# Patient Record
Sex: MALE | Race: WHITE | NOT HISPANIC OR LATINO | Employment: OTHER | URBAN - METROPOLITAN AREA
[De-identification: names, ages, dates, MRNs, and addresses within clinical notes are randomized per-mention and may not be internally consistent; named-entity substitution may affect disease eponyms.]

---

## 2023-01-01 ENCOUNTER — RADIATION ONCOLOGY WEEKLY ASSESSMENT (OUTPATIENT)
Dept: RADIATION ONCOLOGY | Facility: HOSPITAL | Age: 68
End: 2023-01-01
Payer: MEDICARE

## 2023-01-01 ENCOUNTER — APPOINTMENT (OUTPATIENT)
Dept: GENERAL RADIOLOGY | Facility: HOSPITAL | Age: 68
End: 2023-01-01
Payer: MEDICARE

## 2023-01-01 ENCOUNTER — HOSPITAL ENCOUNTER (OUTPATIENT)
Dept: RADIATION ONCOLOGY | Facility: HOSPITAL | Age: 68
Setting detail: RADIATION/ONCOLOGY SERIES
End: 2023-01-01
Payer: MEDICARE

## 2023-01-01 ENCOUNTER — TELEPHONE (OUTPATIENT)
Dept: RADIATION ONCOLOGY | Facility: HOSPITAL | Age: 68
End: 2023-01-01
Payer: MEDICARE

## 2023-01-01 ENCOUNTER — APPOINTMENT (OUTPATIENT)
Dept: RADIATION ONCOLOGY | Facility: HOSPITAL | Age: 68
End: 2023-01-01
Payer: MEDICARE

## 2023-01-01 ENCOUNTER — HOSPITAL ENCOUNTER (OUTPATIENT)
Facility: HOSPITAL | Age: 68
Setting detail: OBSERVATION
End: 2023-08-02
Attending: STUDENT IN AN ORGANIZED HEALTH CARE EDUCATION/TRAINING PROGRAM | Admitting: STUDENT IN AN ORGANIZED HEALTH CARE EDUCATION/TRAINING PROGRAM
Payer: MEDICARE

## 2023-01-01 ENCOUNTER — APPOINTMENT (OUTPATIENT)
Dept: CT IMAGING | Facility: HOSPITAL | Age: 68
End: 2023-01-01
Payer: MEDICARE

## 2023-01-01 VITALS
RESPIRATION RATE: 15 BRPM | HEIGHT: 70 IN | WEIGHT: 156.6 LBS | DIASTOLIC BLOOD PRESSURE: 63 MMHG | BODY MASS INDEX: 22.42 KG/M2 | OXYGEN SATURATION: 92 % | SYSTOLIC BLOOD PRESSURE: 101 MMHG | HEART RATE: 72 BPM | TEMPERATURE: 98.3 F

## 2023-01-01 VITALS
OXYGEN SATURATION: 97 % | HEART RATE: 112 BPM | SYSTOLIC BLOOD PRESSURE: 121 MMHG | RESPIRATION RATE: 24 BRPM | WEIGHT: 150 LBS | HEIGHT: 67 IN | DIASTOLIC BLOOD PRESSURE: 71 MMHG | BODY MASS INDEX: 23.54 KG/M2 | TEMPERATURE: 99.1 F

## 2023-01-01 VITALS
OXYGEN SATURATION: 95 % | SYSTOLIC BLOOD PRESSURE: 107 MMHG | RESPIRATION RATE: 24 BRPM | DIASTOLIC BLOOD PRESSURE: 69 MMHG | HEART RATE: 80 BPM

## 2023-01-01 DIAGNOSIS — C79.51 METASTASIS TO BONE: Primary | ICD-10-CM

## 2023-01-01 DIAGNOSIS — J96.91 RESPIRATORY FAILURE WITH HYPOXIA AND HYPERCAPNIA, UNSPECIFIED CHRONICITY: ICD-10-CM

## 2023-01-01 DIAGNOSIS — J96.92 RESPIRATORY FAILURE WITH HYPOXIA AND HYPERCAPNIA, UNSPECIFIED CHRONICITY: ICD-10-CM

## 2023-01-01 DIAGNOSIS — Z85.118 HISTORY OF LUNG CANCER: ICD-10-CM

## 2023-01-01 DIAGNOSIS — R41.82 ALTERED MENTAL STATUS, UNSPECIFIED ALTERED MENTAL STATUS TYPE: Primary | ICD-10-CM

## 2023-01-01 LAB
ALBUMIN SERPL-MCNC: 1.9 G/DL (ref 3.5–5.2)
ALBUMIN/GLOB SERPL: 0.5 G/DL
ALP SERPL-CCNC: 141 U/L (ref 39–117)
ALT SERPL W P-5'-P-CCNC: 48 U/L (ref 1–41)
ANION GAP SERPL CALCULATED.3IONS-SCNC: 6 MMOL/L (ref 5–15)
APTT PPP: 25.1 SECONDS (ref 61–76.5)
ARTERIAL PATENCY WRIST A: POSITIVE
AST SERPL-CCNC: 34 U/L (ref 1–40)
ATMOSPHERIC PRESS: ABNORMAL MM[HG]
BACTERIA UR QL AUTO: ABNORMAL /HPF
BASE EXCESS BLDA CALC-SCNC: 19.2 MMOL/L (ref 0–3)
BASOPHILS # BLD AUTO: 0 10*3/MM3 (ref 0–0.2)
BASOPHILS NFR BLD AUTO: 0 % (ref 0–1.5)
BDY SITE: ABNORMAL
BILIRUB SERPL-MCNC: <0.2 MG/DL (ref 0–1.2)
BILIRUB UR QL STRIP: NEGATIVE
BUN SERPL-MCNC: 23 MG/DL (ref 8–23)
BUN/CREAT SERPL: 76.7 (ref 7–25)
CALCIUM SPEC-SCNC: 7.7 MG/DL (ref 8.6–10.5)
CHLORIDE SERPL-SCNC: 101 MMOL/L (ref 98–107)
CLARITY UR: CLEAR
CO2 BLDA-SCNC: 49.6 MMOL/L (ref 22–29)
CO2 SERPL-SCNC: 35 MMOL/L (ref 22–29)
COLOR UR: YELLOW
CREAT SERPL-MCNC: 0.3 MG/DL (ref 0.76–1.27)
DEPRECATED RDW RBC AUTO: 64.8 FL (ref 37–54)
EGFRCR SERPLBLD CKD-EPI 2021: 130.4 ML/MIN/1.73
EOSINOPHIL # BLD AUTO: 0 10*3/MM3 (ref 0–0.4)
EOSINOPHIL NFR BLD AUTO: 0 % (ref 0.3–6.2)
ERYTHROCYTE [DISTWIDTH] IN BLOOD BY AUTOMATED COUNT: 20.6 % (ref 12.3–15.4)
GLOBULIN UR ELPH-MCNC: 3.9 GM/DL
GLUCOSE BLDC GLUCOMTR-MCNC: 173 MG/DL (ref 70–105)
GLUCOSE BLDC GLUCOMTR-MCNC: 183 MG/DL (ref 70–105)
GLUCOSE SERPL-MCNC: 141 MG/DL (ref 65–99)
GLUCOSE UR STRIP-MCNC: ABNORMAL MG/DL
HCO3 BLDA-SCNC: 47.1 MMOL/L (ref 21–28)
HCT VFR BLD AUTO: 26.2 % (ref 37.5–51)
HEMODILUTION: NO
HGB BLD-MCNC: 7.9 G/DL (ref 13–17.7)
HGB UR QL STRIP.AUTO: NEGATIVE
HYALINE CASTS UR QL AUTO: ABNORMAL /LPF
INHALED O2 CONCENTRATION: 33 %
INR PPP: 1.13 (ref 0.93–1.1)
KETONES UR QL STRIP: NEGATIVE
LEUKOCYTE ESTERASE UR QL STRIP.AUTO: ABNORMAL
LYMPHOCYTES # BLD AUTO: 0.6 10*3/MM3 (ref 0.7–3.1)
LYMPHOCYTES NFR BLD AUTO: 5.1 % (ref 19.6–45.3)
MCH RBC QN AUTO: 25.7 PG (ref 26.6–33)
MCHC RBC AUTO-ENTMCNC: 30.2 G/DL (ref 31.5–35.7)
MCV RBC AUTO: 85.2 FL (ref 79–97)
MODALITY: ABNORMAL
MONOCYTES # BLD AUTO: 0.5 10*3/MM3 (ref 0.1–0.9)
MONOCYTES NFR BLD AUTO: 4 % (ref 5–12)
MUCOUS THREADS URNS QL MICRO: ABNORMAL /HPF
NEUTROPHILS NFR BLD AUTO: 11.4 10*3/MM3 (ref 1.7–7)
NEUTROPHILS NFR BLD AUTO: 90.9 % (ref 42.7–76)
NITRITE UR QL STRIP: NEGATIVE
NRBC BLD AUTO-RTO: 0.3 /100 WBC (ref 0–0.2)
PCO2 BLDA: 79.1 MM HG (ref 35–48)
PH BLDA: 7.38 PH UNITS (ref 7.35–7.45)
PH UR STRIP.AUTO: 5.5 [PH] (ref 5–8)
PLATELET # BLD AUTO: 310 10*3/MM3 (ref 140–450)
PMV BLD AUTO: 7.8 FL (ref 6–12)
PO2 BLDA: 75 MM HG (ref 83–108)
POTASSIUM SERPL-SCNC: 3.8 MMOL/L (ref 3.5–5.2)
PROT SERPL-MCNC: 5.8 G/DL (ref 6–8.5)
PROT UR QL STRIP: ABNORMAL
PROTHROMBIN TIME: 12 SECONDS (ref 9.6–11.7)
RAD ONC ARIA COURSE ID: NORMAL
RAD ONC ARIA COURSE INTENT: NORMAL
RAD ONC ARIA COURSE LAST TREATMENT DATE: NORMAL
RAD ONC ARIA COURSE START DATE: NORMAL
RAD ONC ARIA COURSE TREATMENT ELAPSED DAYS: 19
RAD ONC ARIA FIRST TREATMENT DATE: NORMAL
RAD ONC ARIA PLAN FRACTIONS TREATED TO DATE: 8
RAD ONC ARIA PLAN FRACTIONS TREATED TO DATE: 8
RAD ONC ARIA PLAN ID: NORMAL
RAD ONC ARIA PLAN ID: NORMAL
RAD ONC ARIA PLAN NAME: NORMAL
RAD ONC ARIA PLAN NAME: NORMAL
RAD ONC ARIA PLAN PRESCRIBED DOSE PER FRACTION: 1.5 GY
RAD ONC ARIA PLAN PRESCRIBED DOSE PER FRACTION: 1.5 GY
RAD ONC ARIA PLAN PRIMARY REFERENCE POINT: NORMAL
RAD ONC ARIA PLAN PRIMARY REFERENCE POINT: NORMAL
RAD ONC ARIA PLAN TOTAL FRACTIONS PRESCRIBED: 10
RAD ONC ARIA PLAN TOTAL FRACTIONS PRESCRIBED: 10
RAD ONC ARIA PLAN TOTAL PRESCRIBED DOSE: 1500 CGY
RAD ONC ARIA PLAN TOTAL PRESCRIBED DOSE: 1500 CGY
RAD ONC ARIA REFERENCE POINT DOSAGE GIVEN TO DATE: 24 GY
RAD ONC ARIA REFERENCE POINT ID: NORMAL
RAD ONC ARIA REFERENCE POINT SESSION DOSAGE GIVEN: 3 GY
RBC # BLD AUTO: 3.07 10*6/MM3 (ref 4.14–5.8)
RBC # UR STRIP: ABNORMAL /HPF
REF LAB TEST METHOD: ABNORMAL
SAO2 % BLDCOA: 93.5 % (ref 94–98)
SODIUM SERPL-SCNC: 142 MMOL/L (ref 136–145)
SP GR UR STRIP: 1.02 (ref 1–1.03)
SQUAMOUS #/AREA URNS HPF: ABNORMAL /HPF
TROPONIN T SERPL HS-MCNC: 31 NG/L
UROBILINOGEN UR QL STRIP: ABNORMAL
WBC # UR STRIP: ABNORMAL /HPF
WBC NRBC COR # BLD: 12.5 10*3/MM3 (ref 3.4–10.8)

## 2023-01-01 PROCEDURE — 77290 THER RAD SIMULAJ FIELD CPLX: CPT | Performed by: RADIOLOGY

## 2023-01-01 PROCEDURE — 77280 THER RAD SIMULAJ FIELD SMPL: CPT | Performed by: STUDENT IN AN ORGANIZED HEALTH CARE EDUCATION/TRAINING PROGRAM

## 2023-01-01 PROCEDURE — 25010000002 MORPHINE PER 10 MG: Performed by: STUDENT IN AN ORGANIZED HEALTH CARE EDUCATION/TRAINING PROGRAM

## 2023-01-01 PROCEDURE — 77412 RADIATION TX DELIVERY LVL 3: CPT | Performed by: RADIOLOGY

## 2023-01-01 PROCEDURE — G0378 HOSPITAL OBSERVATION PER HR: HCPCS

## 2023-01-01 PROCEDURE — 82948 REAGENT STRIP/BLOOD GLUCOSE: CPT

## 2023-01-01 PROCEDURE — 77295 3-D RADIOTHERAPY PLAN: CPT | Performed by: RADIOLOGY

## 2023-01-01 PROCEDURE — 94799 UNLISTED PULMONARY SVC/PX: CPT

## 2023-01-01 PROCEDURE — 77412 RADIATION TX DELIVERY LVL 3: CPT | Performed by: STUDENT IN AN ORGANIZED HEALTH CARE EDUCATION/TRAINING PROGRAM

## 2023-01-01 PROCEDURE — 77427 RADIATION TX MANAGEMENT X5: CPT | Performed by: RADIOLOGY

## 2023-01-01 PROCEDURE — 96376 TX/PRO/DX INJ SAME DRUG ADON: CPT

## 2023-01-01 PROCEDURE — 25010000002 CEFTRIAXONE PER 250 MG: Performed by: NURSE PRACTITIONER

## 2023-01-01 PROCEDURE — 84484 ASSAY OF TROPONIN QUANT: CPT | Performed by: NURSE PRACTITIONER

## 2023-01-01 PROCEDURE — 96365 THER/PROPH/DIAG IV INF INIT: CPT

## 2023-01-01 PROCEDURE — 25010000002 NALOXONE HCL 2 MG/2ML SOLUTION PREFILLED SYRINGE: Performed by: NURSE PRACTITIONER

## 2023-01-01 PROCEDURE — 85025 COMPLETE CBC W/AUTO DIFF WBC: CPT | Performed by: NURSE PRACTITIONER

## 2023-01-01 PROCEDURE — 25010000002 LORAZEPAM PER 2 MG: Performed by: STUDENT IN AN ORGANIZED HEALTH CARE EDUCATION/TRAINING PROGRAM

## 2023-01-01 PROCEDURE — 94660 CPAP INITIATION&MGMT: CPT

## 2023-01-01 PROCEDURE — 77334 RADIATION TREATMENT AID(S): CPT | Performed by: RADIOLOGY

## 2023-01-01 PROCEDURE — 81001 URINALYSIS AUTO W/SCOPE: CPT | Performed by: NURSE PRACTITIONER

## 2023-01-01 PROCEDURE — 77300 RADIATION THERAPY DOSE PLAN: CPT | Performed by: RADIOLOGY

## 2023-01-01 PROCEDURE — 85730 THROMBOPLASTIN TIME PARTIAL: CPT | Performed by: NURSE PRACTITIONER

## 2023-01-01 PROCEDURE — 36415 COLL VENOUS BLD VENIPUNCTURE: CPT

## 2023-01-01 PROCEDURE — 96375 TX/PRO/DX INJ NEW DRUG ADDON: CPT

## 2023-01-01 PROCEDURE — 85610 PROTHROMBIN TIME: CPT | Performed by: NURSE PRACTITIONER

## 2023-01-01 PROCEDURE — 71045 X-RAY EXAM CHEST 1 VIEW: CPT

## 2023-01-01 PROCEDURE — 80053 COMPREHEN METABOLIC PANEL: CPT | Performed by: NURSE PRACTITIONER

## 2023-01-01 PROCEDURE — 82803 BLOOD GASES ANY COMBINATION: CPT

## 2023-01-01 PROCEDURE — 36600 WITHDRAWAL OF ARTERIAL BLOOD: CPT

## 2023-01-01 PROCEDURE — 99284 EMERGENCY DEPT VISIT MOD MDM: CPT

## 2023-01-01 PROCEDURE — 70450 CT HEAD/BRAIN W/O DYE: CPT

## 2023-01-01 PROCEDURE — 87040 BLOOD CULTURE FOR BACTERIA: CPT | Performed by: NURSE PRACTITIONER

## 2023-01-01 RX ORDER — MIDODRINE HYDROCHLORIDE 10 MG/1
10 TABLET ORAL
COMMUNITY

## 2023-01-01 RX ORDER — LORAZEPAM 2 MG/ML
0.5 CONCENTRATE ORAL
Status: DISCONTINUED | OUTPATIENT
Start: 2023-01-01 | End: 2023-01-01 | Stop reason: HOSPADM

## 2023-01-01 RX ORDER — ACETAMINOPHEN 160 MG/5ML
650 SOLUTION ORAL EVERY 4 HOURS PRN
Status: DISCONTINUED | OUTPATIENT
Start: 2023-01-01 | End: 2023-01-01 | Stop reason: HOSPADM

## 2023-01-01 RX ORDER — SCOLOPAMINE TRANSDERMAL SYSTEM 1 MG/1
1 PATCH, EXTENDED RELEASE TRANSDERMAL
Status: DISCONTINUED | OUTPATIENT
Start: 2023-01-01 | End: 2023-01-01 | Stop reason: HOSPADM

## 2023-01-01 RX ORDER — LORAZEPAM 2 MG/ML
2 INJECTION INTRAMUSCULAR
Status: DISCONTINUED | OUTPATIENT
Start: 2023-01-01 | End: 2023-01-01 | Stop reason: HOSPADM

## 2023-01-01 RX ORDER — SODIUM CHLORIDE 9 MG/ML
40 INJECTION, SOLUTION INTRAVENOUS AS NEEDED
Status: DISCONTINUED | OUTPATIENT
Start: 2023-01-01 | End: 2023-01-01 | Stop reason: HOSPADM

## 2023-01-01 RX ORDER — LORAZEPAM 2 MG/ML
1 CONCENTRATE ORAL
Status: DISCONTINUED | OUTPATIENT
Start: 2023-01-01 | End: 2023-01-01 | Stop reason: HOSPADM

## 2023-01-01 RX ORDER — LORAZEPAM 0.5 MG/1
0.5 TABLET ORAL EVERY 8 HOURS PRN
COMMUNITY

## 2023-01-01 RX ORDER — HALOPERIDOL 1 MG/1
1 TABLET ORAL EVERY 4 HOURS PRN
Status: DISCONTINUED | OUTPATIENT
Start: 2023-01-01 | End: 2023-01-01 | Stop reason: HOSPADM

## 2023-01-01 RX ORDER — LORAZEPAM 2 MG/ML
1 INJECTION INTRAMUSCULAR
Status: DISCONTINUED | OUTPATIENT
Start: 2023-01-01 | End: 2023-01-01 | Stop reason: HOSPADM

## 2023-01-01 RX ORDER — GLYCOPYRROLATE 0.2 MG/ML
0.4 INJECTION INTRAMUSCULAR; INTRAVENOUS
Status: DISCONTINUED | OUTPATIENT
Start: 2023-01-01 | End: 2023-01-01 | Stop reason: HOSPADM

## 2023-01-01 RX ORDER — LORAZEPAM 2 MG/ML
0.5 INJECTION INTRAMUSCULAR
Status: DISCONTINUED | OUTPATIENT
Start: 2023-01-01 | End: 2023-01-01 | Stop reason: HOSPADM

## 2023-01-01 RX ORDER — CARBOXYMETHYLCELLULOSE SODIUM 10 MG/ML
1 GEL OPHTHALMIC
Status: DISCONTINUED | OUTPATIENT
Start: 2023-01-01 | End: 2023-01-01 | Stop reason: HOSPADM

## 2023-01-01 RX ORDER — DIPHENOXYLATE HYDROCHLORIDE AND ATROPINE SULFATE 2.5; .025 MG/1; MG/1
1 TABLET ORAL
Status: DISCONTINUED | OUTPATIENT
Start: 2023-01-01 | End: 2023-01-01 | Stop reason: HOSPADM

## 2023-01-01 RX ORDER — NICOTINE POLACRILEX 4 MG
LOZENGE BUCCAL
COMMUNITY

## 2023-01-01 RX ORDER — GLYCOPYRROLATE 0.2 MG/ML
0.2 INJECTION INTRAMUSCULAR; INTRAVENOUS
Status: DISCONTINUED | OUTPATIENT
Start: 2023-01-01 | End: 2023-01-01 | Stop reason: HOSPADM

## 2023-01-01 RX ORDER — NALOXONE HYDROCHLORIDE 1 MG/ML
2 INJECTION INTRAMUSCULAR; INTRAVENOUS; SUBCUTANEOUS ONCE
Status: COMPLETED | OUTPATIENT
Start: 2023-01-01 | End: 2023-01-01

## 2023-01-01 RX ORDER — SODIUM CHLORIDE 0.9 % (FLUSH) 0.9 %
10 SYRINGE (ML) INJECTION EVERY 12 HOURS SCHEDULED
Status: DISCONTINUED | OUTPATIENT
Start: 2023-01-01 | End: 2023-01-01 | Stop reason: HOSPADM

## 2023-01-01 RX ORDER — LORAZEPAM 1 MG/1
1 TABLET ORAL
Status: DISCONTINUED | OUTPATIENT
Start: 2023-01-01 | End: 2023-01-01 | Stop reason: HOSPADM

## 2023-01-01 RX ORDER — ACETAMINOPHEN 650 MG/1
650 SUPPOSITORY RECTAL EVERY 4 HOURS PRN
Status: DISCONTINUED | OUTPATIENT
Start: 2023-01-01 | End: 2023-01-01 | Stop reason: HOSPADM

## 2023-01-01 RX ORDER — SODIUM CHLORIDE 0.9 % (FLUSH) 0.9 %
10 SYRINGE (ML) INJECTION AS NEEDED
Status: DISCONTINUED | OUTPATIENT
Start: 2023-01-01 | End: 2023-01-01

## 2023-01-01 RX ORDER — LORAZEPAM 2 MG/ML
2 CONCENTRATE ORAL
Status: DISCONTINUED | OUTPATIENT
Start: 2023-01-01 | End: 2023-01-01 | Stop reason: HOSPADM

## 2023-01-01 RX ORDER — MORPHINE SULFATE 20 MG/ML
20 SOLUTION ORAL
Status: DISCONTINUED | OUTPATIENT
Start: 2023-01-01 | End: 2023-01-01 | Stop reason: HOSPADM

## 2023-01-01 RX ORDER — SODIUM CHLORIDE 0.9 % (FLUSH) 0.9 %
10 SYRINGE (ML) INJECTION AS NEEDED
Status: DISCONTINUED | OUTPATIENT
Start: 2023-01-01 | End: 2023-01-01 | Stop reason: HOSPADM

## 2023-01-01 RX ORDER — IPRATROPIUM BROMIDE AND ALBUTEROL SULFATE 2.5; .5 MG/3ML; MG/3ML
3 SOLUTION RESPIRATORY (INHALATION) 3 TIMES DAILY
COMMUNITY

## 2023-01-01 RX ORDER — HALOPERIDOL 5 MG/ML
1 INJECTION INTRAMUSCULAR EVERY 4 HOURS PRN
Status: DISCONTINUED | OUTPATIENT
Start: 2023-01-01 | End: 2023-01-01 | Stop reason: HOSPADM

## 2023-01-01 RX ORDER — ACETAMINOPHEN 325 MG/1
650 TABLET ORAL EVERY 4 HOURS PRN
Status: DISCONTINUED | OUTPATIENT
Start: 2023-01-01 | End: 2023-01-01 | Stop reason: HOSPADM

## 2023-01-01 RX ORDER — LORAZEPAM 0.5 MG/1
0.5 TABLET ORAL
Status: DISCONTINUED | OUTPATIENT
Start: 2023-01-01 | End: 2023-01-01 | Stop reason: HOSPADM

## 2023-01-01 RX ORDER — LORAZEPAM 1 MG/1
2 TABLET ORAL
Status: DISCONTINUED | OUTPATIENT
Start: 2023-01-01 | End: 2023-01-01 | Stop reason: HOSPADM

## 2023-01-01 RX ORDER — METOPROLOL TARTRATE 75 MG/1
1 TABLET, FILM COATED ORAL 2 TIMES DAILY
COMMUNITY

## 2023-01-01 RX ADMIN — LORAZEPAM 2 MG: 2 INJECTION INTRAMUSCULAR; INTRAVENOUS at 00:03

## 2023-01-01 RX ADMIN — Medication 10 ML: at 02:51

## 2023-01-01 RX ADMIN — MORPHINE SULFATE 6 MG: 4 INJECTION, SOLUTION INTRAMUSCULAR; INTRAVENOUS at 00:02

## 2023-01-01 RX ADMIN — LORAZEPAM 2 MG: 2 INJECTION INTRAMUSCULAR; INTRAVENOUS at 02:51

## 2023-01-01 RX ADMIN — CEFTRIAXONE 1000 MG: 1 INJECTION, POWDER, FOR SOLUTION INTRAMUSCULAR; INTRAVENOUS at 17:40

## 2023-01-01 RX ADMIN — LORAZEPAM 2 MG: 2 INJECTION INTRAMUSCULAR; INTRAVENOUS at 01:18

## 2023-01-01 RX ADMIN — MORPHINE SULFATE 6 MG: 4 INJECTION, SOLUTION INTRAMUSCULAR; INTRAVENOUS at 02:51

## 2023-01-01 RX ADMIN — NALOXONE HYDROCHLORIDE 2 MG: 1 INJECTION PARENTERAL at 14:38

## 2023-01-01 RX ADMIN — LORAZEPAM 0.5 MG: 2 INJECTION INTRAMUSCULAR; INTRAVENOUS at 19:23

## 2023-01-01 RX ADMIN — MORPHINE SULFATE 6 MG: 4 INJECTION, SOLUTION INTRAMUSCULAR; INTRAVENOUS at 19:13

## 2023-01-01 RX ADMIN — MORPHINE SULFATE 6 MG: 4 INJECTION, SOLUTION INTRAMUSCULAR; INTRAVENOUS at 01:18

## 2023-01-01 RX ADMIN — Medication 10 ML: at 01:18

## 2023-01-01 RX ADMIN — Medication 10 ML: at 00:03

## 2023-01-01 RX ADMIN — MORPHINE SULFATE 6 MG: 4 INJECTION, SOLUTION INTRAMUSCULAR; INTRAVENOUS at 21:29

## 2023-04-30 ENCOUNTER — APPOINTMENT (OUTPATIENT)
Dept: GENERAL RADIOLOGY | Facility: HOSPITAL | Age: 68
End: 2023-04-30
Payer: MEDICARE

## 2023-04-30 ENCOUNTER — HOSPITAL ENCOUNTER (EMERGENCY)
Facility: HOSPITAL | Age: 68
Discharge: HOME | End: 2023-04-30
Attending: EMERGENCY MEDICINE | Admitting: EMERGENCY MEDICINE
Payer: MEDICARE

## 2023-04-30 VITALS
OXYGEN SATURATION: 95 % | HEART RATE: 96 BPM | RESPIRATION RATE: 18 BRPM | HEIGHT: 70 IN | BODY MASS INDEX: 23.48 KG/M2 | DIASTOLIC BLOOD PRESSURE: 86 MMHG | WEIGHT: 164.02 LBS | TEMPERATURE: 98.6 F | SYSTOLIC BLOOD PRESSURE: 141 MMHG

## 2023-04-30 DIAGNOSIS — S76.011A HIP STRAIN, RIGHT, INITIAL ENCOUNTER: Primary | ICD-10-CM

## 2023-04-30 DIAGNOSIS — S70.01XA CONTUSION OF RIGHT HIP, INITIAL ENCOUNTER: ICD-10-CM

## 2023-04-30 PROCEDURE — 99283 EMERGENCY DEPT VISIT LOW MDM: CPT

## 2023-04-30 PROCEDURE — 72110 X-RAY EXAM L-2 SPINE 4/>VWS: CPT

## 2023-04-30 PROCEDURE — 73502 X-RAY EXAM HIP UNI 2-3 VIEWS: CPT

## 2023-04-30 RX ORDER — LIDOCAINE 50 MG/G
1 PATCH TOPICAL EVERY 24 HOURS
Qty: 30 EACH | Refills: 0 | Status: SHIPPED | OUTPATIENT
Start: 2023-04-30

## 2023-04-30 RX ORDER — NAPROXEN 500 MG/1
500 TABLET ORAL 2 TIMES DAILY PRN
Qty: 30 TABLET | Refills: 0 | Status: SHIPPED | OUTPATIENT
Start: 2023-04-30

## 2023-04-30 RX ORDER — METHOCARBAMOL 750 MG/1
750 TABLET, FILM COATED ORAL ONCE
Status: COMPLETED | OUTPATIENT
Start: 2023-04-30 | End: 2023-04-30

## 2023-04-30 RX ORDER — LIDOCAINE 50 MG/G
1 PATCH TOPICAL
Status: DISCONTINUED | OUTPATIENT
Start: 2023-04-30 | End: 2023-04-30 | Stop reason: HOSPADM

## 2023-04-30 RX ORDER — HYDROCODONE BITARTRATE AND ACETAMINOPHEN 5; 325 MG/1; MG/1
1 TABLET ORAL ONCE
Status: COMPLETED | OUTPATIENT
Start: 2023-04-30 | End: 2023-04-30

## 2023-04-30 RX ADMIN — METHOCARBAMOL 750 MG: 750 TABLET ORAL at 11:19

## 2023-04-30 RX ADMIN — LIDOCAINE 1 PATCH: 50 PATCH TOPICAL at 11:19

## 2023-04-30 RX ADMIN — HYDROCODONE BITARTRATE AND ACETAMINOPHEN 1 TABLET: 5; 325 TABLET ORAL at 11:19

## 2023-04-30 NOTE — DISCHARGE INSTRUCTIONS
Please use lidocaine patch for local relief, if these are too expensive please use over-the-counter formulations instead.  Please alternate between Tylenol and naproxen for pain control, please follow-up with orthopedic surgery,  above.

## 2023-04-30 NOTE — Clinical Note
[facility] EMERGENCY DEPARTMENT  [address] [place] [address]  Phone: [phone]    Isra Davenport was seen and treated in our emergency department on 4/30/2023.  He may return to work on 05/01/2023.         Thank you for choosing [facility].    Timi You PA

## 2023-04-30 NOTE — ED PROVIDER NOTES
Subjective   History of Present Illness     Patient is a 67-year-old male comes in complaining of fall and apparent right hip injury 1 week ago.  Patient states he fell about 4 feet onto his right hip after tripping over a screw that was sticking up out of the floor.  Patient states he hit his head at that time as well 1 week ago.  Patient denied any loss of consciousness, vomiting or syncopal episode prior.  Patient states pain is about a 6 out of 10 and nothing seems to make it better or worse.  Patient denies any saddle anesthesia, numbness or tingling bilateral lower extremities, urinary or bowel dysfunction.    Review of Systems   Constitutional: Negative for chills, fatigue and fever.   HENT: Negative for congestion, sore throat, tinnitus and trouble swallowing.    Eyes: Negative for photophobia, discharge and visual disturbance.   Respiratory: Negative for cough and shortness of breath.    Cardiovascular: Negative for chest pain and leg swelling.   Gastrointestinal: Negative for abdominal pain, diarrhea, nausea and vomiting.   Genitourinary: Negative for dysuria, flank pain, frequency and urgency.   Musculoskeletal: Negative for arthralgias and myalgias.        Right hip pain   Skin: Negative for rash.   Neurological: Negative for dizziness, syncope, light-headedness and headaches.   Psychiatric/Behavioral: Negative for confusion.       History reviewed. No pertinent past medical history.    Allergies   Allergen Reactions   • Ibuprofen Itching   • Penicillins Hives       History reviewed. No pertinent surgical history.    History reviewed. No pertinent family history.    Social History     Socioeconomic History   • Marital status:            Objective   Physical Exam  Vitals and nursing note reviewed.   Constitutional:       General: He is not in acute distress.     Appearance: He is well-developed. He is not diaphoretic.   HENT:      Head: Normocephalic and atraumatic.      Right Ear: External ear  "normal.      Left Ear: External ear normal.      Nose: Nose normal.      Mouth/Throat:      Mouth: Mucous membranes are moist.   Eyes:      Extraocular Movements: Extraocular movements intact.      Conjunctiva/sclera: Conjunctivae normal.      Pupils: Pupils are equal, round, and reactive to light.   Cardiovascular:      Rate and Rhythm: Normal rate and regular rhythm.      Pulses: Normal pulses.      Heart sounds: Normal heart sounds.      Comments: S1, S2 audible.  Pulmonary:      Effort: Pulmonary effort is normal. No respiratory distress.      Breath sounds: Normal breath sounds. No wheezing, rhonchi or rales.      Comments: On room air.  Abdominal:      General: Bowel sounds are normal. There is no distension.      Palpations: Abdomen is soft.      Tenderness: There is no abdominal tenderness. There is no guarding or rebound.   Musculoskeletal:         General: Tenderness present. No swelling, deformity or signs of injury.      Cervical back: Normal range of motion.      Right lower leg: No edema.      Left lower leg: No edema.      Comments: Patient has mild tenderness over right hip.  Patient has decreased range of motion of right hip secondary to pain.   Skin:     General: Skin is warm.      Capillary Refill: Capillary refill takes less than 2 seconds.      Findings: No erythema or rash.   Neurological:      Mental Status: He is alert and oriented to person, place, and time.      Cranial Nerves: No cranial nerve deficit.   Psychiatric:         Mood and Affect: Mood normal.         Behavior: Behavior normal.         Procedures           ED Course      /86   Pulse 96   Temp 98.6 °F (37 °C)   Resp 18   Ht 177.8 cm (70\")   Wt 74.4 kg (164 lb 0.4 oz)   SpO2 95%   BMI 23.53 kg/m²   Labs Reviewed - No data to display  XR Spine Lumbar Complete 4+VW    Result Date: 4/30/2023  Impression: 1. Negative for acute fracture. 2. Chronic compression fracture at L1. 3. Status post thoracolumbar fixation with " "visualized hardware unchanged. Electronically Signed: Edenilson Harvey  4/30/2023 11:36 AM EDT  Workstation ID: [id number]    XR Hip With or Without Pelvis 2 - 3 View Right    Result Date: 4/30/2023  Impression: Negative for fracture. Electronically Signed: Edenilson Harvey  4/30/2023 11:33 AM EDT  Workstation ID: [id number]                                         MDM       Differential diagnosis: right hip fracture, low back strain, not meant to be an all-inclusive list    Chart review: Upon chart review, shows patient was seen by Dr. Velasquez for follow-up of abdominal pain and possible urology consult.    EKG: Not indicated     Imaging: X-ray right hip independently interpreted by myself shows no obvious findings of fracture or dislocation.  Official radiology read shows negative for fracture.  X-ray lumbar spine is negative for fracture.  Chronic compression fracture at L1.  Status post thoracolumbar fixation with visualized hardware unchanged.    Labs: Lab work not indicated    Vitals:  /86   Pulse 96   Temp 98.6 °F (37 °C)   Resp 18   Ht 177.8 cm (70\")   Wt 74.4 kg (164 lb 0.4 oz)   SpO2 95%   BMI 23.53 kg/m²     Medications given:    Medications   methocarbamol (ROBAXIN) tablet 750 mg (750 mg Oral Given 4/30/23 1119)   HYDROcodone-acetaminophen (NORCO) 5-325 MG per tablet 1 tablet (1 tablet Oral Given 4/30/23 1119)       Procedures:  Not indicated  MDM: Patient is a 67-year-old male comes in complaining of apparent fall and residual right hip pain that occurred about a week ago.  Patient appears in no acute distress on initial and reevaluation.  X-ray right hip independently interpreted by myself shows no obvious findings of fracture or dislocation.  Official radiology read shows negative for fracture.  X-ray lumbar spine is negative for fracture.  Chronic compression fracture at L1.  Status post thoracolumbar fixation with visualized hardware unchanged.  Patient was given Robaxin and lidocaine patch as well as " Norco here in the ER.  Patient has reported ibuprofen allergy however patient reports that he has taken Aleve in the past without issue.  Patient already has a crutch at bedside and was offered a walker however patient states he already has this at home.  Patient able to ambulate with a crutch on discharge and instructed to follow-up with orthopedic surgery if symptoms persist and voiced understanding. See full discharge instructions for further details.  Results and plan discussed with patient and is agreeable with plan.    Final diagnoses:   Hip strain, right, initial encounter   Contusion of right hip, initial encounter       ED Disposition  ED Disposition     ED Disposition   Discharge    Condition   Stable    Comment   --             [facility] EMERGENCY DEPARTMENT  [address] [facility] [address] [phone]  Go in 1 day  As needed, If symptoms worsen    PATIENT CONNECTION - [facility] [id number] [phone]  Call in 1 week  As needed    Phillip Garcia MD  [address] [facility] [place] [address] [phone]    Schedule an appointment as soon as possible for a visit in 1 week  As needed, If symptoms worsen         Medication List      New Prescriptions    lidocaine 5 %  Commonly known as: LIDODERM  Place 1 patch on the skin as directed by provider Daily. Remove & Discard patch within 12 hours or as directed by MD     naproxen 500 MG EC tablet  Commonly known as: EC NAPROSYN  Take 1 tablet by mouth 2 (Two) Times a Day As Needed for Mild Pain.           Where to Get Your Medications      These medications were sent to [id number] - [place], [place] - [address] [facility] AT [facility] [address] - [phone] [facility] [phone]   [address] [place] [address]    Phone: [phone]   · lidocaine 5 %  · naproxen 500 MG EC tablet          Timi You PA  04/30/23 7812

## 2023-06-16 ENCOUNTER — ANESTHESIA EVENT (OUTPATIENT)
Dept: PERIOP | Facility: HOSPITAL | Age: 68
End: 2023-06-16
Payer: MEDICARE

## 2023-06-16 PROBLEM — S72.001A CLOSED FRACTURE OF RIGHT HIP, INITIAL ENCOUNTER: Status: ACTIVE | Noted: 2023-01-01

## 2023-06-16 PROBLEM — S72.001A CLOSED FRACTURE OF RIGHT HIP: Status: ACTIVE | Noted: 2023-01-01

## 2023-06-16 NOTE — ANESTHESIA PREPROCEDURE EVALUATION
Anesthesia Evaluation     Patient summary reviewed and Nursing notes reviewed   no history of anesthetic complications:  NPO Solid Status: > 8 hours  NPO Liquid Status: > 2 hours           Airway   Dental      Pulmonary    (+) pneumonia , a smoker Current,   Cardiovascular     ECG reviewed    (+) dysrhythmias Tachycardia, PAC,       Neuro/Psych  GI/Hepatic/Renal/Endo      Musculoskeletal     Abdominal    Substance History      OB/GYN          Other   arthritis, blood dyscrasia anemia,     ROS/Med Hx Other: Additional History:  Hip fx, low albumin    ?neoplasm    PSH:  BACK SURGERY LEG SURGERY                 Anesthesia Plan    ASA 3     general with block and ERAS Protocol   total IV anesthesia  (Patient identified; pre-operative vital signs, all relevant labs/studies, complete medical/surgical/anesthetic history, full medication list, full allergy list, and NPO status obtained/reviewed; physical assessment performed; anesthetic options, side effects, potential complications, risks, and benefits discussed; questions answered; written anesthesia consent obtained; patient cleared for procedure; anesthesia machine and equipment checked and functioning)  intravenous induction     Anesthetic plan, risks, benefits, and alternatives have been provided, discussed and informed consent has been obtained with: patient.    Plan discussed with CRNA and CAA.        CODE STATUS:    Level Of Support Discussed With: Patient  Code Status (Patient has no pulse and is not breathing): CPR (Attempt to Resuscitate)  Medical Interventions (Patient has pulse or is breathing): Full Support

## 2023-06-17 ENCOUNTER — ANESTHESIA (OUTPATIENT)
Dept: PERIOP | Facility: HOSPITAL | Age: 68
End: 2023-06-17
Payer: MEDICARE

## 2023-06-17 PROCEDURE — 25010000002 MIDAZOLAM PER 1 MG: Performed by: ANESTHESIOLOGY

## 2023-06-17 PROCEDURE — 25010000002 CEFAZOLIN PER 500 MG: Performed by: ORTHOPAEDIC SURGERY

## 2023-06-17 PROCEDURE — 25010000002 ONDANSETRON PER 1 MG: Performed by: ANESTHESIOLOGIST ASSISTANT

## 2023-06-17 PROCEDURE — 25010000002 ROPIVACAINE PER 1 MG: Performed by: ANESTHESIOLOGY

## 2023-06-17 PROCEDURE — 25010000002 FENTANYL CITRATE (PF) 100 MCG/2ML SOLUTION: Performed by: ANESTHESIOLOGIST ASSISTANT

## 2023-06-17 PROCEDURE — 25010000002 PROPOFOL 1000 MG/100ML EMULSION: Performed by: ANESTHESIOLOGIST ASSISTANT

## 2023-06-17 PROCEDURE — 25010000002 DEXAMETHASONE PER 1 MG: Performed by: ANESTHESIOLOGY

## 2023-06-17 RX ORDER — ROCURONIUM BROMIDE 10 MG/ML
INJECTION, SOLUTION INTRAVENOUS AS NEEDED
Status: DISCONTINUED | OUTPATIENT
Start: 2023-06-17 | End: 2023-06-17 | Stop reason: SURG

## 2023-06-17 RX ORDER — ACETAMINOPHEN 500 MG
TABLET ORAL AS NEEDED
Status: DISCONTINUED | OUTPATIENT
Start: 2023-06-17 | End: 2023-06-17 | Stop reason: SURG

## 2023-06-17 RX ORDER — MIDAZOLAM HYDROCHLORIDE 1 MG/ML
INJECTION INTRAMUSCULAR; INTRAVENOUS AS NEEDED
Status: DISCONTINUED | OUTPATIENT
Start: 2023-06-17 | End: 2023-06-17 | Stop reason: SURG

## 2023-06-17 RX ORDER — ROPIVACAINE HYDROCHLORIDE 5 MG/ML
INJECTION, SOLUTION EPIDURAL; INFILTRATION; PERINEURAL
Status: COMPLETED | OUTPATIENT
Start: 2023-06-17 | End: 2023-06-17

## 2023-06-17 RX ORDER — ONDANSETRON 2 MG/ML
INJECTION INTRAMUSCULAR; INTRAVENOUS AS NEEDED
Status: DISCONTINUED | OUTPATIENT
Start: 2023-06-17 | End: 2023-06-17 | Stop reason: SURG

## 2023-06-17 RX ORDER — EPHEDRINE SULFATE 5 MG/ML
INJECTION INTRAVENOUS AS NEEDED
Status: DISCONTINUED | OUTPATIENT
Start: 2023-06-17 | End: 2023-06-17 | Stop reason: SURG

## 2023-06-17 RX ORDER — LIDOCAINE HYDROCHLORIDE 10 MG/ML
INJECTION, SOLUTION EPIDURAL; INFILTRATION; INTRACAUDAL; PERINEURAL AS NEEDED
Status: DISCONTINUED | OUTPATIENT
Start: 2023-06-17 | End: 2023-06-17 | Stop reason: SURG

## 2023-06-17 RX ORDER — PHENYLEPHRINE HCL IN 0.9% NACL 1 MG/10 ML
SYRINGE (ML) INTRAVENOUS AS NEEDED
Status: DISCONTINUED | OUTPATIENT
Start: 2023-06-17 | End: 2023-06-17 | Stop reason: SURG

## 2023-06-17 RX ORDER — OXYCODONE HCL 10 MG/1
TABLET, FILM COATED, EXTENDED RELEASE ORAL AS NEEDED
Status: DISCONTINUED | OUTPATIENT
Start: 2023-06-17 | End: 2023-06-17 | Stop reason: SURG

## 2023-06-17 RX ORDER — GLYCOPYRROLATE 0.2 MG/ML
INJECTION INTRAMUSCULAR; INTRAVENOUS AS NEEDED
Status: DISCONTINUED | OUTPATIENT
Start: 2023-06-17 | End: 2023-06-17 | Stop reason: SURG

## 2023-06-17 RX ORDER — TRANEXAMIC ACID 10 MG/ML
INJECTION, SOLUTION INTRAVENOUS AS NEEDED
Status: DISCONTINUED | OUTPATIENT
Start: 2023-06-17 | End: 2023-06-17 | Stop reason: SURG

## 2023-06-17 RX ORDER — DEXAMETHASONE SODIUM PHOSPHATE 4 MG/ML
INJECTION, SOLUTION INTRA-ARTICULAR; INTRALESIONAL; INTRAMUSCULAR; INTRAVENOUS; SOFT TISSUE
Status: COMPLETED | OUTPATIENT
Start: 2023-06-17 | End: 2023-06-17

## 2023-06-17 RX ORDER — FENTANYL CITRATE 50 UG/ML
INJECTION, SOLUTION INTRAMUSCULAR; INTRAVENOUS AS NEEDED
Status: DISCONTINUED | OUTPATIENT
Start: 2023-06-17 | End: 2023-06-17 | Stop reason: SURG

## 2023-06-17 RX ORDER — NEOSTIGMINE METHYLSULFATE 5 MG/5 ML
SYRINGE (ML) INTRAVENOUS AS NEEDED
Status: DISCONTINUED | OUTPATIENT
Start: 2023-06-17 | End: 2023-06-17 | Stop reason: SURG

## 2023-06-17 RX ORDER — PROPOFOL 10 MG/ML
INJECTION, EMULSION INTRAVENOUS AS NEEDED
Status: DISCONTINUED | OUTPATIENT
Start: 2023-06-17 | End: 2023-06-17 | Stop reason: SURG

## 2023-06-17 RX ADMIN — Medication 150 MCG: at 09:25

## 2023-06-17 RX ADMIN — SODIUM CHLORIDE, SODIUM LACTATE, POTASSIUM CHLORIDE, AND CALCIUM CHLORIDE: .6; .31; .03; .02 INJECTION, SOLUTION INTRAVENOUS at 08:56

## 2023-06-17 RX ADMIN — DEXAMETHASONE SODIUM PHOSPHATE 4 MG: 4 INJECTION, SOLUTION INTRAMUSCULAR; INTRAVENOUS at 08:42

## 2023-06-17 RX ADMIN — SODIUM CHLORIDE, SODIUM LACTATE, POTASSIUM CHLORIDE, AND CALCIUM CHLORIDE: .6; .31; .03; .02 INJECTION, SOLUTION INTRAVENOUS at 08:42

## 2023-06-17 RX ADMIN — PROPOFOL INJECTABLE EMULSION 100 MG: 10 INJECTION, EMULSION INTRAVENOUS at 09:01

## 2023-06-17 RX ADMIN — TRANEXAMIC ACID 1000 MG: 10 INJECTION, SOLUTION INTRAVENOUS at 09:06

## 2023-06-17 RX ADMIN — Medication 3 MG: at 09:32

## 2023-06-17 RX ADMIN — FENTANYL CITRATE 50 MCG: 50 INJECTION, SOLUTION INTRAMUSCULAR; INTRAVENOUS at 09:01

## 2023-06-17 RX ADMIN — ONDANSETRON 4 MG: 2 INJECTION INTRAMUSCULAR; INTRAVENOUS at 09:29

## 2023-06-17 RX ADMIN — EPHEDRINE SULFATE 10 MG: 5 INJECTION INTRAVENOUS at 09:13

## 2023-06-17 RX ADMIN — ACETAMINOPHEN 1000 MG: 500 TABLET, FILM COATED ORAL at 08:36

## 2023-06-17 RX ADMIN — ROPIVACAINE HYDROCHLORIDE 30 ML: 5 INJECTION EPIDURAL; INFILTRATION; PERINEURAL at 08:42

## 2023-06-17 RX ADMIN — Medication 2 MG: at 09:37

## 2023-06-17 RX ADMIN — GLYCOPYRROLATE 0.4 MG: 0.2 INJECTION INTRAMUSCULAR; INTRAVENOUS at 09:37

## 2023-06-17 RX ADMIN — ROCURONIUM BROMIDE 30 MG: 10 INJECTION, SOLUTION INTRAVENOUS at 09:01

## 2023-06-17 RX ADMIN — PROPOFOL INJECTABLE EMULSION 150 MCG/KG/MIN: 10 INJECTION, EMULSION INTRAVENOUS at 09:06

## 2023-06-17 RX ADMIN — GLYCOPYRROLATE 0.4 MG: 0.2 INJECTION INTRAMUSCULAR; INTRAVENOUS at 09:32

## 2023-06-17 RX ADMIN — LIDOCAINE HYDROCHLORIDE 20 MG: 10 INJECTION, SOLUTION EPIDURAL; INFILTRATION; INTRACAUDAL; PERINEURAL at 09:01

## 2023-06-17 RX ADMIN — OXYCODONE HYDROCHLORIDE 10 MG: 10 TABLET, FILM COATED, EXTENDED RELEASE ORAL at 08:36

## 2023-06-17 RX ADMIN — MIDAZOLAM 2 MG: 1 INJECTION INTRAMUSCULAR; INTRAVENOUS at 08:38

## 2023-06-17 RX ADMIN — CEFAZOLIN 2 G: 2 INJECTION, POWDER, FOR SOLUTION INTRAMUSCULAR; INTRAVENOUS at 08:56

## 2023-06-17 NOTE — ANESTHESIA PROCEDURE NOTES
Peripheral IV    Start time: 6/17/2023 9:20 AM  End time: 6/17/2023 9:20 AM  Line placed for Fluids/Medication Admin and Sedation.  Performed By   CRNA/CAA: Li Vasquez CAA  Preanesthetic Checklist  Completed: patient identified, IV checked, site marked, risks and benefits discussed, surgical consent, monitors and equipment checked, pre-op evaluation and timeout performed  Peripheral IV Prep   Patient position: supine   Prep: ChloraPrep  Patient monitoring: heart rate, cardiac monitor and continuous pulse ox  Peripheral IV Procedure   Laterality:left  Location:  Hand  Catheter size: 20 G          Post Assessment   Dressing Type: tape and transparent.    IV Dressing/Site: clean, dry and intact  Additional Notes  Existing IV occluded by positioning.  Unable to adjust to flow adequately, so placed new one

## 2023-06-17 NOTE — ANESTHESIA PROCEDURE NOTES
Peripheral Block    Pre-sedation assessment completed: 6/17/2023 8:30 AM    Patient reassessed immediately prior to procedure    Patient location during procedure: pre-op  Reason for block: procedure for pain, at surgeon's request, post-op pain management and secondary anesthetic  Performed by  Anesthesiologist: Tutu Rodriguez MD  Preanesthetic Checklist  Completed: patient identified, IV checked, site marked, risks and benefits discussed, surgical consent, monitors and equipment checked, pre-op evaluation and timeout performed  Prep:  Pt Position: supine  Sterile barriers:cap, gloves, mask and washed/disinfected hands  Prep: ChloraPrep  Patient monitoring: blood pressure monitoring, continuous pulse oximetry and EKG  Procedure    Sedation: yes  Performed under: local infiltration  Guidance:ultrasound guided and landmark technique    ULTRASOUND INTERPRETATION.  Using ultrasound guidance a 20 G gauge needle was placed in close proximity to the femoral nerve, at which point, under ultrasound guidance anesthetic was injected in the area of the nerve and spread of the anesthesia was seen on ultrasound in close proximity thereto.  There were no abnormalities seen on ultrasound; a digital image was taken; and the patient tolerated the procedure with no complications. Images:still images obtained, printed/placed on chart    Laterality:right  Block Type:fascia iliaca compartment  Injection Technique:single-shot  Needle Type:echogenic  Needle Gauge:20 G  Resistance on Injection: less than 15 psi    Medications Used: dexamethasone (DECADRON) injection - Injection   4 mg - 6/17/2023 8:42:00 AM  ropivacaine (NAROPIN) 0.5 % injection - Injection   30 mL - 6/17/2023 8:42:00 AM      Medications  Preservative Free Saline:30ml    Post Assessment  Injection Assessment: negative aspiration for heme, no paresthesia on injection and incremental injection  Patient Tolerance:comfortable throughout  block  Complications:no  Additional Notes  Pre-procedure:  Peripheral nerve block performed preoperatively prior to the start of anesthesia time at the request of the patient and the surgeon for the management of postoperative acute surgical pain as well as for a secondary intraoperative anesthetic (general anesthesia is the primary intraoperative anesthetic); patient identified; pre-procedure vital signs, all relevant labs/studies, complete medical/surgical/anesthetic history, full medication list, full allergy list, and NPO status obtained/reviewed; physical assessment performed; anesthetic options, side effects, potential complications, risks, and benefits discussed; questions answered; patient wishes to proceed with the procedure; written anesthesia procedure consent obtained; patient cleared for procedure; time out performed; IV access in situ    Procedure:  ASA monitor placed; supplemental oxygen provided; patient positioned; hand hygiene performed; sterile technique maintained throughout the procedure; sterile prep applied; insertion site determined by anatomical landmarks, palpation, and ultrasound imaging; live ultrasound guidance throughout the procedure; target nerves/landmarks identified on live ultrasound; skin and subcutaneous tissues numbed by injection of 1% lidocaine; 4 inch 20G StimupWhisher Ultra 360 Insulated Echogenic Needle used; realtime needle advancement and placement in the target anatomic plane witnessed on live ultrasound; negative aspiration prior to injection; correct needle placement confirmed on live ultrasound by local anesthetic spread in the correct anatomic plane; local anesthetic mixture injected with negative aspiration prior to each injection and after each 1-5 mL injected; needle withdrawn; dressing applied; ultrasound image printed and placed in the patient's permanent medical record    Post-procedure:  Peripheral nerve block placed successfully; good block; no apparent  complications; minimal estimated blood loss; vital signs stable throughout; see nurse's notes for vitals; transported to the OR, general anesthesia induced, and surgery started

## 2023-06-17 NOTE — ANESTHESIA PROCEDURE NOTES
Airway  Urgency: elective    Date/Time: 6/17/2023 9:04 AM  End Time:6/17/2023 9:04 AM  Airway not difficult    General Information and Staff    Patient location during procedure: OR  Anesthesiologist: Tutu Rodriguez MD  CRNA/CAA: Li Vasquez CAA    Indications and Patient Condition  Indications for airway management: airway protection    Preoxygenated: yes  Mask difficulty assessment: 2 - vent by mask + OA or adjuvant +/- NMBA    Final Airway Details  Final airway type: endotracheal airway      Successful airway: ETT  Cuffed: yes   Successful intubation technique: video laryngoscopy  Facilitating devices/methods: intubating stylet  Endotracheal tube insertion site: oral  Blade: [place]  Blade size: 4  ETT size (mm): 7.5  Cormack-Lehane Classification: grade I - full view of glottis  Placement verified by: chest auscultation, capnometry and palpation of cuff   Measured from: lips  ETT/EBT  to lips (cm): 23  Number of attempts at approach: 1  Assessment: lips, teeth, and gum same as pre-op and atraumatic intubation

## 2023-06-17 NOTE — ANESTHESIA POSTPROCEDURE EVALUATION
Patient: Isra Davenport    Procedure Summary     Date: 06/17/23 Room / Location: [facility] OR 08 / [facility] MAIN OR    Anesthesia Start: 0854 Anesthesia Stop: 0946    Procedure: HIP INTERTROCHANTERIC NAILING (Right: Thigh) Diagnosis:       Closed fracture of right hip, initial encounter      (Closed fracture of right hip, initial encounter [S72.001A])    Surgeons: Levi Blackmon II, MD Provider: Tutu Rodriguez MD    Anesthesia Type: general with block, ERAS Protocol ASA Status: 3          Anesthesia Type: general with block, ERAS Protocol    Vitals  Vitals Value Taken Time   /60 06/17/23 1100   Temp 97.8 °F (36.6 °C) 06/17/23 1100   Pulse 97 06/17/23 1101   Resp 22 06/17/23 1100   SpO2 92 % 06/17/23 1101   Vitals shown include unvalidated device data.        Post Anesthesia Care and Evaluation    Patient location during evaluation: PACU  Patient participation: complete - patient participated  Level of consciousness: awake  Pain scale: See nurse's notes for pain score.  Pain management: adequate    Airway patency: patent  Anesthetic complications: No anesthetic complications  PONV Status: none  Cardiovascular status: acceptable  Respiratory status: acceptable and spontaneous ventilation  Hydration status: acceptable    Comments: Patient seen and examined postoperatively; vital signs stable; SpO2 greater than or equal to 90%; cardiopulmonary status stable; nausea/vomiting adequately controlled; pain adequately controlled; no apparent anesthesia complications; patient discharged from anesthesia care when discharge criteria were met

## 2023-06-18 PROBLEM — R91.8 MASS OF LEFT LUNG: Status: ACTIVE | Noted: 2023-01-01

## 2023-06-19 ENCOUNTER — ANESTHESIA EVENT (OUTPATIENT)
Dept: GASTROENTEROLOGY | Facility: HOSPITAL | Age: 68
DRG: 477 | End: 2023-06-19
Payer: MEDICARE

## 2023-06-19 ENCOUNTER — ANESTHESIA (OUTPATIENT)
Dept: GASTROENTEROLOGY | Facility: HOSPITAL | Age: 68
DRG: 477 | End: 2023-06-19
Payer: MEDICARE

## 2023-06-19 PROCEDURE — 25010000002 PROPOFOL 500 MG/50ML EMULSION: Performed by: NURSE ANESTHETIST, CERTIFIED REGISTERED

## 2023-06-19 PROCEDURE — 25010000002 PROPOFOL 10 MG/ML EMULSION: Performed by: NURSE ANESTHETIST, CERTIFIED REGISTERED

## 2023-06-19 RX ORDER — SODIUM CHLORIDE 9 MG/ML
INJECTION, SOLUTION INTRAVENOUS CONTINUOUS PRN
Status: DISCONTINUED | OUTPATIENT
Start: 2023-06-19 | End: 2023-06-19 | Stop reason: SURG

## 2023-06-19 RX ORDER — PROPOFOL 10 MG/ML
INJECTION, EMULSION INTRAVENOUS AS NEEDED
Status: DISCONTINUED | OUTPATIENT
Start: 2023-06-19 | End: 2023-06-19 | Stop reason: SURG

## 2023-06-19 RX ORDER — LIDOCAINE HYDROCHLORIDE 20 MG/ML
INJECTION, SOLUTION INFILTRATION; PERINEURAL AS NEEDED
Status: DISCONTINUED | OUTPATIENT
Start: 2023-06-19 | End: 2023-06-19 | Stop reason: SURG

## 2023-06-19 RX ADMIN — LIDOCAINE HYDROCHLORIDE 60 MG: 20 INJECTION, SOLUTION INFILTRATION; PERINEURAL at 08:32

## 2023-06-19 RX ADMIN — PROPOFOL 125 MCG/KG/MIN: 10 INJECTION, EMULSION INTRAVENOUS at 08:32

## 2023-06-19 RX ADMIN — SODIUM CHLORIDE: 0.9 INJECTION, SOLUTION INTRAVENOUS at 08:28

## 2023-06-19 RX ADMIN — PROPOFOL 80 MG: 10 INJECTION, EMULSION INTRAVENOUS at 08:32

## 2023-06-19 NOTE — ANESTHESIA PREPROCEDURE EVALUATION
Anesthesia Evaluation     Patient summary reviewed and Nursing notes reviewed   no history of anesthetic complications:  NPO Solid Status: > 8 hours  NPO Liquid Status: > 8 hours           Airway   Dental      Pulmonary    (+) pneumonia , a smoker Current, COPD,     ROS comment: Lung mass   Cardiovascular     ECG reviewed    (+) dysrhythmias Tachycardia, PAC,   (-) angina      Neuro/Psych  (-) CVA  GI/Hepatic/Renal/Endo - negative ROS     Musculoskeletal     Abdominal    Substance History   (+) alcohol use,   (-) drug use     OB/GYN          Other   arthritis, blood dyscrasia anemia,     ROS/Med Hx Other: Additional History:  Hip fx, low albumin    ?neoplasm    PSH:  BACK SURGERY LEG SURGERY                     Anesthesia Plan    ASA 3     general   total IV anesthesia  (Patient identified; pre-operative vital signs, all relevant labs/studies, complete medical/surgical/anesthetic history, full medication list, full allergy list, and NPO status obtained/reviewed; physical assessment performed; anesthetic options, side effects, potential complications, risks, and benefits discussed; questions answered; written anesthesia consent obtained; patient cleared for procedure; anesthesia machine and equipment checked and functioning)  intravenous induction     Anesthetic plan, risks, benefits, and alternatives have been provided, discussed and informed consent has been obtained with: patient.    Plan discussed with CRNA and CAA.        CODE STATUS:

## 2023-06-19 NOTE — ANESTHESIA POSTPROCEDURE EVALUATION
Patient: Isra Davenport    Procedure Summary     Date: 06/19/23 Room / Location: [facility] ENDOSCOPY 2 / [facility] ENDOSCOPY    Anesthesia Start: 0828 Anesthesia Stop: 0906    Procedure: BRONCHOSCOPY WITH BIOPSY X1 AREA, FINE NEEDLE ASPIRATION, BRUSHING, AND BRONCHIAL WASHING (Bronchus) Diagnosis:       Mass of left lung      (Mass of left lung [R91.8])    Surgeons: Reggie Abarca MD Provider: Colette Rossi MD    Anesthesia Type: general ASA Status: 3          Anesthesia Type: general    Vitals  Vitals Value Taken Time   /75 06/19/23 1100   Temp     Pulse 91 06/19/23 1100   Resp 25 06/19/23 1015   SpO2 93 % 06/19/23 1100   Vitals shown include unvalidated device data.        Post Anesthesia Care and Evaluation    Patient location during evaluation: PACU  Patient participation: complete - patient participated  Level of consciousness: awake and alert  Pain management: satisfactory to patient    Airway patency: patent  Anesthetic complications: No anesthetic complications  PONV Status: none  Cardiovascular status: acceptable  Respiratory status: acceptable  Hydration status: acceptable

## 2023-06-28 PROBLEM — C34.92 PRIMARY MALIGNANT NEOPLASM OF LEFT LUNG METASTATIC TO OTHER SITE: Status: ACTIVE | Noted: 2023-01-01

## 2023-06-29 ENCOUNTER — HOME HEALTH ADMISSION (OUTPATIENT)
Dept: HOME HEALTH SERVICES | Facility: HOME HEALTHCARE | Age: 68
End: 2023-06-29
Payer: COMMERCIAL

## 2023-06-29 PROBLEM — C34.90 LUNG CANCER METASTATIC TO BRAIN: Status: ACTIVE | Noted: 2023-06-29

## 2023-06-29 PROBLEM — C79.31 LUNG CANCER METASTATIC TO BRAIN: Status: ACTIVE | Noted: 2023-01-01

## 2023-07-06 PROBLEM — M48.061 SPINAL STENOSIS OF LUMBAR REGION: Status: ACTIVE | Noted: 2023-01-01

## 2023-07-06 PROBLEM — G89.29 CHRONIC BACK PAIN: Status: ACTIVE | Noted: 2023-07-06

## 2023-07-06 PROBLEM — K43.9 HERNIA OF ANTERIOR ABDOMINAL WALL: Status: ACTIVE | Noted: 2020-09-22

## 2023-07-06 PROBLEM — K63.5 POLYP OF COLON: Status: ACTIVE | Noted: 2023-01-01

## 2023-07-06 PROBLEM — N41.9 PROSTATITIS: Status: ACTIVE | Noted: 2023-01-01

## 2023-07-06 PROBLEM — M79.2 NEURALGIA: Status: ACTIVE | Noted: 2023-01-01

## 2023-07-06 PROBLEM — M54.9 CHRONIC BACK PAIN: Status: ACTIVE | Noted: 2023-07-06

## 2023-07-06 PROBLEM — N40.0 BENIGN PROSTATIC HYPERPLASIA WITHOUT URINARY OBSTRUCTION: Status: ACTIVE | Noted: 2023-01-01

## 2023-07-06 PROBLEM — M19.90 OSTEOARTHRITIS: Status: ACTIVE | Noted: 2023-01-01

## 2023-07-06 PROBLEM — G62.9 NEUROPATHY: Status: ACTIVE | Noted: 2023-07-06

## 2023-07-15 ENCOUNTER — APPOINTMENT (OUTPATIENT)
Dept: GENERAL RADIOLOGY | Facility: HOSPITAL | Age: 68
DRG: 308 | End: 2023-07-15
Payer: MEDICARE

## 2023-07-15 ENCOUNTER — HOSPITAL ENCOUNTER (INPATIENT)
Facility: HOSPITAL | Age: 68
LOS: 10 days | Discharge: NURSING, REHAB OR LONG-TERM CARE | DRG: 308 | End: 2023-07-27
Attending: EMERGENCY MEDICINE | Admitting: INTERNAL MEDICINE
Payer: MEDICARE

## 2023-07-15 DIAGNOSIS — M79.604 PAIN IN BOTH LOWER EXTREMITIES: ICD-10-CM

## 2023-07-15 DIAGNOSIS — M79.605 PAIN IN BOTH LOWER EXTREMITIES: ICD-10-CM

## 2023-07-15 DIAGNOSIS — R60.0 PEDAL EDEMA: ICD-10-CM

## 2023-07-15 DIAGNOSIS — I48.91 ATRIAL FIBRILLATION WITH RAPID VENTRICULAR RESPONSE: ICD-10-CM

## 2023-07-15 DIAGNOSIS — C34.92 PRIMARY MALIGNANT NEOPLASM OF LEFT LUNG METASTATIC TO OTHER SITE: Primary | ICD-10-CM

## 2023-07-15 PROBLEM — C79.51 METASTASIS TO BONE: Status: ACTIVE | Noted: 2023-01-01

## 2023-07-15 LAB
ALBUMIN SERPL-MCNC: 2.6 G/DL (ref 3.5–5.2)
ALBUMIN/GLOB SERPL: 0.6 G/DL
ALP SERPL-CCNC: 178 U/L (ref 39–117)
ALT SERPL W P-5'-P-CCNC: 28 U/L (ref 1–41)
ANION GAP SERPL CALCULATED.3IONS-SCNC: 6 MMOL/L (ref 5–15)
ANION GAP SERPL CALCULATED.3IONS-SCNC: 9 MMOL/L (ref 5–15)
AST SERPL-CCNC: 11 U/L (ref 1–40)
BASOPHILS # BLD AUTO: 0 10*3/MM3 (ref 0–0.2)
BASOPHILS # BLD AUTO: 0 10*3/MM3 (ref 0–0.2)
BASOPHILS NFR BLD AUTO: 0.1 % (ref 0–1.5)
BASOPHILS NFR BLD AUTO: 0.3 % (ref 0–1.5)
BILIRUB SERPL-MCNC: 0.2 MG/DL (ref 0–1.2)
BUN SERPL-MCNC: 21 MG/DL (ref 8–23)
BUN SERPL-MCNC: 23 MG/DL (ref 8–23)
BUN/CREAT SERPL: 39 (ref 7–25)
BUN/CREAT SERPL: 56.8 (ref 7–25)
CALCIUM SPEC-SCNC: 10 MG/DL (ref 8.6–10.5)
CALCIUM SPEC-SCNC: 9.2 MG/DL (ref 8.6–10.5)
CHLORIDE SERPL-SCNC: 95 MMOL/L (ref 98–107)
CHLORIDE SERPL-SCNC: 96 MMOL/L (ref 98–107)
CO2 SERPL-SCNC: 36 MMOL/L (ref 22–29)
CO2 SERPL-SCNC: 38 MMOL/L (ref 22–29)
CREAT SERPL-MCNC: 0.37 MG/DL (ref 0.76–1.27)
CREAT SERPL-MCNC: 0.59 MG/DL (ref 0.76–1.27)
DEPRECATED RDW RBC AUTO: 61.7 FL (ref 37–54)
DEPRECATED RDW RBC AUTO: 64.3 FL (ref 37–54)
EGFRCR SERPLBLD CKD-EPI 2021: 106.3 ML/MIN/1.73
EGFRCR SERPLBLD CKD-EPI 2021: 122.4 ML/MIN/1.73
EOSINOPHIL # BLD AUTO: 0 10*3/MM3 (ref 0–0.4)
EOSINOPHIL # BLD AUTO: 0 10*3/MM3 (ref 0–0.4)
EOSINOPHIL NFR BLD AUTO: 0 % (ref 0.3–6.2)
EOSINOPHIL NFR BLD AUTO: 0 % (ref 0.3–6.2)
ERYTHROCYTE [DISTWIDTH] IN BLOOD BY AUTOMATED COUNT: 20.6 % (ref 12.3–15.4)
ERYTHROCYTE [DISTWIDTH] IN BLOOD BY AUTOMATED COUNT: 20.7 % (ref 12.3–15.4)
GEN 5 2HR TROPONIN T REFLEX: 30 NG/L
GLOBULIN UR ELPH-MCNC: 4.2 GM/DL
GLUCOSE BLDC GLUCOMTR-MCNC: 391 MG/DL (ref 70–105)
GLUCOSE BLDC GLUCOMTR-MCNC: 421 MG/DL (ref 70–105)
GLUCOSE BLDC GLUCOMTR-MCNC: 448 MG/DL (ref 70–105)
GLUCOSE SERPL-MCNC: 355 MG/DL (ref 65–99)
GLUCOSE SERPL-MCNC: 450 MG/DL (ref 65–99)
HBA1C MFR BLD: 7.3 % (ref 4.8–5.6)
HCT VFR BLD AUTO: 31.8 % (ref 37.5–51)
HCT VFR BLD AUTO: 32.5 % (ref 37.5–51)
HGB BLD-MCNC: 10 G/DL (ref 13–17.7)
HGB BLD-MCNC: 9.9 G/DL (ref 13–17.7)
LYMPHOCYTES # BLD AUTO: 0.6 10*3/MM3 (ref 0.7–3.1)
LYMPHOCYTES # BLD AUTO: 0.6 10*3/MM3 (ref 0.7–3.1)
LYMPHOCYTES NFR BLD AUTO: 4.9 % (ref 19.6–45.3)
LYMPHOCYTES NFR BLD AUTO: 4.9 % (ref 19.6–45.3)
MAGNESIUM SERPL-MCNC: 1.7 MG/DL (ref 1.6–2.4)
MCH RBC QN AUTO: 25.7 PG (ref 26.6–33)
MCH RBC QN AUTO: 26.7 PG (ref 26.6–33)
MCHC RBC AUTO-ENTMCNC: 30.5 G/DL (ref 31.5–35.7)
MCHC RBC AUTO-ENTMCNC: 31.5 G/DL (ref 31.5–35.7)
MCV RBC AUTO: 84.5 FL (ref 79–97)
MCV RBC AUTO: 84.8 FL (ref 79–97)
MONOCYTES # BLD AUTO: 0.5 10*3/MM3 (ref 0.1–0.9)
MONOCYTES # BLD AUTO: 0.6 10*3/MM3 (ref 0.1–0.9)
MONOCYTES NFR BLD AUTO: 4.4 % (ref 5–12)
MONOCYTES NFR BLD AUTO: 5.5 % (ref 5–12)
NEUTROPHILS NFR BLD AUTO: 10.2 10*3/MM3 (ref 1.7–7)
NEUTROPHILS NFR BLD AUTO: 10.3 10*3/MM3 (ref 1.7–7)
NEUTROPHILS NFR BLD AUTO: 89.3 % (ref 42.7–76)
NEUTROPHILS NFR BLD AUTO: 90.6 % (ref 42.7–76)
NRBC BLD AUTO-RTO: 0.1 /100 WBC (ref 0–0.2)
NRBC BLD AUTO-RTO: 0.4 /100 WBC (ref 0–0.2)
NT-PROBNP SERPL-MCNC: 1137 PG/ML (ref 0–900)
PLATELET # BLD AUTO: 404 10*3/MM3 (ref 140–450)
PLATELET # BLD AUTO: 415 10*3/MM3 (ref 140–450)
PMV BLD AUTO: 9.1 FL (ref 6–12)
PMV BLD AUTO: 9.6 FL (ref 6–12)
POTASSIUM SERPL-SCNC: 4.6 MMOL/L (ref 3.5–5.2)
POTASSIUM SERPL-SCNC: 5.4 MMOL/L (ref 3.5–5.2)
PROCALCITONIN SERPL-MCNC: 0.22 NG/ML (ref 0–0.25)
PROT SERPL-MCNC: 6.8 G/DL (ref 6–8.5)
RBC # BLD AUTO: 3.75 10*6/MM3 (ref 4.14–5.8)
RBC # BLD AUTO: 3.84 10*6/MM3 (ref 4.14–5.8)
SODIUM SERPL-SCNC: 138 MMOL/L (ref 136–145)
SODIUM SERPL-SCNC: 142 MMOL/L (ref 136–145)
TROPONIN T DELTA: 4 NG/L
TROPONIN T SERPL HS-MCNC: 26 NG/L
WBC NRBC COR # BLD: 11.4 10*3/MM3 (ref 3.4–10.8)
WBC NRBC COR # BLD: 11.4 10*3/MM3 (ref 3.4–10.8)

## 2023-07-15 PROCEDURE — 80053 COMPREHEN METABOLIC PANEL: CPT | Performed by: EMERGENCY MEDICINE

## 2023-07-15 PROCEDURE — 63710000001 INSULIN GLARGINE PER 5 UNITS: Performed by: NURSE PRACTITIONER

## 2023-07-15 PROCEDURE — 84484 ASSAY OF TROPONIN QUANT: CPT | Performed by: EMERGENCY MEDICINE

## 2023-07-15 PROCEDURE — 71045 X-RAY EXAM CHEST 1 VIEW: CPT

## 2023-07-15 PROCEDURE — 25010000002 ONDANSETRON PER 1 MG: Performed by: EMERGENCY MEDICINE

## 2023-07-15 PROCEDURE — 82948 REAGENT STRIP/BLOOD GLUCOSE: CPT

## 2023-07-15 PROCEDURE — 25010000002 HYDROMORPHONE 1 MG/ML SOLUTION: Performed by: EMERGENCY MEDICINE

## 2023-07-15 PROCEDURE — 99285 EMERGENCY DEPT VISIT HI MDM: CPT

## 2023-07-15 PROCEDURE — 73630 X-RAY EXAM OF FOOT: CPT

## 2023-07-15 PROCEDURE — 83036 HEMOGLOBIN GLYCOSYLATED A1C: CPT | Performed by: EMERGENCY MEDICINE

## 2023-07-15 PROCEDURE — 63710000001 INSULIN LISPRO (HUMAN) PER 5 UNITS: Performed by: NURSE PRACTITIONER

## 2023-07-15 PROCEDURE — 77263 THER RADIOLOGY TX PLNG CPLX: CPT | Performed by: RADIOLOGY

## 2023-07-15 PROCEDURE — G0378 HOSPITAL OBSERVATION PER HR: HCPCS

## 2023-07-15 PROCEDURE — 73590 X-RAY EXAM OF LOWER LEG: CPT

## 2023-07-15 PROCEDURE — 63710000001 INSULIN LISPRO (HUMAN) PER 5 UNITS: Performed by: EMERGENCY MEDICINE

## 2023-07-15 PROCEDURE — 84466 ASSAY OF TRANSFERRIN: CPT | Performed by: NURSE PRACTITIONER

## 2023-07-15 PROCEDURE — 36415 COLL VENOUS BLD VENIPUNCTURE: CPT | Performed by: EMERGENCY MEDICINE

## 2023-07-15 PROCEDURE — 63710000001 DEXAMETHASONE PER 0.25 MG: Performed by: NURSE PRACTITIONER

## 2023-07-15 PROCEDURE — 25010000002 FUROSEMIDE PER 20 MG: Performed by: EMERGENCY MEDICINE

## 2023-07-15 PROCEDURE — 25010000002 AZITHROMYCIN PER 500 MG: Performed by: NURSE PRACTITIONER

## 2023-07-15 PROCEDURE — 85025 COMPLETE CBC W/AUTO DIFF WBC: CPT | Performed by: EMERGENCY MEDICINE

## 2023-07-15 PROCEDURE — 93005 ELECTROCARDIOGRAM TRACING: CPT | Performed by: EMERGENCY MEDICINE

## 2023-07-15 PROCEDURE — 83540 ASSAY OF IRON: CPT | Performed by: NURSE PRACTITIONER

## 2023-07-15 PROCEDURE — 83735 ASSAY OF MAGNESIUM: CPT | Performed by: EMERGENCY MEDICINE

## 2023-07-15 PROCEDURE — 84145 PROCALCITONIN (PCT): CPT | Performed by: NURSE PRACTITIONER

## 2023-07-15 PROCEDURE — 83880 ASSAY OF NATRIURETIC PEPTIDE: CPT | Performed by: NURSE PRACTITIONER

## 2023-07-15 PROCEDURE — 85025 COMPLETE CBC W/AUTO DIFF WBC: CPT | Performed by: NURSE PRACTITIONER

## 2023-07-15 RX ORDER — HYDROCODONE BITARTRATE AND ACETAMINOPHEN 5; 325 MG/1; MG/1
1 TABLET ORAL EVERY 6 HOURS PRN
Status: DISCONTINUED | OUTPATIENT
Start: 2023-07-15 | End: 2023-07-18

## 2023-07-15 RX ORDER — DEXTROSE MONOHYDRATE 25 G/50ML
10-50 INJECTION, SOLUTION INTRAVENOUS
Status: DISCONTINUED | OUTPATIENT
Start: 2023-07-15 | End: 2023-07-19

## 2023-07-15 RX ORDER — POLYETHYLENE GLYCOL 3350 17 G/17G
17 POWDER, FOR SOLUTION ORAL DAILY PRN
Status: DISCONTINUED | OUTPATIENT
Start: 2023-07-15 | End: 2023-07-27 | Stop reason: HOSPADM

## 2023-07-15 RX ORDER — FERROUS SULFATE TAB EC 324 MG (65 MG FE EQUIVALENT) 324 (65 FE) MG
324 TABLET DELAYED RESPONSE ORAL
Status: DISCONTINUED | OUTPATIENT
Start: 2023-07-16 | End: 2023-07-18

## 2023-07-15 RX ORDER — ASPIRIN 81 MG/1
81 TABLET, CHEWABLE ORAL DAILY
Status: DISCONTINUED | OUTPATIENT
Start: 2023-07-16 | End: 2023-07-27 | Stop reason: HOSPADM

## 2023-07-15 RX ORDER — IPRATROPIUM BROMIDE AND ALBUTEROL SULFATE 2.5; .5 MG/3ML; MG/3ML
3 SOLUTION RESPIRATORY (INHALATION) EVERY 4 HOURS PRN
Status: DISCONTINUED | OUTPATIENT
Start: 2023-07-15 | End: 2023-07-27 | Stop reason: HOSPADM

## 2023-07-15 RX ORDER — INSULIN LISPRO 100 [IU]/ML
1-200 INJECTION, SOLUTION INTRAVENOUS; SUBCUTANEOUS
Status: DISCONTINUED | OUTPATIENT
Start: 2023-07-15 | End: 2023-07-19

## 2023-07-15 RX ORDER — DEXAMETHASONE 4 MG/1
4 TABLET ORAL EVERY 8 HOURS SCHEDULED
Status: DISCONTINUED | OUTPATIENT
Start: 2023-07-15 | End: 2023-07-16

## 2023-07-15 RX ORDER — METOPROLOL TARTRATE 5 MG/5ML
5 INJECTION INTRAVENOUS ONCE
Status: COMPLETED | OUTPATIENT
Start: 2023-07-15 | End: 2023-07-15

## 2023-07-15 RX ORDER — DILTIAZEM HCL/D5W 125 MG/125
5-15 PLASTIC BAG, INJECTION (ML) INTRAVENOUS CONTINUOUS
Status: DISCONTINUED | OUTPATIENT
Start: 2023-07-15 | End: 2023-07-16

## 2023-07-15 RX ORDER — FUROSEMIDE 10 MG/ML
20 INJECTION INTRAMUSCULAR; INTRAVENOUS DAILY
Status: DISCONTINUED | OUTPATIENT
Start: 2023-07-16 | End: 2023-07-27 | Stop reason: HOSPADM

## 2023-07-15 RX ORDER — SODIUM CHLORIDE 0.9 % (FLUSH) 0.9 %
10 SYRINGE (ML) INJECTION AS NEEDED
Status: DISCONTINUED | OUTPATIENT
Start: 2023-07-15 | End: 2023-07-27 | Stop reason: HOSPADM

## 2023-07-15 RX ORDER — AMOXICILLIN 250 MG
2 CAPSULE ORAL 2 TIMES DAILY
Status: DISCONTINUED | OUTPATIENT
Start: 2023-07-15 | End: 2023-07-27 | Stop reason: HOSPADM

## 2023-07-15 RX ORDER — INSULIN LISPRO 100 [IU]/ML
1-200 INJECTION, SOLUTION INTRAVENOUS; SUBCUTANEOUS AS NEEDED
Status: DISCONTINUED | OUTPATIENT
Start: 2023-07-15 | End: 2023-07-19

## 2023-07-15 RX ORDER — ONDANSETRON 2 MG/ML
4 INJECTION INTRAMUSCULAR; INTRAVENOUS ONCE
Status: COMPLETED | OUTPATIENT
Start: 2023-07-15 | End: 2023-07-15

## 2023-07-15 RX ORDER — ONDANSETRON 4 MG/1
4 TABLET, FILM COATED ORAL EVERY 6 HOURS PRN
Status: DISCONTINUED | OUTPATIENT
Start: 2023-07-15 | End: 2023-07-27 | Stop reason: HOSPADM

## 2023-07-15 RX ORDER — BISACODYL 10 MG
10 SUPPOSITORY, RECTAL RECTAL DAILY PRN
Status: DISCONTINUED | OUTPATIENT
Start: 2023-07-15 | End: 2023-07-27 | Stop reason: HOSPADM

## 2023-07-15 RX ORDER — NICOTINE POLACRILEX 4 MG
15 LOZENGE BUCCAL
Status: DISCONTINUED | OUTPATIENT
Start: 2023-07-15 | End: 2023-07-19

## 2023-07-15 RX ORDER — SODIUM CHLORIDE 0.9 % (FLUSH) 0.9 %
10 SYRINGE (ML) INJECTION EVERY 12 HOURS SCHEDULED
Status: DISCONTINUED | OUTPATIENT
Start: 2023-07-15 | End: 2023-07-27 | Stop reason: HOSPADM

## 2023-07-15 RX ORDER — FAMOTIDINE 20 MG/1
20 TABLET, FILM COATED ORAL
Status: DISCONTINUED | OUTPATIENT
Start: 2023-07-16 | End: 2023-07-18

## 2023-07-15 RX ORDER — FUROSEMIDE 10 MG/ML
40 INJECTION INTRAMUSCULAR; INTRAVENOUS ONCE
Status: COMPLETED | OUTPATIENT
Start: 2023-07-15 | End: 2023-07-15

## 2023-07-15 RX ORDER — IBUPROFEN 600 MG/1
1 TABLET ORAL
Status: DISCONTINUED | OUTPATIENT
Start: 2023-07-15 | End: 2023-07-19

## 2023-07-15 RX ORDER — BISACODYL 5 MG/1
5 TABLET, DELAYED RELEASE ORAL DAILY PRN
Status: DISCONTINUED | OUTPATIENT
Start: 2023-07-15 | End: 2023-07-27 | Stop reason: HOSPADM

## 2023-07-15 RX ORDER — SODIUM CHLORIDE 9 MG/ML
100 INJECTION, SOLUTION INTRAVENOUS CONTINUOUS
Status: DISCONTINUED | OUTPATIENT
Start: 2023-07-16 | End: 2023-07-16

## 2023-07-15 RX ORDER — INSULIN LISPRO 100 [IU]/ML
4 INJECTION, SOLUTION INTRAVENOUS; SUBCUTANEOUS ONCE
Status: COMPLETED | OUTPATIENT
Start: 2023-07-15 | End: 2023-07-15

## 2023-07-15 RX ORDER — GABAPENTIN 100 MG/1
200 CAPSULE ORAL NIGHTLY
Status: DISCONTINUED | OUTPATIENT
Start: 2023-07-15 | End: 2023-07-22

## 2023-07-15 RX ORDER — SODIUM CHLORIDE 9 MG/ML
40 INJECTION, SOLUTION INTRAVENOUS AS NEEDED
Status: DISCONTINUED | OUTPATIENT
Start: 2023-07-15 | End: 2023-07-27 | Stop reason: HOSPADM

## 2023-07-15 RX ADMIN — Medication 5 MG/HR: at 20:02

## 2023-07-15 RX ADMIN — FUROSEMIDE 40 MG: 10 INJECTION, SOLUTION INTRAMUSCULAR; INTRAVENOUS at 21:35

## 2023-07-15 RX ADMIN — METOPROLOL TARTRATE 5 MG: 1 INJECTION, SOLUTION INTRAVENOUS at 20:39

## 2023-07-15 RX ADMIN — DEXAMETHASONE 4 MG: 4 TABLET ORAL at 21:36

## 2023-07-15 RX ADMIN — Medication 10 ML: at 21:37

## 2023-07-15 RX ADMIN — INSULIN LISPRO 4 UNITS: 100 INJECTION, SOLUTION INTRAVENOUS; SUBCUTANEOUS at 20:20

## 2023-07-15 RX ADMIN — HYDROMORPHONE HYDROCHLORIDE 0.5 MG: 1 INJECTION, SOLUTION INTRAMUSCULAR; INTRAVENOUS; SUBCUTANEOUS at 17:18

## 2023-07-15 RX ADMIN — ONDANSETRON 4 MG: 2 INJECTION INTRAMUSCULAR; INTRAVENOUS at 17:15

## 2023-07-15 RX ADMIN — GABAPENTIN 200 MG: 100 CAPSULE ORAL at 21:36

## 2023-07-15 RX ADMIN — INSULIN LISPRO 7 UNITS: 100 INJECTION, SOLUTION INTRAVENOUS; SUBCUTANEOUS at 22:44

## 2023-07-15 RX ADMIN — AZITHROMYCIN MONOHYDRATE 500 MG: 500 INJECTION, POWDER, LYOPHILIZED, FOR SOLUTION INTRAVENOUS at 21:36

## 2023-07-15 RX ADMIN — SENNOSIDES AND DOCUSATE SODIUM 2 TABLET: 8.6; 5 TABLET ORAL at 21:36

## 2023-07-15 RX ADMIN — INSULIN GLARGINE 17 UNITS: 100 INJECTION, SOLUTION SUBCUTANEOUS at 22:43

## 2023-07-15 NOTE — ED PROVIDER NOTES
Subjective   History of Present Illness  68-year-old male presents with pain and swelling to his legs.  He has history of metastatic cancer.  He states he had pain medication prescribed yesterday but the pharmacy is closed for the weekend and he has not been able to get it.  He does not complain of chest pain or shortness of breath.  He has recently been started on chemotherapy and radiation.  He states he has had 3 courses of chemotherapy.  He does not report any fever.  Review of Systems    Past Medical History:   Diagnosis Date    Arthritis     Benign prostatic hyperplasia without urinary obstruction 07/06/2023    Chronic back pain 07/06/2023    Closed fracture of right hip 06/16/2023    Added automatically from request for surgery [id number]    Hernia of anterior abdominal wall 09/22/2020    Lung cancer     squamous cell carcimoma,left    Lung cancer metastatic to brain 06/29/2023    Mass of left lung 06/16/2023    Added automatically from request for surgery [id number]    Neuralgia 07/06/2023    Neuropathy 07/06/2023    Osteoarthritis 07/06/2023    Polyp of colon 07/06/2023    Primary malignant neoplasm of left lung metastatic to other site 06/16/2023    Added automatically from request for surgery [id number]    Prostatitis 07/06/2023    Spinal stenosis of lumbar region 07/06/2023       Allergies   Allergen Reactions    Ibuprofen Itching    Penicillins Hives       Past Surgical History:   Procedure Laterality Date    BACK SURGERY  2006    BRONCHOSCOPY N/A 6/19/2023    Procedure: BRONCHOSCOPY WITH BIOPSY X1 AREA, FINE NEEDLE ASPIRATION, BRUSHING, AND BRONCHIAL WASHING;  Surgeon: Reggie Abarca MD;  Location: [facility] ENDOSCOPY;  Service: Pulmonary;  Laterality: N/A;  POST: MUCOUS PLUGS  AND LUNG MASS    HIP INTERTROCHANTERIC NAILING Right 6/17/2023    Procedure: HIP INTERTROCHANTERIC NAILING;  Surgeon: Levi Blackmon II, MD;  Location: [facility] MAIN OR;  Service: Orthopedics;  Laterality: Right;    LEG SURGERY  Bilateral 08/25/1985    PORTACATH PLACEMENT Right 6/29/2023    Procedure: INSERTION OF PORTACATH;  Surgeon: Jese Goss MD;  Location: [facility] MAIN OR;  Service: General;  Laterality: Right;       No family history on file.    Social History     Socioeconomic History    Marital status:    Tobacco Use    Smoking status: Every Day     Packs/day: 1.00     Years: 15.00     Pack years: 15.00     Types: Cigarettes    Smokeless tobacco: Never   Vaping Use    Vaping Use: Never used   Substance and Sexual Activity    Alcohol use: Yes     Alcohol/week: 2.0 standard drinks     Types: 2 Cans of beer per week    Drug use: Never    Sexual activity: Defer     Prior to Admission medications    Medication Sig Start Date End Date Taking? Authorizing Provider   acetaminophen (TYLENOL) 325 MG tablet Take 2 tablets by mouth Every 6 (Six) Hours As Needed for Mild Pain. Indications: Pain    Provider, MD Hang   albuterol sulfate  (90 Base) MCG/ACT inhaler Inhale 2 puffs Every 4 (Four) Hours As Needed for Wheezing. 6/26/23   Soham Harding MD   aspirin 81 MG EC tablet Take 1 tablet by mouth Daily. 6/30/23   Soham Harding MD   ferrous sulfate 324 (65 Fe) MG tablet delayed-release EC tablet Take 1 tablet by mouth Daily With Breakfast. 6/27/23   Soham Harding MD   metoprolol tartrate (LOPRESSOR) 25 MG tablet Take 1 tablet by mouth Every 8 (Eight) Hours. 6/26/23   Soham Harding MD           Objective   Physical Exam  68-year-old male awake alert.  Chronically ill in appearance.  Pupils equal round to light.  Neck supple chest clear cardiovascular rhythm abdomen soft nontender examination extremities he has bilateral pedal edema.  There is some erythema to both feet some warmth.  He complains of pain palpation of both feet.     Procedures           ED Course      Results for orders placed or performed during the hospital encounter of 07/15/23   Comprehensive Metabolic Panel    Specimen:  Blood   Result Value Ref Range    Glucose 355 (H) 65 - 99 mg/dL    BUN 21 8 - 23 mg/dL    Creatinine 0.37 (L) 0.76 - 1.27 mg/dL    Sodium 138 136 - 145 mmol/L    Potassium 4.6 3.5 - 5.2 mmol/L    Chloride 96 (L) 98 - 107 mmol/L    CO2 36.0 (H) 22.0 - 29.0 mmol/L    Calcium 10.0 8.6 - 10.5 mg/dL    Total Protein 6.8 6.0 - 8.5 g/dL    Albumin 2.6 (L) 3.5 - 5.2 g/dL    ALT (SGPT) 28 1 - 41 U/L    AST (SGOT) 11 1 - 40 U/L    Alkaline Phosphatase 178 (H) 39 - 117 U/L    Total Bilirubin 0.2 0.0 - 1.2 mg/dL    Globulin 4.2 gm/dL    A/G Ratio 0.6 g/dL    BUN/Creatinine Ratio 56.8 (H) 7.0 - 25.0    Anion Gap 6.0 5.0 - 15.0 mmol/L    eGFR 122.4 >60.0 mL/min/1.73   CBC Auto Differential    Specimen: Blood   Result Value Ref Range    WBC 11.40 (H) 3.40 - 10.80 10*3/mm3    RBC 3.75 (L) 4.14 - 5.80 10*6/mm3    Hemoglobin 10.0 (L) 13.0 - 17.7 g/dL    Hematocrit 31.8 (L) 37.5 - 51.0 %    MCV 84.8 79.0 - 97.0 fL    MCH 26.7 26.6 - 33.0 pg    MCHC 31.5 31.5 - 35.7 g/dL    RDW 20.7 (H) 12.3 - 15.4 %    RDW-SD 61.7 (H) 37.0 - 54.0 fl    MPV 9.1 6.0 - 12.0 fL    Platelets 404 140 - 450 10*3/mm3    Neutrophil % 89.3 (H) 42.7 - 76.0 %    Lymphocyte % 4.9 (L) 19.6 - 45.3 %    Monocyte % 5.5 5.0 - 12.0 %    Eosinophil % 0.0 (L) 0.3 - 6.2 %    Basophil % 0.3 0.0 - 1.5 %    Neutrophils, Absolute 10.20 (H) 1.70 - 7.00 10*3/mm3    Lymphocytes, Absolute 0.60 (L) 0.70 - 3.10 10*3/mm3    Monocytes, Absolute 0.60 0.10 - 0.90 10*3/mm3    Eosinophils, Absolute 0.00 0.00 - 0.40 10*3/mm3    Basophils, Absolute 0.00 0.00 - 0.20 10*3/mm3    nRBC 0.1 0.0 - 0.2 /100 WBC   POC Glucose Once    Specimen: Blood   Result Value Ref Range    Glucose 391 (H) 70 - 105 mg/dL   ECG 12 Lead Rhythm Change   Result Value Ref Range    QT Interval 233 ms     XR Chest 1 View    Result Date: 7/15/2023  Impression: 1. Worsening left basilar airspace disease compatible with pneumonia. 2. Chronic elevation of the left hemidiaphragm. Electronically Signed: Jeet Panda   "7/15/2023 6:09 PM EDT  Workstation ID: [id number]    XR Foot 3+ View Bilateral    Result Date: 7/15/2023  Impression: 1. No acute bony abnormality of the feet. Electronically Signed: Jeet Panda  7/15/2023 6:34 PM EDT  Workstation ID: [id number]    XR Tibia Fibula 2 View Bilateral    Result Date: 7/15/2023  Impression: 1. No acute bony abnormality of the left tibia or fibula. 2. Old healed fractures of the right tibia and fibula. No acute bony abnormality. Electronically Signed: Jeet Panda  7/15/2023 6:31 PM EDT  Workstation ID: [id number]   Medications   sodium chloride 0.9 % flush 10 mL (has no administration in time range)   dilTIAZem (CARDIZEM) 125 mg in 125 mL D5W infusion (10 mg/hr Intravenous Rate/Dose Change 7/15/23 2035)   HYDROmorphone (DILAUDID) injection 0.5 mg (has no administration in time range)   metoprolol tartrate (LOPRESSOR) injection 5 mg (has no administration in time range)   furosemide (LASIX) injection 40 mg (has no administration in time range)   ondansetron (ZOFRAN) injection 4 mg (4 mg Intravenous Given 7/15/23 1715)   HYDROmorphone (DILAUDID) injection 0.5 mg (0.5 mg Intravenous Given 7/15/23 1718)   dilTIAZem (CARDIZEM) bolus from bag 1 mg/mL 15 mg (15 mg Intravenous Bolus from Bag 7/15/23 2003)   insulin lispro (HUMALOG/ADMELOG) injection 4 Units (4 Units Subcutaneous Given 7/15/23 2020)     /65   Pulse (!) 149   Temp 98.2 °F (36.8 °C)   Resp 19   Ht 177.8 cm (70\")   Wt 65.4 kg (144 lb 3.2 oz)   SpO2 95%   BMI 20.69 kg/m²                                          Medical Decision Making  Problems Addressed:  Atrial fibrillation with rapid ventricular response: complicated acute illness or injury  Pain in both lower extremities: complicated acute illness or injury  Pedal edema: complicated acute illness or injury  Primary malignant neoplasm of left lung metastatic to other site: complicated acute illness or injury    Amount and/or Complexity of Data Reviewed  Labs: " ordered.  Radiology: ordered.  ECG/medicine tests: ordered.    Risk  Prescription drug management.  Decision regarding hospitalization.    Chart review: Patient had vascular ultrasound yesterday which was negative for DVT he had hip intertrochanteric nailing for pathologic fracture on the 17th of last month.  Comorbidity: As per past history   Differential: Pathologic fracture, DVT ruled out by negative ultrasound yesterday, cellulitis felt to be unlikely since he has pain in both feet.  My EKG interpretation: EKG not initially obtained as he was in sinus rhythm on the monitor with occasional ectopic beat however he was noted to develop what appeared to be atrial fibrillation an EKG was obtained   My EKG report atrial fibrillation with rapid ventricular response rate of 151.  lab: Glucose 355 with BUN 21 creatinine 0.37 CO2 36 albumin 2.6 alkaline phosphatase 178  My Radiology review and interpretation: X-rays of tib-fib showed no evidence of metastatic disease.  He has old left healed tib-fib fracture.  X-rays of both feet negative for fracture or metastasis.  Chest x-ray reveals worsening left basilar airspace disease with chronic elevation of left hemidiaphragm.  Discussion/treatment: Patient had IV placed.  He was given Dilaudid and Zofran.  He reported marked improvement in his pain with treatment.  Patient was noted going to atrial fibrillation.  He was started on Cardizem given bolus and placed on drip.  Regarding his chest x-ray he reports he has had no fever new shortness of breath or cough.  I suspect this is likely related to his neoplasm as this has similar appearance to his CT scan from the 17th of last month.  We will hold off on antibiotics for now. patient did not have adequate rate control with Cardizem and was given additional Lopressor.  He was given Lasix for his edema.  We will also check magnesium level.  Additional history he actually has not started radiation yet just chemotherapy patient  critical care time of 30 minutes independent of procedures.  Patient was discussed with the nurse practitioner for the hospitalist.  Will be admitted to their service for continued care patient had improvement in his heart rate with treatment.  Patient was evaluated using appropriate PPE      Final diagnoses:   Primary malignant neoplasm of left lung metastatic to other site   Pedal edema   Pain in both lower extremities   Atrial fibrillation with rapid ventricular response       ED Disposition  ED Disposition       ED Disposition   Decision to Admit    Condition   --    Comment   Level of Care: Telemetry [5]   Admitting Physician: ETHAN TIDWELL [[id number]]   Bed Request Comments: pcu                 No follow-up provider specified.       Medication List      No changes were made to your prescriptions during this visit.            Salvatore Dougherty MD  07/15/23 2027       Salvatore Dougherty MD  07/15/23 2028       Salvatore Dougherty MD  07/15/23 2109

## 2023-07-16 LAB
GLUCOSE BLDC GLUCOMTR-MCNC: 150 MG/DL (ref 70–105)
GLUCOSE BLDC GLUCOMTR-MCNC: 184 MG/DL (ref 70–105)
GLUCOSE BLDC GLUCOMTR-MCNC: 309 MG/DL (ref 70–105)
GLUCOSE BLDC GLUCOMTR-MCNC: 349 MG/DL (ref 70–105)
GLUCOSE BLDC GLUCOMTR-MCNC: 365 MG/DL (ref 70–105)
GLUCOSE BLDC GLUCOMTR-MCNC: 390 MG/DL (ref 70–105)
GLUCOSE BLDC GLUCOMTR-MCNC: 495 MG/DL (ref 70–105)
IRON 24H UR-MRATE: 22 MCG/DL (ref 59–158)
IRON SATN MFR SERPL: 10 % (ref 20–50)
TIBC SERPL-MCNC: 224 MCG/DL (ref 298–536)
TRANSFERRIN SERPL-MCNC: 150 MG/DL (ref 200–360)

## 2023-07-16 PROCEDURE — 82948 REAGENT STRIP/BLOOD GLUCOSE: CPT

## 2023-07-16 PROCEDURE — 63710000001 INSULIN GLARGINE PER 5 UNITS: Performed by: NURSE PRACTITIONER

## 2023-07-16 PROCEDURE — 63710000001 DEXAMETHASONE PER 0.25 MG: Performed by: NURSE PRACTITIONER

## 2023-07-16 PROCEDURE — 94640 AIRWAY INHALATION TREATMENT: CPT

## 2023-07-16 PROCEDURE — 99222 1ST HOSP IP/OBS MODERATE 55: CPT | Performed by: INTERNAL MEDICINE

## 2023-07-16 PROCEDURE — 93010 ELECTROCARDIOGRAM REPORT: CPT | Performed by: INTERNAL MEDICINE

## 2023-07-16 PROCEDURE — 99214 OFFICE O/P EST MOD 30 MIN: CPT | Performed by: INTERNAL MEDICINE

## 2023-07-16 PROCEDURE — 63710000001 INSULIN REGULAR HUMAN PER 5 UNITS: Performed by: NURSE PRACTITIONER

## 2023-07-16 PROCEDURE — G0378 HOSPITAL OBSERVATION PER HR: HCPCS

## 2023-07-16 PROCEDURE — 94799 UNLISTED PULMONARY SVC/PX: CPT

## 2023-07-16 PROCEDURE — 25010000002 FUROSEMIDE PER 20 MG: Performed by: NURSE PRACTITIONER

## 2023-07-16 PROCEDURE — 25010000002 AZITHROMYCIN PER 500 MG: Performed by: NURSE PRACTITIONER

## 2023-07-16 PROCEDURE — 63710000001 INSULIN LISPRO (HUMAN) PER 5 UNITS: Performed by: NURSE PRACTITIONER

## 2023-07-16 PROCEDURE — 93005 ELECTROCARDIOGRAM TRACING: CPT

## 2023-07-16 RX ORDER — GUAIFENESIN 600 MG/1
600 TABLET, EXTENDED RELEASE ORAL EVERY 12 HOURS SCHEDULED
Status: DISCONTINUED | OUTPATIENT
Start: 2023-07-16 | End: 2023-07-27 | Stop reason: HOSPADM

## 2023-07-16 RX ORDER — HYDROCODONE BITARTRATE AND ACETAMINOPHEN 5; 325 MG/1; MG/1
1 TABLET ORAL EVERY 4 HOURS PRN
Status: ON HOLD | COMMUNITY
End: 2023-07-21 | Stop reason: SDUPTHER

## 2023-07-16 RX ORDER — ERGOCALCIFEROL 1.25 MG/1
50000 CAPSULE ORAL WEEKLY
COMMUNITY

## 2023-07-16 RX ORDER — FUROSEMIDE 20 MG/1
20 TABLET ORAL DAILY
COMMUNITY

## 2023-07-16 RX ORDER — DEXAMETHASONE 4 MG/1
4 TABLET ORAL
COMMUNITY

## 2023-07-16 RX ORDER — SODIUM CHLORIDE 9 MG/ML
100 INJECTION, SOLUTION INTRAVENOUS ONCE
Status: COMPLETED | OUTPATIENT
Start: 2023-07-16 | End: 2023-07-16

## 2023-07-16 RX ORDER — DILTIAZEM HYDROCHLORIDE 120 MG/1
120 CAPSULE, COATED, EXTENDED RELEASE ORAL
Status: DISCONTINUED | OUTPATIENT
Start: 2023-07-16 | End: 2023-07-18

## 2023-07-16 RX ORDER — DEXAMETHASONE 4 MG/1
4 TABLET ORAL
Status: DISCONTINUED | OUTPATIENT
Start: 2023-07-17 | End: 2023-07-17

## 2023-07-16 RX ORDER — BUDESONIDE 0.5 MG/2ML
0.5 INHALANT ORAL
Status: DISCONTINUED | OUTPATIENT
Start: 2023-07-16 | End: 2023-07-22

## 2023-07-16 RX ORDER — ONDANSETRON 4 MG/1
4 TABLET, FILM COATED ORAL EVERY 6 HOURS PRN
COMMUNITY

## 2023-07-16 RX ORDER — NICOTINE 21 MG/24HR
1 PATCH, TRANSDERMAL 24 HOURS TRANSDERMAL EVERY 24 HOURS
COMMUNITY

## 2023-07-16 RX ORDER — OMEPRAZOLE 20 MG/1
20 CAPSULE, DELAYED RELEASE ORAL DAILY
COMMUNITY
End: 2023-07-27 | Stop reason: HOSPADM

## 2023-07-16 RX ADMIN — SODIUM CHLORIDE 100 ML/HR: 9 INJECTION, SOLUTION INTRAVENOUS at 00:04

## 2023-07-16 RX ADMIN — METOPROLOL TARTRATE 25 MG: 25 TABLET, FILM COATED ORAL at 08:09

## 2023-07-16 RX ADMIN — GUAIFENESIN 600 MG: 600 TABLET, EXTENDED RELEASE ORAL at 21:26

## 2023-07-16 RX ADMIN — Medication 10 ML: at 20:12

## 2023-07-16 RX ADMIN — Medication 10 ML: at 08:09

## 2023-07-16 RX ADMIN — FERROUS SULFATE TAB EC 324 MG (65 MG FE EQUIVALENT) 324 MG: 324 (65 FE) TABLET DELAYED RESPONSE at 08:09

## 2023-07-16 RX ADMIN — INSULIN LISPRO 5 UNITS: 100 INJECTION, SOLUTION INTRAVENOUS; SUBCUTANEOUS at 20:12

## 2023-07-16 RX ADMIN — DEXAMETHASONE 4 MG: 4 TABLET ORAL at 06:06

## 2023-07-16 RX ADMIN — FUROSEMIDE 20 MG: 10 INJECTION, SOLUTION INTRAMUSCULAR; INTRAVENOUS at 08:09

## 2023-07-16 RX ADMIN — ASPIRIN 81 MG CHEWABLE TABLET 81 MG: 81 TABLET CHEWABLE at 08:09

## 2023-07-16 RX ADMIN — INSULIN GLARGINE 17 UNITS: 100 INJECTION, SOLUTION SUBCUTANEOUS at 20:12

## 2023-07-16 RX ADMIN — INSULIN HUMAN 8 UNITS: 100 INJECTION, SOLUTION PARENTERAL at 00:04

## 2023-07-16 RX ADMIN — FAMOTIDINE 20 MG: 20 TABLET, FILM COATED ORAL at 08:09

## 2023-07-16 RX ADMIN — BUDESONIDE 0.5 MG: 0.5 INHALANT RESPIRATORY (INHALATION) at 20:01

## 2023-07-16 RX ADMIN — INSULIN LISPRO 11 UNITS: 100 INJECTION, SOLUTION INTRAVENOUS; SUBCUTANEOUS at 12:29

## 2023-07-16 RX ADMIN — HYDROCODONE BITARTRATE AND ACETAMINOPHEN 1 TABLET: 5; 325 TABLET ORAL at 03:46

## 2023-07-16 RX ADMIN — FAMOTIDINE 20 MG: 20 TABLET, FILM COATED ORAL at 16:39

## 2023-07-16 RX ADMIN — METOPROLOL TARTRATE 25 MG: 25 TABLET, FILM COATED ORAL at 00:04

## 2023-07-16 RX ADMIN — SENNOSIDES AND DOCUSATE SODIUM 2 TABLET: 8.6; 5 TABLET ORAL at 21:29

## 2023-07-16 RX ADMIN — INSULIN HUMAN 2 UNITS: 100 INJECTION, SOLUTION PARENTERAL at 06:06

## 2023-07-16 RX ADMIN — GUAIFENESIN 600 MG: 600 TABLET, EXTENDED RELEASE ORAL at 14:13

## 2023-07-16 RX ADMIN — SENNOSIDES AND DOCUSATE SODIUM 2 TABLET: 8.6; 5 TABLET ORAL at 08:09

## 2023-07-16 RX ADMIN — AZITHROMYCIN MONOHYDRATE 500 MG: 500 INJECTION, POWDER, LYOPHILIZED, FOR SOLUTION INTRAVENOUS at 21:26

## 2023-07-16 RX ADMIN — INSULIN LISPRO 14 UNITS: 100 INJECTION, SOLUTION INTRAVENOUS; SUBCUTANEOUS at 17:37

## 2023-07-16 RX ADMIN — GABAPENTIN 200 MG: 100 CAPSULE ORAL at 20:12

## 2023-07-16 RX ADMIN — SODIUM CHLORIDE 100 ML/HR: 9 INJECTION, SOLUTION INTRAVENOUS at 06:06

## 2023-07-16 RX ADMIN — METOPROLOL TARTRATE 25 MG: 25 TABLET, FILM COATED ORAL at 16:39

## 2023-07-16 RX ADMIN — DILTIAZEM HYDROCHLORIDE 120 MG: 120 CAPSULE, EXTENDED RELEASE ORAL at 14:13

## 2023-07-16 RX ADMIN — HYDROCODONE BITARTRATE AND ACETAMINOPHEN 1 TABLET: 5; 325 TABLET ORAL at 16:39

## 2023-07-16 NOTE — ED NOTES
Nursing report ED to floor  Isra Davenport  68 y.o.  male    HPI:   Chief Complaint   Patient presents with    Pain       Admitting doctor:   Jese SMITH MD    Admitting diagnosis:   The primary encounter diagnosis was Primary malignant neoplasm of left lung metastatic to other site. Diagnoses of Pedal edema, Pain in both lower extremities, and Atrial fibrillation with rapid ventricular response were also pertinent to this visit.    Code status:   Current Code Status       Date Active Code Status Order ID Comments User Context       Prior            Allergies:   Ibuprofen and Penicillins    Isolation:  No active isolations     Fall Risk:  Fall Risk Assessment was completed, and patient is at high risk for falls.   Predictive Model Details         20 (Low) Factor Value    Calculated 7/15/2023 18:07 Age 68    Risk of Fall Model Musculoskeletal Assessment WDL     Active Peripheral IV Present     Imaging order in this encounter Present     Respiratory Rate 19     Skin Assessment WDL     Financial Class Other     Magnesium not on file     Albumin 2.6 g/dL     Creatinine 0.37 mg/dL     Drug Use No     Diastolic BP 69     Tobacco Use Current     [place] Scale not on file     Chloride 96 mmol/L     Number of Distinct Medication Classes administered 2     Peripheral Vascular Assessment WDL     Clinically Relevant Sex Not Female     Gastrointestinal Assessment WDL     Number of administrations of Analgesic Narcotics 1     Cardiac Assessment WDL     ALT 28 U/L     Potassium 4.6 mmol/L     Days after Admission 0.05     Calcium 10 mg/dL     Total Bilirubin 0.2 mg/dL         Weight:       07/15/23  1706   Weight: 65.4 kg (144 lb 3.2 oz)       Intake and Output  No intake or output data in the 24 hours ending 07/15/23 2041    Diet:        Most recent vitals:   Vitals:    07/15/23 2028 07/15/23 2035 07/15/23 2035 07/15/23 2039   BP: 126/78  113/65    Pulse: (!) 149 (!) 149  (!) 143   Resp:       Temp:       SpO2: 95%       Weight:       Height:           Active LDAs/IV Access:   Lines, Drains & Airways       Active LDAs       Name Placement date Placement time Site Days    Peripheral IV 07/15/23 1711 Anterior;Distal;Left;Upper Antecubital 07/15/23  1711  Antecubital  less than 1    Peripheral IV 07/15/23 2033 Anterior;Right Forearm 07/15/23  2033  Forearm  less than 1    Single Lumen Implantable Port 06/29/23 Right Internal jugular 06/29/23  0745  Internal jugular  16                    Skin Condition:   Skin Assessments (last day)       None             Labs (abnormal labs have a star):   Labs Reviewed   COMPREHENSIVE METABOLIC PANEL - Abnormal; Notable for the following components:       Result Value    Glucose 355 (*)     Creatinine 0.37 (*)     Chloride 96 (*)     CO2 36.0 (*)     Albumin 2.6 (*)     Alkaline Phosphatase 178 (*)     BUN/Creatinine Ratio 56.8 (*)     All other components within normal limits    Narrative:     GFR Normal >60  Chronic Kidney Disease <60  Kidney Failure <15     CBC WITH AUTO DIFFERENTIAL - Abnormal; Notable for the following components:    WBC 11.40 (*)     RBC 3.75 (*)     Hemoglobin 10.0 (*)     Hematocrit 31.8 (*)     RDW 20.7 (*)     RDW-SD 61.7 (*)     Neutrophil % 89.3 (*)     Lymphocyte % 4.9 (*)     Eosinophil % 0.0 (*)     Neutrophils, Absolute 10.20 (*)     Lymphocytes, Absolute 0.60 (*)     All other components within normal limits   POCT GLUCOSE FINGERSTICK - Abnormal; Notable for the following components:    Glucose 391 (*)     All other components within normal limits   TROPONIN   MAGNESIUM   CBC AND DIFFERENTIAL    Narrative:     The following orders were created for panel order CBC & Differential.  Procedure                               Abnormality         Status                     ---------                               -----------         ------                     CBC Auto Differential[[id number]]        Abnormal            Final result                 Please view results for  these tests on the individual orders.       LOC: Person, Place, Time, and Situation    Telemetry:  Telemetry    Cardiac Monitoring Ordered: yes    EKG:   ECG 12 Lead Rhythm Change   Preliminary Result   HEART RATE= 151  bpm   RR Interval= 398  ms   SC Interval=   ms   P Horizontal Axis=   deg   P Front Axis=   deg   QRSD Interval= 75  ms   QT Interval= 233  ms   QRS Axis= 71  deg   T Wave Axis= 238  deg   - ABNORMAL ECG -   Atrial fibrillation   Consider anteroseptal infarct   Repolarization abnormality, prob rate related   Electronically Signed By:    Date and Time of Study: 2023-07-15 19:55:15          Medications Given in the ED:   Medications   sodium chloride 0.9 % flush 10 mL (has no administration in time range)   dilTIAZem (CARDIZEM) 125 mg in 125 mL D5W infusion (10 mg/hr Intravenous Rate/Dose Change 7/15/23 2035)   HYDROmorphone (DILAUDID) injection 0.5 mg (has no administration in time range)   furosemide (LASIX) injection 40 mg (has no administration in time range)   ondansetron (ZOFRAN) injection 4 mg (4 mg Intravenous Given 7/15/23 1715)   HYDROmorphone (DILAUDID) injection 0.5 mg (0.5 mg Intravenous Given 7/15/23 1718)   dilTIAZem (CARDIZEM) bolus from bag 1 mg/mL 15 mg (15 mg Intravenous Bolus from Bag 7/15/23 2003)   insulin lispro (HUMALOG/ADMELOG) injection 4 Units (4 Units Subcutaneous Given 7/15/23 2020)   metoprolol tartrate (LOPRESSOR) injection 5 mg (5 mg Intravenous Given 7/15/23 2039)       Imaging results:  XR Chest 1 View    Result Date: 7/15/2023  Impression: 1. Worsening left basilar airspace disease compatible with pneumonia. 2. Chronic elevation of the left hemidiaphragm. Electronically Signed: Jeet Panda  7/15/2023 6:09 PM EDT  Workstation ID: [id number]    XR Foot 3+ View Bilateral    Result Date: 7/15/2023  Impression: 1. No acute bony abnormality of the feet. Electronically Signed: Jeet Panda  7/15/2023 6:34 PM EDT  Workstation ID: [id number]    XR Tibia Fibula 2 View  Bilateral    Result Date: 7/15/2023  Impression: 1. No acute bony abnormality of the left tibia or fibula. 2. Old healed fractures of the right tibia and fibula. No acute bony abnormality. Electronically Signed: Jeet Panda  7/15/2023 6:31 PM EDT  Workstation ID: [id number]     Social issues:   Social History     Socioeconomic History    Marital status:    Tobacco Use    Smoking status: Every Day     Packs/day: 1.00     Years: 15.00     Pack years: 15.00     Types: Cigarettes    Smokeless tobacco: Never   Vaping Use    Vaping Use: Never used   Substance and Sexual Activity    Alcohol use: Yes     Alcohol/week: 2.0 standard drinks     Types: 2 Cans of beer per week    Drug use: Never    Sexual activity: Defer       NIH Stroke Scale:  Interval: (not recorded)  1a. Level of Consciousness: (not recorded)  1b. LOC Questions: (not recorded)  1c. LOC Commands: (not recorded)  2. Best Gaze: (not recorded)  3. Visual: (not recorded)  4. Facial Palsy: (not recorded)  5a. Motor Arm, Left: (not recorded)  5b. Motor Arm, Right: (not recorded)  6a. Motor Leg, Left: (not recorded)  6b. Motor Leg, Right: (not recorded)  7. Limb Ataxia: (not recorded)  8. Sensory: (not recorded)  9. Best Language: (not recorded)  10. Dysarthria: (not recorded)  11. Extinction and Inattention (formerly Neglect): (not recorded)    Total (NIH Stroke Scale): (not recorded)     Additional notable assessment information:       Nursing report ED to floor:        Geraldo Trujillo RN   07/15/23 20:41 EDT

## 2023-07-16 NOTE — CONSULTS
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  Endocrine consultation is requested for further evaluation management.  Patient tells me that he is not aware that he has diabetes.  He is not taking any medications for diabetes.  He apparently has been on dexamethasone with his chemotherapy for lung cancer and have most likely contributed to this hyperglycemia.  He is eating fair at this time.    Past Medical History:   Diagnosis Date    Arthritis     Benign prostatic hyperplasia without urinary obstruction 07/06/2023    Chronic back pain 07/06/2023    Closed fracture of right hip 06/16/2023    Added automatically from request for surgery [id number]    Hernia of anterior abdominal wall 09/22/2020    Lung cancer     squamous cell carcimoma,left    Lung cancer metastatic to brain 06/29/2023    Mass of left lung 06/16/2023    Added automatically from request for surgery [id number]    Neuralgia 07/06/2023    Neuropathy 07/06/2023    Osteoarthritis 07/06/2023    Polyp of colon 07/06/2023    Primary malignant neoplasm of left lung metastatic to other site 06/16/2023    Added automatically from request for surgery [id number]    Prostatitis 07/06/2023    Spinal stenosis of lumbar region 07/06/2023       Social History     Socioeconomic History    Marital status:    Tobacco Use    Smoking status: Every Day     Packs/day: 1.00     Years: 15.00     Pack years: 15.00     Types: Cigarettes     Passive exposure: Current    Smokeless tobacco: Never   Vaping Use     Vaping Use: Never used   Substance and Sexual Activity    Alcohol use: Yes     Alcohol/week: 2.0 standard drinks     Types: 2 Cans of beer per week    Drug use: Never    Sexual activity: Defer       History reviewed. No pertinent family history.    Allergies   Allergen Reactions    Ibuprofen Itching    Penicillins Hives       ROS:   Constitutional: Admit fatigue, tiredness.    Eyes:  Denies change in visual acuity   HENT:  Denies nasal congestion or sore throat   Respiratory: Denies cough, shortness of breath.   Cardiovascular:  Denies chest pain, edema   GI:  Denies abdominal pain, nausea, vomiting.   :  Denies polyuria and polydipsia  Musculoskeletal:  Denies back pain or joint pain   Integument:  Denies dry skin, rash   Neurologic:  Denies headache, focal weakness or sensory changes   Endocrine:  Denies polyuria or polydipsia   Psychiatric:  Denies depression or anxiety      Vitals:    07/16/23 1439   BP: 129/72   Pulse: 94   Resp: 16   Temp: 97.4 °F (36.3 °C)   SpO2: 93%      Body mass index is 20.4 kg/m².     Physical Exam:  GEN: NAD, conversant  EYES: EOMI, PERRL, no conjunctival erythema  NECK: no thyromegaly, full ROM   CV: RRR, no murmurs/rubs/gallops, no peripheral edema  LUNG: CTAB, no wheezes/rales/rhonchi  SKIN: no rashes, no acanthosis  MSK: no deformities, full ROM of all extremities  NEURO: no tremors, DTR normal  PSYCH: AOX3, appropriate mood, affect normal      Results Review:     I reviewed the patient's new clinical results.    Lab Results   Component Value Date    GLUCOSE 450 (C) 07/15/2023    BUN 23 07/15/2023    CREATININE 0.59 (L) 07/15/2023    BCR 39.0 (H) 07/15/2023    K 5.4 (H) 07/15/2023    CO2 38.0 (H) 07/15/2023    CALCIUM 9.2 07/15/2023    PROTENTOTREF 7.1 06/17/2023    ALBUMIN 2.6 (L) 07/15/2023    LABIL2 0.5 (L) 06/17/2023    AST 11 07/15/2023    ALT 28 07/15/2023       Lab Results   Component Value Date    HGBA1C 7.30 (H) 07/15/2023     Lab Results   Component Value Date     CREATININE 0.59 (L) 07/15/2023     Results from last 7 days   Lab Units 07/16/23  1109 07/16/23  0709 07/16/23  0605 07/16/23  0136 07/15/23  2345 07/15/23  2316   GLUCOSE mg/dL 390* 184* 150* 309* 421* 448*       Medication Review: Reviewed.       Current Facility-Administered Medications:     aspirin chewable tablet 81 mg, 81 mg, Oral, Daily, Yi Amin APRN, 81 mg at 07/16/23 0809    azithromycin (ZITHROMAX) 500 mg in sodium chloride 0.9 % 250 mL IVPB-VTB, 500 mg, Intravenous, Q24H, Yi Amin APRN, 500 mg at 07/15/23 2136    sennosides-docusate (PERICOLACE) 8.6-50 MG per tablet 2 tablet, 2 tablet, Oral, BID, 2 tablet at 07/16/23 0809 **AND** polyethylene glycol (MIRALAX) packet 17 g, 17 g, Oral, Daily PRN **AND** bisacodyl (DULCOLAX) EC tablet 5 mg, 5 mg, Oral, Daily PRN **AND** bisacodyl (DULCOLAX) suppository 10 mg, 10 mg, Rectal, Daily PRN, Yi Amin APRN    budesonide (PULMICORT) nebulizer solution 0.5 mg, 0.5 mg, Nebulization, BID - RT, Raoul Castillo MD    [START ON 7/17/2023] dexamethasone (DECADRON) tablet 4 mg, 4 mg, Oral, Daily With Breakfast, Srini Kitchen APRN    dextrose (D50W) (25 g/50 mL) IV injection 10-50 mL, 10-50 mL, Intravenous, Q15 Min PRN, Yi Amin APRN    dextrose (GLUTOSE) oral gel 15 g, 15 g, Oral, Q15 Min PRN, Yi Amin APRN    dilTIAZem CD (CARDIZEM CD) 24 hr capsule 120 mg, 120 mg, Oral, Q24H, Raoul Castillo MD, 120 mg at 07/16/23 1413    famotidine (PEPCID) tablet 20 mg, 20 mg, Oral, BID AC, AminJames mirzaessa, APRN, 20 mg at 07/16/23 0809    ferrous sulfate EC tablet 324 mg, 324 mg, Oral, Daily With Breakfast, Amin, Yi, APRN, 324 mg at 07/16/23 0809    furosemide (LASIX) injection 20 mg, 20 mg, Intravenous, Daily, Delroy Amina, APRN, 20 mg at 07/16/23 0809    gabapentin (NEURONTIN) capsule 200 mg, 200 mg, Oral, Nightly, Amin, Iy, APRN, 200 mg at 07/15/23 2136    Glucagon (GLUCAGEN) injection 1 mg, 1 mg,  Intramuscular, Q15 Min PRN, AminDelroya, APRN    guaiFENesin (MUCINEX) 12 hr tablet 600 mg, 600 mg, Oral, Q12H, Raoul Castillo MD, 600 mg at 07/16/23 1413    HYDROcodone-acetaminophen (NORCO) 5-325 MG per tablet 1 tablet, 1 tablet, Oral, Q6H PRN, Amin, Yi, APRN, 1 tablet at 07/16/23 0346    HYDROmorphone (DILAUDID) injection 0.5 mg, 0.5 mg, Intravenous, Q4H PRN, Amin, Yi, APRN    insulin glargine (LANTUS, SEMGLEE) injection 1-200 Units, 1-200 Units, Subcutaneous, Nightly - Glucommander, Amin, Yi, APRN, 17 Units at 07/15/23 2243    insulin lispro (HUMALOG/ADMELOG) injection 1-200 Units, 1-200 Units, Subcutaneous, 4x Daily With Meals & Nightly, Eloisa, Yi, APRN, 11 Units at 07/16/23 1229    insulin lispro (HUMALOG/ADMELOG) injection 1-200 Units, 1-200 Units, Subcutaneous, PRN, Amin, Yi, APRN    ipratropium-albuterol (DUO-NEB) nebulizer solution 3 mL, 3 mL, Nebulization, Q4H PRN, Amin, Yi, APRN    metoprolol tartrate (LOPRESSOR) tablet 25 mg, 25 mg, Oral, Q8H, Amin, Yi, APRN, 25 mg at 07/16/23 0809    ondansetron (ZOFRAN) tablet 4 mg, 4 mg, Oral, Q6H PRN, Amin, Yi, APRN    [COMPLETED] Insert Peripheral IV, , , Once **AND** sodium chloride 0.9 % flush 10 mL, 10 mL, Intravenous, PRN, Salvatore Dougherty MD    sodium chloride 0.9 % flush 10 mL, 10 mL, Intravenous, Q12H, Amin, Yi, APRN, 10 mL at 07/16/23 0809    sodium chloride 0.9 % flush 10 mL, 10 mL, Intravenous, PRN, Amin, Yi, APRN    sodium chloride 0.9 % infusion 40 mL, 40 mL, Intravenous, PRN, Eloisa, Yi, APRN          Assessment and plan:  Diabetes mellitus type 2 with hyperglycemia: Uncontrolled, he is currently on subcu Glucomander with Lantus and Humalog which I will recommend to continue.  I will add regular insulin for dexamethasone.  Follow blood sugars and make adjustments as needed.    Metastatic lung cancer: Followed by oncology    A-fib with RVR/lower extremity  pain and edema/anemia: Followed by primary team.    Thank you very much for the consultation.      Mireya Abarca MD FACE.

## 2023-07-16 NOTE — PROGRESS NOTES
[facility] Medicine Services Daily Progress Note    Patient Name: Isra Davenport  : 1955  MRN: [id number]  Primary Care Physician:  Mayco Carreon MD  Date of admission: 7/15/2023      Subjective      Chief Complaint: Pain     History of Present Illness: Isra Davenport is a 68 y.o. 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  He is status post intramedullary nailing of the hip on 2023 and a Vefrrj-f-Ghnp placed on 2023 he is currently undergoing chemotherapy and plan for palliative radiation who presented to [facility] on 7/15/2023 complaining of pain and lower extremity edema     Review of records show patient had presented to chemotherapy with complaint of lower extremity edema and what he describes as neuropathic pain.  He had ultrasound that was negative for DVT and was prescribed Lasix.  He was also prescribed Norco 5/325 for pain.  Patient reports when he went to  prescriptions from the pharmacy the pharmacy was closed on Monday and he was unable to fill prescription.  He presents to the ED today with uncontrolled pain.  It was noted from previous admission he was discharged on low pressure for diagnosis of sinus tachycardia     ED work-up included chest x-ray that showed a right internal jugular Port-A-Cath in place.  Heart size stable.  Airspace disease in the left lung compatible with pneumonia.  Chronic elevation of the left hemidiaphragm.  No pleural effusion.  Right lung was clear.  Bilateral foot x-ray was negative for fracture.  There is healed fracture of the right tibia and fibula.  Troponin 26, potassium 4.6, sodium 138, glucose 355, creatinine 0.3, BUN is 21.  Albumin 2.6.  White count 11.4, hemoglobin 10.  On admission heart rate 84 but while he was being treated in the ED heart rate jumped into the  150s and was found to be in acute A-fib with RVR.  O2 sats dropped to 88% was placed on 2 L nasal cannula.  He was given a Cardizem bolus and Lopressor bolus and resumed on a Cardizem drip.  Patient denies any chest pain or feeling of chest palpitations.  Denies any fever chills or increased shortness of breath.  Reports positive for productive cough.  Positive for swelling of bilateral lower extremities below the calf and extreme tenderness to touch bilateral feet.      7/16/23 patient seen and examined in bed no acute distress, vital signs stable, K-5.4, hr 81, dyspnea on high flow oxygen.  We will start Cardizem p.o.       ROS Review of Systems   Constitutional: Positive for malaise/fatigue.   Cardiovascular:  Positive for leg swelling.   Respiratory:  Positive for cough and sputum production.    Skin: Negative.    Musculoskeletal:  Positive for back pain.   Gastrointestinal: Negative.    Genitourinary: Negative.    Neurological:  Positive for paresthesias.   Psychiatric/B      Objective      Vitals:   Temp:  [98.2 °F (36.8 °C)-98.7 °F (37.1 °C)] 98.7 °F (37.1 °C)  Heart Rate:  [0-156] 81  Resp:  [19-21] 20  BP: (101-129)/(54-82) 120/70  Flow (L/min):  [2-22] 22    Physical Exam Constitutional:       Appearance: He is ill-appearing.   Eyes:      Pupils: Pupils are equal, round, and reactive to light.   Cardiovascular:      Rate and Rhythm: Tachycardia present. Rhythm irregular.   Pulmonary:      Breath sounds: Rhonchi present.   Abdominal:      General: Abdomen is flat.      Palpations: Abdomen is soft.   Musculoskeletal:      Right lower leg: Edema present.      Left lower leg: Edema present.   Skin:     General: Skin is warm and dry.   Neurological:      Mental Status: He is alert and oriented to person, place, and time.   Psychiatric:         Mood and Affect: Mood normal.           Result Review    Result Review:  I have personally reviewed the results from the time of this admission to 7/16/2023 08:56 EDT and  agree with these findings:  []  Laboratory  []  Microbiology  []  Radiology  []  EKG/Telemetry   []  Cardiology/Vascular   []  Pathology  []  Old records  []  Other:  Most notable findings include:     Wounds (last 24 hours)       LDA Wound       Row Name 07/16/23 0713 07/16/23 0711          Wound 06/17/23 0915 Right anterior hip Incision    Wound - Properties Group Placement Date: 06/17/23  -HB Placement Time: 0915  -HB Side: Right  -HB Orientation: anterior  -HB Location: hip  -HB Primary Wound Type: Incision  -HB    Dressing Appearance -- dry  -MF     Closure -- Approximated;Staples  -MF     Periwound -- intact;dry  -MF     Periwound Temperature -- warm  -MF     Periwound Skin Turgor -- soft  -MF     Drainage Amount -- none  -MF     Care, Wound -- cleansed with;sterile normal saline  -MF     Dressing Care -- open to air  -MF     Retired Wound - Properties Group Placement Date: 06/17/23  -HB Placement Time: 0915  -HB Side: Right  -HB Orientation: anterior  -HB Location: hip  -HB Primary Wound Type: Incision  -HB    Retired Wound - Properties Group Date first assessed: 06/17/23  -HB Time first assessed: 0915  -HB Side: Right  -HB Location: hip  -HB Primary Wound Type: Incision  -HB       Wound 07/15/23 2100 Bilateral medial gluteal Pressure Injury    Wound - Properties Group Placement Date: 07/15/23  -MF Placement Time: 2100  -MF Present on Hospital Admission: Y  -MF Side: Bilateral  -MF Orientation: medial  -MF Location: gluteal  -MF Primary Wound Type: Pressure inj  -MF    Pressure Injury Stage 2  -MF --     Dressing Appearance open to air  -MF --     Closure Open to air  -MF --     Periwound Temperature warm  -MF --     Periwound Skin Turgor soft  -MF --     Drainage Amount none  -MF --     Care, Wound cleansed with;soap and water  -MF --     Dressing Care open to air  -MF --     Periwound Care dry periwound area maintained  -MF --     Retired Wound - Properties Group Placement Date: 07/15/23  -MF Placement  Time: 2100  -MF Present on Hospital Admission: Y  -MF Side: Bilateral  -MF Orientation: medial  -MF Location: gluteal  -MF Primary Wound Type: Pressure inj  -MF    Retired Wound - Properties Group Date first assessed: 07/15/23  - Time first assessed: 2100  -MF Present on Hospital Admission: Y  -MF Side: Bilateral  -MF Location: gluteal  -MF Primary Wound Type: Pressure inj  -MF              User Key  (r) = Recorded By, (t) = Taken By, (c) = Cosigned By      Initials Name Provider Type     Cleo Britton, RN Registered Nurse     Danyell Torres RN Registered Nurse                    CBC:      Lab 07/15/23  2133 07/15/23  1712   WBC 11.40* 11.40*   HEMOGLOBIN 9.9* 10.0*   HEMATOCRIT 32.5* 31.8*   PLATELETS 415 404   NEUTROS ABS 10.30* 10.20*   LYMPHS ABS 0.60* 0.60*   MONOS ABS 0.50 0.60   EOS ABS 0.00 0.00   MCV 84.5 84.8     CMP:        Lab 07/15/23  2133 07/15/23  1712   SODIUM 142 138   POTASSIUM 5.4* 4.6   CHLORIDE 95* 96*   CO2 38.0* 36.0*   ANION GAP 9.0 6.0   BUN 23 21   CREATININE 0.59* 0.37*   EGFR 106.3 122.4   GLUCOSE 450* 355*   CALCIUM 9.2 10.0   MAGNESIUM 1.7  --    TOTAL PROTEIN  --  6.8   ALBUMIN  --  2.6*   GLOBULIN  --  4.2   ALT (SGPT)  --  28   AST (SGOT)  --  11   BILIRUBIN  --  0.2   ALK PHOS  --  178*     No results found for: ACANTHNAEG, AFBCX, BPERTUSSISCX, BLOODCX  No results found for: BCIDPCR, CXREFLEX, CSFCX, CULTURETIS  No results found for: CULTURES, HSVCX, URCX  No results found for: EYECULTURE, GCCX, HSVCULTURE, LABHSV  No results found for: LEGIONELLA, MRSACX, MUMPSCX, MYCOPLASCX  No results found for: NOCARDIACX, STOOLCX  No results found for: THROATCX, UNSTIMCULT, URINECX, CULTURE, VZVCULTUR  No results found for: VIRALCULTU, WOUNDCX      Assessment & Plan      Brief Patient Summary:  Isra Davenport is a 68 y.o. male who presented to chemotherapy with complaint of lower extremity edema and what he describes as neuropathic pain.  He had ultrasound that was negative for  DVT and was prescribed Lasix.  He was also prescribed Norco 5/325 for pain.  Patient reports when he went to  prescriptions from the pharmacy, the pharmacy was closed on Monday and he was unable to fill prescription.  He presents to the ED today with uncontrolled pain.  It was noted from previous admission he was discharged on low pressure for diagnosis of sinus tachycardia         aspirin, 81 mg, Oral, Daily  azithromycin, 500 mg, Intravenous, Q24H  dexamethasone, 4 mg, Oral, Q8H  famotidine, 20 mg, Oral, BID AC  ferrous sulfate, 324 mg, Oral, Daily With Breakfast  furosemide, 20 mg, Intravenous, Daily  gabapentin, 200 mg, Oral, Nightly  insulin glargine, 1-200 Units, Subcutaneous, Nightly - Glucommander  insulin lispro, 1-200 Units, Subcutaneous, 4x Daily With Meals & Nightly  insulin regular, 2 Units, Subcutaneous, Q8H  metoprolol tartrate, 25 mg, Oral, Q8H  senna-docusate sodium, 2 tablet, Oral, BID  sodium chloride, 10 mL, Intravenous, Q12H       dilTIAZem, 5-15 mg/hr, Last Rate: Stopped (07/16/23 0052)  Pharmacy Consult - Steroid Insulin Protocol,          Active Hospital Problems:  Active Hospital Problems    Diagnosis     **Atrial fibrillation with RVR        New onset A-fib with RVR  -Cardizem drip>to po  - Started on Cardiezem drip in ED and given lopressor  - consulted cardiology  - Had considered pharmacologic AC but with known brain mets concerned with risk for bleed so will consult oncology for recommendation on AC  - continue VTE with SCD until seen by cardiology and oncology     Pain and Edema BLE  - appears Neuropathic  - pt also with known bone cancer and arthritis   - consider neuropathy 2/2 chemotherapy  - will continue Lasix for edema  -Was ruled out for DVT   - will add gabapentin 200mg QHS for sign of neuropathic pain  - check BNP  - will resume pt Norco that was previously ordered and add prn Dilaudid   -echo 6/22/23: Left ventricular systolic function is normal. Left ventricular  ejection fraction appears to be 61 - 65%.   Left ventricular diastolic function is consistent with (grade I) impaired relaxation.     Hyperglycemia- likely steroid induced   -A1C 7.3  - add glucomander  - add steroid protocol      Hypoxia- consider 2/2 known lung cancer   - cxr with pneumonia and elevated WBC and known tobacco use  - will cover possible COPD vs pneumonia vs carcinoma with empiric Azithromycin  - wean O2 as tolerated   - duo nebs prn     Anemia  - continue ferrous sulfate  - hgb stable     Stage IV squamous cell lung cancer with mets to brain and bone  - currently treated with chemotherapy   - continue decadron as previously ordered by oncology  - continue nexium   - consult oncology for continued management    DVT prophylaxis:  Mechanical DVT prophylaxis orders are present.    CODE STATUS:    Medical Intervention Limits: NO intubation (DNI)  Code Status (Patient has no pulse and is not breathing): No CPR (Do Not Attempt to Resuscitate)  Medical Interventions (Patient has pulse or is breathing): Limited Support      Disposition:  I expect patient to be discharged NH.    I have utilized all available, immediate resources to obtain, update, or review the patient's current medications including all prescriptions, over-the-counter products, herbals, cannabis/cannabidiol products, and vitamin.mineral/dietary (nutritional) supplements.      Medical Intervention Limits: NO intubation (DNI)  Code Status (Patient has no pulse and is not breathing): No CPR (Do Not Attempt to Resuscitate)  Medical Interventions (Patient has pulse or is breathing): Limited Support    Next of kin of Power of :       Admission Status:  I believe this patient meets ADMIT status.              This patient has been examined wearing appropriate Personal Protective Equipment and discussed with  RN . 07/16/23      Electronically signed by Raoul Castillo MD, 07/16/23, 08:56 EDT.  [facility] Hospitalist Team

## 2023-07-16 NOTE — CONSULTS
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 male admitted to [facility] through the ED on 7/15/2023 with complaints of uncontrolled pain and BLE edema.  In the ED he developed tachycardia in the 150s and was found to be in A-fib with RVR.  He was started on supplemental oxygen for O2 sat drop to 88%.  He received Cardizem bolus followed by drip and Lopressor bolus.  In the ED, CBC revealed WBC 11.4, hemoglobin 10.0, MCV 84.8, and platelets 404,000.  Creatinine and LFTs were not elevated.  CXR revealed left basilar pneumonia.  He reported left lower extremity pain with x-ray of the left tip/fib and left foot negative for acute findings.  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  He is post radiation therapy to the brain and currently receiving radiation therapy to the right hip and femur.  He was last seen by our service as an outpatient on 2023 where he was prescribed Lasix and hydrocodone/APAP 5/325 mg as needed for his BLE edema and pain.  BLE venous Doppler performed as an outpatient on 2023 was negative for DVT/SVT and showed bilateral fluid retention.  At time of consultation 2023, hemoglobin had dropped to 9.9.    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  Our service had initially evaluated him during 2023 - 2023 hospitalization during which lung cancer diagnosis was made and initial work-up completed.  He had received radiation therapy to the brain and is currently receiving  radiation to the hip/femur.  He was also diagnosed with iron deficiency anemia during the hospital stay where he was treated with IV iron and discharged on oral ferrous sulfate with GERD prophylaxis.  Complete anemia work-up with the exception of bone marrow was performed during the hospital stay with findings negative for additional etiologies.  Incidentally his copper level was elevated without multivitamin supplementation.  CBC on 7/14/2023 revealed WBC 13, hemoglobin 10.76, and platelets 329,000.  He was continued on daily oral iron.  Molecular testing on his tumor pathology was pending with plans to start chemotherapy after radiation was completed.  He was also to start Xgeva for bone metastases support and was prescribed calcium with vitamin D supplementation.    Past Medical History, Surgical History, Social History, Family History: No change from recent outpatient chart  Allergies: Penicillin causes hives and ibuprofen causes pruritus.    PCP: Mayco Carreon MD    History:  Past Medical History:   Diagnosis Date    Arthritis     Benign prostatic hyperplasia without urinary obstruction 07/06/2023    Chronic back pain 07/06/2023    Closed fracture of right hip 06/16/2023    Added automatically from request for surgery [id number]    Hernia of anterior abdominal wall 09/22/2020    Lung cancer     squamous cell carcimoma,left    Lung cancer metastatic to brain 06/29/2023    Mass of left lung 06/16/2023    Added automatically from request for surgery [id number]    Neuralgia 07/06/2023    Neuropathy 07/06/2023    Osteoarthritis 07/06/2023    Polyp of colon 07/06/2023    Primary malignant neoplasm of left lung metastatic to other site 06/16/2023    Added automatically from request for surgery [id number]    Prostatitis 07/06/2023    Spinal stenosis of lumbar region 07/06/2023   ,   Past Surgical History:   Procedure Laterality Date    BACK SURGERY  2006    BRONCHOSCOPY N/A 6/19/2023    Procedure: BRONCHOSCOPY WITH  BIOPSY X1 AREA, FINE NEEDLE ASPIRATION, BRUSHING, AND BRONCHIAL WASHING;  Surgeon: Reggie Abarca MD;  Location: [facility] ENDOSCOPY;  Service: Pulmonary;  Laterality: N/A;  POST: MUCOUS PLUGS  AND LUNG MASS    HIP INTERTROCHANTERIC NAILING Right 6/17/2023    Procedure: HIP INTERTROCHANTERIC NAILING;  Surgeon: Levi Blackmon II, MD;  Location: [facility] MAIN OR;  Service: Orthopedics;  Laterality: Right;    LEG SURGERY Bilateral 08/25/1985    PORTACATH PLACEMENT Right 6/29/2023    Procedure: INSERTION OF PORTACATH;  Surgeon: Jese Goss MD;  Location: [facility] MAIN OR;  Service: General;  Laterality: Right;   , History reviewed. No pertinent family history.,   Social History     Tobacco Use    Smoking status: Every Day     Packs/day: 1.00     Years: 15.00     Pack years: 15.00     Types: Cigarettes     Passive exposure: Current    Smokeless tobacco: Never   Vaping Use    Vaping Use: Never used   Substance Use Topics    Alcohol use: Yes     Alcohol/week: 2.0 standard drinks     Types: 2 Cans of beer per week    Drug use: Never   ,   Medications Prior to Admission   Medication Sig Dispense Refill Last Dose    acetaminophen (TYLENOL) 325 MG tablet Take 2 tablets by mouth Every 6 (Six) Hours As Needed for Mild Pain. Indications: Pain       albuterol sulfate  (90 Base) MCG/ACT inhaler Inhale 2 puffs Every 4 (Four) Hours As Needed for Wheezing. 6.7 g 5     aspirin 81 MG EC tablet Take 1 tablet by mouth Daily. 30 tablet 2     denosumab (XGEVA) 120 MG/1.7ML solution injection Inject 1.7 mL under the skin into the appropriate area as directed See Admin Instructions. Every 4 weeks       dexamethasone (DECADRON) 4 MG tablet Take 1 tablet by mouth Daily With Breakfast.       ferrous sulfate 324 (65 Fe) MG tablet delayed-release EC tablet Take 1 tablet by mouth Daily With Breakfast. 30 tablet 2     furosemide (LASIX) 20 MG tablet Take 1 tablet by mouth Daily.       HYDROcodone-acetaminophen (NORCO) 5-325  MG per tablet Take 1 tablet by mouth Every 4 (Four) Hours As Needed for Moderate Pain.       metoprolol tartrate (LOPRESSOR) 25 MG tablet Take 1 tablet by mouth Every 8 (Eight) Hours. 90 tablet 1     nicotine (NICODERM CQ) 21 MG/24HR patch Place 1 patch on the skin as directed by provider Daily.       omeprazole (priLOSEC) 20 MG capsule Take 1 capsule by mouth Daily.       ondansetron (ZOFRAN) 4 MG tablet Take 1 tablet by mouth Every 6 (Six) Hours As Needed for Nausea or Vomiting.       vitamin D (ERGOCALCIFEROL) 1.25 MG (38513 UT) capsule capsule Take 1 capsule by mouth 1 (One) Time Per Week.       vitamin D3 125 MCG (5000 UT) capsule capsule Take 1 capsule by mouth Daily.      , Scheduled Meds:  aspirin, 81 mg, Oral, Daily  azithromycin, 500 mg, Intravenous, Q24H  dexamethasone, 4 mg, Oral, Q8H  famotidine, 20 mg, Oral, BID AC  ferrous sulfate, 324 mg, Oral, Daily With Breakfast  furosemide, 20 mg, Intravenous, Daily  gabapentin, 200 mg, Oral, Nightly  insulin glargine, 1-200 Units, Subcutaneous, Nightly - Glucommander  insulin lispro, 1-200 Units, Subcutaneous, 4x Daily With Meals & Nightly  insulin regular, 2 Units, Subcutaneous, Q8H  metoprolol tartrate, 25 mg, Oral, Q8H  senna-docusate sodium, 2 tablet, Oral, BID  sodium chloride, 10 mL, Intravenous, Q12H    , Continuous Infusions:  dilTIAZem, 5-15 mg/hr, Last Rate: Stopped (07/16/23 0052)  Pharmacy Consult - Steroid Insulin Protocol,     , PRN Meds:    senna-docusate sodium **AND** polyethylene glycol **AND** bisacodyl **AND** bisacodyl    dextrose    dextrose    glucagon (human recombinant)    HYDROcodone-acetaminophen    HYDROmorphone    insulin lispro    ipratropium-albuterol    ondansetron    Pharmacy Consult - Steroid Insulin Protocol    [COMPLETED] Insert Peripheral IV **AND** sodium chloride    sodium chloride    sodium chloride   Allergies:  Ibuprofen and Penicillins    ROS:  Review of Systems   Constitutional:  Negative for activity change, chills,  "fatigue, fever and unexpected weight change.   HENT:  Negative for congestion, dental problem, hearing loss, mouth sores, nosebleeds, sore throat and trouble swallowing.    Eyes:  Negative for photophobia and visual disturbance.   Respiratory:  Positive for shortness of breath. Negative for cough and chest tightness.    Cardiovascular:  Positive for leg swelling. Negative for chest pain and palpitations.   Gastrointestinal:  Negative for abdominal distention, abdominal pain, blood in stool, constipation, diarrhea, nausea and vomiting.   Endocrine: Negative for cold intolerance and heat intolerance.   Genitourinary:  Negative for decreased urine volume, difficulty urinating, dysuria, frequency, hematuria and urgency.   Musculoskeletal:  Negative for arthralgias and gait problem.   Skin:  Positive for wound (BLE's from skin creatinine secondary to edema). Negative for rash.   Neurological:  Negative for dizziness, tremors, weakness, light-headedness, numbness and headaches.   Hematological:  Negative for adenopathy. Does not bruise/bleed easily.   Psychiatric/Behavioral:  Negative for confusion and hallucinations. The patient is not nervous/anxious.    All other systems reviewed and are negative.     Objective     Vital Signs:   /70 (BP Location: Right arm, Patient Position: Lying)   Pulse 81   Temp 98.7 °F (37.1 °C) (Oral)   Resp 20   Ht 177.8 cm (70\")   Wt 64.5 kg (142 lb 3.2 oz)   SpO2 93%   BMI 20.40 kg/m²     Physical Exam:  Physical Exam  Vitals and nursing note reviewed.   Constitutional:       General: He is not in acute distress.     Appearance: Normal appearance. He is well-developed. He is not diaphoretic.   HENT:      Head: Normocephalic and atraumatic.      Nose: Nose normal.      Comments: O2 per NC     Mouth/Throat:      Mouth: Mucous membranes are moist.      Pharynx: Oropharynx is clear. No oropharyngeal exudate or posterior oropharyngeal erythema.      Comments: Edentulous  Eyes:      " General: No scleral icterus.     Extraocular Movements: Extraocular movements intact.      Conjunctiva/sclera: Conjunctivae normal.      Pupils: Pupils are equal, round, and reactive to light.   Cardiovascular:      Rate and Rhythm: Normal rate and regular rhythm.      Heart sounds: Normal heart sounds. No murmur heard.     Comments: Cardiac monitor leads.  Right chest wall Sjzoqj-e-Zyhu is not accessed.  Pulmonary:      Effort: Pulmonary effort is normal. No respiratory distress.      Breath sounds: No stridor. No wheezing or rales.      Comments: Decreased breath sounds bilaterally  Abdominal:      General: Bowel sounds are normal. There is no distension.      Palpations: Abdomen is soft. There is no mass.      Tenderness: There is no abdominal tenderness. There is no guarding.   Genitourinary:     Comments: Deferred   Musculoskeletal:         General: Swelling (BLE 1+ edema) present. No tenderness or deformity. Normal range of motion.      Cervical back: Normal range of motion and neck supple.      Right lower leg: No edema.      Left lower leg: No edema.      Comments: RUE IV and left hand O2 monitor.   Lymphadenopathy:      Cervical: No cervical adenopathy.      Upper Body:      Right upper body: No supraclavicular adenopathy.      Left upper body: No supraclavicular adenopathy.   Skin:     General: Skin is warm and dry.      Coloration: Skin is not pale.      Findings: Erythema (BLE with dry/hyperemic skin) present. No bruising or rash.      Comments: Left chest wall tattoos.   Neurological:      General: No focal deficit present.      Mental Status: He is alert and oriented to person, place, and time.      Coordination: Coordination normal.   Psychiatric:         Mood and Affect: Mood normal.         Behavior: Behavior normal.         Thought Content: Thought content normal.        Results Review:  Lab Results (last 48 hours)       Procedure Component Value Units Date/Time    POC Glucose Once [[id number]]   (Abnormal) Collected: 07/16/23 0709    Specimen: Blood Updated: 07/16/23 0711     Glucose 184 mg/dL      Comment: Serial Number: [id number]:  [id number]       POC Glucose Once [[id number]]  (Abnormal) Collected: 07/16/23 0605    Specimen: Blood Updated: 07/16/23 0607     Glucose 150 mg/dL      Comment: Serial Number: [id number]:  [id number]       POC Glucose Once [[id number]]  (Abnormal) Collected: 07/16/23 0136    Specimen: Blood Updated: 07/16/23 0137     Glucose 309 mg/dL      Comment: Serial Number: [id number]:  [id number]       POC Glucose Once [[id number]]  (Abnormal) Collected: 07/15/23 2345    Specimen: Blood Updated: 07/15/23 2348     Glucose 421 mg/dL      Comment: Serial Number: [id number]:  [id number]       POC Glucose Once [[id number]]  (Abnormal) Collected: 07/15/23 2316    Specimen: Blood Updated: 07/15/23 2317     Glucose 448 mg/dL      Comment: Serial Number: [id number]:  [id number]       Magnesium [[id number]]  (Normal) Collected: 07/15/23 2133    Specimen: Blood Updated: 07/15/23 2239     Magnesium 1.7 mg/dL     Basic Metabolic Panel [[id number]]  (Abnormal) Collected: 07/15/23 2133    Specimen: Blood Updated: 07/15/23 2239     Glucose 450 mg/dL      BUN 23 mg/dL      Creatinine 0.59 mg/dL      Sodium 142 mmol/L      Potassium 5.4 mmol/L      Chloride 95 mmol/L      CO2 38.0 mmol/L      Calcium 9.2 mg/dL      BUN/Creatinine Ratio 39.0     Anion Gap 9.0 mmol/L      eGFR 106.3 mL/min/1.73     Narrative:      GFR Normal >60  Chronic Kidney Disease <60  Kidney Failure <15      High Sensitivity Troponin T 2Hr [[id number]]  (Abnormal) Collected: 07/15/23 2133    Specimen: Blood Updated: 07/15/23 2239     HS Troponin T 30 ng/L      Troponin T Delta 4 ng/L     Narrative:      High Sensitive Troponin T Reference Range:  <10.0 ng/L- Negative Female for AMI  <15.0 ng/L- Negative Male for AMI  >=10 - Abnormal Female indicating possible myocardial injury.  >=15 - Abnormal Male indicating  "possible myocardial injury.   Clinicians would have to utilize clinical acumen, EKG, Troponin, and serial changes to determine if it is an Acute Myocardial Infarction or myocardial injury due to an underlying chronic condition.         Procalcitonin [[id number]]  (Normal) Collected: 07/15/23 2133    Specimen: Blood Updated: 07/15/23 2229     Procalcitonin 0.22 ng/mL     Narrative:      As a Marker for Sepsis (Non-Neonates):    1. <0.5 ng/mL represents a low risk of severe sepsis and/or septic shock.  2. >2 ng/mL represents a high risk of severe sepsis and/or septic shock.    As a Marker for Lower Respiratory Tract Infections that require antibiotic therapy:    PCT on Admission    Antibiotic Therapy       6-12 Hrs later    >0.5                Strongly Recommended  >0.25 - <0.5        Recommended   0.1 - 0.25          Discouraged              Remeasure/reassess PCT  <0.1                Strongly Discouraged     Remeasure/reassess PCT    As 28 day mortality risk marker: \"Change in Procalcitonin Result\" (>80% or <=80%) if Day 0 (or Day 1) and Day 4 values are available. Refer to http://www.[employer]-pct-calculator.com    Change in PCT <=80%  A decrease of PCT levels below or equal to 80% defines a positive change in PCT test result representing a higher risk for 28-day all-cause mortality of patients diagnosed with severe sepsis for septic shock.    Change in PCT >80%  A decrease of PCT levels of more than 80% defines a negative change in PCT result representing a lower risk for 28-day all-cause mortality of patients diagnosed with severe sepsis or septic shock.       BNP [[id number]]  (Abnormal) Collected: 07/15/23 2133    Specimen: Blood Updated: 07/15/23 2223     proBNP 1,137.0 pg/mL     Narrative:      Among patients with dyspnea, NT-proBNP is highly sensitive for the detection of acute congestive heart failure. In addition NT-proBNP of <300 pg/ml effectively rules out acute congestive heart failure with 99% negative " predictive value.    Results may be falsely decreased if patient taking Biotin.      CBC Auto Differential [[id number]]  (Abnormal) Collected: 07/15/23 2133    Specimen: Blood Updated: 07/15/23 2220     WBC 11.40 10*3/mm3      RBC 3.84 10*6/mm3      Hemoglobin 9.9 g/dL      Hematocrit 32.5 %      MCV 84.5 fL      MCH 25.7 pg      MCHC 30.5 g/dL      RDW 20.6 %      RDW-SD 64.3 fl      MPV 9.6 fL      Platelets 415 10*3/mm3      Neutrophil % 90.6 %      Lymphocyte % 4.9 %      Monocyte % 4.4 %      Eosinophil % 0.0 %      Basophil % 0.1 %      Neutrophils, Absolute 10.30 10*3/mm3      Lymphocytes, Absolute 0.60 10*3/mm3      Monocytes, Absolute 0.50 10*3/mm3      Eosinophils, Absolute 0.00 10*3/mm3      Basophils, Absolute 0.00 10*3/mm3      nRBC 0.4 /100 WBC     Hemoglobin A1c [[id number]]  (Abnormal) Collected: 07/15/23 2133    Specimen: Blood Updated: 07/15/23 2215     Hemoglobin A1C 7.30 %     High Sensitivity Troponin T [[id number]]  (Abnormal) Collected: 07/15/23 1712    Specimen: Blood Updated: 07/15/23 2052     HS Troponin T 26 ng/L     Narrative:      High Sensitive Troponin T Reference Range:  <10.0 ng/L- Negative Female for AMI  <15.0 ng/L- Negative Male for AMI  >=10 - Abnormal Female indicating possible myocardial injury.  >=15 - Abnormal Male indicating possible myocardial injury.   Clinicians would have to utilize clinical acumen, EKG, Troponin, and serial changes to determine if it is an Acute Myocardial Infarction or myocardial injury due to an underlying chronic condition.         POC Glucose Once [[id number]]  (Abnormal) Collected: 07/15/23 2020    Specimen: Blood Updated: 07/15/23 2021     Glucose 391 mg/dL      Comment: Serial Number: [id number]:  [id number]       CBC & Differential [[id number]]  (Abnormal) Collected: 07/15/23 1712    Specimen: Blood Updated: 07/15/23 1754    Narrative:      The following orders were created for panel order CBC & Differential.  Procedure                                Abnormality         Status                     ---------                               -----------         ------                     CBC Auto Differential[[id number]]        Abnormal            Final result                 Please view results for these tests on the individual orders.    CBC Auto Differential [[id number]]  (Abnormal) Collected: 07/15/23 1712    Specimen: Blood Updated: 07/15/23 1754     WBC 11.40 10*3/mm3      RBC 3.75 10*6/mm3      Hemoglobin 10.0 g/dL      Hematocrit 31.8 %      MCV 84.8 fL      MCH 26.7 pg      MCHC 31.5 g/dL      RDW 20.7 %      RDW-SD 61.7 fl      MPV 9.1 fL      Platelets 404 10*3/mm3      Neutrophil % 89.3 %      Lymphocyte % 4.9 %      Monocyte % 5.5 %      Eosinophil % 0.0 %      Basophil % 0.3 %      Neutrophils, Absolute 10.20 10*3/mm3      Lymphocytes, Absolute 0.60 10*3/mm3      Monocytes, Absolute 0.60 10*3/mm3      Eosinophils, Absolute 0.00 10*3/mm3      Basophils, Absolute 0.00 10*3/mm3      nRBC 0.1 /100 WBC     Comprehensive Metabolic Panel [[id number]]  (Abnormal) Collected: 07/15/23 1712    Specimen: Blood Updated: 07/15/23 1741     Glucose 355 mg/dL      BUN 21 mg/dL      Creatinine 0.37 mg/dL      Sodium 138 mmol/L      Potassium 4.6 mmol/L      Chloride 96 mmol/L      CO2 36.0 mmol/L      Calcium 10.0 mg/dL      Total Protein 6.8 g/dL      Albumin 2.6 g/dL      ALT (SGPT) 28 U/L      AST (SGOT) 11 U/L      Alkaline Phosphatase 178 U/L      Total Bilirubin 0.2 mg/dL      Globulin 4.2 gm/dL      A/G Ratio 0.6 g/dL      BUN/Creatinine Ratio 56.8     Anion Gap 6.0 mmol/L      eGFR 122.4 mL/min/1.73     Narrative:      GFR Normal >60  Chronic Kidney Disease <60  Kidney Failure <15               Pending Results: iron sat    Imaging Reviewed:   XR Chest 1 View    Result Date: 7/15/2023  Impression: 1. Worsening left basilar airspace disease compatible with pneumonia. 2. Chronic elevation of the left hemidiaphragm. Electronically Signed: Jeet Panda  7/15/2023  6:09 PM EDT  Workstation ID: [id number]    NM PET/CT Skull Base to Mid Thigh    Result Date: 7/10/2023  1.There is a large mass in the left upper lobe extending to the hilum consistent with patient's known malignancy. There is metastatic activity to left hilar lymph node, bilateral adrenal glands and right greater trochanter. Intracranial metastatic disease has also been seen on brain MRI exam. Electronically Signed: Maddie Leungroberth  7/10/2023 6:13 PM EDT  Workstation ID: [id number]    XR Foot 3+ View Bilateral    Result Date: 7/15/2023  Impression: 1. No acute bony abnormality of the feet. Electronically Signed: Jeet Panda  7/15/2023 6:34 PM EDT  Workstation ID: [id number]    XR Tibia Fibula 2 View Bilateral    Result Date: 7/15/2023  Impression: 1. No acute bony abnormality of the left tibia or fibula. 2. Old healed fractures of the right tibia and fibula. No acute bony abnormality. Electronically Signed: Jeet Panda  7/15/2023 6:31 PM EDT  Workstation ID: [id number]     I have reviewed the patient's labs, imaging, reports, and other clinician documentation.         Assessment & Plan       ASSESSMENT  Stage IV squamous cell carcinoma left upper lobe lung-status post radiation to the brain and currently on radiation to the right hip/femur.  Molecular testing on tumor pathology remains pending with plan for outpatient palliative chemotherapy once radiation therapy is completed.  Plan to start outpatient Xgeva for bone metastases support.  Will resume radiation to the left hip.  DANN/elevated copper level-he has not been on multivitamin containing copper.  Received IV iron last admit.  On daily oral iron.  Hemoglobin dropping somewhat post admission.  We will check iron sat and treat with IV iron if indicated.  He had reported bright red blood per rectum as outpatient with 2 colonoscopies in the past 6 years that were unremarkable per his report.  Left lung pneumonia-likely postobstructive.  On antibiotics and supportive care  per primary team.  BLE edema and pain-recent outpatient BLE Doppler was negative for DVT/SVT.  On Lasix.  Gabapentin and Dilaudid for pain control added by primary team.  A-fib with RVR-new onset noted in ED.  Cardiology consulted per primary team.  Currently on Cardizem drip.  Chronic nausea-on Pepcid twice daily and as needed Zofran.  Smoking-cessation counseling continued.  He had been started on nicotine patch as outpatient.    PLAN  Check iron saturation.  Continue ferrous sulfate 325 mg by mouth daily.  Consider IV iron if iron sat is low.  Radiation oncology consultation to resume radiation therapy when able  Await molecular testing results with plan for chemotherapy post completion of radiation.  Start Xgeva and daily Ca+VitD for bone metastases support as outpatient.  Reduce dexamethasone dose back to current outpatient dose of 4 mg by mouth daily.  Continue ASA 81 mg by mouth daily.  Would not recommend full dose anticoagulation for A-fib secondary to brain mets and known rectal bleeding.  Continue as needed hydrocodone, as needed Dilaudid, and scheduled gabapentin for pain control.  Continue Pepcid p.o. twice daily and as needed Zofran.  Follow CBC.    Note prepared by DILIP Sebastian.  Patient seen and examined by Mina Dupree MD.  Electronically signed by NOREEN Vanegas, 07/16/23, 9:56 AM EDT.       I have personally performed a face-to-face diagnostic evaluation on this patient. I have performed a complete history and physical examination, reviewed laboratory studies, and radiographic examinations.  I have completed the majority and substantive portion of the medical decision making.  I have formulated the assessment on this patient and the plan of action as noted above. I have discussed the case with Srini Kitchen NP, have edited/reviewed the note, and agree with the care plan.  The patient claims to have come to the hospital with lower extremity swelling and shortness of air.  On  examination, he has decreased breath sounds and 2+ lower extremity edema.  WBC 11.4, hemoglobin 9.9, platelet count 415,000 and blood sugar is very high.  He has been started on Decadron 4 mg 3 times a day.  We will decrease the dose to 4 mg once a day which she was on as outpatient.  We will have radiation oncology resume radiation unable.  Leg swelling and A-fib to be addressed by cardiology.  He will continue on treatment for pneumonia and oral iron replacement.  Plan is to start systemic therapy post completion of radiation.      I discussed the patient's findings and my recommendations with patient.    Thank you for this consult.  We will be happy to follow along in the care of this patient.     Part of this note may be an electronic transcription/translation of spoken language to printed text using the Dragon Dictation System.    Electronically signed by Mina Dupree MD, 07/16/23, 10:08 AM EDT.

## 2023-07-16 NOTE — CONSULTS
Referring Provider: Dr. Castillo  Reason for Consultation: New onset A-fib    Patient Care Team:  Mayco Carreon MD as PCP - General (Internal Medicine)  Mina Dupree MD as Consulting Physician (Hematology and Oncology)    Chief complaint pain and lower extremity swelling      History of present illness:  Isra Davenport is a 68 y.o. male who presented to the ED with pain and lower extremity swelling on 7/15/2023.  States he has had progressively worsening lower extremity swelling for the last few days so came in for further evaluation.  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    Cardiology consulted for new onset A-fib. 12-lead EKG from yesterday shows A-fib RVR, patient started on IV Cardizem and given IV Lopressor.  Patient is in sinus rhythm at time of evaluation.  Patient recently underwent cardiac work-up during recent admission on 6/16/2023 in which his Myoview study showed no evidence of recurrent ischemia and was consistent with a low risk study and echocardiogram showed preserved LVEF of 56 to 60% with grade 1 diastolic dysfunction.     Review of Systems   Constitutional: Positive for malaise/fatigue. Negative for chills and fever.   Cardiovascular:  Positive for dyspnea on exertion and leg swelling. Negative for chest pain, palpitations and syncope.   Respiratory:  Positive for shortness of breath.    Gastrointestinal:  Negative for abdominal pain, nausea and vomiting.   Neurological:  Positive for weakness. Negative for dizziness and light-headedness.     History  Past Medical History:   Diagnosis Date    Arthritis     Benign prostatic hyperplasia without urinary obstruction 07/06/2023    Chronic back pain 07/06/2023    Closed fracture of right hip 06/16/2023     Added automatically from request for surgery [id number]    Hernia of anterior abdominal wall 09/22/2020    Lung cancer     squamous cell carcimoma,left    Lung cancer metastatic to brain 06/29/2023    Mass of left lung 06/16/2023    Added automatically from request for surgery [id number]    Neuralgia 07/06/2023    Neuropathy 07/06/2023    Osteoarthritis 07/06/2023    Polyp of colon 07/06/2023    Primary malignant neoplasm of left lung metastatic to other site 06/16/2023    Added automatically from request for surgery [id number]    Prostatitis 07/06/2023    Spinal stenosis of lumbar region 07/06/2023       Past Surgical History:   Procedure Laterality Date    BACK SURGERY  2006    BRONCHOSCOPY N/A 6/19/2023    Procedure: BRONCHOSCOPY WITH BIOPSY X1 AREA, FINE NEEDLE ASPIRATION, BRUSHING, AND BRONCHIAL WASHING;  Surgeon: Reggie Abarca MD;  Location: [facility] ENDOSCOPY;  Service: Pulmonary;  Laterality: N/A;  POST: MUCOUS PLUGS  AND LUNG MASS    HIP INTERTROCHANTERIC NAILING Right 6/17/2023    Procedure: HIP INTERTROCHANTERIC NAILING;  Surgeon: Levi Blackmon II, MD;  Location: [facility] MAIN OR;  Service: Orthopedics;  Laterality: Right;    LEG SURGERY Bilateral 08/25/1985    PORTACATH PLACEMENT Right 6/29/2023    Procedure: INSERTION OF PORTACATH;  Surgeon: Jese Goss MD;  Location: [facility] MAIN OR;  Service: General;  Laterality: Right;       History reviewed. No pertinent family history.    Social History     Tobacco Use    Smoking status: Every Day     Packs/day: 1.00     Years: 15.00     Pack years: 15.00     Types: Cigarettes     Passive exposure: Current    Smokeless tobacco: Never   Vaping Use    Vaping Use: Never used   Substance Use Topics    Alcohol use: Yes     Alcohol/week: 2.0 standard drinks     Types: 2 Cans of beer per week    Drug use: Never        Medications Prior to Admission   Medication Sig Dispense Refill Last Dose    acetaminophen (TYLENOL) 325 MG tablet Take 2 tablets by mouth  Every 6 (Six) Hours As Needed for Mild Pain. Indications: Pain       albuterol sulfate  (90 Base) MCG/ACT inhaler Inhale 2 puffs Every 4 (Four) Hours As Needed for Wheezing. 6.7 g 5     aspirin 81 MG EC tablet Take 1 tablet by mouth Daily. 30 tablet 2     denosumab (XGEVA) 120 MG/1.7ML solution injection Inject 1.7 mL under the skin into the appropriate area as directed See Admin Instructions. Every 4 weeks       dexamethasone (DECADRON) 4 MG tablet Take 1 tablet by mouth Daily With Breakfast.       ferrous sulfate 324 (65 Fe) MG tablet delayed-release EC tablet Take 1 tablet by mouth Daily With Breakfast. 30 tablet 2     furosemide (LASIX) 20 MG tablet Take 1 tablet by mouth Daily.       HYDROcodone-acetaminophen (NORCO) 5-325 MG per tablet Take 1 tablet by mouth Every 4 (Four) Hours As Needed for Moderate Pain.       metoprolol tartrate (LOPRESSOR) 25 MG tablet Take 1 tablet by mouth Every 8 (Eight) Hours. 90 tablet 1     nicotine (NICODERM CQ) 21 MG/24HR patch Place 1 patch on the skin as directed by provider Daily.       omeprazole (priLOSEC) 20 MG capsule Take 1 capsule by mouth Daily.       ondansetron (ZOFRAN) 4 MG tablet Take 1 tablet by mouth Every 6 (Six) Hours As Needed for Nausea or Vomiting.       vitamin D (ERGOCALCIFEROL) 1.25 MG (43101 UT) capsule capsule Take 1 capsule by mouth 1 (One) Time Per Week.       vitamin D3 125 MCG (5000 UT) capsule capsule Take 1 capsule by mouth Daily.            Ibuprofen and Penicillins    Scheduled Meds:aspirin, 81 mg, Oral, Daily  azithromycin, 500 mg, Intravenous, Q24H  [START ON 7/17/2023] dexamethasone, 4 mg, Oral, Daily With Breakfast  famotidine, 20 mg, Oral, BID AC  ferrous sulfate, 324 mg, Oral, Daily With Breakfast  furosemide, 20 mg, Intravenous, Daily  gabapentin, 200 mg, Oral, Nightly  insulin glargine, 1-200 Units, Subcutaneous, Nightly - Glucommander  insulin lispro, 1-200 Units, Subcutaneous, 4x Daily With Meals & Nightly  metoprolol tartrate, 25  "mg, Oral, Q8H  senna-docusate sodium, 2 tablet, Oral, BID  sodium chloride, 10 mL, Intravenous, Q12H      Continuous Infusions:dilTIAZem, 5-15 mg/hr, Last Rate: Stopped (07/16/23 0052)      PRN Meds:.  senna-docusate sodium **AND** polyethylene glycol **AND** bisacodyl **AND** bisacodyl    dextrose    dextrose    glucagon (human recombinant)    HYDROcodone-acetaminophen    HYDROmorphone    insulin lispro    ipratropium-albuterol    ondansetron    [COMPLETED] Insert Peripheral IV **AND** sodium chloride    sodium chloride    sodium chloride        VITAL SIGNS  Vitals:    07/16/23 0000 07/16/23 0045 07/16/23 0500 07/16/23 0954   BP: 102/66 105/61 120/70 107/62   BP Location:  Right arm Right arm    Patient Position:  Lying Lying    Pulse: 109 81 81 89   Resp:  21 20 16   Temp:  98.5 °F (36.9 °C) 98.7 °F (37.1 °C) 97.5 °F (36.4 °C)   TempSrc:  Oral Oral Oral   SpO2: 94% 94% 93% 96%   Weight:   64.5 kg (142 lb 3.2 oz)    Height:           Flowsheet Rows      Flowsheet Row First Filed Value   Admission Height 177.8 cm (70\") Documented at 07/15/2023 1652   Admission Weight 72.6 kg (160 lb) Documented at 07/15/2023 1652             TELEMETRY: Sinus rhythm    Physical Exam:  Vitals reviewed.   Constitutional:       Appearance: Chronically ill-appearing.   Eyes:      Pupils: Pupils are equal, round, and reactive to light.   Pulmonary:      Effort: Pulmonary effort is normal.      Breath sounds: Rhonchi present.   Cardiovascular:      Normal rate. Regular rhythm.   Pulses:     Intact distal pulses.   Edema:     Peripheral edema present.  Abdominal:      General: Bowel sounds are normal.      Palpations: Abdomen is soft.   Musculoskeletal: Normal range of motion.      Cervical back: Normal range of motion and neck supple. Skin:     General: Skin is warm.   Neurological:      General: No focal deficit present.      Mental Status: Confused.          LAB RESULTS (LAST 7 DAYS)    CBC  Results from last 7 days   Lab Units " 07/15/23  2133 07/15/23  1712   WBC 10*3/mm3 11.40* 11.40*   RBC 10*6/mm3 3.84* 3.75*   HEMOGLOBIN g/dL 9.9* 10.0*   HEMATOCRIT % 32.5* 31.8*   MCV fL 84.5 84.8   PLATELETS 10*3/mm3 415 404       BMP  Results from last 7 days   Lab Units 07/15/23  2133 07/15/23  1712   SODIUM mmol/L 142 138   POTASSIUM mmol/L 5.4* 4.6   CHLORIDE mmol/L 95* 96*   CO2 mmol/L 38.0* 36.0*   BUN mg/dL 23 21   CREATININE mg/dL 0.59* 0.37*   GLUCOSE mg/dL 450* 355*   MAGNESIUM mg/dL 1.7  --        CMP   Results from last 7 days   Lab Units 07/15/23  2133 07/15/23  1712   SODIUM mmol/L 142 138   POTASSIUM mmol/L 5.4* 4.6   CHLORIDE mmol/L 95* 96*   CO2 mmol/L 38.0* 36.0*   BUN mg/dL 23 21   CREATININE mg/dL 0.59* 0.37*   GLUCOSE mg/dL 450* 355*   ALBUMIN g/dL  --  2.6*   BILIRUBIN mg/dL  --  0.2   ALK PHOS U/L  --  178*   AST (SGOT) U/L  --  11   ALT (SGPT) U/L  --  28         BNP 1137        TROPONIN  Results from last 7 days   Lab Units 07/15/23  2133   HSTROP T ng/L 30*       CoAg        Creatinine Clearance  Estimated Creatinine Clearance: 110.8 mL/min (A) (by C-G formula based on SCr of 0.59 mg/dL (L)).    ABG        Radiology  XR Chest 1 View    Result Date: 7/15/2023  Impression: 1. Worsening left basilar airspace disease compatible with pneumonia. 2. Chronic elevation of the left hemidiaphragm. Electronically Signed: Jeet Panda  7/15/2023 6:09 PM EDT  Workstation ID: [id number]    XR Foot 3+ View Bilateral    Result Date: 7/15/2023  Impression: 1. No acute bony abnormality of the feet. Electronically Signed: Jeet Panda  7/15/2023 6:34 PM EDT  Workstation ID: [id number]    XR Tibia Fibula 2 View Bilateral    Result Date: 7/15/2023  Impression: 1. No acute bony abnormality of the left tibia or fibula. 2. Old healed fractures of the right tibia and fibula. No acute bony abnormality. Electronically Signed: Jeet Panda  7/15/2023 6:31 PM EDT  Workstation ID: [id number]         EKG      I personally viewed and interpreted the patient's  EKG/Telemetry data:    ECHOCARDIOGRAM:    Results for orders placed during the hospital encounter of 06/16/23    Adult Transthoracic Echo Complete W/ Cont if Necessary Per Protocol    Interpretation Summary    Left ventricular systolic function is normal. Left ventricular ejection fraction appears to be 61 - 65%.    Left ventricular diastolic function is consistent with (grade I) impaired relaxation.    There is mild bileaflet mitral valve prolapse present.    Estimated right ventricular systolic pressure from tricuspid regurgitation is mildly elevated (35-45 mmHg). Calculated right ventricular systolic pressure from tricuspid regurgitation is 44 mmHg.      STRESS MYOVIEW:  Results for orders placed during the hospital encounter of 06/16/23    Stress Test With Myocardial Perfusion One Day    Interpretation Summary    Left ventricular ejection fraction is hyperdynamic (Calculated EF > 70%).    Myocardial perfusion imaging indicates a small-to-moderate-sized infarct located in the inferior wall with no significant ischemia noted.    Impressions are consistent with a low risk study.    Diaphragmatic attenuation artifact is present.    This is Cardiolite imaging stress test with fixed inferior defect.  MI versus attenuation artifact.  No ischemia noted.  Left ventricular size and function was normal.  Clinical correlation recommended.    Findings consistent with a normal ECG stress test.       CARDIAC CATHETERIZATION:  No results found for this or any previous visit.       OTHER:         Assessment & Plan       Atrial fibrillation with RVR    New onset atrial fibrillation  Patient was started on Cardizem drip in ED and given Lopressor  Patient is currently on sinus rhythm  Patient recently underwent cardiac work-up to include Myoview study which showed no significant ischemia and was consistent with a low risk study  Patient also had recent echocardiogram which showed preserved LVEF of 61 to 65% with grade 1 diastolic  dysfunction and mild bileaflet mitral valve prolapse noted  [id number]-SQLr score is 1  Will obtain Holter/MCOT monitor at discharge to evaluate further arrhythmia  Patient is currently on Lopressor for rate control    Pain and lower extremity edema  Pain appears neuropathic  Patient has known 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 Rojas    I discussed the patients findings and my recommendations with patient and nurse    Lorrie Montiel, APRN  07/16/23  10:34 EDT    Cardiology attending:  Seen and examined.  Chart and labs reviewed.  History and exam findings are verified with above changes noted.  Assessment and plan notated by APC after being formulated by attending consultant.  Note that greater than 50% of the time spent in care of the patient was provided by attending consultant.  Patient underwent recent ischemic work-up that was negative for ischemia on Myoview.  Echocardiogram demonstrated normal left ventricular systolic function.  Patient had paroxysmal atrial fibrillation and spontaneously converted back to sinus rhythm.  No further work-up recommended at the present time.  Would consider oral anticoagulation if no contraindications given this patient's coagulable state from malignancy and A-fib.    Physical Exam:      General: Alert, cooperative, no distress, appears stated age  Head:  Normocephalic, atraumatic, mucous membranes moist  Eyes:  Conjunctiva/corneas clear, EOM's intact     Neck:  Supple,  no bruit  Lungs:            Coarse and diminished bilaterally  Chest wall: No tenderness  Heart::  Regular rate and rhythm, S1  and S2 normal, no murmur, rub or gallop  Abdomen: Soft, non-tender, nondistended bowel sounds active  Extremities: No cyanosis, clubbing, or edema  Pulses:            2+ and symmetric all extremities  Skin:  No rashes or lesions  Neuro/psych: A&O x3. CN II through XII are grossly intact with appropriate affect

## 2023-07-16 NOTE — H&P
[facility]  History & Physical    Patient Name: Isra Davenport  : 1955  MRN: [id number]  Primary Care Physician:  Mayco Carreon MD  Date of admission: 7/15/2023  Date and Time of Service: 7/15/23 at 2015    Subjective      Chief Complaint: Pain    History of Present Illness: Isra Davenport is a 68 y.o. 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  He is status post intramedullary nailing of the hip on 2023 and a Javoop-i-Emvw placed on 2023 he is currently undergoing chemotherapy and plan for palliative radiation who presented to [facility] on 7/15/2023 complaining of pain and lower extremity edema    Review of records show patient had presented to chemotherapy with complaint of lower extremity edema and what he describes as neuropathic pain.  He had ultrasound that was negative for DVT and was prescribed Lasix.  He was also prescribed Norco 5/325 for pain.  Patient reports when he went to  prescriptions from the pharmacy the pharmacy was closed on Monday and he was unable to fill prescription.  He presents to the ED today with uncontrolled pain.  It was noted from previous admission he was discharged on low pressure for diagnosis of sinus tachycardia    ED work-up included chest x-ray that showed a right internal jugular Port-A-Cath in place.  Heart size stable.  Airspace disease in the left lung compatible with pneumonia.  Chronic elevation of the left hemidiaphragm.  No pleural effusion.  Right lung was clear.  Bilateral foot x-ray was negative for fracture.  There is healed fracture of the right tibia and fibula.  Troponin 26, potassium 4.6, sodium 138, glucose 355, creatinine 0.3, BUN is 21.  Albumin 2.6.  White count 11.4, hemoglobin 10.  On admission heart rate 84 but while he was being treated in the ED  heart rate jumped into the 150s and was found to be in acute A-fib with RVR.  O2 sats dropped to 88% was placed on 2 L nasal cannula.  He was given a Cardizem bolus and Lopressor bolus and resumed on a Cardizem drip.  Patient denies any chest pain or feeling of chest palpitations.  Denies any fever chills or increased shortness of breath.  Reports positive for productive cough.  Positive for swelling of bilateral lower extremities below the calf and extreme tenderness to touch bilateral feet.          Review of Systems   Constitutional: Positive for malaise/fatigue.   Cardiovascular:  Positive for leg swelling.   Respiratory:  Positive for cough and sputum production.    Skin: Negative.    Musculoskeletal:  Positive for back pain.   Gastrointestinal: Negative.    Genitourinary: Negative.    Neurological:  Positive for paresthesias.   Psychiatric/Behavioral: Negative.        Personal History     Past Medical History:   Diagnosis Date    Arthritis     Benign prostatic hyperplasia without urinary obstruction 07/06/2023    Chronic back pain 07/06/2023    Closed fracture of right hip 06/16/2023    Added automatically from request for surgery [id number]    Hernia of anterior abdominal wall 09/22/2020    Lung cancer     squamous cell carcimoma,left    Lung cancer metastatic to brain 06/29/2023    Mass of left lung 06/16/2023    Added automatically from request for surgery [id number]    Neuralgia 07/06/2023    Neuropathy 07/06/2023    Osteoarthritis 07/06/2023    Polyp of colon 07/06/2023    Primary malignant neoplasm of left lung metastatic to other site 06/16/2023    Added automatically from request for surgery [id number]    Prostatitis 07/06/2023    Spinal stenosis of lumbar region 07/06/2023       Past Surgical History:   Procedure Laterality Date    BACK SURGERY  2006    BRONCHOSCOPY N/A 6/19/2023    Procedure: BRONCHOSCOPY WITH BIOPSY X1 AREA, FINE NEEDLE ASPIRATION, BRUSHING, AND BRONCHIAL WASHING;  Surgeon: Reggie Abarca,  MD;  Location: [facility] ENDOSCOPY;  Service: Pulmonary;  Laterality: N/A;  POST: MUCOUS PLUGS  AND LUNG MASS    HIP INTERTROCHANTERIC NAILING Right 6/17/2023    Procedure: HIP INTERTROCHANTERIC NAILING;  Surgeon: Levi Blackmon II, MD;  Location: [facility] MAIN OR;  Service: Orthopedics;  Laterality: Right;    LEG SURGERY Bilateral 08/25/1985    PORTACATH PLACEMENT Right 6/29/2023    Procedure: INSERTION OF PORTACATH;  Surgeon: Jese Goss MD;  Location: [facility] MAIN OR;  Service: General;  Laterality: Right;       Family History: family history is not on file. Otherwise pertinent FHx was reviewed and not pertinent to current issue.    Social History:  reports that he has been smoking cigarettes. He has a 15.00 pack-year smoking history. He has never used smokeless tobacco. He reports current alcohol use of about 2.0 standard drinks per week. He reports that he does not use drugs.    Home Medications:  Prior to Admission Medications       Prescriptions Last Dose Informant Patient Reported? Taking?    acetaminophen (TYLENOL) 325 MG tablet   Yes No    Take 2 tablets by mouth Every 6 (Six) Hours As Needed for Mild Pain. Indications: Pain    albuterol sulfate  (90 Base) MCG/ACT inhaler   No No    Inhale 2 puffs Every 4 (Four) Hours As Needed for Wheezing.    aspirin 81 MG EC tablet   No No    Take 1 tablet by mouth Daily.    ferrous sulfate 324 (65 Fe) MG tablet delayed-release EC tablet   No No    Take 1 tablet by mouth Daily With Breakfast.    metoprolol tartrate (LOPRESSOR) 25 MG tablet   No No    Take 1 tablet by mouth Every 8 (Eight) Hours.              Allergies:  Allergies   Allergen Reactions    Ibuprofen Itching    Penicillins Hives       Objective      Vitals:   Temp:  [98.2 °F (36.8 °C)] 98.2 °F (36.8 °C)  Heart Rate:  [0-156] 108  Resp:  [19-20] 19  BP: (105-129)/(54-82) 114/74  Flow (L/min):  [2-6] 2    Physical Exam  Constitutional:       Appearance: He is ill-appearing.   Eyes:       Pupils: Pupils are equal, round, and reactive to light.   Cardiovascular:      Rate and Rhythm: Tachycardia present. Rhythm irregular.   Pulmonary:      Breath sounds: Rhonchi present.   Abdominal:      General: Abdomen is flat.      Palpations: Abdomen is soft.   Musculoskeletal:      Right lower leg: Edema present.      Left lower leg: Edema present.   Skin:     General: Skin is warm and dry.   Neurological:      Mental Status: He is alert and oriented to person, place, and time.   Psychiatric:         Mood and Affect: Mood normal.        Result Review    Result Review:  I have personally reviewed the results from the time of this admission to 7/15/2023 21:19 EDT and agree with these findings:  [x]  Laboratory  [x]  Microbiology  [x]  Radiology  []  EKG/Telemetry   []  Cardiology/Vascular   []  Pathology  [x]  Old records  []  Other:  Most notable findings include: see H&P         Assessment & Plan        Active Hospital Problems:  Active Hospital Problems    Diagnosis     **Atrial fibrillation with RVR      Plan:   New onset A-fib with RVR  - Started on Cardiezem drip in ED and given lopressor  - will consult cardiology  - Had considered pharmacologic AC but with known brain mets concerned with risk for bleed so will consult oncology for recommendation on AC  - continue VTE with SCD until seen by cardiology and oncology    Pain and Edema BLE  - appears Neuropathic  - pt also with known bone cancer and arthritis   - consider neuropathy 2/2 chemotherapy  - will continue Lasix for edema  -Was ruled out for DVT   - will add gabapentin 200mg QHS for sign of neuropathic pain  - check BNP  - will resume pt Norco that was previously ordered and add prn Dilaudid   -echo 6/22/23: Left ventricular systolic function is normal. Left ventricular ejection fraction appears to be 61 - 65%.   Left ventricular diastolic function is consistent with (grade I) impaired relaxation.    Hyperglycemia  - likely steroid induced   -A1C  7.3  - add glucomander  - add steroid protocol   -endocrinology consult    Hypoxia  - consider 2/2 known lung cancer   - cxr with pneumonia and elevated WBC and known tobacco use  - will cover possible COPD vs pneumonia vs carcinoma with empiric Azithromycin  - wean O2 as tolerated   - duo nebs prn    Anemia  - continue ferrous sulfate  - hgb stable    Stage IV squamous cell lung cancer with mets to brain and bone  - currently treated with chemotherapy   - continue decadron as previously ordered by oncology  - continue nexium   - consult oncology for continued management    DVT prophylaxis:  Mechanical DVT prophylaxis orders are present.    CODE STATUS:    Medical Intervention Limits: NO intubation (DNI)  Code Status (Patient has no pulse and is not breathing): No CPR (Do Not Attempt to Resuscitate)  Medical Interventions (Patient has pulse or is breathing): Limited Support    Admission Status:  I believe this patient meets observation status.    I discussed the patient's findings and my recommendations with patient.    This patient has been examined wearing appropriate Personal Protective Equipment a    Signature: Electronically signed by NOREEN Agosto, 07/15/23, 21:19 EDT.  [facility] Hospitalist Team

## 2023-07-16 NOTE — PLAN OF CARE
Goal Outcome Evaluation:             Pt is resting at this time, no complaints of pain, vss, plan of care continues at this time

## 2023-07-17 PROBLEM — I48.91 ATRIAL FIBRILLATION: Status: ACTIVE | Noted: 2023-01-01

## 2023-07-17 LAB
ANION GAP SERPL CALCULATED.3IONS-SCNC: 7 MMOL/L (ref 5–15)
BASOPHILS # BLD AUTO: 0 10*3/MM3 (ref 0–0.2)
BASOPHILS NFR BLD AUTO: 0.2 % (ref 0–1.5)
BUN SERPL-MCNC: 17 MG/DL (ref 8–23)
BUN/CREAT SERPL: 48.6 (ref 7–25)
CALCIUM SPEC-SCNC: 9.7 MG/DL (ref 8.6–10.5)
CHLORIDE SERPL-SCNC: 94 MMOL/L (ref 98–107)
CO2 SERPL-SCNC: 41 MMOL/L (ref 22–29)
CREAT SERPL-MCNC: 0.35 MG/DL (ref 0.76–1.27)
DEPRECATED RDW RBC AUTO: 62.1 FL (ref 37–54)
EGFRCR SERPLBLD CKD-EPI 2021: 124.5 ML/MIN/1.73
EOSINOPHIL # BLD AUTO: 0 10*3/MM3 (ref 0–0.4)
EOSINOPHIL NFR BLD AUTO: 0.1 % (ref 0.3–6.2)
ERYTHROCYTE [DISTWIDTH] IN BLOOD BY AUTOMATED COUNT: 19.7 % (ref 12.3–15.4)
GLUCOSE BLDC GLUCOMTR-MCNC: 194 MG/DL (ref 70–105)
GLUCOSE BLDC GLUCOMTR-MCNC: 198 MG/DL (ref 70–105)
GLUCOSE BLDC GLUCOMTR-MCNC: 234 MG/DL (ref 70–105)
GLUCOSE BLDC GLUCOMTR-MCNC: 60 MG/DL (ref 70–105)
GLUCOSE BLDC GLUCOMTR-MCNC: 97 MG/DL (ref 70–105)
GLUCOSE SERPL-MCNC: 91 MG/DL (ref 65–99)
HCT VFR BLD AUTO: 31.5 % (ref 37.5–51)
HGB BLD-MCNC: 9.5 G/DL (ref 13–17.7)
LYMPHOCYTES # BLD AUTO: 0.9 10*3/MM3 (ref 0.7–3.1)
LYMPHOCYTES NFR BLD AUTO: 7 % (ref 19.6–45.3)
MCH RBC QN AUTO: 25.7 PG (ref 26.6–33)
MCHC RBC AUTO-ENTMCNC: 30.1 G/DL (ref 31.5–35.7)
MCV RBC AUTO: 85.3 FL (ref 79–97)
MONOCYTES # BLD AUTO: 0.8 10*3/MM3 (ref 0.1–0.9)
MONOCYTES NFR BLD AUTO: 6.3 % (ref 5–12)
NEUTROPHILS NFR BLD AUTO: 10.7 10*3/MM3 (ref 1.7–7)
NEUTROPHILS NFR BLD AUTO: 86.4 % (ref 42.7–76)
NRBC BLD AUTO-RTO: 0.2 /100 WBC (ref 0–0.2)
PLATELET # BLD AUTO: 445 10*3/MM3 (ref 140–450)
PMV BLD AUTO: 9.2 FL (ref 6–12)
POTASSIUM SERPL-SCNC: 4.5 MMOL/L (ref 3.5–5.2)
QT INTERVAL: 233 MS
QT INTERVAL: 341 MS
RBC # BLD AUTO: 3.69 10*6/MM3 (ref 4.14–5.8)
SODIUM SERPL-SCNC: 142 MMOL/L (ref 136–145)
TSH SERPL DL<=0.05 MIU/L-ACNC: 0.14 UIU/ML (ref 0.27–4.2)
WBC NRBC COR # BLD: 12.3 10*3/MM3 (ref 3.4–10.8)

## 2023-07-17 PROCEDURE — 99232 SBSQ HOSP IP/OBS MODERATE 35: CPT | Performed by: INTERNAL MEDICINE

## 2023-07-17 PROCEDURE — 63710000001 INSULIN LISPRO (HUMAN) PER 5 UNITS: Performed by: NURSE PRACTITIONER

## 2023-07-17 PROCEDURE — 93010 ELECTROCARDIOGRAM REPORT: CPT | Performed by: INTERNAL MEDICINE

## 2023-07-17 PROCEDURE — 94799 UNLISTED PULMONARY SVC/PX: CPT

## 2023-07-17 PROCEDURE — 84443 ASSAY THYROID STIM HORMONE: CPT | Performed by: INTERNAL MEDICINE

## 2023-07-17 PROCEDURE — 25010000002 NA FERRIC GLUC CPLX PER 12.5 MG: Performed by: INTERNAL MEDICINE

## 2023-07-17 PROCEDURE — 25010000002 FUROSEMIDE PER 20 MG: Performed by: NURSE PRACTITIONER

## 2023-07-17 PROCEDURE — 63710000001 DEXAMETHASONE PER 0.25 MG: Performed by: NURSE PRACTITIONER

## 2023-07-17 PROCEDURE — 97166 OT EVAL MOD COMPLEX 45 MIN: CPT

## 2023-07-17 PROCEDURE — 85025 COMPLETE CBC W/AUTO DIFF WBC: CPT | Performed by: NURSE PRACTITIONER

## 2023-07-17 PROCEDURE — 97162 PT EVAL MOD COMPLEX 30 MIN: CPT

## 2023-07-17 PROCEDURE — 25010000002 AZITHROMYCIN PER 500 MG: Performed by: NURSE PRACTITIONER

## 2023-07-17 PROCEDURE — 85007 BL SMEAR W/DIFF WBC COUNT: CPT | Performed by: NURSE PRACTITIONER

## 2023-07-17 PROCEDURE — G0108 DIAB MANAGE TRN  PER INDIV: HCPCS

## 2023-07-17 PROCEDURE — 99231 SBSQ HOSP IP/OBS SF/LOW 25: CPT | Performed by: INTERNAL MEDICINE

## 2023-07-17 PROCEDURE — 93005 ELECTROCARDIOGRAM TRACING: CPT | Performed by: INTERNAL MEDICINE

## 2023-07-17 PROCEDURE — 63710000001 INSULIN REGULAR HUMAN PER 5 UNITS: Performed by: INTERNAL MEDICINE

## 2023-07-17 PROCEDURE — 80048 BASIC METABOLIC PNL TOTAL CA: CPT | Performed by: INTERNAL MEDICINE

## 2023-07-17 PROCEDURE — 63710000001 INSULIN GLARGINE PER 5 UNITS: Performed by: NURSE PRACTITIONER

## 2023-07-17 PROCEDURE — 63710000001 INSULIN LISPRO (HUMAN) PER 5 UNITS: Performed by: INTERNAL MEDICINE

## 2023-07-17 PROCEDURE — 82948 REAGENT STRIP/BLOOD GLUCOSE: CPT

## 2023-07-17 PROCEDURE — 63710000001 DEXAMETHASONE PER 0.25 MG: Performed by: INTERNAL MEDICINE

## 2023-07-17 RX ORDER — DEXAMETHASONE 4 MG/1
4 TABLET ORAL EVERY 12 HOURS SCHEDULED
Status: DISCONTINUED | OUTPATIENT
Start: 2023-07-17 | End: 2023-07-19

## 2023-07-17 RX ORDER — IPRATROPIUM BROMIDE AND ALBUTEROL SULFATE 2.5; .5 MG/3ML; MG/3ML
3 SOLUTION RESPIRATORY (INHALATION)
Status: DISCONTINUED | OUTPATIENT
Start: 2023-07-17 | End: 2023-07-23

## 2023-07-17 RX ORDER — DILTIAZEM HCL/D5W 125 MG/125
5-15 PLASTIC BAG, INJECTION (ML) INTRAVENOUS
Status: DISCONTINUED | OUTPATIENT
Start: 2023-07-17 | End: 2023-07-19

## 2023-07-17 RX ORDER — INSULIN LISPRO 100 [IU]/ML
6 INJECTION, SOLUTION INTRAVENOUS; SUBCUTANEOUS ONCE
Status: COMPLETED | OUTPATIENT
Start: 2023-07-17 | End: 2023-07-17

## 2023-07-17 RX ORDER — DEXAMETHASONE 4 MG/1
4 TABLET ORAL EVERY 12 HOURS SCHEDULED
Status: DISCONTINUED | OUTPATIENT
Start: 2023-07-17 | End: 2023-07-17

## 2023-07-17 RX ADMIN — METOPROLOL TARTRATE 25 MG: 25 TABLET, FILM COATED ORAL at 09:31

## 2023-07-17 RX ADMIN — POLYETHYLENE GLYCOL 3350 17 G: 17 POWDER, FOR SOLUTION ORAL at 22:13

## 2023-07-17 RX ADMIN — DEXAMETHASONE 4 MG: 4 TABLET ORAL at 09:18

## 2023-07-17 RX ADMIN — SODIUM CHLORIDE 250 MG: 9 INJECTION, SOLUTION INTRAVENOUS at 11:26

## 2023-07-17 RX ADMIN — INSULIN LISPRO 6 UNITS: 100 INJECTION, SOLUTION INTRAVENOUS; SUBCUTANEOUS at 14:53

## 2023-07-17 RX ADMIN — METOPROLOL TARTRATE 25 MG: 25 TABLET, FILM COATED ORAL at 20:59

## 2023-07-17 RX ADMIN — FERROUS SULFATE TAB EC 324 MG (65 MG FE EQUIVALENT) 324 MG: 324 (65 FE) TABLET DELAYED RESPONSE at 09:19

## 2023-07-17 RX ADMIN — INSULIN GLARGINE 12 UNITS: 100 INJECTION, SOLUTION SUBCUTANEOUS at 22:13

## 2023-07-17 RX ADMIN — DEXAMETHASONE 4 MG: 4 TABLET ORAL at 22:13

## 2023-07-17 RX ADMIN — INSULIN LISPRO 8 UNITS: 100 INJECTION, SOLUTION INTRAVENOUS; SUBCUTANEOUS at 18:13

## 2023-07-17 RX ADMIN — Medication 5 MG/HR: at 21:32

## 2023-07-17 RX ADMIN — GUAIFENESIN 600 MG: 600 TABLET, EXTENDED RELEASE ORAL at 22:13

## 2023-07-17 RX ADMIN — METOPROLOL TARTRATE 25 MG: 25 TABLET, FILM COATED ORAL at 00:31

## 2023-07-17 RX ADMIN — Medication 10 ML: at 09:20

## 2023-07-17 RX ADMIN — SENNOSIDES AND DOCUSATE SODIUM 2 TABLET: 8.6; 5 TABLET ORAL at 22:13

## 2023-07-17 RX ADMIN — BISACODYL 5 MG: 5 TABLET, COATED ORAL at 22:13

## 2023-07-17 RX ADMIN — GABAPENTIN 200 MG: 100 CAPSULE ORAL at 22:12

## 2023-07-17 RX ADMIN — FAMOTIDINE 20 MG: 20 TABLET, FILM COATED ORAL at 17:37

## 2023-07-17 RX ADMIN — Medication 10 ML: at 22:14

## 2023-07-17 RX ADMIN — IPRATROPIUM BROMIDE AND ALBUTEROL SULFATE 3 ML: .5; 3 SOLUTION RESPIRATORY (INHALATION) at 20:41

## 2023-07-17 RX ADMIN — HYDROCODONE BITARTRATE AND ACETAMINOPHEN 1 TABLET: 5; 325 TABLET ORAL at 20:59

## 2023-07-17 RX ADMIN — BUDESONIDE 0.5 MG: 0.5 INHALANT RESPIRATORY (INHALATION) at 20:47

## 2023-07-17 RX ADMIN — SENNOSIDES AND DOCUSATE SODIUM 2 TABLET: 8.6; 5 TABLET ORAL at 09:18

## 2023-07-17 RX ADMIN — INSULIN LISPRO 4 UNITS: 100 INJECTION, SOLUTION INTRAVENOUS; SUBCUTANEOUS at 22:14

## 2023-07-17 RX ADMIN — GUAIFENESIN 600 MG: 600 TABLET, EXTENDED RELEASE ORAL at 09:19

## 2023-07-17 RX ADMIN — FAMOTIDINE 20 MG: 20 TABLET, FILM COATED ORAL at 09:18

## 2023-07-17 RX ADMIN — ASPIRIN 81 MG CHEWABLE TABLET 81 MG: 81 TABLET CHEWABLE at 09:19

## 2023-07-17 RX ADMIN — INSULIN HUMAN 2 UNITS: 100 INJECTION, SOLUTION PARENTERAL at 22:14

## 2023-07-17 RX ADMIN — FUROSEMIDE 20 MG: 10 INJECTION, SOLUTION INTRAMUSCULAR; INTRAVENOUS at 09:19

## 2023-07-17 RX ADMIN — AZITHROMYCIN MONOHYDRATE 500 MG: 500 INJECTION, POWDER, LYOPHILIZED, FOR SOLUTION INTRAVENOUS at 22:14

## 2023-07-17 RX ADMIN — DILTIAZEM HYDROCHLORIDE 120 MG: 120 CAPSULE, EXTENDED RELEASE ORAL at 09:18

## 2023-07-17 NOTE — CASE MANAGEMENT/SOCIAL WORK
Discharge Planning Assessment  [facility]     Patient Name: Isra Davenport  MRN: [id number]  Today's Date: 7/17/2023    Admit Date: 7/15/2023    Plan: [facility] referral pending acceptance. Will require precert and PASRR to be completed. From home alone with no home O2.   Discharge Needs Assessment       Row Name 07/17/23 1346       Living Environment    People in Home alone    Current Living Arrangements home    Potentially Unsafe Housing Conditions none    Primary Care Provided by self    Provides Primary Care For no one    Family Caregiver if Needed sibling(s)    Quality of Family Relationships supportive    Able to Return to Prior Arrangements yes       Resource/Environmental Concerns    Resource/Environmental Concerns none    Transportation Concerns rides, unreliable from others       Transition Planning    Patient/Family Anticipates Transition to inpatient rehabilitation facility    Patient/Family Anticipated Services at Transition skilled nursing    Transportation Anticipated family or friend will provide;health plan transportation       Discharge Needs Assessment    Readmission Within the Last 30 Days other (see comments)  Currently in observation status and not considered readmission at this time    Equipment Currently Used at Home none    Concerns to be Addressed care coordination/care conferences;discharge planning    Anticipated Changes Related to Illness none    Equipment Needed After Discharge other (see comments)  Watch O2 needs    Provided Post Acute Provider List? N/A    Patient's Choice of Community Agency(s) 1. [facility]                   Discharge Plan       Row Name 07/17/23 1348       Plan    Plan [facility] referral pending acceptance. Will require precert and PASRR to be completed. From home alone with no home O2.    Patient/Family in Agreement with Plan yes    Plan Comments CM met with patient at bedside to discuss dc planning. Confirmed patient was current  with [facility]. CM added in basket and confirmed with liaison Negrita ONEIL Patient reported that he worked with therapy who recommending SNF. Patient reported that he would go to [place]. CM discussed that [place] now known [facility]. New referral added in basket and sent to liaporfirio Bashir to review. Updated  liaison. FIDEL Barriers: Diabetic educator consulted, palliative care team consulted, endocrinology following. PT notes pending at this time. Precert to be started once therapy notes available and once accepting facility is known. Patient reported that he does not drive and relies on his brother or sister-in-law for transportation.                  Continued Care and Services - Admitted Since 7/15/2023       Destination       Service Provider Request Status Selected Services Address Phone Fax Patient Preferred    [facility] Pending - Request Sent N/A [id number] [place] [address] [phone]               Home Medical Care Coordination complete.      Service Provider Request Status Selected Services Address Phone Fax Patient Preferred    [facility]  Selected Home Health Services [address] [place] [address] [phone]                    Demographic Summary       Row Name 07/17/23 1346       General Information    Admission Type observation    Required Notices Provided Observation Status Notice    Referral Source admission list    Reason for Consult discharge planning    Preferred Language English       Contact Information    Permission Granted to Share Info With                    Functional Status       Row Name 07/17/23 1346       Functional Status    Usual Activity Tolerance moderate    Current Activity Tolerance moderate       Functional Status, IADL    Medications independent    Meal Preparation independent    Housekeeping independent    Laundry independent    Shopping  independent                Yun Kirkland RN     Office Phone: [phone]  Office Cell: [phone]

## 2023-07-17 NOTE — PROGRESS NOTES
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 male admitted to [facility] through the ED on 7/15/2023 with complaints of uncontrolled pain and BLE edema.  In the ED he developed tachycardia in the 150s and was found to be in A-fib with RVR.  He was started on supplemental oxygen for O2 sat drop to 88%.  He received Cardizem bolus followed by drip and Lopressor bolus.  In the ED, CBC revealed WBC 11.4, hemoglobin 10.0, MCV 84.8, and platelets 404,000.  Creatinine and LFTs were not elevated.  CXR revealed left basilar pneumonia.  He reported left lower extremity pain with x-ray of the left tip/fib and left foot negative for acute findings.  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  He is post radiation therapy to the brain and currently receiving radiation therapy to the right hip and femur.  He was last seen by our service as an outpatient on 2023 where he was prescribed Lasix and hydrocodone/APAP 5/325 mg as needed for his BLE edema and pain.  BLE venous Doppler performed as an outpatient on 2023 was negative for DVT/SVT and showed bilateral fluid retention.  At time of consultation 2023, hemoglobin had dropped to 9.9.     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  Our service had initially evaluated him during 2023 - 2023 hospitalization during which lung cancer diagnosis was made and initial work-up completed.  He had received radiation therapy to the brain and is currently receiving radiation to  the hip/femur.  He was also diagnosed with iron deficiency anemia during the hospital stay where he was treated with IV iron and discharged on oral ferrous sulfate with GERD prophylaxis.  Complete anemia work-up with the exception of bone marrow was performed during the hospital stay with findings negative for additional etiologies.  Incidentally his copper level was elevated without multivitamin supplementation.  CBC on 7/14/2023 revealed WBC 13, hemoglobin 10.76, and platelets 329,000.  He was continued on daily oral iron.  Molecular testing on his tumor pathology was pending with plans to start chemotherapy after radiation was completed.  He was also to start Xgeva for bone metastases support and was prescribed calcium with vitamin D supplementation.     PCP: Mayco Carreon MD    INTERVAL HISTORY:  7/17/2023 - white blood count 12.30, hemoglobin 9.5, iron 22 (), iron saturation 22 (), TIBC 224 (298-536). Calculated iron deficit is 662 mg. Started Ferrlecit 250 mg IV x 3 days.  Molecular testing on tumor specimen reveals low-level PD-L1 positivity.    History of present illness reviewed since last visit and changes noted on 07/17/23.    Subjective   Feels better. Leg swelling has decreased.     ROS:  Review of Systems   Constitutional:  Negative for activity change, appetite change, chills, fatigue, fever and unexpected weight change.   HENT:  Negative for mouth sores, sore throat and tinnitus.    Eyes:  Negative for photophobia, pain, redness and visual disturbance.   Respiratory:  Negative for cough and shortness of breath.    Cardiovascular:  Positive for leg swelling (Leg swelling decreased.). Negative for chest pain and palpitations.   Gastrointestinal:  Negative for abdominal pain, constipation, diarrhea, nausea and vomiting.   Genitourinary:  Negative for dysuria, enuresis and hematuria.   Musculoskeletal:  Negative for arthralgias, back pain, myalgias and neck pain.   Skin:  Negative  "for rash and wound.   Allergic/Immunologic: Negative for environmental allergies.   Neurological:  Negative for dizziness, weakness, light-headedness, numbness and headaches.   Hematological:  Negative for adenopathy. Does not bruise/bleed easily.   Psychiatric/Behavioral:  Negative for dysphoric mood. The patient is not nervous/anxious.       MEDICATIONS:    Scheduled Meds:  aspirin, 81 mg, Oral, Daily  azithromycin, 500 mg, Intravenous, Q24H  budesonide, 0.5 mg, Nebulization, BID - RT  dexamethasone, 4 mg, Oral, Q12H  dilTIAZem CD, 120 mg, Oral, Q24H  famotidine, 20 mg, Oral, BID AC  ferrous sulfate, 324 mg, Oral, Daily With Breakfast  furosemide, 20 mg, Intravenous, Daily  gabapentin, 200 mg, Oral, Nightly  guaiFENesin, 600 mg, Oral, Q12H  insulin glargine, 1-200 Units, Subcutaneous, Nightly - Glucommander  insulin lispro, 1-200 Units, Subcutaneous, 4x Daily With Meals & Nightly  ipratropium-albuterol, 3 mL, Nebulization, 4x Daily - RT  metoprolol tartrate, 25 mg, Oral, Q12H  senna-docusate sodium, 2 tablet, Oral, BID  sodium chloride, 10 mL, Intravenous, Q12H    Continuous Infusions:      PRN Meds:    senna-docusate sodium **AND** polyethylene glycol **AND** bisacodyl **AND** bisacodyl    dextrose    dextrose    glucagon (human recombinant)    HYDROcodone-acetaminophen    HYDROmorphone    insulin lispro    ipratropium-albuterol    ondansetron    [COMPLETED] Insert Peripheral IV **AND** sodium chloride    sodium chloride    sodium chloride     ALLERGIES:    Allergies   Allergen Reactions    Ibuprofen Itching    Penicillins Hives     Objective    VITALS:   /59 (BP Location: Right arm, Patient Position: Sitting)   Pulse 86   Temp 97.3 °F (36.3 °C)   Resp (!) 29   Ht 177.8 cm (70\")   Wt 68.6 kg (151 lb 3.8 oz) Comment: Nurse notified of gain  SpO2 94%   BMI 21.70 kg/m²     PHYSICAL EXAM:  Physical Exam  Vitals reviewed.   Constitutional:       General: He is not in acute distress.     Appearance: He is " well-developed. He is not diaphoretic.   HENT:      Head: Normocephalic and atraumatic.      Mouth/Throat:      Pharynx: No oropharyngeal exudate.      Comments: Oxygen via nasal cannula.   Eyes:      Conjunctiva/sclera: Conjunctivae normal.   Neck:      Thyroid: No thyromegaly.      Vascular: No JVD.   Cardiovascular:      Rate and Rhythm: Normal rate and regular rhythm.      Heart sounds: Normal heart sounds. No murmur heard.     Comments: Cardiac monitor leads.   Pulmonary:      Effort: Pulmonary effort is normal. No respiratory distress.      Breath sounds: Normal breath sounds.   Chest:      Comments: Right chest wall [id number] - not accessed.  Abdominal:      General: Bowel sounds are normal.      Palpations: Abdomen is soft.      Tenderness: There is no abdominal tenderness. There is no guarding.   Musculoskeletal:         General: Normal range of motion.      Cervical back: Normal range of motion and neck supple. No rigidity.      Right lower le+ Edema present.      Left lower le+ Edema present.      Comments: Right upper extremity peripheral IV.   Left hand oxygen saturation monitor.   Skin:     General: Skin is warm and dry.      Findings: No erythema or rash.      Comments: Left chest wall tattoo.    Neurological:      Mental Status: He is alert and oriented to person, place, and time.      Sensory: No sensory deficit.   Psychiatric:         Behavior: Behavior normal.       RECENT LABS:  Lab Results (last 24 hours)       Procedure Component Value Units Date/Time    POC Glucose Once [[id number]]  (Abnormal) Collected: 23 1119    Specimen: Blood Updated: 23 1121     Glucose 194 mg/dL      Comment: Serial Number: [id number]:  [id number]       POC Glucose Once [[id number]]  (Normal) Collected: 23 0814    Specimen: Blood Updated: 23 0815     Glucose 97 mg/dL      Comment: Serial Number: [id number]:  [id number]       POC Glucose Once [[id number]]  (Abnormal)  Collected: 07/17/23 0732    Specimen: Blood Updated: 07/17/23 0733     Glucose 60 mg/dL      Comment: Serial Number: [id number]:  [id number]       Basic Metabolic Panel [[id number]]  (Abnormal) Collected: 07/17/23 0216    Specimen: Blood Updated: 07/17/23 0355     Glucose 91 mg/dL      BUN 17 mg/dL      Creatinine 0.35 mg/dL      Sodium 142 mmol/L      Potassium 4.5 mmol/L      Chloride 94 mmol/L      CO2 41.0 mmol/L      Calcium 9.7 mg/dL      BUN/Creatinine Ratio 48.6     Anion Gap 7.0 mmol/L      eGFR 124.5 mL/min/1.73     Narrative:      GFR Normal >60  Chronic Kidney Disease <60  Kidney Failure <15      CBC & Differential [[id number]]  (Abnormal) Collected: 07/17/23 0216    Specimen: Blood Updated: 07/17/23 0316    Narrative:      The following orders were created for panel order CBC & Differential.  Procedure                               Abnormality         Status                     ---------                               -----------         ------                     CBC Auto Differential[[id number]]        Abnormal            Final result                 Please view results for these tests on the individual orders.    CBC Auto Differential [[id number]]  (Abnormal) Collected: 07/17/23 0216    Specimen: Blood Updated: 07/17/23 0316     WBC 12.30 10*3/mm3      RBC 3.69 10*6/mm3      Hemoglobin 9.5 g/dL      Hematocrit 31.5 %      MCV 85.3 fL      MCH 25.7 pg      MCHC 30.1 g/dL      RDW 19.7 %      RDW-SD 62.1 fl      MPV 9.2 fL      Platelets 445 10*3/mm3      Neutrophil % 86.4 %      Lymphocyte % 7.0 %      Monocyte % 6.3 %      Eosinophil % 0.1 %      Basophil % 0.2 %      Neutrophils, Absolute 10.70 10*3/mm3      Lymphocytes, Absolute 0.90 10*3/mm3      Monocytes, Absolute 0.80 10*3/mm3      Eosinophils, Absolute 0.00 10*3/mm3      Basophils, Absolute 0.00 10*3/mm3      nRBC 0.2 /100 WBC     POC Glucose Once [[id number]]  (Abnormal) Collected: 07/16/23 2054    Specimen: Blood Updated: 07/16/23 2056      Glucose 365 mg/dL      Comment: Serial Number: [id number]:  [id number]       POC Glucose Once [[id number]]  (Abnormal) Collected: 07/16/23 1947    Specimen: Blood Updated: 07/16/23 1948     Glucose 349 mg/dL      Comment: Serial Number: [id number]:  [id number]       POC Glucose Once [[id number]]  (Abnormal) Collected: 07/16/23 1629    Specimen: Blood Updated: 07/16/23 1630     Glucose 495 mg/dL      Comment: Serial Number: [id number]:  [id number]             PENDING RESULTS: None.    IMAGING REVIEWED:  XR Chest 1 View    Result Date: 7/15/2023  Impression: 1. Worsening left basilar airspace disease compatible with pneumonia. 2. Chronic elevation of the left hemidiaphragm. Electronically Signed: Jeet Panda  7/15/2023 6:09 PM EDT  Workstation ID: [id number]    XR Foot 3+ View Bilateral    Result Date: 7/15/2023  Impression: 1. No acute bony abnormality of the feet. Electronically Signed: Jeet Panda  7/15/2023 6:34 PM EDT  Workstation ID: [id number]    XR Tibia Fibula 2 View Bilateral    Result Date: 7/15/2023  Impression: 1. No acute bony abnormality of the left tibia or fibula. 2. Old healed fractures of the right tibia and fibula. No acute bony abnormality. Electronically Signed: Jeet Panda  7/15/2023 6:31 PM EDT  Workstation ID: [id number]     I have reviewed the patient's labs, imaging, reports, and other clinician documentation.    Assessment & Plan   ASSESSMENT  Stage IV squamous cell carcinoma left upper lobe lung-status post radiation to the brain and currently on radiation to the right hip/femur.  Molecular testing on tumor pathology revealed low-level PD-L1 positivity with plan for outpatient palliative chemotherapy and immunotherapy once radiation therapy is completed.  Plan to start outpatient Xgeva for bone metastases support.  Will resume radiation to the left hip.  DANN/with malabsorption of oral iron/Elevated copper level-he has not been on multivitamin containing copper. He had  reported bright red blood per rectum as outpatient with 2 colonoscopies in the past 6 years that were unremarkable per his report. Received IV iron last admit.  On daily oral iron. Hemoglobin dropping somewhat post admission. Iron studies showed DANN, slightly improved from last admission, and he was initiated on IV iron replacement.   Left lung pneumonia-likely post-obstructive.  On antibiotics and supportive care per primary team.  BLE edema and pain-recent outpatient BLE Doppler was negative for DVT/SVT.  On Lasix.  Gabapentin and Dilaudid for pain control added by primary team.  A-fib with RVR-new onset noted in ED.  Cardiology consulted per primary team.  Currently on Cardizem drip.  Chronic nausea-on Pepcid twice daily and as needed Zofran.  Smoking-cessation counseling continued.  He had been started on nicotine patch as outpatient.     PLAN  Follow CBC  Continue ferrous sulfate 325 mg by mouth daily.  Start Ferrlecit 250 mg IV x 3 days, day 1/3.  Continue dexamethasone 4 mg by mouth daily.  Continue ASA 81 mg by mouth daily.  Would not recommend full dose anticoagulation for A-fib secondary to brain metastasis and known rectal bleeding.  Continue PRN Norco and  Dilaudid and scheduled gabapentin for pain control.  Continue Pepcid p.o. twice daily and PRN Zofran.  Radiation oncology consultation to resume radiation therapy when able  Combination of chemotherapy and immunotherapy post radiation.  Start Xgeva and daily Ca+VitD for bone metastases support as outpatient.    Electronically signed by NOREEN Anguiano, 07/17/23, 2:09 PM EDT.      I have personally performed a face-to-face diagnostic evaluation on this patient. I have performed a complete history and physical examination, reviewed laboratory studies, and radiographic examinations.  I have completed the majority and substantive portion of the medical decision making.  I have formulated the assessment on this patient and the plan of action as noted  above. I have discussed the case with Josseline Girard NP, have edited/reviewed the note, and agree with the care plan.  The patient claims to be feeling much better with decrease in swelling of the legs.  On examination, he has 1+ swelling of the lower extremities.  Molecular testing on his tumor specimen has revealed low-level PD-L1 positivity.  Iron saturation is low.  He is starting on IV iron replacement.  He is to complete his radiation therapy to the hip and will then get started on palliative chemo-immunotherapy.      I discussed the patient's findings and my recommendations with patient.    Part of this note may be an electronic transcription/translation of spoken language to printed text using the Dragon Dictation System.     Electronically signed by Mina Dupree MD, 07/17/23, 5:12 PM EDT.

## 2023-07-17 NOTE — PLAN OF CARE
Goal Outcome Evaluation:  Plan of Care Reviewed With: patient        Progress: no change  Outcome Evaluation: Pt is a 69 y/o M admitted to [facility] 7/15/23 with A. Fib with RVR. Pt recently admitted 6/16/23-6/29/23 s/p IM nailing on 6/17/23. Duplex (-) BLE DVT. CXR compatible with PNA. XR tibia fibula   (-) acute abnormality of L tibia or fibula, indicates old healed fx of R tibia and fibula. XR foot (-) acute. PMHx significant for lung cancer metastatic to brain and bone, recently started on chemotherapy and radiation, neuropathy secondary to chemotherapy. At baseline pt resides alone, however recently been staying with brother and sister in law who work during the day, [facility] with 1 J[employer]ONNY. Pt typically independent with ADLs, completes sponge baths and utilizes RW for mobility. Pt family assist with transportation.This date, pt A&Ox4, in supine upon arrival. This OT entered pt room, pt saturating at 83% and took O2 off on room air. Pt on 3L O2 and returned >90%. Pt requires mod A for supine to sit, to bring BLE off side of bed. Pt comes to stand with min A x2 with zachary HHA and transfers from bed to chair with min-modAx2. Pt requires max A to don socks d/t increased pain and edema. Pt is at high risk for falls and is not safe to return home at this time. Anticipate pt to require increased assist with ADLs. OT recommend SNF when medically appropriate for d/c to return to PLOF to max potential, pt agreeable. OT to follow.      Anticipated Discharge Disposition (OT): skilled nursing facility

## 2023-07-17 NOTE — PLAN OF CARE
Goal Outcome Evaluation:  Plan of Care Reviewed With: patient        Progress: no change  Outcome Evaluation: Pt is a 67 y/o M who presents to [facility] for pain and LE edema and is dx with Afib w/ RVR. Pt recently d/c 6/29/23 s/p R IM nailing and recent dx of metastatic lung CA with mets to bone/brain. Imaging/testing (-) for DVT or acute fractures. Pt is indep living alone at baseline, but since recent d/c, has been living with brother and sister-in-law, who both work in their [employer] with 1 J[employer]ONNY. Pt indep with ADL's and uses RW for mobility. Pt currently on 3L O2 via NC but does not require home O2. Pt required min-modAx2 for mobility this date secondary to BLE pain and swelling and weakness. Pt would benefit from skilled PT through length of stay, and recommend SNF upon d/c due to decline in baseline function and unsafe mobility.      Anticipated Discharge Disposition (PT): skilled nursing facility

## 2023-07-17 NOTE — CONSULTS
Diabetes Education    Patient Name:  Isra Davenport  YOB: 1955  MRN: [id number]  Admit Date:  7/15/2023        Follow-up with patient to teach insulin administration. Patient stated that he has dyslexia and has a difficult time reading. Patient stated he is currently living with his brother and sister-in-law per doctor's recommendations. Reviewed handouts with discussion on types of insulin, storage of insulin, injection sites, disposal of needles, rotation of sites, and how to use an insulin pen. Patient did return demonstration of applying the pen needle to the insulin pen, priming the pen, administering the shot into the foam pad,and holding the pen for 10 seconds after administration. Reviewed handouts with discussion on hypoglycemia and hyperglycemia signs, symptoms, and treatment. Discussed with bedside nurse to have patient give self insulin shots. Patient stated he would like follow-up for review.  Prescriptions started in discharge orders for lancets, test strips, Lantus, Novolog, and pen needles.    Electronically signed by:  Sabine Dela Cruz RN  07/17/23 15:04 EDT

## 2023-07-17 NOTE — THERAPY EVALUATION
Patient Name: Isra Davenport  : 1955    MRN: [id number]                              Today's Date: 2023       Admit Date: 7/15/2023    Visit Dx:     ICD-10-CM ICD-9-CM   1. Primary malignant neoplasm of left lung metastatic to other site  C34.92 162.9   2. Pedal edema  R60.0 782.3   3. Pain in both lower extremities  M79.604 729.5    M79.605    4. Atrial fibrillation with rapid ventricular response  I48.91 427.31     Patient Active Problem List   Diagnosis    Closed fracture of right hip    Closed fracture of right hip, initial encounter    Mass of left lung    Primary malignant neoplasm of left lung metastatic to other site    Lung cancer metastatic to brain    Hernia of anterior abdominal wall    Spinal stenosis of lumbar region    Prostatitis    Polyp of colon    Osteoarthritis    Neuropathy    Neuralgia    Chronic back pain    Benign prostatic hyperplasia without urinary obstruction    Metastasis to bone    Atrial fibrillation with RVR     Past Medical History:   Diagnosis Date    Arthritis     Benign prostatic hyperplasia without urinary obstruction 2023    Chronic back pain 2023    Closed fracture of right hip 2023    Added automatically from request for surgery [id number]    Hernia of anterior abdominal wall 2020    Lung cancer     squamous cell carcimoma,left    Lung cancer metastatic to brain 2023    Mass of left lung 2023    Added automatically from request for surgery [id number]    Neuralgia 2023    Neuropathy 2023    Osteoarthritis 2023    Polyp of colon 2023    Primary malignant neoplasm of left lung metastatic to other site 2023    Added automatically from request for surgery [id number]    Prostatitis 2023    Spinal stenosis of lumbar region 2023     Past Surgical History:   Procedure Laterality Date    BACK SURGERY  2006    BRONCHOSCOPY N/A 2023    Procedure: BRONCHOSCOPY WITH BIOPSY X1 AREA, FINE NEEDLE ASPIRATION,  BRUSHING, AND BRONCHIAL WASHING;  Surgeon: Reggie Abarca MD;  Location: [facility] ENDOSCOPY;  Service: Pulmonary;  Laterality: N/A;  POST: MUCOUS PLUGS  AND LUNG MASS    HIP INTERTROCHANTERIC NAILING Right 6/17/2023    Procedure: HIP INTERTROCHANTERIC NAILING;  Surgeon: Levi Blackmon II, MD;  Location: [facility] MAIN OR;  Service: Orthopedics;  Laterality: Right;    LEG SURGERY Bilateral 08/25/1985    PORTACATH PLACEMENT Right 6/29/2023    Procedure: INSERTION OF PORTACATH;  Surgeon: Jese Goss MD;  Location: [facility] MAIN OR;  Service: General;  Laterality: Right;      General Information       Row Name 07/17/23 1302          OT Time and Intention    Document Type evaluation  -MS     Mode of Treatment occupational therapy  -MS       Row Name 07/17/23 1309          General Information    Patient Profile Reviewed yes  -MS     Prior Level of Function independent:;ADL's  RW, family provides transportation, independent ADLs, spongebath  -MS     Existing Precautions/Restrictions fall;oxygen therapy device and L/min  3L O2, no home O2  -MS     Barriers to Rehab previous functional deficit;impaired sensation  -MS       Row Name 07/17/23 1309          Occupational Profile    Reason for Services/Referral (Occupational Profile) Pt is a 69 y/o M admitted to [facility] 7/15/23 with A. Fib with RVR. Pt recently admitted 6/16/23-6/29/23 s/p IM nailing on 6/17/23. Duplex (-) BLE DVT. CXR compatible with PNA. XR tibia fibula   (-) acute abnormality of L tibia or fibula, indicates old healed fx of R tibia and fibula. XR foot (-) acute. PMHx significant for lung cancer metastatic to brain and bone, recently started on chemotherapy and radiation, neuropathy secondary to chemotherapy. At baseline pt resides alone, however recently been staying with brother and sister in law, [facility] with 1 JHONNY. Pt typically independent with ADLs, completes sponge baths and utilizes RW for mobility. Pt family assist with transportation.  -MS      Environmental Supports and Barriers (Occupational Profile) supportive family  -MS       Row Name 07/17/23 1302          Living Environment    People in Home other (see comments)  lives alone, currently staying with brother and TOMER for assistance  -MS       Row Name 07/17/23 1302          Home Main Entrance    Number of Stairs, Main Entrance one  -MS       Row Name 07/17/23 1302          Stairs Within Home, Primary    Number of Stairs, Within Home, Primary none  -MS       Row Name 07/17/23 1302          Cognition    Orientation Status (Cognition) oriented x 4  -MS       Row Name 07/17/23 1302          Safety Issues, Functional Mobility    Impairments Affecting Function (Mobility) balance;endurance/activity tolerance;pain;sensation/sensory awareness  -MS               User Key  (r) = Recorded By, (t) = Taken By, (c) = Cosigned By      Initials Name Provider Type    aJdyn Almanza, OT Occupational Therapist                     Mobility/ADL's       Row Name 07/17/23 1304          Bed Mobility    Bed Mobility bed mobility (all) activities;supine-sit  -MS     Supine-Sit [place] (Bed Mobility) moderate assist (50% patient effort)  -MS     Comment, (Bed Mobility) mod A bringing BLE off bed  -MS       Row Name 07/17/23 1304          Transfers    Transfers sit-stand transfer;bed-chair transfer  -MS       Row Name 07/17/23 1304          Bed-Chair Transfer    Bed-Chair [place] (Transfers) minimum assist (75% patient effort);moderate assist (50% patient effort);2 person assist  -MS     Comment, (Bed-Chair Transfer) zachary HHA  -MS       Row Name 07/17/23 1304          Sit-Stand Transfer    Sit-Stand [place] (Transfers) minimum assist (75% patient effort);2 person assist  -MS     Comment, (Sit-Stand Transfer) zachary HHA  -MS       Row Name 07/17/23 1304          Activities of Daily Living    BADL Assessment/Intervention lower body dressing  -MS       Row Name 07/17/23 1304          Lower Body Dressing  Assessment/Training    [place] Level (Lower Body Dressing) maximum assist (25% patient effort)  -MS     Position (Lower Body Dressing) edge of bed sitting  -MS     Comment, (Lower Body Dressing) assist d/t increased pain in zachary feet  -MS               User Key  (r) = Recorded By, (t) = Taken By, (c) = Cosigned By      Initials Name Provider Type    Jadyn Almanza OT Occupational Therapist                   Obj/Interventions       Row Name 07/17/23 1305          Sensory Assessment (Somatosensory)    Sensory Assessment BLE neuropathy  -MS       Row Name 07/17/23 1305          Range of Motion Comprehensive    General Range of Motion bilateral upper extremity ROM WNL  -MS       Row Name 07/17/23 1305          Strength Comprehensive (MMT)    Comment, General Manual Muscle Testing (MMT) Assessment BUE grossly 4/5  -MS       Row Name 07/17/23 1305          Balance    Balance Assessment sitting static balance;sitting dynamic balance;standing static balance;standing dynamic balance  -MS     Static Sitting Balance supervision  -MS     Dynamic Sitting Balance supervision  -MS     Position, Sitting Balance unsupported;sitting edge of bed  -MS     Static Standing Balance minimal assist;2-person assist  -MS     Dynamic Standing Balance moderate assist;2-person assist  -MS     Position/Device Used, Standing Balance supported  -MS               User Key  (r) = Recorded By, (t) = Taken By, (c) = Cosigned By      Initials Name Provider Type    Jadyn Almanza OT Occupational Therapist                   Goals/Plan       Row Name 07/17/23 3458          Bed Mobility Goal 1 (OT)    Activity/Assistive Device (Bed Mobility Goal 1, OT) bed mobility activities, all  -MS     [place] Level/Cues Needed (Bed Mobility Goal 1, OT) modified independence  -MS     Time Frame (Bed Mobility Goal 1, OT) long term goal (LTG);2 weeks  -MS     Progress/Outcomes (Bed Mobility Goal 1, OT) new goal  -MS       Row Name 07/17/23 3534           Transfer Goal 1 (OT)    Activity/Assistive Device (Transfer Goal 1, OT) transfers, all  -MS     [place] Level/Cues Needed (Transfer Goal 1, OT) modified independence  -MS     Time Frame (Transfer Goal 1, OT) long term goal (LTG);2 weeks  -MS     Progress/Outcome (Transfer Goal 1, OT) new goal  -MS       Row Name 07/17/23 1313          Dressing Goal 1 (OT)    Activity/Device (Dressing Goal 1, OT) dressing skills, all  -MS     [place]/Cues Needed (Dressing Goal 1, OT) modified independence  -MS     Time Frame (Dressing Goal 1, OT) long term goal (LTG);2 weeks  -MS     Progress/Outcome (Dressing Goal 1, OT) new goal  -MS       Row Name 07/17/23 1313          Toileting Goal 1 (OT)    Activity/Device (Toileting Goal 1, OT) toileting skills, all  -MS     [place] Level/Cues Needed (Toileting Goal 1, OT) modified independence  -MS     Time Frame (Toileting Goal 1, OT) long term goal (LTG);2 weeks  -MS     Progress/Outcome (Toileting Goal 1, OT) new goal  -MS       Row Name 07/17/23 1313          Grooming Goal 1 (OT)    Activity/Device (Grooming Goal 1, OT) grooming skills, all  -MS     [place] (Grooming Goal 1, OT) modified independence  in standing  -MS     Time Frame (Grooming Goal 1, OT) long term goal (LTG);2 weeks  -MS     Progress/Outcome (Grooming Goal 1, OT) new [facility]  -MS       Row Name 07/17/23 1313          Therapy Assessment/Plan (OT)    Planned Therapy Interventions (OT) activity tolerance training;adaptive equipment training;BADL retraining;functional balance retraining;IADL retraining;patient/caregiver education/training;transfer/mobility retraining;strengthening exercise;occupation/activity based interventions  -MS               User Key  (r) = Recorded By, (t) = Taken By, (c) = Cosigned By      Initials Name Provider Type    Jadyn Almanza OT Occupational Therapist                   Clinical Impression       Row Name 07/17/23 1305          Pain Assessment    Pain Intervention(s)  Repositioned;Emotional support;Ambulation/increased activity;Medication (See MAR)  -MS     Additional Documentation Pain Scale: FACES Pre/Post-Treatment (Group)  -MS       Row Name 07/17/23 8774          Pain Scale: FACES Pre/Post-Treatment    Pain: FACES Scale, Pretreatment 4-->hurts little more  -MS     Posttreatment Pain Rating 6-->hurts even more  -MS     Pain Location - Side/Orientation Bilateral  -MS     Pain Location - foot  -MS       Row Name 07/17/23 5924          Plan of Care Review    Plan of Care Reviewed With patient  -MS     Progress no change  -MS     Outcome Evaluation Pt is a 67 y/o M admitted to [facility] 7/15/23 with A. Fib with RVR. Pt recently admitted 6/16/23-6/29/23 s/p IM nailing on 6/17/23. Duplex (-) BLE DVT. CXR compatible with PNA. XR tibia fibula   (-) acute abnormality of L tibia or fibula, indicates old healed fx of R tibia and fibula. XR foot (-) acute. PMHx significant for lung cancer metastatic to brain and bone, recently started on chemotherapy and radiation, neuropathy secondary to chemotherapy. At baseline pt resides alone, however recently been staying with brother and sister in law who work during the day, [employer] [facility] with 1 JHONNY. Pt typically independent with ADLs, completes sponge baths and utilizes RW for mobility. Pt family assist with transportation.This date, pt A&Ox4, in supine upon arrival. This OT entered pt room, pt saturating at 83% and took O2 off on room air. Pt on 3L O2 and returned >90%. Pt requires mod A for supine to sit, to bring BLE off side of bed. Pt comes to stand with min A x2 with zachary HHA and transfers from bed to chair with min-modAx2. Pt requires max A to don socks d/t increased pain and edema. Pt is at high risk for falls and is not safe to return home at this time. Anticipate pt to require increased assist with ADLs. OT recommend SNF when medically appropriate for d/c to return to PLOF to max potential, pt agreeable. OT to follow.  -MS       Row Name 07/17/23 4399           Therapy Assessment/Plan (OT)    Criteria for Skilled Therapeutic Interventions Met (OT) yes;meets criteria;skilled treatment is necessary  -MS     Therapy Frequency (OT) 3 times/wk  -MS     Predicted Duration of Therapy Intervention (OT) until d/c  -MS       Row Name 07/17/23 1306          Therapy Plan Review/Discharge Plan (OT)    Anticipated Discharge Disposition (OT) skilled nursing facility  -MS       Row Name 07/17/23 1306          Vital Signs    Pre Systolic BP Rehab 107  -MS     Pre Treatment Diastolic BP 72  -MS     Intratreatment Heart Rate (beats/min) 115  -MS     Intratreatment Resp Rate (breaths/min) 34  -MS     Pre SpO2 (%) 83  -MS     O2 Delivery Pre Treatment room air  -MS     O2 Delivery Intra Treatment nasal cannula  3L  -MS     Post SpO2 (%) 91  -MS     O2 Delivery Post Treatment nasal cannula  3L  -MS     Pre Patient Position Supine  -MS     Intra Patient Position Standing  -MS     Post Patient Position Sitting  -MS       Row Name 07/17/23 1306          Positioning and Restraints    Pre-Treatment Position in bed  -MS     Post Treatment Position chair  -MS     In Chair notified nsg;reclined;call light within reach;encouraged to call for assist;exit alarm on;legs elevated  -MS               User Key  (r) = Recorded By, (t) = Taken By, (c) = Cosigned By      Initials Name Provider Type    Jadyn Almanza, OT Occupational Therapist                   Outcome Measures       Row Name 07/17/23 1313          How much help from another is currently needed...    Putting on and taking off regular lower body clothing? 2  -MS     Bathing (including washing, rinsing, and drying) 2  -MS     Toileting (which includes using toilet bed pan or urinal) 2  -MS     Putting on and taking off regular upper body clothing 3  -MS     Taking care of personal grooming (such as brushing teeth) 3  -MS     Eating meals 4  -MS     AM-PAC 6 Clicks Score (OT) 16  -MS       Row Name 07/17/23 1313          Functional  Assessment    Outcome Measure Options AM-PAC 6 Clicks Daily Activity (OT)  -MS               User Key  (r) = Recorded By, (t) = Taken By, (c) = Cosigned By      Initials Name Provider Type    MS Jadyn Valdivia OT Occupational Therapist                    Occupational Therapy Education       Title: PT OT SLP Therapies (Done)       Topic: Occupational Therapy (Done)       Point: ADL training (Done)       Description:   Instruct learner(s) on proper safety adaptation and remediation techniques during self care or transfers.   Instruct in proper use of assistive devices.                  Learning Progress Summary             Patient Acceptance, E,TB, VU by MS at 7/17/2023 1314                         Point: Body mechanics (Done)       Description:   Instruct learner(s) on proper positioning and spine alignment during self-care, functional mobility activities and/or exercises.                  Learning Progress Summary             Patient Acceptance, E,TB, VU by MS at 7/17/2023 1314                                         User Key       Initials Effective Dates Name Provider Type Discipline    MS 07/13/22 -  Jadyn Valdivia OT Occupational Therapist OT                  OT Recommendation and Plan  Planned Therapy Interventions (OT): activity tolerance training, adaptive equipment training, BADL retraining, functional balance retraining, IADL retraining, patient/caregiver education/training, transfer/mobility retraining, strengthening exercise, occupation/activity based interventions  Therapy Frequency (OT): 3 times/wk  Plan of Care Review  Plan of Care Reviewed With: patient  Progress: no change  Outcome Evaluation: Pt is a 67 y/o M admitted to [facility] 7/15/23 with A. Fib with RVR. Pt recently admitted 6/16/23-6/29/23 s/p IM nailing on 6/17/23. Duplex (-) BLE DVT. CXR compatible with PNA. XR tibia fibula   (-) acute abnormality of L tibia or fibula, indicates old healed fx of R tibia and fibula. XR foot (-) acute. PMHx  significant for lung cancer metastatic to brain and bone, recently started on chemotherapy and radiation, neuropathy secondary to chemotherapy. At baseline pt resides alone, however recently been staying with brother and sister in law who work during the day, [employer] with 1 JHONNY. Pt typically independent with ADLs, completes sponge baths and utilizes RW for mobility. Pt family assist with transportation.This date, pt A&Ox4, in supine upon arrival. This OT entered pt room, pt saturating at 83% and took O2 off on room air. Pt on 3L O2 and returned >90%. Pt requires mod A for supine to sit, to bring BLE off side of bed. Pt comes to stand with min A x2 with zachary HHA and transfers from bed to chair with min-modAx2. Pt requires max A to don socks d/t increased pain and edema. Pt is at high risk for falls and is not safe to return home at this time. Anticipate pt to require increased assist with ADLs. OT recommend SNF when medically appropriate for d/c to return to PLOF to max potential, pt agreeable. OT to follow.     Time Calculation:              Jadyn Valdivia OT  7/17/2023

## 2023-07-17 NOTE — CONSULTS
PULMONARY CRITICAL CARE CONSULT NOTE      PATIENT IDENTIFICATION:  Name: Isra Davenport  MRN: [id number]  :  1955     Age: 68 y.o.  Sex: male        DATE OF CONSULTATION:  2023  PRIMARY CARE PHYSICIAN    Mayco Carreon MD                  CHIEF COMPLAINT: SOB    History of Present Illness:   Isra Davenport is a 68 y.o. 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  Patient had a nailing of the hip on  and an Cqpvrh-f-Uari placed on  and is currently undergoing chemotherapy with a plan for palliative radiation.  Per report patient went to his chemotherapy appointment complaining of the lower extremity edema and what he describes as neuropathic pain.  He did have an ultrasound that showed negative for DVT and was prescribed Lasix.  He went to the pharmacy to pick it up and it was closed so he came to the ER with uncontrolled pain.  In the ER chest x-ray showed a right internal jugular Port-A-Cath in place.  Airspace disease in the left lung compatible with pneumonia.  In the ER patient was noted with A-fib with RVR.  O2 sats dropped to 88% was placed on 2 L O2, he was given a Cardizem bolus and Lopressor bolus in the ER and started on a Cardizem drip.  Admitted for further treatment.      Review of Systems:   Constitutional: As above   Eyes: negative   ENT/oropharynx: negative   Cardiovascular: As above   Respiratory: As above   Gastrointestinal: negative   Genitourinary: negative   Neurological: negative   Musculoskeletal: negative   Integument/breast: negative   Endocrine: negative   Allergic/Immunologic: negative     Past Medical History:  Past Medical History:   Diagnosis Date    Arthritis     Benign prostatic hyperplasia without urinary obstruction 2023    Chronic back pain 2023    Closed  fracture of right hip 06/16/2023    Added automatically from request for surgery [id number]    Hernia of anterior abdominal wall 09/22/2020    Lung cancer     squamous cell carcimoma,left    Lung cancer metastatic to brain 06/29/2023    Mass of left lung 06/16/2023    Added automatically from request for surgery [id number]    Neuralgia 07/06/2023    Neuropathy 07/06/2023    Osteoarthritis 07/06/2023    Polyp of colon 07/06/2023    Primary malignant neoplasm of left lung metastatic to other site 06/16/2023    Added automatically from request for surgery [id number]    Prostatitis 07/06/2023    Spinal stenosis of lumbar region 07/06/2023       Past Surgical History:  Past Surgical History:   Procedure Laterality Date    BACK SURGERY  2006    BRONCHOSCOPY N/A 6/19/2023    Procedure: BRONCHOSCOPY WITH BIOPSY X1 AREA, FINE NEEDLE ASPIRATION, BRUSHING, AND BRONCHIAL WASHING;  Surgeon: Reggie Abarca MD;  Location: [facility] ENDOSCOPY;  Service: Pulmonary;  Laterality: N/A;  POST: MUCOUS PLUGS  AND LUNG MASS    HIP INTERTROCHANTERIC NAILING Right 6/17/2023    Procedure: HIP INTERTROCHANTERIC NAILING;  Surgeon: Levi Blackmon II, MD;  Location: [facility] MAIN OR;  Service: Orthopedics;  Laterality: Right;    LEG SURGERY Bilateral 08/25/1985    PORTACATH PLACEMENT Right 6/29/2023    Procedure: INSERTION OF PORTACATH;  Surgeon: Jese Goss MD;  Location: [facility] MAIN OR;  Service: General;  Laterality: Right;        Family History:  History reviewed. No pertinent family history.     Social History:   Social History     Tobacco Use    Smoking status: Every Day     Packs/day: 1.00     Years: 15.00     Pack years: 15.00     Types: Cigarettes     Passive exposure: Current    Smokeless tobacco: Never   Substance Use Topics    Alcohol use: Yes     Alcohol/week: 2.0 standard drinks     Types: 2 Cans of beer per week        Allergies:  Allergies   Allergen Reactions    Ibuprofen Itching    Penicillins Hives       Home  Meds:  Medications Prior to Admission   Medication Sig Dispense Refill Last Dose    acetaminophen (TYLENOL) 325 MG tablet Take 2 tablets by mouth Every 6 (Six) Hours As Needed for Mild Pain. Indications: Pain       albuterol sulfate  (90 Base) MCG/ACT inhaler Inhale 2 puffs Every 4 (Four) Hours As Needed for Wheezing. 6.7 g 5     aspirin 81 MG EC tablet Take 1 tablet by mouth Daily. 30 tablet 2     denosumab (XGEVA) 120 MG/1.7ML solution injection Inject 1.7 mL under the skin into the appropriate area as directed See Admin Instructions. Every 4 weeks       dexamethasone (DECADRON) 4 MG tablet Take 1 tablet by mouth Daily With Breakfast.       ferrous sulfate 324 (65 Fe) MG tablet delayed-release EC tablet Take 1 tablet by mouth Daily With Breakfast. 30 tablet 2     furosemide (LASIX) 20 MG tablet Take 1 tablet by mouth Daily.       HYDROcodone-acetaminophen (NORCO) 5-325 MG per tablet Take 1 tablet by mouth Every 4 (Four) Hours As Needed for Moderate Pain.       metoprolol tartrate (LOPRESSOR) 25 MG tablet Take 1 tablet by mouth Every 8 (Eight) Hours. 90 tablet 1     nicotine (NICODERM CQ) 21 MG/24HR patch Place 1 patch on the skin as directed by provider Daily.       omeprazole (priLOSEC) 20 MG capsule Take 1 capsule by mouth Daily.       ondansetron (ZOFRAN) 4 MG tablet Take 1 tablet by mouth Every 6 (Six) Hours As Needed for Nausea or Vomiting.       vitamin D (ERGOCALCIFEROL) 1.25 MG (66054 UT) capsule capsule Take 1 capsule by mouth 1 (One) Time Per Week.       vitamin D3 125 MCG (5000 UT) capsule capsule Take 1 capsule by mouth Daily.          Objective:  tMax 24 hrs: Temp (24hrs), Av.6 °F (36.4 °C), Min:97.3 °F (36.3 °C), Max:98.4 °F (36.9 °C)      Vitals Ranges:   Temp:  [97.3 °F (36.3 °C)-98.4 °F (36.9 °C)] 97.4 °F (36.3 °C)  Heart Rate:  [] 91  Resp:  [15-29] 29  BP: ()/(52-72) 113/64    Intake and Output Last 3 Shifts:   I/O last 3 completed shifts:  In: 3568.2 [P.O.:;  "I.V.:1068.2; IV Piggyback:500]  Out: 2000 [Urine:2000]    Exam:  /64 (BP Location: Right arm, Patient Position: Lying)   Pulse 91   Temp 97.4 °F (36.3 °C) (Oral)   Resp (!) 29   Ht 177.8 cm (70\")   Wt 68.6 kg (151 lb 3.8 oz) Comment: Nurse notified of gain  SpO2 90%   BMI 21.70 kg/m²       07/16/23  0500 07/17/23  0601   Weight: 64.5 kg (142 lb 3.2 oz) 68.6 kg (151 lb 3.8 oz) (Nurse notified of gain)     General Appearance:      HEENT:  Normocephalic, without obvious abnormality, atraumatic. Conjunctivae/corneas clear.  Normal external ear canals. Nares normal, no drainage.  Neck:  Supple, symmetrical, trachea midline. No JVD.  Lungs /Chest wall:   Bilateral basal rhonchi, respirations unlabored, symmetrical wall movement.     Heart:  Regular rate and rhythm, systolic murmur PMI left sternal border  Abdomen: Soft, nontender, no masses, no organomegaly.    Extremities: Trace edema, no clubbing or cyanosis        Data Review:  All labs (24hrs):   Recent Results (from the past 24 hour(s))   POC Glucose Once    Collection Time: 07/16/23  4:29 PM    Specimen: Blood   Result Value Ref Range    Glucose 495 (C) 70 - 105 mg/dL   POC Glucose Once    Collection Time: 07/16/23  7:47 PM    Specimen: Blood   Result Value Ref Range    Glucose 349 (H) 70 - 105 mg/dL   POC Glucose Once    Collection Time: 07/16/23  8:54 PM    Specimen: Blood   Result Value Ref Range    Glucose 365 (H) 70 - 105 mg/dL   CBC Auto Differential    Collection Time: 07/17/23  2:16 AM    Specimen: Blood   Result Value Ref Range    WBC 12.30 (H) 3.40 - 10.80 10*3/mm3    RBC 3.69 (L) 4.14 - 5.80 10*6/mm3    Hemoglobin 9.5 (L) 13.0 - 17.7 g/dL    Hematocrit 31.5 (L) 37.5 - 51.0 %    MCV 85.3 79.0 - 97.0 fL    MCH 25.7 (L) 26.6 - 33.0 pg    MCHC 30.1 (L) 31.5 - 35.7 g/dL    RDW 19.7 (H) 12.3 - 15.4 %    RDW-SD 62.1 (H) 37.0 - 54.0 fl    MPV 9.2 6.0 - 12.0 fL    Platelets 445 140 - 450 10*3/mm3    Neutrophil % 86.4 (H) 42.7 - 76.0 %    Lymphocyte % " 7.0 (L) 19.6 - 45.3 %    Monocyte % 6.3 5.0 - 12.0 %    Eosinophil % 0.1 (L) 0.3 - 6.2 %    Basophil % 0.2 0.0 - 1.5 %    Neutrophils, Absolute 10.70 (H) 1.70 - 7.00 10*3/mm3    Lymphocytes, Absolute 0.90 0.70 - 3.10 10*3/mm3    Monocytes, Absolute 0.80 0.10 - 0.90 10*3/mm3    Eosinophils, Absolute 0.00 0.00 - 0.40 10*3/mm3    Basophils, Absolute 0.00 0.00 - 0.20 10*3/mm3    nRBC 0.2 0.0 - 0.2 /100 WBC   Basic Metabolic Panel    Collection Time: 07/17/23  2:16 AM    Specimen: Blood   Result Value Ref Range    Glucose 91 65 - 99 mg/dL    BUN 17 8 - 23 mg/dL    Creatinine 0.35 (L) 0.76 - 1.27 mg/dL    Sodium 142 136 - 145 mmol/L    Potassium 4.5 3.5 - 5.2 mmol/L    Chloride 94 (L) 98 - 107 mmol/L    CO2 41.0 (H) 22.0 - 29.0 mmol/L    Calcium 9.7 8.6 - 10.5 mg/dL    BUN/Creatinine Ratio 48.6 (H) 7.0 - 25.0    Anion Gap 7.0 5.0 - 15.0 mmol/L    eGFR 124.5 >60.0 mL/min/1.73   POC Glucose Once    Collection Time: 07/17/23  7:32 AM    Specimen: Blood   Result Value Ref Range    Glucose 60 (L) 70 - 105 mg/dL   POC Glucose Once    Collection Time: 07/17/23  8:14 AM    Specimen: Blood   Result Value Ref Range    Glucose 97 70 - 105 mg/dL   POC Glucose Once    Collection Time: 07/17/23 11:19 AM    Specimen: Blood   Result Value Ref Range    Glucose 194 (H) 70 - 105 mg/dL        Imaging:  Adult Transthoracic Echo Complete W/ Cont if Necessary Per Protocol    Result Date: 6/22/2023    Left ventricular systolic function is normal. Left ventricular ejection fraction appears to be 61 - 65%.   Left ventricular diastolic function is consistent with (grade I) impaired relaxation.   There is mild bileaflet mitral valve prolapse present.   Estimated right ventricular systolic pressure from tricuspid regurgitation is mildly elevated (35-45 mmHg). Calculated right ventricular systolic pressure from tricuspid regurgitation is 44 mmHg.     CT Chest With Contrast Diagnostic    Result Date: 6/17/2023  EXAM: CT OF THE CHEST, ABDOMEN AND PELVIS  WITH IV CONTRAST INDICATIONS: Right hip lesion TECHNIQUE: CT of the chest, abdomen and pelvis was performed following the administration of 100 mL Isovue-370 intravenous contrast. CT dose lowering techniques were used, to include: automated exposure control, adjustment for patient size, and / or use of iterative reconstruction.  COMPARISON: No prior  CTs are available. FINDINGS: CHEST: Vasculature: There is no thoracic aortic aneurysm.  Thoracic aortic calcifications are present. Mediastinum / Pleura: There is no pericardial or pleural effusion. There is no mediastinal, hilar or axillary lymphadenopathy. Airways / Lungs: There is abrupt occlusion of the left upper lobe bronchus (series 2, image 49). There is an associated mass in the perihilar left upper lobe which measures approximately 6.6 x 4.8 cm (series 2, image 59). This mass broadly abuts the mediastinum. There is consolidative airspace disease in the left lower lobe. A 0.6 cm nodule is present in the right upper lobe (series 5, image 53). Findings are superimposed upon centrilobular emphysema. ABDOMEN: Liver: The liver is normal in contour without focal lesion. The portal venous system is patent. Gallbladder and Bile Ducts: Unremarkable CT appearance of the gallbladder. There is no intrahepatic or extrahepatic biliary ductal dilatation. Spleen: There is homogeneous enhancement without focal lesion. Pancreas: The pancreatic parenchyma is unremarkable. There is no pancreatic ductal dilatation. Adrenals: Bilateral adrenal nodules are present. The right-sided nodule measures 3.6 x 2.5 cm. Nodules on the left measure 1.8 x 1.7 cm and 2.3 x 2.2 cm. Kidneys: There is symmetric enhancement without hydronephrosis. Hypoattenuating structures in each kidney are too small to characterize. Vasculature: There are atherosclerotic calcifications throughout the abdomen and pelvis, without aneurysm. Nodes: There is no lymphadenopathy by size criteria. Bowel: The stomach and  visualized loops of large and small bowel are unremarkable. An unremarkable appendix is identified. Mesentery/Peritoneum: Unremarkable Soft Tissues: Unremarkable PELVIS: Pelvic Organs: The prostate gland is enlarged. The urinary bladder is unremarkable. Bones: The patient has undergone fixation of the right proximal femur. There is localized soft tissue fluid and gas. Skin staples overlie the region. The patient has undergone prior fixation of the left femur as well, incompletely imaged.     1. There is a 6.6 cm mass in the perihilar left upper lobe which occludes the left upper lobe bronchus and broadly abuts the mediastinum. This is compatible with a primary pulmonary neoplastic process. 2. Bilateral adrenal nodules, presumably metastatic. 3. Nonspecific 0.6 cm nodule in the right upper lobe. 4. Emphysema. 5. Enlarged prostate. 6. Additional chronic findings as above. Electronically signed by:  Levi Maradiaga M.D.  6/17/2023 8:55 PM Mountain Time    MRI Brain With & Without Contrast    Result Date: 6/22/2023  MRI BRAIN W WO CONTRAST Date of Exam: 6/22/2023 3:10 PM EDT Indication: Brain metastasis, radiation therapy treatment planning.  Comparison: 6/18/2023 Technique:  Routine multiplanar/multisequence sequence images of the brain were obtained before and after the uneventful administration of Prohance. Findings: There is generalized enlargement of the ventricles and CSF containing spaces. There is edema identified in the left parietal region. There is also a small focus of vasogenic edema in the right frontal region. There is a tiny focus of increased T2 signal in the left frontal white matter inferiorly. Following gadolinium administration, there is an enhancing focus measuring 4 mm in size in the right frontal lobe at the gray-white interface and in the left parietal lobe measuring 11 mm in size. The post gadolinium images are degraded by motion artifact. No additional enhancing lesions are identified. There is  no evidence of intracranial hemorrhage. There is no evidence of significant mass effect or midline shift. The orbital structures are normal. There  is mild mucosal disease in the sphenoid sinus. The remaining paranasal sinuses and mastoid air cells are clear.     Impression: 1. Metastatic lesions in the right frontal lobe measuring 4 mm in size and in the left parietal lobe measuring 11 mm in size with surrounding vasogenic edema. 2. Mild paranasal sinus disease. Electronically Signed: Jese Ellsworth  6/22/2023 4:11 PM EDT  Workstation ID: [id number] Prohance    MRI Brain With & Without Contrast    Result Date: 6/18/2023  MRI BRAIN W WO CONTRAST Date of Exam: 6/18/2023 11:05 AM EDT Indication: lung mass and pathologic bone fracture; evaluate for metastases.  Comparison: None available. Technique:  Routine multiplanar/multisequence sequence images of the brain were obtained before and after the uneventful administration of Prohance. Findings: Multiple sequences are degraded by motion artifact. There is mild generalized parenchymal volume loss. Diffusion-weighted images demonstrate no acute infarcts. There is no evidence of intracranial hemorrhage. Within the left parietal lobe near the vertex there is a large amount of vasogenic edema. There is an underlying and seen 9 mm mass consistent with metastatic disease. There is a smaller area of vasogenic edema within the subcortical white matter of the right frontal lobe at the vertex with underlying 6 mm mass. Also consistent with metastatic disease. No additional areas of vasogenic edema or pathologic contrast enhancement identified. There are no abnormal extra-axial fluid collections. No mass effect or midline shift. No hydrocephalus. Globes and orbits are within normal limits. There is mucosal thickening of the paranasal sinuses. Globes and orbits are within normal limits.     Impression: 1. Within the right frontal lobe there is a 6 mm testis and within the left parietal  lobe near the vertex there is a 9 mm metastasis. 2. No evidence of hemorrhage or infarct. Electronically Signed: Jeet Panda  6/18/2023 12:25 PM EDT  Workstation ID: [id number]    CT Abdomen Pelvis With Contrast    Result Date: 6/17/2023  EXAM: CT OF THE CHEST, ABDOMEN AND PELVIS WITH IV CONTRAST INDICATIONS: Right hip lesion TECHNIQUE: CT of the chest, abdomen and pelvis was performed following the administration of 100 mL Isovue-370 intravenous contrast. CT dose lowering techniques were used, to include: automated exposure control, adjustment for patient size, and / or use of iterative reconstruction.  COMPARISON: No prior  CTs are available. FINDINGS: CHEST: Vasculature: There is no thoracic aortic aneurysm.  Thoracic aortic calcifications are present. Mediastinum / Pleura: There is no pericardial or pleural effusion. There is no mediastinal, hilar or axillary lymphadenopathy. Airways / Lungs: There is abrupt occlusion of the left upper lobe bronchus (series 2, image 49). There is an associated mass in the perihilar left upper lobe which measures approximately 6.6 x 4.8 cm (series 2, image 59). This mass broadly abuts the mediastinum. There is consolidative airspace disease in the left lower lobe. A 0.6 cm nodule is present in the right upper lobe (series 5, image 53). Findings are superimposed upon centrilobular emphysema. ABDOMEN: Liver: The liver is normal in contour without focal lesion. The portal venous system is patent. Gallbladder and Bile Ducts: Unremarkable CT appearance of the gallbladder. There is no intrahepatic or extrahepatic biliary ductal dilatation. Spleen: There is homogeneous enhancement without focal lesion. Pancreas: The pancreatic parenchyma is unremarkable. There is no pancreatic ductal dilatation. Adrenals: Bilateral adrenal nodules are present. The right-sided nodule measures 3.6 x 2.5 cm. Nodules on the left measure 1.8 x 1.7 cm and 2.3 x 2.2 cm. Kidneys: There is symmetric  enhancement without hydronephrosis. Hypoattenuating structures in each kidney are too small to characterize. Vasculature: There are atherosclerotic calcifications throughout the abdomen and pelvis, without aneurysm. Nodes: There is no lymphadenopathy by size criteria. Bowel: The stomach and visualized loops of large and small bowel are unremarkable. An unremarkable appendix is identified. Mesentery/Peritoneum: Unremarkable Soft Tissues: Unremarkable PELVIS: Pelvic Organs: The prostate gland is enlarged. The urinary bladder is unremarkable. Bones: The patient has undergone fixation of the right proximal femur. There is localized soft tissue fluid and gas. Skin staples overlie the region. The patient has undergone prior fixation of the left femur as well, incompletely imaged.     1. There is a 6.6 cm mass in the perihilar left upper lobe which occludes the left upper lobe bronchus and broadly abuts the mediastinum. This is compatible with a primary pulmonary neoplastic process. 2. Bilateral adrenal nodules, presumably metastatic. 3. Nonspecific 0.6 cm nodule in the right upper lobe. 4. Emphysema. 5. Enlarged prostate. 6. Additional chronic findings as above. Electronically signed by:  Levi Maradiaga M.D.  6/17/2023 8:55 PM Mountain Time    [place] < 1 Hour    Result Date: 6/29/2023  This procedure was auto-finalized with no dictation required.    XR Chest 1 View    Result Date: 7/15/2023  XR CHEST 1 VW Date of Exam: 7/15/2023 5:58 PM EDT Indication: edema Comparison: 6/29/2023 Findings: Right internal jugular Port-A-Cath remains in place and unchanged in position. Heart size is stable. There is chronic elevation of left ami diaphragm. There is were airspace disease compatible with pneumonia. Right lung is clear. No pleural effusion. No evidence pneumothorax.     Impression: 1. Worsening left basilar airspace disease compatible with pneumonia. 2. Chronic elevation of the left hemidiaphragm. Electronically Signed: Jeet  Farzad  7/15/2023 6:09 PM EDT  Workstation ID: [id number]    XR Chest 1 View    Result Date: 6/29/2023  XR CHEST 1 VW Date of Exam: 6/29/2023 9:05 AM EDT Indication: port insertion Comparison: AP portable chest 6/20/2023 Findings: Right chest wall port catheter has been placed with the tip extending to the mid SVC level. There is no pneumothorax. Previously seen left hemithorax opacification has resolved in the interval. There is persistent dense consolidative change within the left lower lobe medially, however. There is elevation of the left hemidiaphragm. Suspected trace left basilar pleural fluid. With exception of minimal right basilar linear subsegmental atelectasis, the right lung appears clear. Heart size is normal. No pneumothorax is identified. Thoracolumbar spinal fusion rods are in place.     Impression: 1. Right chest wall tania catheter placement with the tip located at the mid SVC level. No visible pneumothorax. 2. Medial left basilar consolidation, suggestive of pneumonia, with probable trace left basilar pleural fluid. Significantly improved left lung aeration since 6/20/2023. Electronically Signed: Alice Hernandez  6/29/2023 9:38 AM EDT  Workstation ID: [id number]    XR Chest 1 View    Result Date: 6/20/2023  XR CHEST 1 VW Date of Exam: 6/20/2023 8:55 AM EDT Indication: change in oxygen needs Comparison: 1 day prior. Findings: Interval new complete opacification of the left lung field consistent with likely lobar atelectasis, with leftward mediastinal shift now present. Suspect additional component of probable small effusion on the left. The right lung remains clear. Heart and  mediastinal contours are obscured. External defibrillator pad noted in place.     Impression: Interval new complete opacification of the left lung field consistent with likely lobar atelectasis, with leftward mediastinal shift now present. Suspect additional component of probable small effusion on the left. Electronically Signed:  Avelino Cazares  6/20/2023 9:49 AM EDT  Workstation ID: [id number]    XR Chest 1 View    Result Date: 6/19/2023  XR CHEST 1 VW Date of Exam: 6/19/2023 11:18 AM EDT Indication: hypoxia. Comparison: CT chest with contrast 6/17/2023 Findings: Redemonstration of a large to the left upper lobe mass at the lingula most consistent with pulmonary malignancy. Left lower lobe basilar consolidation unchanged. No consolidation in the right lung. No pneumothorax. Mild elevation of the right hemidiaphragm. Thoracolumbar fixation hardware noted. Emphysema.     Impression: 1. No change in left upper lobe mass extending to the lingula most consistent with pulmonary malignancy. Unchanged left basilar lower lobe consolidation. 2. Clear right lung. 3. Emphysema. Electronically Signed: Edenilson Harvey  6/19/2023 11:30 AM EDT  Workstation ID: [id number]    Duplex venous lower extremity bilateral CAR    Result Date: 7/14/2023    Normal bilateral lower extremity venous duplex scan.     Stress Test With Myocardial Perfusion One Day    Result Date: 6/23/2023    Left ventricular ejection fraction is hyperdynamic (Calculated EF > 70%).   Myocardial perfusion imaging indicates a small-to-moderate-sized infarct located in the inferior wall with no significant ischemia noted.   Impressions are consistent with a low risk study.   Diaphragmatic attenuation artifact is present.   This is Cardiolite imaging stress test with fixed inferior defect.  MI versus attenuation artifact.  No ischemia noted.  Left ventricular size and function was normal.  Clinical correlation recommended.   Findings consistent with a normal ECG stress test.     NM PET/CT Skull Base to Mid Thigh    Result Date: 7/10/2023  NM PET/CT SKULL BASE TO MID THIGH Date of Exam: 7/10/2023 12:29 PM EDT Indication: C34.92. Squamous cell carcinoma left lung. Left upper lobe mass and adrenal nodules. Intracranial metastatic disease. Comparison: CT of the chest and pelvis 6/17/2023, brain MRI  6/22/2023 Technique: PET imaging was obtained from skull base to mid-thigh approximately 60 minutes after radiotracer injection. A low dose non contrast CT was obtained for attenuation correction and anatomic localization. Fused PET-CT and 3D MIP reconstructions were utilized for image interpretation. Dose: 15.1 F-18 FDG Fasting Blood Glucose Level: 123 mg/dl Findings: Base of Skull/Neck: No suspicious metabolic activity. Intracranial metastatic activity has been identified on brain MRI. This is outside the field-of-view. Physiologic activity is present. Thorax: Large mass in the left upper lobe extending to the left hilum with necrotic center. Metabolic activity measures maximally 26 SUV on image #95. The mass measures 7.7 x 5.7 cm. Reference image #94. Right hilar lymph node on image #85 has metabolic activity measuring maximally 10 SUV. This lymph node measures 1.5 x 2.4 cm. The background mediastinal metabolic activity measures maximally 2.3 SUV. Physiologic activity is present. Abdomen and Pelvis: Hypermetabolic activity in the adrenal glands bilaterally consistent metastatic disease. The left adrenal metabolic activity measures maximally 17.8 SUV. The mass measures 2.5 x 2.2 cm. The right adrenal mass has metabolic activity measuring maximally 11 SUV. The mass measures 3.5 x 3.1 cm. The background normal liver metabolic activity measures maximally 2.5 SUV.  Physiologic activity is present. Musculoskeletal System and Extremities: Hypermetabolic activity of the right proximal femur at the greater trochanter measures 10.5 SUV maximally image #223. There is loss of cortex at this location. Underlying osseous metastatic lesion is suspected. Patient does have recent dynamic fixation of the hip. No suspicious metabolic activity. Physiologic activity is present. Miscellaneous CT Findings: There is moderate emphysema. A right anterior chest wall port is present. Tip terminates at the cavoatrial junction. Mild coronary  artery atherosclerotic calcification. Bilateral adrenal masses. Postsurgical changes lumbar spine. Moderate stool burden. Diverticulosis without inflammation. Moderate atherosclerotic calcification abdominal aorta.     1.There is a large mass in the left upper lobe extending to the hilum consistent with patient's known malignancy. There is metastatic activity to left hilar lymph node, bilateral adrenal glands and right greater trochanter. Intracranial metastatic disease has also been seen on brain MRI exam. Electronically Signed: Maddie Eden  7/10/2023 6:13 PM EDT  Workstation ID: [id number]    XR Foot 3+ View Bilateral    Result Date: 7/15/2023  XR FOOT 3+ VW BILATERAL Date of Exam: 7/15/2023 6:04 PM EDT Indication: pain Comparison: None available. Findings: Right foot: There is no acute fracture or dislocation. No bony erosion or abnormal periosteal reaction. Soft tissues are unremarkable. Left foot: There is no acute fracture or dislocation. No bony erosion or abnormal periosteal reaction. There is mild diffuse soft tissue swelling.     Impression: 1. No acute bony abnormality of the feet. Electronically Signed: Jeet Panda  7/15/2023 6:34 PM EDT  Workstation ID: [id number]    XR Tibia Fibula 2 View Bilateral    Result Date: 7/15/2023  XR TIBIA FIBULA 2 VW BILATERAL Date of Exam: 7/15/2023 6:04 PM EDT Indication: pain Comparison: None available. Findings: Left tibia and fibula: There is no acute fracture or dislocation. Soft tissues are unremarkable. Right tibia and fibula: There is smooth deformity of the midshaft of both the tibia and fibula related to old healed fractures. There is single surgical screw in place through the mid tibia. There is no evidence of acute fracture or dislocation. No abnormal periosteal reaction identified. Soft tissues are unremarkable.     Impression: 1. No acute bony abnormality of the left tibia or fibula. 2. Old healed fractures of the right tibia and fibula. No acute bony  abnormality. Electronically Signed: Jeet Panda  7/15/2023 6:31 PM EDT  Workstation ID: [id number]       Current:  aspirin, 81 mg, Oral, Daily  azithromycin, 500 mg, Intravenous, Q24H  budesonide, 0.5 mg, Nebulization, BID - RT  dexamethasone, 4 mg, Oral, Daily With Breakfast  dilTIAZem CD, 120 mg, Oral, Q24H  famotidine, 20 mg, Oral, BID AC  ferric gluconate, 250 mg, Intravenous, Once  ferrous sulfate, 324 mg, Oral, Daily With Breakfast  furosemide, 20 mg, Intravenous, Daily  gabapentin, 200 mg, Oral, Nightly  guaiFENesin, 600 mg, Oral, Q12H  insulin glargine, 1-200 Units, Subcutaneous, Nightly - Glucommander  insulin lispro, 1-200 Units, Subcutaneous, 4x Daily With Meals & Nightly  metoprolol tartrate, 25 mg, Oral, Q12H  senna-docusate sodium, 2 tablet, Oral, BID  sodium chloride, 10 mL, Intravenous, Q12H        Infusion:        PRN:    senna-docusate sodium **AND** polyethylene glycol **AND** bisacodyl **AND** bisacodyl    dextrose    dextrose    glucagon (human recombinant)    HYDROcodone-acetaminophen    HYDROmorphone    insulin lispro    ipratropium-albuterol    ondansetron    [COMPLETED] Insert Peripheral IV **AND** sodium chloride    sodium chloride    sodium chloride    ASSESSMENT:  Acute hypoxic respiratory distress  Pneumonia  COPD  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 Jazmine Nathan, NOREEN  7/17/2023  12:52 EDT      I personally have examined  and interviewed the patient. I have reviewed the history, data, problems, assessment and plan with our NP.  Total Critical  care time in direct medical management (   ) minutes, This time specifically excludes time spent performing procedures.    Emiliano Lucero MD   7/17/2023  23:56 EDT

## 2023-07-17 NOTE — CASE MANAGEMENT/SOCIAL WORK
ocial Work Assessment   [place]     Patient Name: Isra Davenport  MRN: [id number]  Today's Date: 7/17/2023    Admit Date: 7/15/2023     Discharge Plan       Row Name 07/17/23 1559       Plan    Plan [facility] referral pending acceptance. Will require precert. PASRR approved. From home alone with no home O2.    Plan Comments Charting update to reflect PASRR approval.             BOSSMAN Olivas, MSSW, [facility]    Phone: [phone]  Cell: [phone]  Fax: [phone]  [email or web address]

## 2023-07-17 NOTE — PROGRESS NOTES
CARDIOLOGY FOLLOW-UP PROGRESS NOTE      Reason for follow-up:    Atrial Fibrillation  Edema     Attending: Raoul Castillo MD      Subjective .     Denies chest pain or dyspnea at present     Review of Systems   Constitutional: Negative for chills and fever.   HENT:  Negative for ear discharge and nosebleeds.    Eyes:  Negative for discharge and redness.   Cardiovascular:  Negative for chest pain, orthopnea, palpitations, paroxysmal nocturnal dyspnea and syncope.   Respiratory:  Negative for cough, shortness of breath and wheezing.    Endocrine: Negative for heat intolerance.   Skin:  Negative for rash.   Musculoskeletal:  Negative for arthritis and myalgias.   Gastrointestinal:  Negative for abdominal pain, melena, nausea and vomiting.   Genitourinary:  Negative for dysuria and hematuria.   Neurological:  Negative for dizziness, light-headedness, numbness and tremors.   Psychiatric/Behavioral:  Negative for depression. The patient is not nervous/anxious.    Pertinent items are noted in HPI, all other systems reviewed and negative  Allergies: Ibuprofen and Penicillins    Scheduled Meds:aspirin, 81 mg, Oral, Daily  azithromycin, 500 mg, Intravenous, Q24H  budesonide, 0.5 mg, Nebulization, BID - RT  dexamethasone, 4 mg, Oral, Daily With Breakfast  dilTIAZem CD, 120 mg, Oral, Q24H  famotidine, 20 mg, Oral, BID AC  ferrous sulfate, 324 mg, Oral, Daily With Breakfast  furosemide, 20 mg, Intravenous, Daily  gabapentin, 200 mg, Oral, Nightly  guaiFENesin, 600 mg, Oral, Q12H  insulin glargine, 1-200 Units, Subcutaneous, Nightly - Glucommander  insulin lispro, 1-200 Units, Subcutaneous, 4x Daily With Meals & Nightly  metoprolol tartrate, 25 mg, Oral, Q8H  senna-docusate sodium, 2 tablet, Oral, BID  sodium chloride, 10 mL, Intravenous, Q12H        Continuous Infusions:     PRN Meds:.  senna-docusate sodium **AND** polyethylene glycol **AND** bisacodyl **AND** bisacodyl    dextrose    dextrose    glucagon (human  "recombinant)    HYDROcodone-acetaminophen    HYDROmorphone    insulin lispro    ipratropium-albuterol    ondansetron    [COMPLETED] Insert Peripheral IV **AND** sodium chloride    sodium chloride    sodium chloride    Objective     VITAL SIGNS  Patient Vitals for the past 24 hrs:   BP Temp Temp src Pulse Resp SpO2 Weight   07/17/23 0700 93/52 -- -- 76 -- 100 % --   07/17/23 0601 109/65 97.4 °F (36.3 °C) Oral 90 21 96 % 68.6 kg (151 lb 3.8 oz)   07/17/23 0032 119/69 -- -- 80 -- 99 % --   07/17/23 0031 119/69 97.3 °F (36.3 °C) Oral 71 20 -- --   07/16/23 2015 107/69 98.4 °F (36.9 °C) Oral 73 15 92 % --   07/16/23 2004 -- -- -- 78 18 97 % --   07/16/23 2001 -- -- -- 79 18 92 % --   07/16/23 1731 119/71 97.6 °F (36.4 °C) Oral 83 20 95 % --   07/16/23 1439 129/72 97.4 °F (36.3 °C) Oral 94 16 93 % --   07/16/23 0954 107/62 97.5 °F (36.4 °C) Oral 89 16 96 % --        Flowsheet Rows      Flowsheet Row First Filed Value   Admission Height 177.8 cm (70\") Documented at 07/15/2023 1652   Admission Weight 72.6 kg (160 lb) Documented at 07/15/2023 1652            Body mass index is 21.7 kg/m².      Intake/Output Summary (Last 24 hours) at 7/17/2023 0831  Last data filed at 7/17/2023 0601  Gross per 24 hour   Intake 1210 ml   Output 850 ml   Net 360 ml        TELEMETRY:     SR with PACS    Physical Exam:  Constitutional:       Appearance: Well-developed.   Eyes:      General: No scleral icterus.        Right eye: No discharge.   HENT:      Head: Normocephalic and atraumatic.   Neck:      Thyroid: No thyromegaly.      Lymphadenopathy: No cervical adenopathy.   Pulmonary:      Effort: Pulmonary effort is normal. No respiratory distress.      Breath sounds: Normal breath sounds. No wheezing. No rales.   Cardiovascular:      Normal rate. Regularly irregular rhythm.      No gallop.    Edema:     Peripheral edema absent.   Abdominal:      Tenderness: There is no abdominal tenderness.   Skin:     Findings: No erythema or rash. "   Neurological:      Mental Status: Alert and oriented to person, place, and time.          Results Review:   I reviewed the patient's new clinical results.    CBC    Results from last 7 days   Lab Units 07/17/23  0216 07/15/23  2133 07/15/23  1712   WBC 10*3/mm3 12.30* 11.40* 11.40*   HEMOGLOBIN g/dL 9.5* 9.9* 10.0*   PLATELETS 10*3/mm3 445 415 404     BMP   Results from last 7 days   Lab Units 07/17/23  0216 07/15/23  2133 07/15/23  1712   SODIUM mmol/L 142 142 138   POTASSIUM mmol/L 4.5 5.4* 4.6   CHLORIDE mmol/L 94* 95* 96*   CO2 mmol/L 41.0* 38.0* 36.0*   BUN mg/dL 17 23 21   CREATININE mg/dL 0.35* 0.59* 0.37*   GLUCOSE mg/dL 91 450* 355*   MAGNESIUM mg/dL  --  1.7  --      Cr Clearance Estimated Creatinine Clearance: 198.7 mL/min (A) (by C-G formula based on SCr of 0.35 mg/dL (L)).  Coag     HbA1C   Lab Results   Component Value Date    HGBA1C 7.30 (H) 07/15/2023     Blood Glucose   Glucose   Date/Time Value Ref Range Status   07/17/2023 0814 97 70 - 105 mg/dL Final     Comment:     Serial Number: [id number]:  [id number]   07/17/2023 0732 60 (L) 70 - 105 mg/dL Final     Comment:     Serial Number: [id number]:  [id number]   07/16/2023 2054 365 (H) 70 - 105 mg/dL Final     Comment:     Serial Number: [id number]:  [id number]   07/16/2023 1947 349 (H) 70 - 105 mg/dL Final     Comment:     Serial Number: [id number]:  [id number]   07/16/2023 1629 495 (C) 70 - 105 mg/dL Final     Comment:     Serial Number: [id number]:  [id number]   07/16/2023 1109 390 (H) 70 - 105 mg/dL Final     Comment:     Serial Number: [id number]:  [id number]   07/16/2023 0709 184 (H) 70 - 105 mg/dL Final     Comment:     Serial Number: [id number]:  [id number]   07/16/2023 0605 150 (H) 70 - 105 mg/dL Final     Comment:     Serial Number: [id number]:  [id number]     Infection   Results from last 7 days   Lab Units 07/15/23  2133   PROCALCITONIN ng/mL 0.22     CMP   Results from last 7 days   Lab  Units 07/17/23  0216 07/15/23  2133 07/15/23  1712   SODIUM mmol/L 142 142 138   POTASSIUM mmol/L 4.5 5.4* 4.6   CHLORIDE mmol/L 94* 95* 96*   CO2 mmol/L 41.0* 38.0* 36.0*   BUN mg/dL 17 23 21   CREATININE mg/dL 0.35* 0.59* 0.37*   GLUCOSE mg/dL 91 450* 355*   ALBUMIN g/dL  --   --  2.6*   BILIRUBIN mg/dL  --   --  0.2   ALK PHOS U/L  --   --  178*   AST (SGOT) U/L  --   --  11   ALT (SGPT) U/L  --   --  28     ABG      UA      OLIVIA  No results found for: POCMETH, POCAMPHET, POCBARBITUR, POCBENZO, POCCOCAINE, POCOPIATES, POCOXYCODO, POCPHENCYC, POCPROPOXY, POCTHC, POCTRICYC  Lysis Labs   Results from last 7 days   Lab Units 07/17/23  0216 07/15/23  2133 07/15/23  1712   HEMOGLOBIN g/dL 9.5* 9.9* 10.0*   PLATELETS 10*3/mm3 445 415 404   CREATININE mg/dL 0.35* 0.59* 0.37*     Radiology(recent) XR Chest 1 View    Result Date: 7/15/2023  Impression: 1. Worsening left basilar airspace disease compatible with pneumonia. 2. Chronic elevation of the left hemidiaphragm. Electronically Signed: Jeet Panda  7/15/2023 6:09 PM EDT  Workstation ID: [id number]    XR Foot 3+ View Bilateral    Result Date: 7/15/2023  Impression: 1. No acute bony abnormality of the feet. Electronically Signed: Jeet Panda  7/15/2023 6:34 PM EDT  Workstation ID: [id number]    XR Tibia Fibula 2 View Bilateral    Result Date: 7/15/2023  Impression: 1. No acute bony abnormality of the left tibia or fibula. 2. Old healed fractures of the right tibia and fibula. No acute bony abnormality. Electronically Signed: Jeet Panda  7/15/2023 6:31 PM EDT  Workstation ID: [id number]     Imaging Results (Last 24 Hours)       ** No results found for the last 24 hours. **            Results from last 7 days   Lab Units 07/15/23  2133   HSTROP T ng/L 30*       EKG          I personally viewed and interpreted the patient's EKG/Telemetry data:        ECHOCARDIOGRAM:     Results for orders placed during the hospital encounter of 06/16/23    Adult Transthoracic Echo Complete  W/ Cont if Necessary Per Protocol    Interpretation Summary    Left ventricular systolic function is normal. Left ventricular ejection fraction appears to be 61 - 65%.    Left ventricular diastolic function is consistent with (grade I) impaired relaxation.    There is mild bileaflet mitral valve prolapse present.    Estimated right ventricular systolic pressure from tricuspid regurgitation is mildly elevated (35-45 mmHg). Calculated right ventricular systolic pressure from tricuspid regurgitation is 44 mmHg.        STRESS MYOVIEW:      CARDIAC CATHETERIZATION:      OTHER:         Assessment & Plan            Atrial fibrillation with RVR        ASSESSMENT:    PAF: now SR with PACs  LE pain  Stage IV squamous cell lung cancer with mets to brain and bone    PLAN:    Continue conservative Rx  Continue po Cardizem and metoprolol  Recent non invasive ischemic eval negative for ischemia  Echo with preserved LV function  Per oncology not recommended long term a/c due to brain mets and known rectal bleeding      Additional recommendations per Dr. Johnson    Patient is seen and examined and findings are verified.  All data is reviewed by me personally.  Assessment and plan formulated by APC was done after discussion with attending.  I spent more than 50% of time in taking care of the patient.    Patient is sitting up in the chair.  Patient is going in and out of atrial fibrillation.    I discussed the patients findings and my recommendations with patient and RN    Hemodynamics are stable.    Normal S1 and S2.  No pericardial rub or murmur.    Patient is in sinus rhythm at the time of my evaluation.  However patient is going in and out of atrial fibrillation there are episodes of MAT.  If patient continues to have recurrence of this arrhythmia, I would consider amiodarone.  Continue beta-blocker and Cardizem.  Patient is not a candidate for anticoagulation as per oncology.  Patient understands the risks.    Electronically signed  by Qamar Johnson MD, 07/18/23, 5:26 PM EDT.                Electronically signed by NOREEN Peng, 07/17/23, 8:31 AM EDT.          NOREEN Peng  07/17/23  08:31 EDT

## 2023-07-17 NOTE — PROGRESS NOTES
[facility] Emergency Medicine Services Daily Progress Note    Patient Name: Isra Davenport  : 1955  MRN: [id number]  Primary Care Physician:  Mayco Carreon MD  Date of admission: 7/15/2023      Subjective      Chief Complaint: Pain     History of Present Illness: Irsa Davenport is a 68 y.o. 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  He is status post intramedullary nailing of the hip on 2023 and a Injxzk-j-Whjt placed on 2023 he is currently undergoing chemotherapy and plan for palliative radiation who presented to [facility] on 7/15/2023 complaining of pain and lower extremity edema     Review of records show patient had presented to chemotherapy with complaint of lower extremity edema and what he describes as neuropathic pain.  He had ultrasound that was negative for DVT and was prescribed Lasix.  He was also prescribed Norco 5/325 for pain.  Patient reports when he went to  prescriptions from the pharmacy the pharmacy was closed on Monday and he was unable to fill prescription.  He presents to the ED today with uncontrolled pain.  It was noted from previous admission he was discharged on low pressure for diagnosis of sinus tachycardia     ED work-up included chest x-ray that showed a right internal jugular Port-A-Cath in place.  Heart size stable.  Airspace disease in the left lung compatible with pneumonia.  Chronic elevation of the left hemidiaphragm.  No pleural effusion.  Right lung was clear.  Bilateral foot x-ray was negative for fracture.  There is healed fracture of the right tibia and fibula.  Troponin 26, potassium 4.6, sodium 138, glucose 355, creatinine 0.3, BUN is 21.  Albumin 2.6.  White count 11.4, hemoglobin 10.  On admission heart rate 84 but while he was being treated in the ED heart rate jumped into the  150s and was found to be in acute A-fib with RVR.  O2 sats dropped to 88% was placed on 2 L nasal cannula.  He was given a Cardizem bolus and Lopressor bolus and resumed on a Cardizem drip.  Patient denies any chest pain or feeling of chest palpitations.  Denies any fever chills or increased shortness of breath.  Reports positive for productive cough.  Positive for swelling of bilateral lower extremities below the calf and extreme tenderness to touch bilateral feet.      7/16/23 patient seen and examined in bed no acute distress, vital signs stable, K-5.4, hr 81, dyspnea on high flow oxygen.  We will start Cardizem p.o.  7/17/2023 patient seen and examined in bed no acute distress, he is on 3 L of oxygen, blood pressure 93/52.  Discussed with RN.       ROS Review of Systems   Constitutional: Positive for malaise/fatigue.   Cardiovascular:  Positive for leg swelling.   Respiratory:  Positive for cough and sputum production.    Skin: Negative.    Musculoskeletal:  Positive for back pain.   Gastrointestinal: Negative.    Genitourinary: Negative.    Neurological:  Positive for paresthesias.   Psychiatric/B      Objective      Vitals:   Temp:  [97.3 °F (36.3 °C)-98.4 °F (36.9 °C)] 97.4 °F (36.3 °C)  Heart Rate:  [] 105  Resp:  [15-21] 21  BP: ()/(52-72) 93/52  Flow (L/min):  [3] 3    Physical Exam Constitutional:       Appearance: He is ill-appearing.   Eyes:      Pupils: Pupils are equal, round, and reactive to light.   Cardiovascular:      Rate and Rhythm: Tachycardia present. Rhythm irregular.   Pulmonary:      Breath sounds: Rhonchi present.   Abdominal:      General: Abdomen is flat.      Palpations: Abdomen is soft.   Musculoskeletal:      Right lower leg: Edema present.      Left lower leg: Edema present.   Skin:     General: Skin is warm and dry.   Neurological:      Mental Status: He is alert and oriented to person, place, and time.   Psychiatric:         Mood and Affect: Mood normal.            Result Review    Result Review:  I have personally reviewed the results from the time of this admission to 7/17/2023 09:25 EDT and agree with these findings:  []  Laboratory  []  Microbiology  []  Radiology  []  EKG/Telemetry   []  Cardiology/Vascular   []  Pathology  []  Old records  []  Other:  Most notable findings include:     Wounds (last 24 hours)       LDA Wound       Row Name 07/17/23 0400 07/17/23 0000 07/16/23 2000       Wound 06/17/23 0915 Right anterior hip Incision    Wound - Properties Group Placement Date: 06/17/23  -HB Placement Time: 0915 -HB Side: Right  -HB Orientation: anterior  -HB Location: hip  -HB Primary Wound Type: Incision  -HB    Retired Wound - Properties Group Placement Date: 06/17/23  -HB Placement Time: 0915 -HB Side: Right  -HB Orientation: anterior  -HB Location: hip  -HB Primary Wound Type: Incision  -HB    Retired Wound - Properties Group Date first assessed: 06/17/23  -HB Time first assessed: 0915  -HB Side: Right  -HB Location: hip  -HB Primary Wound Type: Incision  -HB       Wound 07/15/23 2100 Bilateral medial gluteal Pressure Injury    Wound - Properties Group Placement Date: 07/15/23  -MF Placement Time: 2100  -MF Present on Hospital Admission: Y  -MF Side: Bilateral  -MF Orientation: medial  -MF Location: gluteal  -MF Primary Wound Type: Pressure inj  -MF    Dressing Appearance -- -- dry;intact  -MF    Periwound intact  -MF intact  -MF intact  -MF    Periwound Temperature warm  -MF warm  -MF warm  -MF    Periwound Skin Turgor soft  -MF soft  -MF soft  -MF    Care, Wound -- -- soap and water  -MF    Retired Wound - Properties Group Placement Date: 07/15/23  -MF Placement Time: 2100  -MF Present on Hospital Admission: Y  -MF Side: Bilateral  -MF Orientation: medial  -MF Location: gluteal  -MF Primary Wound Type: Pressure inj  -MF    Retired Wound - Properties Group Date first assessed: 07/15/23  -MF Time first assessed: 2100  -MF Present on Hospital Admission: Y  -MF  Side: Bilateral  -MF Location: gluteal  -MF Primary Wound Type: Pressure inj  -MF              User Key  (r) = Recorded By, (t) = Taken By, (c) = Cosigned By      Initials Name Provider Type     Cleo Britton, RN Registered Nurse    Danyell Samuel, RN Registered Nurse                    CBC:      Lab 07/17/23  0216 07/15/23  2133 07/15/23  1712   WBC 12.30* 11.40* 11.40*   HEMOGLOBIN 9.5* 9.9* 10.0*   HEMATOCRIT 31.5* 32.5* 31.8*   PLATELETS 445 415 404   NEUTROS ABS 10.70* 10.30* 10.20*   LYMPHS ABS 0.90 0.60* 0.60*   MONOS ABS 0.80 0.50 0.60   EOS ABS 0.00 0.00 0.00   MCV 85.3 84.5 84.8       CMP:        Lab 07/17/23  0216 07/15/23  2133 07/15/23  1712   SODIUM 142 142 138   POTASSIUM 4.5 5.4* 4.6   CHLORIDE 94* 95* 96*   CO2 41.0* 38.0* 36.0*   ANION GAP 7.0 9.0 6.0   BUN 17 23 21   CREATININE 0.35* 0.59* 0.37*   EGFR 124.5 106.3 122.4   GLUCOSE 91 450* 355*   CALCIUM 9.7 9.2 10.0   MAGNESIUM  --  1.7  --    TOTAL PROTEIN  --   --  6.8   ALBUMIN  --   --  2.6*   GLOBULIN  --   --  4.2   ALT (SGPT)  --   --  28   AST (SGOT)  --   --  11   BILIRUBIN  --   --  0.2   ALK PHOS  --   --  178*       No results found for: ACANTHNAEG, AFBCX, BPERTUSSISCX, BLOODCX  No results found for: BCIDPCR, CXREFLEX, CSFCX, CULTURETIS  No results found for: CULTURES, HSVCX, URCX  No results found for: EYECULTURE, GCCX, HSVCULTURE, LABHSV  No results found for: LEGIONELLA, MRSACX, MUMPSCX, MYCOPLASCX  No results found for: NOCARDIACX, STOOLCX  No results found for: THROATCX, UNSTIMCULT, URINECX, CULTURE, VZVCULTUR  No results found for: VIRALCULTU, WOUNDCX      Assessment & Plan      Brief Patient Summary:  Isra Davenport is a 68 y.o. male who presented to chemotherapy with complaint of lower extremity edema and what he describes as neuropathic pain.  He had ultrasound that was negative for DVT and was prescribed Lasix.  He was also prescribed Norco 5/325 for pain.  Patient reports when he went to  prescriptions from  the pharmacy, the pharmacy was closed on Monday and he was unable to fill prescription.  He presents to the ED today with uncontrolled pain.  It was noted from previous admission he was discharged on low pressure for diagnosis of sinus tachycardia         aspirin, 81 mg, Oral, Daily  azithromycin, 500 mg, Intravenous, Q24H  budesonide, 0.5 mg, Nebulization, BID - RT  dexamethasone, 4 mg, Oral, Daily With Breakfast  dilTIAZem CD, 120 mg, Oral, Q24H  famotidine, 20 mg, Oral, BID AC  ferrous sulfate, 324 mg, Oral, Daily With Breakfast  furosemide, 20 mg, Intravenous, Daily  gabapentin, 200 mg, Oral, Nightly  guaiFENesin, 600 mg, Oral, Q12H  insulin glargine, 1-200 Units, Subcutaneous, Nightly - Glucommander  insulin lispro, 1-200 Units, Subcutaneous, 4x Daily With Meals & Nightly  metoprolol tartrate, 25 mg, Oral, Q8H  senna-docusate sodium, 2 tablet, Oral, BID  sodium chloride, 10 mL, Intravenous, Q12H               Active Hospital Problems:  Active Hospital Problems    Diagnosis     **Atrial fibrillation with RVR        New onset A-fib with RVR  -Cardizem drip>to po  - Started on Cardiezem drip in ED and given lopressor  - consulted cardiology  - Had considered pharmacologic AC but with known brain mets concerned with risk for bleed so will consult oncology for recommendation on AC  - continue VTE with SCD until seen by cardiology and oncology     Pain and Edema BLE  - appears Neuropathic  - pt also with known bone cancer and arthritis   - consider neuropathy 2/2 chemotherapy  - will continue Lasix for edema  -Was ruled out for DVT   - will add gabapentin 200mg QHS for sign of neuropathic pain  - check BNP  - will resume pt Norco that was previously ordered and add prn Dilaudid   -echo 6/22/23: Left ventricular systolic function is normal. Left ventricular ejection fraction appears to be 61 - 65%.   Left ventricular diastolic function is consistent with (grade I) impaired relaxation.     Hyperglycemia- likely steroid  induced   -A1C 7.3  - add glucomander  - add steroid protocol      Hypoxia- consider 2/2 known lung cancer   - cxr with pneumonia and elevated WBC and known tobacco use  - will cover possible COPD vs pneumonia vs carcinoma with empiric Azithromycin  - wean O2 as tolerated   - duo nebs prn     Anemia  - continue ferrous sulfate  - hgb stable     Stage IV squamous cell lung cancer with mets to brain and bone  - currently treated with chemotherapy   - continue decadron as previously ordered by oncology  - continue nexium   Per oncology   Check iron saturation.continue ferrous sulfate 325 mg by mouth daily.Consider IV iron if iron sat is low.  Radiation oncology consultation to resume radiation therapy when able  Await molecular testing results with plan for chemotherapy post completion of radiation.  Start Xgeva and daily Ca+VitD for bone metastases support as outpatient.  dexamethasone  4 mg by mouth daily.  Continue ASA 81 mg by mouth daily.  Would not recommend full dose anticoagulation for A-fib secondary to brain mets and known rectal bleeding.  Continue as needed hydrocodone, as needed Dilaudid, and scheduled gabapentin for pain control.  Continue Pepcid p.o. twice daily and as needed Zofran    DVT prophylaxis:  Mechanical DVT prophylaxis orders are present.    CODE STATUS:    Medical Intervention Limits: NO intubation (DNI)  Code Status (Patient has no pulse and is not breathing): No CPR (Do Not Attempt to Resuscitate)  Medical Interventions (Patient has pulse or is breathing): Limited Support      Disposition:  I expect patient to be discharged NH.    I have utilized all available, immediate resources to obtain, update, or review the patient's current medications including all prescriptions, over-the-counter products, herbals, cannabis/cannabidiol products, and vitamin.mineral/dietary (nutritional) supplements.      Medical Intervention Limits: NO intubation (DNI)  Code Status (Patient has no pulse and is not  breathing): No CPR (Do Not Attempt to Resuscitate)  Medical Interventions (Patient has pulse or is breathing): Limited Support    Next of kin of Power of :       Admission Status:  I believe this patient meets ADMIT status.              This patient has been examined wearing appropriate Personal Protective Equipment and discussed with  RN . 07/17/23      Electronically signed by Raoul Castillo MD, 07/17/23, 09:25 EDT.  [facility] Hospitalist Team

## 2023-07-17 NOTE — DISCHARGE PLACEMENT REQUEST
"Catalina Davenport (68 y.o. Male)       Date of Birth   1955    Social Security Number       Address   [address] [address]    Home Phone   [phone]    MRN   [id number]       Jehovah's witness   None    Marital Status                               Admission Date   7/15/23    Admission Type   Emergency    Admitting Provider   Jese Ontiveros MD    Attending Provider   Raoul Castillo MD    Department, Room/Bed   [facility], 2112/1       Discharge Date       Discharge Disposition       Discharge Destination                                 Attending Provider: Raoul Castillo MD    Allergies: Ibuprofen, Penicillins    Isolation: None   Infection: None   Code Status: No CPR    Ht: 177.8 cm (70\")   Wt: 68.6 kg (151 lb 3.8 oz)    Admission Cmt: None   Principal Problem: Atrial fibrillation with RVR [I48.91]                   Active Insurance as of 7/15/2023       Primary Coverage       Payor Plan Insurance Group Employer/Plan Group    ANTHEM MEDICARE REPLACEMENT ANTHEM MEDICARE ADVANTAGE [id number]       Payor Plan Address Payor Plan Phone Number Payor Plan Fax Number Effective Dates    [address] [phone]  6/1/2021 - None Entered    [facility] [address]         Subscriber Name Subscriber Birth Date Member ID       CATALINA DAVENPORT 1955 [id number]               Secondary Coverage       Payor Plan Insurance Group Employer/Plan Group    [place]        Payor Plan Address Payor Plan Phone Number Payor Plan Fax Number Effective Dates    [address]   4/30/2023 - None Entered    [place] [address]         Subscriber Name Subscriber Birth Date Member ID       CATALINA DAVENPORT 1955 [id number]                     Emergency Contacts        (Rel.) Home Phone Work Phone Mobile Phone    faiza davenport (Brother) [phone] -- --    martin davenport (Relative) [phone] --    roddy davenport [phone] -- " [phone]                 History & Physical        Yi Amin APRN at 07/15/23 2006       Attestation signed by Raoul Castillo MD at 23 0871    I have reviewed this documentation and agree.                      [facility] Medicine Services  History & Physical    Patient Name: Isra Davenport  : 1955  MRN: [id number]  Primary Care Physician:  Mayco Carreon MD  Date of admission: 7/15/2023  Date and Time of Service: 7/15/23 at 2015    Subjective      Chief Complaint: Pain    History of Present Illness: Isra Davenport is a 68 y.o. 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  He is status post intramedullary nailing of the hip on 2023 and a Ogdjgv-p-Wfzr placed on 2023 he is currently undergoing chemotherapy and plan for palliative radiation who presented to [facility] on 7/15/2023 complaining of pain and lower extremity edema    Review of records show patient had presented to chemotherapy with complaint of lower extremity edema and what he describes as neuropathic pain.  He had ultrasound that was negative for DVT and was prescribed Lasix.  He was also prescribed Norco 5/325 for pain.  Patient reports when he went to  prescriptions from the pharmacy the pharmacy was closed on Monday and he was unable to fill prescription.  He presents to the ED today with uncontrolled pain.  It was noted from previous admission he was discharged on low pressure for diagnosis of sinus tachycardia    ED work-up included chest x-ray that showed a right internal jugular Port-A-Cath in place.  Heart size stable.  Airspace disease in the left lung compatible with pneumonia.  Chronic elevation of the left hemidiaphragm.  No pleural effusion.  Right lung was clear.  Bilateral foot x-ray was negative for fracture.  There is healed  fracture of the right tibia and fibula.  Troponin 26, potassium 4.6, sodium 138, glucose 355, creatinine 0.3, BUN is 21.  Albumin 2.6.  White count 11.4, hemoglobin 10.  On admission heart rate 84 but while he was being treated in the ED heart rate jumped into the 150s and was found to be in acute A-fib with RVR.  O2 sats dropped to 88% was placed on 2 L nasal cannula.  He was given a Cardizem bolus and Lopressor bolus and resumed on a Cardizem drip.  Patient denies any chest pain or feeling of chest palpitations.  Denies any fever chills or increased shortness of breath.  Reports positive for productive cough.  Positive for swelling of bilateral lower extremities below the calf and extreme tenderness to touch bilateral feet.          Review of Systems   Constitutional: Positive for malaise/fatigue.   Cardiovascular:  Positive for leg swelling.   Respiratory:  Positive for cough and sputum production.    Skin: Negative.    Musculoskeletal:  Positive for back pain.   Gastrointestinal: Negative.    Genitourinary: Negative.    Neurological:  Positive for paresthesias.   Psychiatric/Behavioral: Negative.        Personal History     Past Medical History:   Diagnosis Date    Arthritis     Benign prostatic hyperplasia without urinary obstruction 07/06/2023    Chronic back pain 07/06/2023    Closed fracture of right hip 06/16/2023    Added automatically from request for surgery [id number]    Hernia of anterior abdominal wall 09/22/2020    Lung cancer     squamous cell carcimoma,left    Lung cancer metastatic to brain 06/29/2023    Mass of left lung 06/16/2023    Added automatically from request for surgery [id number]    Neuralgia 07/06/2023    Neuropathy 07/06/2023    Osteoarthritis 07/06/2023    Polyp of colon 07/06/2023    Primary malignant neoplasm of left lung metastatic to other site 06/16/2023    Added automatically from request for surgery [id number]    Prostatitis 07/06/2023    Spinal stenosis of lumbar region 07/06/2023        Past Surgical History:   Procedure Laterality Date    BACK SURGERY  2006    BRONCHOSCOPY N/A 6/19/2023    Procedure: BRONCHOSCOPY WITH BIOPSY X1 AREA, FINE NEEDLE ASPIRATION, BRUSHING, AND BRONCHIAL WASHING;  Surgeon: Reggie Abarca MD;  Location: [facility] ENDOSCOPY;  Service: Pulmonary;  Laterality: N/A;  POST: MUCOUS PLUGS  AND LUNG MASS    HIP INTERTROCHANTERIC NAILING Right 6/17/2023    Procedure: HIP INTERTROCHANTERIC NAILING;  Surgeon: Levi Blackmon II, MD;  Location: [facility] MAIN OR;  Service: Orthopedics;  Laterality: Right;    LEG SURGERY Bilateral 08/25/1985    PORTACATH PLACEMENT Right 6/29/2023    Procedure: INSERTION OF PORTACATH;  Surgeon: Jese Goss MD;  Location: [facility] MAIN OR;  Service: General;  Laterality: Right;       Family History: family history is not on file. Otherwise pertinent FHx was reviewed and not pertinent to current issue.    Social History:  reports that he has been smoking cigarettes. He has a 15.00 pack-year smoking history. He has never used smokeless tobacco. He reports current alcohol use of about 2.0 standard drinks per week. He reports that he does not use drugs.    Home Medications:  Prior to Admission Medications       Prescriptions Last Dose Informant Patient Reported? Taking?    acetaminophen (TYLENOL) 325 MG tablet   Yes No    Take 2 tablets by mouth Every 6 (Six) Hours As Needed for Mild Pain. Indications: Pain    albuterol sulfate  (90 Base) MCG/ACT inhaler   No No    Inhale 2 puffs Every 4 (Four) Hours As Needed for Wheezing.    aspirin 81 MG EC tablet   No No    Take 1 tablet by mouth Daily.    ferrous sulfate 324 (65 Fe) MG tablet delayed-release EC tablet   No No    Take 1 tablet by mouth Daily With Breakfast.    metoprolol tartrate (LOPRESSOR) 25 MG tablet   No No    Take 1 tablet by mouth Every 8 (Eight) Hours.              Allergies:  Allergies   Allergen Reactions    Ibuprofen Itching    Penicillins Hives       Objective       Vitals:   Temp:  [98.2 °F (36.8 °C)] 98.2 °F (36.8 °C)  Heart Rate:  [0-156] 108  Resp:  [19-20] 19  BP: (105-129)/(54-82) 114/74  Flow (L/min):  [2-6] 2    Physical Exam  Constitutional:       Appearance: He is ill-appearing.   Eyes:      Pupils: Pupils are equal, round, and reactive to light.   Cardiovascular:      Rate and Rhythm: Tachycardia present. Rhythm irregular.   Pulmonary:      Breath sounds: Rhonchi present.   Abdominal:      General: Abdomen is flat.      Palpations: Abdomen is soft.   Musculoskeletal:      Right lower leg: Edema present.      Left lower leg: Edema present.   Skin:     General: Skin is warm and dry.   Neurological:      Mental Status: He is alert and oriented to person, place, and time.   Psychiatric:         Mood and Affect: Mood normal.        Result Review    Result Review:  I have personally reviewed the results from the time of this admission to 7/15/2023 21:19 EDT and agree with these findings:  [x]  Laboratory  [x]  Microbiology  [x]  Radiology  []  EKG/Telemetry   []  Cardiology/Vascular   []  Pathology  [x]  Old records  []  Other:  Most notable findings include: see H&P         Assessment & Plan        Active Hospital Problems:  Active Hospital Problems    Diagnosis     **Atrial fibrillation with RVR      Plan:   New onset A-fib with RVR  - Started on Cardiezem drip in ED and given lopressor  - will consult cardiology  - Had considered pharmacologic AC but with known brain mets concerned with risk for bleed so will consult oncology for recommendation on AC  - continue VTE with SCD until seen by cardiology and oncology    Pain and Edema BLE  - appears Neuropathic  - pt also with known bone cancer and arthritis   - consider neuropathy 2/2 chemotherapy  - will continue Lasix for edema  -Was ruled out for DVT   - will add gabapentin 200mg QHS for sign of neuropathic pain  - check BNP  - will resume pt Norco that was previously ordered and add prn Dilaudid   -echo 6/22/23:  Left ventricular systolic function is normal. Left ventricular ejection fraction appears to be 61 - 65%.   Left ventricular diastolic function is consistent with (grade I) impaired relaxation.    Hyperglycemia  - likely steroid induced   -A1C 7.3  - add glucomander  - add steroid protocol   -endocrinology consult    Hypoxia  - consider 2/2 known lung cancer   - cxr with pneumonia and elevated WBC and known tobacco use  - will cover possible COPD vs pneumonia vs carcinoma with empiric Azithromycin  - wean O2 as tolerated   - duo nebs prn    Anemia  - continue ferrous sulfate  - hgb stable    Stage IV squamous cell lung cancer with mets to brain and bone  - currently treated with chemotherapy   - continue decadron as previously ordered by oncology  - continue nexium   - consult oncology for continued management    DVT prophylaxis:  Mechanical DVT prophylaxis orders are present.    CODE STATUS:    Medical Intervention Limits: NO intubation (DNI)  Code Status (Patient has no pulse and is not breathing): No CPR (Do Not Attempt to Resuscitate)  Medical Interventions (Patient has pulse or is breathing): Limited Support    Admission Status:  I believe this patient meets observation status.    I discussed the patient's findings and my recommendations with patient.    This patient has been examined wearing appropriate Personal Protective Equipment a    Signature: Electronically signed by NOREEN Agosto, 07/15/23, 21:19 EDT.  [facility] Hospitalist Team     Electronically signed by Raoul Castillo MD at 07/16/23 0856

## 2023-07-17 NOTE — PLAN OF CARE
Goal Outcome Evaluation:         Patient pleasant and cooperative with care; states he is starting to feel better. Still experiencing tenderness in his bilateral lower extremities. Edema has improved but remains about a 2+. Low blood sugar of 60  treated this am. MD notified and new orders received. Up in chair for approx 2 hours this afternoon. Diabetic educator in to see patient. Continues on 2L O2 NC. Stable throughout the shift; continuing to monitor.

## 2023-07-17 NOTE — CONSULTS
"Diabetes Education  Assessment/Teaching    Patient Name:  Isra Davenport  YOB: 1955  MRN: [id number]  Admit Date:  7/15/2023      Assessment Date:  7/17/2023  Flowsheet Row Most Recent Value   General Information     Referral From: A1c, Blood glucose, MD order  [MD consult to insulin teach and newly diagnosed, admission blood sugar 355, and on 7/15/2023 A1c was 7.3%.]   Height 177.8 cm (70\")   Weight 68.6 kg (151 lb 3.8 oz)  [Nurse notified of gain]   Weight Method Bed scale   Pregnancy Assessment    Diabetes History    What type of diabetes do you have? Type 2   Length of Diabetes Diagnosis Newly diagnosed <6 months  [Diagnosed this admission.]   Current DM knowledge poor   Have you had diabetes education/teaching in the past? no   Do you test your blood sugar at home? no   Have you had high blood sugar? (>140mg/dl) yes   How often do you have high blood sugar? unknown   When was your last high blood sugar? Admission blood sugar 355   Education Preferences    What areas of diabetes would you like to learn about? avoiding high blood sugar, avoiding low blood sugar, diabetes complications, diet information, medications for diabetes, understanding diabetes, testing my blood sugar at home, resources on diabetes   Nutrition Information    Assessment Topics    Healthy Eating - Assessment Needs education   Taking Medication - Assessment Needs education   Problem Solving - Assessment Needs education   Reducing Risk - Assessment Needs education   Monitoring - Assessment Needs education   DM Goals    Healthy Eating - Goal Today   Taking Medication - Goal Today   Problem Solving - Goal Today   Reducing Risk - Goal Today   Monitoring - Goal Today            Flowsheet Row Most Recent Value   DM Education Needs    Meter Meter provided   Meter Type Accuchek  [Accu-chek Guide Me glucometer given to patient with patient doing return demonstration using the lancing device, inserting the test strip into the meter, and " applying blood to the test strip. Blood sugar was 199.]   Frequency of Testing AC meals  [Log book given to patient with discussion on checking blood sugar 3X a day before meals and sometimes at bedtime.]   Medication Insulin, Side effects, Administration, Actions, Pen   Problem Solving Hypoglycemia, Hyperglycemia, Signs, Symptoms, Treatment   Reducing Risks A1C testing, Lipids, Blood pressure, Eye exam, Dental exam, Foot care, Immunizations, Cardiovascular, Neuropathy, Retinopathy  [On 7/15/2023 A1c was 7.3%.]   Healthy Eating Basic meal plan provided   Discharge Plan Home   Motivation Moderate   Teaching Method Explanation, Discussion, Demonstration, Handouts, Teach back   Patient Response Demonstrates adequately, Verbalized understanding              Other Comments:  A1c info sheet given with discussion on being newly diagnosed, A1c target, and healthy blood sugar range. First Steps book to managing diabetes given to patient with discussion on diabetes and diagnosis. Discussed how diabetes puts you at higher risk for heart attack, stroke, kidney failure, blindness, and neuropathy.  Also discussed the importance of yearly eye exams, dental exams, regular follow-up with PCP to check blood pressure, lipids, kidney function, liver function, and to keep up-to-date on vaccinations. Foot care discussed with checking feet daily and wearing shoes when out of bed. Handouts given with discussion on 1 serving of carbs being = 15 grams, being allowed 4 servings  of carbs per meal, counting carbs, reading food labels, and meal planning. At home patient stated he normally drinks water, coffee, regular Sprite, and milk. Asked patient if he could switch to diet Sprite and he said he could since he was drinking it here and didn't even realize it was diet. Patient asking educator to come back later to go over insulin administration. Handouts left at bedside on insulin pen administration, types of insulin, storage of insulin,  injection sites, disposal of needles, rotation of sites, hypoglycemia and hyperglycemia signs, symptoms, and treatment.        Electronically signed by:  Sabine Dela Cruz RN  07/17/23 12:04 EDT

## 2023-07-17 NOTE — THERAPY EVALUATION
Patient Name: Isra Davenport  : 1955    MRN: [id number]                              Today's Date: 2023       Admit Date: 7/15/2023    Visit Dx:     ICD-10-CM ICD-9-CM   1. Primary malignant neoplasm of left lung metastatic to other site  C34.92 162.9   2. Pedal edema  R60.0 782.3   3. Pain in both lower extremities  M79.604 729.5    M79.605    4. Atrial fibrillation with rapid ventricular response  I48.91 427.31     Patient Active Problem List   Diagnosis    Closed fracture of right hip    Closed fracture of right hip, initial encounter    Mass of left lung    Primary malignant neoplasm of left lung metastatic to other site    Lung cancer metastatic to brain    Hernia of anterior abdominal wall    Spinal stenosis of lumbar region    Prostatitis    Polyp of colon    Osteoarthritis    Neuropathy    Neuralgia    Chronic back pain    Benign prostatic hyperplasia without urinary obstruction    Metastasis to bone    Atrial fibrillation with RVR    Atrial fibrillation     Past Medical History:   Diagnosis Date    Arthritis     Benign prostatic hyperplasia without urinary obstruction 2023    Chronic back pain 2023    Closed fracture of right hip 2023    Added automatically from request for surgery [id number]    Hernia of anterior abdominal wall 2020    Lung cancer     squamous cell carcimoma,left    Lung cancer metastatic to brain 2023    Mass of left lung 2023    Added automatically from request for surgery [id number]    Neuralgia 2023    Neuropathy 2023    Osteoarthritis 2023    Polyp of colon 2023    Primary malignant neoplasm of left lung metastatic to other site 2023    Added automatically from request for surgery [id number]    Prostatitis 2023    Spinal stenosis of lumbar region 2023     Past Surgical History:   Procedure Laterality Date    BACK SURGERY  2006    BRONCHOSCOPY N/A 2023    Procedure: BRONCHOSCOPY WITH BIOPSY X1 AREA,  FINE NEEDLE ASPIRATION, BRUSHING, AND BRONCHIAL WASHING;  Surgeon: Reggie Abarca MD;  Location: [facility] ENDOSCOPY;  Service: Pulmonary;  Laterality: N/A;  POST: MUCOUS PLUGS  AND LUNG MASS    HIP INTERTROCHANTERIC NAILING Right 6/17/2023    Procedure: HIP INTERTROCHANTERIC NAILING;  Surgeon: Levi Blackmon II, MD;  Location: [facility] MAIN OR;  Service: Orthopedics;  Laterality: Right;    LEG SURGERY Bilateral 08/25/1985    PORTACATH PLACEMENT Right 6/29/2023    Procedure: INSERTION OF PORTACATH;  Surgeon: Jese Goss MD;  Location: [facility] MAIN OR;  Service: General;  Laterality: Right;      General Information       Row Name 07/17/23 1555          Physical Therapy Time and Intention    Document Type evaluation  -OD     Mode of Treatment physical therapy;co-treatment  -OD       Row Name 07/17/23 1555          General Information    Patient Profile Reviewed yes  -OD     Prior Level of Function independent:;ADL's  RW, family provides transportation, independent ADLs, spongebath  -OD     Existing Precautions/Restrictions fall;oxygen therapy device and L/min  3L O2  -OD     Barriers to Rehab previous functional deficit;impaired sensation  -OD       Row Name 07/17/23 1557          Living Environment    People in Home alone;other (see comments)  Recently has been staying with brother and TOMER  -OD       Row Name 07/17/23 1555          Home Main Entrance    Number of Stairs, Main Entrance one  -OD       Row Name 07/17/23 1555          Stairs Within Home, Primary    Number of Stairs, Within Home, Primary none  -OD       Row Name 07/17/23 1557          Cognition    Orientation Status (Cognition) oriented x 4  -OD       Row Name 07/17/23 1559          Safety Issues, Functional Mobility    Impairments Affecting Function (Mobility) balance;endurance/activity tolerance;pain;sensation/sensory awareness  -OD               User Key  (r) = Recorded By, (t) = Taken By, (c) = Cosigned By      Initials Name Provider  Type    OD Samantha Frankel, PT Physical Therapist                   Mobility       Row Name 07/17/23 1557          Bed Mobility    Bed Mobility bed mobility (all) activities  -OD     All Activities, [place] (Bed Mobility) moderate assist (50% patient effort)  -OD     Comment, (Bed Mobility) for assist BLE due to pain  -OD       Row Name 07/17/23 1557          Bed-Chair Transfer    Bed-Chair [place] (Transfers) minimum assist (75% patient effort);moderate assist (50% patient effort);2 person assist  -OD     Comment, (Bed-Chair Transfer) B HHA  -OD       Row Name 07/17/23 1557          Sit-Stand Transfer    Sit-Stand [place] (Transfers) minimum assist (75% patient effort);2 person assist  -OD     Comment, (Sit-Stand Transfer) B HHA  -OD       Row Name 07/17/23 1557          Gait/Stairs (Locomotion)    [place] Level (Gait) moderate assist (50% patient effort);2 person assist;other (see comments)  Side steps for stand-pivot to bedside chair  -OD     Distance in Feet (Gait) 1  -OD               User Key  (r) = Recorded By, (t) = Taken By, (c) = Cosigned By      Initials Name Provider Type    OD Samantha Frankel, PT Physical Therapist                   Obj/Interventions       Row Name 07/17/23 1558          Range of Motion Comprehensive    General Range of Motion lower extremity range of motion deficits identified  -OD     Comment, General Range of Motion Secondary to pain BLE.  -OD       Row Name 07/17/23 1553          Strength Comprehensive (MMT)    General Manual Muscle Testing (MMT) Assessment lower extremity strength deficits identified  -OD     Comment, General Manual Muscle Testing (MMT) Assessment BLE grossly 4/5  -OD       Row Name 07/17/23 1559          Balance    Balance Interventions sitting;standing;sit to stand;moderate challenge;static;dynamic  -OD       Row Name 07/17/23 1555          Sensory Assessment (Somatosensory)    Sensory Assessment (Somatosensory) sensation intact;other (see  comments)  Impaired due to swelling  -OD               User Key  (r) = Recorded By, (t) = Taken By, (c) = Cosigned By      Initials Name Provider Type    Samantha Gomez, PT Physical Therapist                   Goals/Plan       Row Name 07/17/23 1611          Bed Mobility Goal 1 (PT)    Activity/Assistive Device (Bed Mobility Goal 1, PT) bed mobility activities, all  -OD     [place] Level/Cues Needed (Bed Mobility Goal 1, PT) independent  -OD     Time Frame (Bed Mobility Goal 1, PT) long term goal (LTG);2 weeks  -OD       [facility] Name 07/17/23 1611          Transfer Goal 1 (PT)    Activity/Assistive Device (Transfer Goal 1, PT) transfers, all;walker, rolling  -OD     [place] Level/Cues Needed (Transfer Goal 1, PT) modified independence  -OD     Time Frame (Transfer Goal 1, PT) long term goal (LTG);2 weeks  -OD       Row Name 07/17/23 1611          Gait Training Goal 1 (PT)    Activity/Assistive Device (Gait Training Goal 1, PT) gait (walking locomotion);assistive device use;decrease fall risk;diminish gait deviation;walker, rolling  -OD     [place] Level (Gait Training Goal 1, PT) modified independence  -OD     Distance (Gait Training Goal 1, PT) 50 feet  -OD     Time Frame (Gait Training Goal 1, PT) long term goal (LTG);2 weeks  -OD       [facility] Name 07/17/23 1611          Therapy Assessment/Plan (PT)    Planned Therapy Interventions (PT) balance training;bed mobility training;gait training;home exercise program;neuromuscular re-education;strengthening;patient/family education;transfer training;postural re-education  -OD               User Key  (r) = Recorded By, (t) = Taken By, (c) = Cosigned By      Initials Name Provider Type    Samantha Gomez, LUIZ Physical Therapist                   Clinical Impression       Row Name 07/17/23 1600          Pain Scale: FACES Pre/Post-Treatment    Pain: FACES Scale, Pretreatment 4-->hurts little more  -OD     Posttreatment Pain Rating 4-->hurts little more  -OD      Pain Location - Side/Orientation Bilateral  -OD     Pain Location - foot  -OD       Row Name 07/17/23 1600          Plan of Care Review    Plan of Care Reviewed With patient  -OD     Progress no change  -OD     Outcome Evaluation Pt is a 67 y/o M who presents to [facility] for pain and LE edema and is dx with Afib w/ RVR. Pt recently d/c 6/29/23 s/p R IM nailing and recent dx of metastatic lung CA with mets to bone/brain. Imaging/testing (-) for DVT or acute fractures. Pt is indep living alone at baseline, but since recent d/c, has been living with brother and sister-in-law, who both work in their [employer] with 1 [employer]. Pt indep with ADL's and uses RW for mobility. Pt currently on 3L O2 via NC but does not require home O2. Pt required min-modAx2 for mobility this date secondary to BLE pain and swelling and weakness. Pt would benefit from skilled PT through length of stay, and recommend SNF upon d/c due to decline in baseline function and unsafe mobility.  -OD       Row Name 07/17/23 1600          Therapy Assessment/Plan (PT)    Rehab Potential (PT) good, to achieve stated therapy goals  -OD     Criteria for Skilled Interventions Met (PT) yes;meets criteria  -OD     Therapy Frequency (PT) 5 times/wk  -OD     Predicted Duration of Therapy Intervention (PT) until d/c  -OD       Row Name 07/17/23 1600          Vital Signs    Pre Systolic BP Rehab 107  -OD     Pre Treatment Diastolic BP 72  -OD     Intratreatment Resp Rate (breaths/min) 34  -OD     O2 Delivery Pre Treatment nasal cannula  -OD     Intra SpO2 (%) 91  -OD     O2 Delivery Intra Treatment nasal cannula  -OD     O2 Delivery Post Treatment nasal cannula  -OD     Pre Patient Position Supine  -OD     Intra Patient Position Standing  -OD     Post Patient Position Sitting  -OD       Row Name 07/17/23 1600          Positioning and Restraints    Pre-Treatment Position in bed  -OD     Post Treatment Position chair  -OD     In Chair notified nsg;reclined;call light within  reach;encouraged to call for assist;exit alarm on  -OD               User Key  (r) = Recorded By, (t) = Taken By, (c) = Cosigned By      Initials Name Provider Type    Samantha Gomez PT Physical Therapist                   Outcome Measures       Row Name 07/17/23 1612          How much help from another person do you currently need...    Turning from your back to your side while in flat bed without using bedrails? 3  -OD     Moving from lying on back to sitting on the side of a flat bed without bedrails? 3  -OD     Moving to and from a bed to a chair (including a wheelchair)? 2  -OD     Standing up from a chair using your arms (e.g., wheelchair, bedside chair)? 2  -OD     Climbing 3-5 steps with a railing? 1  -OD     To walk in hospital room? 3  -OD     AM-PAC 6 Clicks Score (PT) 14  -OD     Highest level of mobility 4 --> Transferred to chair/commode  -OD       Row Name 07/17/23 1612 07/17/23 1313       Functional Assessment    Outcome Measure Options AM-PAC 6 Clicks Basic Mobility (PT)  -OD AM-PAC 6 Clicks Daily Activity (OT)  -MS              User Key  (r) = Recorded By, (t) = Taken By, (c) = Cosigned By      Initials Name Provider Type    Jadyn Almanza, OT Occupational Therapist    Samantha Gomez PT Physical Therapist                                 Physical Therapy Education       Title: PT OT SLP Therapies (Done)       Topic: Physical Therapy (Done)       Point: Mobility training (Done)       Learning Progress Summary             Patient Acceptance, E, VU by OD at 7/17/2023 1612                         Point: Home exercise program (Done)       Learning Progress Summary             Patient Acceptance, E, VU by OD at 7/17/2023 1612                         Point: Body mechanics (Done)       Learning Progress Summary             Patient Acceptance, E, VU by OD at 7/17/2023 1612                         Point: Precautions (Done)       Learning Progress Summary             Patient Acceptance, E, VU by OD  at 7/17/2023 1612                                         User Key       Initials Effective Dates Name Provider Type Discipline    OD 05/11/23 -  Samantha Frankel, LUIZ Physical Therapist PT                  PT Recommendation and Plan  Planned Therapy Interventions (PT): balance training, bed mobility training, gait training, home exercise program, neuromuscular re-education, strengthening, patient/family education, transfer training, postural re-education  Plan of Care Reviewed With: patient  Progress: no change  Outcome Evaluation: Pt is a 69 y/o M who presents to [facility] for pain and LE edema and is dx with Afib w/ RVR. Pt recently d/c 6/29/23 s/p R IM nailing and recent dx of metastatic lung CA with mets to bone/brain. Imaging/testing (-) for DVT or acute fractures. Pt is indep living alone at baseline, but since recent d/c, has been living with brother and sister-in-law, who both work in their [employer] with 1 [employer]. Pt indep with ADL's and uses RW for mobility. Pt currently on 3L O2 via NC but does not require home O2. Pt required min-modAx2 for mobility this date secondary to BLE pain and swelling and weakness. Pt would benefit from skilled PT through length of stay, and recommend SNF upon d/c due to decline in baseline function and unsafe mobility.     Time Calculation:    PT Charges       Row Name 07/17/23 1613             Time Calculation    Start Time 1043  -OD      Stop Time 1110  -OD      Time Calculation (min) 27 min  -OD      PT Received On 07/17/23  -OD      PT - Next Appointment 07/18/23  -OD      PT Goal Re-Cert Due Date 07/31/23  -OD         Time Calculation- PT    Total Timed Code Minutes- PT 0 minute(s)  -OD                User Key  (r) = Recorded By, (t) = Taken By, (c) = Cosigned By      Initials Name Provider Type    OD Samantha Frankel, PT Physical Therapist                  Therapy Charges for Today       Code Description Service Date Service Provider Modifiers Qty    99409246163 HC PT EVAL MOD COMPLEXITY 4  7/17/2023 Samantha Frankel, PT GP 1            PT G-Codes  Outcome Measure Options: AM-PAC 6 Clicks Basic Mobility (PT)  AM-PAC 6 Clicks Score (PT): 14  AM-PAC 6 Clicks Score (OT): 16  PT Discharge Summary  Anticipated Discharge Disposition (PT): skilled nursing facility    Samantha Frankel, LUIZ  7/17/2023

## 2023-07-18 LAB
ANION GAP SERPL CALCULATED.3IONS-SCNC: 7 MMOL/L (ref 5–15)
ANION GAP SERPL CALCULATED.3IONS-SCNC: 8 MMOL/L (ref 5–15)
ANISOCYTOSIS BLD QL: ABNORMAL
BUN SERPL-MCNC: 19 MG/DL (ref 8–23)
BUN SERPL-MCNC: 21 MG/DL (ref 8–23)
BUN/CREAT SERPL: 42.9 (ref 7–25)
BUN/CREAT SERPL: 47.5 (ref 7–25)
CA-I SERPL ISE-MCNC: 1.18 MMOL/L (ref 1.2–1.3)
CALCIUM SPEC-SCNC: 9 MG/DL (ref 8.6–10.5)
CALCIUM SPEC-SCNC: 9.1 MG/DL (ref 8.6–10.5)
CHLORIDE SERPL-SCNC: 87 MMOL/L (ref 98–107)
CHLORIDE SERPL-SCNC: 87 MMOL/L (ref 98–107)
CO2 SERPL-SCNC: 35 MMOL/L (ref 22–29)
CO2 SERPL-SCNC: 37 MMOL/L (ref 22–29)
CREAT SERPL-MCNC: 0.4 MG/DL (ref 0.76–1.27)
CREAT SERPL-MCNC: 0.49 MG/DL (ref 0.76–1.27)
DEPRECATED RDW RBC AUTO: 59.9 FL (ref 37–54)
EGFRCR SERPLBLD CKD-EPI 2021: 112.5 ML/MIN/1.73
EGFRCR SERPLBLD CKD-EPI 2021: 119.6 ML/MIN/1.73
ERYTHROCYTE [DISTWIDTH] IN BLOOD BY AUTOMATED COUNT: 20.1 % (ref 12.3–15.4)
GLUCOSE BLDC GLUCOMTR-MCNC: 154 MG/DL (ref 70–105)
GLUCOSE BLDC GLUCOMTR-MCNC: 188 MG/DL (ref 70–105)
GLUCOSE BLDC GLUCOMTR-MCNC: 314 MG/DL (ref 70–105)
GLUCOSE BLDC GLUCOMTR-MCNC: 327 MG/DL (ref 70–105)
GLUCOSE BLDC GLUCOMTR-MCNC: 334 MG/DL (ref 70–105)
GLUCOSE BLDC GLUCOMTR-MCNC: 353 MG/DL (ref 70–105)
GLUCOSE SERPL-MCNC: 335 MG/DL (ref 65–99)
GLUCOSE SERPL-MCNC: 372 MG/DL (ref 65–99)
HCT VFR BLD AUTO: 28.7 % (ref 37.5–51)
HGB BLD-MCNC: 8.8 G/DL (ref 13–17.7)
LARGE PLATELETS: ABNORMAL
LYMPHOCYTES # BLD MANUAL: 0.35 10*3/MM3 (ref 0.7–3.1)
LYMPHOCYTES NFR BLD MANUAL: 2 % (ref 5–12)
MAGNESIUM SERPL-MCNC: 1.5 MG/DL (ref 1.6–2.4)
MAGNESIUM SERPL-MCNC: 2.2 MG/DL (ref 1.6–2.4)
MCH RBC QN AUTO: 26.1 PG (ref 26.6–33)
MCHC RBC AUTO-ENTMCNC: 30.8 G/DL (ref 31.5–35.7)
MCV RBC AUTO: 84.9 FL (ref 79–97)
MICROCYTES BLD QL: ABNORMAL
MONOCYTES # BLD: 0.23 10*3/MM3 (ref 0.1–0.9)
NEUTROPHILS # BLD AUTO: 11.12 10*3/MM3 (ref 1.7–7)
NEUTROPHILS NFR BLD MANUAL: 91 % (ref 42.7–76)
NEUTS BAND NFR BLD MANUAL: 4 % (ref 0–5)
PHOSPHATE SERPL-MCNC: 2 MG/DL (ref 2.5–4.5)
PLATELET # BLD AUTO: 395 10*3/MM3 (ref 140–450)
PMV BLD AUTO: 9.2 FL (ref 6–12)
POTASSIUM SERPL-SCNC: 4.5 MMOL/L (ref 3.5–5.2)
POTASSIUM SERPL-SCNC: 4.6 MMOL/L (ref 3.5–5.2)
RBC # BLD AUTO: 3.38 10*6/MM3 (ref 4.14–5.8)
SCAN SLIDE: NORMAL
SMALL PLATELETS BLD QL SMEAR: ADEQUATE
SODIUM SERPL-SCNC: 130 MMOL/L (ref 136–145)
SODIUM SERPL-SCNC: 131 MMOL/L (ref 136–145)
VARIANT LYMPHS NFR BLD MANUAL: 1 % (ref 0–5)
VARIANT LYMPHS NFR BLD MANUAL: 2 % (ref 19.6–45.3)
WBC MORPH BLD: NORMAL
WBC NRBC COR # BLD: 11.7 10*3/MM3 (ref 3.4–10.8)

## 2023-07-18 PROCEDURE — 99232 SBSQ HOSP IP/OBS MODERATE 35: CPT | Performed by: INTERNAL MEDICINE

## 2023-07-18 PROCEDURE — 63710000001 INSULIN REGULAR HUMAN PER 5 UNITS: Performed by: INTERNAL MEDICINE

## 2023-07-18 PROCEDURE — 82948 REAGENT STRIP/BLOOD GLUCOSE: CPT

## 2023-07-18 PROCEDURE — 63710000001 INSULIN LISPRO (HUMAN) PER 5 UNITS: Performed by: NURSE PRACTITIONER

## 2023-07-18 PROCEDURE — 99497 ADVNCD CARE PLAN 30 MIN: CPT | Performed by: NURSE PRACTITIONER

## 2023-07-18 PROCEDURE — 82330 ASSAY OF CALCIUM: CPT | Performed by: INTERNAL MEDICINE

## 2023-07-18 PROCEDURE — 25010000002 FUROSEMIDE PER 20 MG: Performed by: NURSE PRACTITIONER

## 2023-07-18 PROCEDURE — 97112 NEUROMUSCULAR REEDUCATION: CPT

## 2023-07-18 PROCEDURE — 25010000002 NA FERRIC GLUC CPLX PER 12.5 MG: Performed by: NURSE PRACTITIONER

## 2023-07-18 PROCEDURE — 93010 ELECTROCARDIOGRAM REPORT: CPT | Performed by: INTERNAL MEDICINE

## 2023-07-18 PROCEDURE — 99221 1ST HOSP IP/OBS SF/LOW 40: CPT | Performed by: NURSE PRACTITIONER

## 2023-07-18 PROCEDURE — 80048 BASIC METABOLIC PNL TOTAL CA: CPT | Performed by: NURSE PRACTITIONER

## 2023-07-18 PROCEDURE — 94799 UNLISTED PULMONARY SVC/PX: CPT

## 2023-07-18 PROCEDURE — 80048 BASIC METABOLIC PNL TOTAL CA: CPT | Performed by: INTERNAL MEDICINE

## 2023-07-18 PROCEDURE — 94761 N-INVAS EAR/PLS OXIMETRY MLT: CPT

## 2023-07-18 PROCEDURE — 97116 GAIT TRAINING THERAPY: CPT

## 2023-07-18 PROCEDURE — 63710000001 DEXAMETHASONE PER 0.25 MG: Performed by: INTERNAL MEDICINE

## 2023-07-18 PROCEDURE — 63710000001 INSULIN GLARGINE PER 5 UNITS: Performed by: NURSE PRACTITIONER

## 2023-07-18 PROCEDURE — 97530 THERAPEUTIC ACTIVITIES: CPT

## 2023-07-18 PROCEDURE — 83735 ASSAY OF MAGNESIUM: CPT | Performed by: NURSE PRACTITIONER

## 2023-07-18 PROCEDURE — 84100 ASSAY OF PHOSPHORUS: CPT | Performed by: INTERNAL MEDICINE

## 2023-07-18 PROCEDURE — 94664 DEMO&/EVAL PT USE INHALER: CPT

## 2023-07-18 PROCEDURE — 93005 ELECTROCARDIOGRAM TRACING: CPT | Performed by: INTERNAL MEDICINE

## 2023-07-18 PROCEDURE — 25010000002 MAGNESIUM SULFATE 2 GM/50ML SOLUTION: Performed by: INTERNAL MEDICINE

## 2023-07-18 PROCEDURE — 83735 ASSAY OF MAGNESIUM: CPT | Performed by: INTERNAL MEDICINE

## 2023-07-18 RX ORDER — MORPHINE SULFATE 2 MG/ML
2 INJECTION, SOLUTION INTRAMUSCULAR; INTRAVENOUS EVERY 4 HOURS PRN
Status: DISCONTINUED | OUTPATIENT
Start: 2023-07-18 | End: 2023-07-21

## 2023-07-18 RX ORDER — DOCUSATE SODIUM 100 MG/1
100 CAPSULE, LIQUID FILLED ORAL 2 TIMES DAILY
Status: DISCONTINUED | OUTPATIENT
Start: 2023-07-18 | End: 2023-07-27 | Stop reason: HOSPADM

## 2023-07-18 RX ORDER — PANTOPRAZOLE SODIUM 40 MG/1
40 TABLET, DELAYED RELEASE ORAL
Status: DISCONTINUED | OUTPATIENT
Start: 2023-07-18 | End: 2023-07-27 | Stop reason: HOSPADM

## 2023-07-18 RX ORDER — AMIODARONE HYDROCHLORIDE 200 MG/1
200 TABLET ORAL
Status: DISCONTINUED | OUTPATIENT
Start: 2023-07-18 | End: 2023-07-20

## 2023-07-18 RX ORDER — MAGNESIUM SULFATE HEPTAHYDRATE 40 MG/ML
2 INJECTION, SOLUTION INTRAVENOUS
Status: COMPLETED | OUTPATIENT
Start: 2023-07-18 | End: 2023-07-19

## 2023-07-18 RX ORDER — TRAMADOL HYDROCHLORIDE 50 MG/1
50 TABLET ORAL EVERY 6 HOURS PRN
Status: DISCONTINUED | OUTPATIENT
Start: 2023-07-18 | End: 2023-07-22

## 2023-07-18 RX ORDER — SODIUM PHOSPHATE IN 0.9 % NACL 15MMOL/100
15 PLASTIC BAG, INJECTION (ML) INTRAVENOUS ONCE
Status: COMPLETED | OUTPATIENT
Start: 2023-07-19 | End: 2023-07-19

## 2023-07-18 RX ADMIN — INSULIN LISPRO 3 UNITS: 100 INJECTION, SOLUTION INTRAVENOUS; SUBCUTANEOUS at 22:00

## 2023-07-18 RX ADMIN — MAGNESIUM SULFATE HEPTAHYDRATE 2 G: 2 INJECTION, SOLUTION INTRAVENOUS at 17:34

## 2023-07-18 RX ADMIN — MAGNESIUM SULFATE HEPTAHYDRATE 2 G: 2 INJECTION, SOLUTION INTRAVENOUS at 19:14

## 2023-07-18 RX ADMIN — INSULIN HUMAN 2 UNITS: 100 INJECTION, SOLUTION PARENTERAL at 22:20

## 2023-07-18 RX ADMIN — GUAIFENESIN 600 MG: 600 TABLET, EXTENDED RELEASE ORAL at 22:26

## 2023-07-18 RX ADMIN — SODIUM CHLORIDE 250 MG: 9 INJECTION, SOLUTION INTRAVENOUS at 12:43

## 2023-07-18 RX ADMIN — Medication 10 ML: at 08:44

## 2023-07-18 RX ADMIN — GUAIFENESIN 600 MG: 600 TABLET, EXTENDED RELEASE ORAL at 08:34

## 2023-07-18 RX ADMIN — INSULIN GLARGINE 12 UNITS: 100 INJECTION, SOLUTION SUBCUTANEOUS at 22:18

## 2023-07-18 RX ADMIN — DILTIAZEM HYDROCHLORIDE 30 MG: 30 TABLET, FILM COATED ORAL at 22:18

## 2023-07-18 RX ADMIN — IPRATROPIUM BROMIDE AND ALBUTEROL SULFATE 3 ML: .5; 3 SOLUTION RESPIRATORY (INHALATION) at 20:25

## 2023-07-18 RX ADMIN — BUDESONIDE 0.5 MG: 0.5 INHALANT RESPIRATORY (INHALATION) at 07:10

## 2023-07-18 RX ADMIN — Medication 1 TABLET: at 12:39

## 2023-07-18 RX ADMIN — AMIODARONE HYDROCHLORIDE 200 MG: 200 TABLET ORAL at 12:39

## 2023-07-18 RX ADMIN — BUDESONIDE 0.5 MG: 0.5 INHALANT RESPIRATORY (INHALATION) at 20:30

## 2023-07-18 RX ADMIN — SENNOSIDES AND DOCUSATE SODIUM 2 TABLET: 8.6; 5 TABLET ORAL at 08:34

## 2023-07-18 RX ADMIN — ASPIRIN 81 MG CHEWABLE TABLET 81 MG: 81 TABLET CHEWABLE at 08:34

## 2023-07-18 RX ADMIN — FUROSEMIDE 20 MG: 10 INJECTION, SOLUTION INTRAMUSCULAR; INTRAVENOUS at 08:34

## 2023-07-18 RX ADMIN — INSULIN LISPRO 6 UNITS: 100 INJECTION, SOLUTION INTRAVENOUS; SUBCUTANEOUS at 08:52

## 2023-07-18 RX ADMIN — SENNOSIDES AND DOCUSATE SODIUM 2 TABLET: 8.6; 5 TABLET ORAL at 22:17

## 2023-07-18 RX ADMIN — DILTIAZEM HYDROCHLORIDE 30 MG: 30 TABLET, FILM COATED ORAL at 14:34

## 2023-07-18 RX ADMIN — DEXAMETHASONE 4 MG: 4 TABLET ORAL at 22:18

## 2023-07-18 RX ADMIN — PANTOPRAZOLE SODIUM 40 MG: 40 TABLET, DELAYED RELEASE ORAL at 12:39

## 2023-07-18 RX ADMIN — FAMOTIDINE 20 MG: 20 TABLET, FILM COATED ORAL at 08:34

## 2023-07-18 RX ADMIN — FERROUS SULFATE TAB EC 324 MG (65 MG FE EQUIVALENT) 324 MG: 324 (65 FE) TABLET DELAYED RESPONSE at 08:34

## 2023-07-18 RX ADMIN — INSULIN LISPRO 10 UNITS: 100 INJECTION, SOLUTION INTRAVENOUS; SUBCUTANEOUS at 17:34

## 2023-07-18 RX ADMIN — DEXAMETHASONE 4 MG: 4 TABLET ORAL at 08:34

## 2023-07-18 RX ADMIN — Medication 10 ML: at 22:26

## 2023-07-18 RX ADMIN — IPRATROPIUM BROMIDE AND ALBUTEROL SULFATE 3 ML: .5; 3 SOLUTION RESPIRATORY (INHALATION) at 07:06

## 2023-07-18 RX ADMIN — MAGNESIUM SULFATE HEPTAHYDRATE 2 G: 2 INJECTION, SOLUTION INTRAVENOUS at 22:22

## 2023-07-18 RX ADMIN — METOPROLOL TARTRATE 25 MG: 25 TABLET, FILM COATED ORAL at 22:17

## 2023-07-18 RX ADMIN — INSULIN HUMAN 2 UNITS: 100 INJECTION, SOLUTION PARENTERAL at 08:35

## 2023-07-18 RX ADMIN — GABAPENTIN 200 MG: 100 CAPSULE ORAL at 22:18

## 2023-07-18 RX ADMIN — METOPROLOL TARTRATE 25 MG: 25 TABLET, FILM COATED ORAL at 08:34

## 2023-07-18 RX ADMIN — DOCUSATE SODIUM 100 MG: 100 CAPSULE, LIQUID FILLED ORAL at 22:18

## 2023-07-18 RX ADMIN — INSULIN LISPRO 13 UNITS: 100 INJECTION, SOLUTION INTRAVENOUS; SUBCUTANEOUS at 12:39

## 2023-07-18 NOTE — PLAN OF CARE
Goal Outcome Evaluation:      Isra Davenport presents with functional mobility impairments which indicate the need for skilled intervention. Tolerating session today without incident, required CGA for bed mobility/transfers/gait training 8 ft forward/backward. Pt primarily limited by B foot pain/hyperalgesia, educated on desensitization techniques with reports of improved pain/sensitivity. Recommending SNF at d/c and will continue to follow and progress as tolerated. PPE: gloves    Kristyn He, PT, DPT

## 2023-07-18 NOTE — CONSULTS
Nutrition Services    Patient Name: Isra Davenport  YOB: 1955  MRN: [id number]  Admission date: 7/15/2023    Comment:    Continue current diet and ONS.    Moderate chronic disease related malnutrition related to decreased appetite in the setting of a catabolic illness as evidenced by PO intake meeting less than 75% of estimated energy requirement for greater than or equal to 1 month, moderate fluid accumulation, >5% wt loss in 1 month, and moderate muscle wasting per NFPE.     See MSA below.    PPE Documentation        PPE Worn By Provider gloves   PPE Worn By Patient  N/A     CLINICAL NUTRITION ASSESSMENT      Reason for Assessment 7/18: wound, nursing admission screen consult, MST: 4      H&P      Past Medical History:   Diagnosis Date    Arthritis     Benign prostatic hyperplasia without urinary obstruction 07/06/2023    Chronic back pain 07/06/2023    Closed fracture of right hip 06/16/2023    Added automatically from request for surgery [id number]    Hernia of anterior abdominal wall 09/22/2020    Lung cancer     squamous cell carcimoma,left    Lung cancer metastatic to brain 06/29/2023    Mass of left lung 06/16/2023    Added automatically from request for surgery [id number]    Neuralgia 07/06/2023    Neuropathy 07/06/2023    Osteoarthritis 07/06/2023    Polyp of colon 07/06/2023    Primary malignant neoplasm of left lung metastatic to other site 06/16/2023    Added automatically from request for surgery [id number]    Prostatitis 07/06/2023    Spinal stenosis of lumbar region 07/06/2023       Past Surgical History:   Procedure Laterality Date    BACK SURGERY  2006    BRONCHOSCOPY N/A 6/19/2023    Procedure: BRONCHOSCOPY WITH BIOPSY X1 AREA, FINE NEEDLE ASPIRATION, BRUSHING, AND BRONCHIAL WASHING;  Surgeon: Reggie Abarca MD;  Location: [facility] ENDOSCOPY;  Service: Pulmonary;  Laterality: N/A;  POST: MUCOUS PLUGS  AND LUNG MASS    HIP INTERTROCHANTERIC NAILING Right 6/17/2023    Procedure: HIP  "INTERTROCHANTERIC NAILING;  Surgeon: Levi Blackmon II, MD;  Location: [facility] MAIN OR;  Service: Orthopedics;  Laterality: Right;    LEG SURGERY Bilateral 08/25/1985    PORTACATH PLACEMENT Right 6/29/2023    Procedure: INSERTION OF PORTACATH;  Surgeon: Jese Goss MD;  Location: [facility] MAIN OR;  Service: General;  Laterality: Right;        Current Problems   New onset A-fib RVR  -cardiology following     Pain and edema BLE    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 Pt with recent admission in June for dx of new metastatic squamous cell carcinoma of the L lung stage IV with mets to the bone and brain. RD visited pt at bedside. Pt reported that he has been eating 2 meals daily with some snacking. Pt said he has been drinking the ONS this admission as this is one of the few things he can drink to get his meds down. Pt agreeable to ONS at time of RD visit. Pt denied food allergies and difficulty chewing/swallowing. NFPE completed, consistent with nutrition diagnosis of malnutrition using AND/ASPEN criteria. See MSA below.        Anthropometrics        Current Height, Weight Height: 177.8 cm (70\")  Weight: 68.6 kg (151 lb 3.8 oz) (Nurse notified of gain) (07/17/23 0601)       Ideal Body Weight (IBW) 166#   Usual Body Weight (UBW) Unknown        Trending Weight Hx     This admission: 7/18: 151# - scale              PTA: 10% wt loss x 1 month    Wt Readings from Last 30 Encounters:   07/17/23 0601 68.6 kg (151 lb 3.8 oz)   07/16/23 0500 64.5 kg (142 lb 3.2 oz)   07/15/23 1706 65.4 kg (144 lb 3.2 oz)   07/15/23 1652 72.6 kg (160 lb)   07/14/23 1503 72.6 kg (160 lb)   07/06/23 1230 72.6 kg (160 lb)   07/01/23 1339 64.9 kg (143 lb)   06/29/23 0041 71.5 kg (157 lb 10.1 oz)   06/25/23 0500 72.2 kg (159 lb 2.8 oz)   06/24/23 0500 " 71.7 kg (158 lb 1.1 oz)   06/23/23 0502 77.5 kg (170 lb 13.7 oz)   06/22/23 1322 76.7 kg (169 lb)   06/22/23 0509 77 kg (169 lb 12.1 oz)   06/21/23 0536 76.1 kg (167 lb 12.3 oz)   06/16/23 1141 74.4 kg (164 lb)   04/30/23 1009 74.4 kg (164 lb 0.4 oz)   02/26/16 1256 83.5 kg (184 lb)   01/07/16 0931 83.6 kg (184 lb 6.4 oz)      BMI kg/m2 Body mass index is 21.7 kg/m².       Labs        Pertinent Labs Reviewed, management per attending    Results from last 7 days   Lab Units 07/17/23  0216 07/15/23  2133 07/15/23  1712   SODIUM mmol/L 142 142 138   POTASSIUM mmol/L 4.5 5.4* 4.6   CHLORIDE mmol/L 94* 95* 96*   CO2 mmol/L 41.0* 38.0* 36.0*   BUN mg/dL 17 23 21   CREATININE mg/dL 0.35* 0.59* 0.37*   CALCIUM mg/dL 9.7 9.2 10.0   BILIRUBIN mg/dL  --   --  0.2   ALK PHOS U/L  --   --  178*   ALT (SGPT) U/L  --   --  28   AST (SGOT) U/L  --   --  11   GLUCOSE mg/dL 91 450* 355*     Results from last 7 days   Lab Units 07/17/23  2254 07/17/23  0216 07/15/23  2133   MAGNESIUM mg/dL  --   --  1.7   HEMOGLOBIN g/dL 8.8*   < > 9.9*   HEMATOCRIT % 28.7*   < > 32.5*    < > = values in this interval not displayed.     COVID19   Date Value Ref Range Status   06/19/2023 Not Detected Not Detected - Ref. Range Final     Lab Results   Component Value Date    HGBA1C 7.30 (H) 07/15/2023        Medications    Scheduled Medications aspirin, 81 mg, Oral, Daily  azithromycin, 500 mg, Intravenous, Q24H  budesonide, 0.5 mg, Nebulization, BID - RT  calcium 500 mg vitamin D 5 mcg (200 UT), 1 tablet, Oral, Daily  dexamethasone, 4 mg, Oral, Q12H   And  insulin regular, 2 Units, Subcutaneous, Q12H  dilTIAZem CD, 120 mg, Oral, Q24H  docusate sodium, 100 mg, Oral, BID  ferric gluconate, 250 mg, Intravenous, Daily  furosemide, 20 mg, Intravenous, Daily  gabapentin, 200 mg, Oral, Nightly  guaiFENesin, 600 mg, Oral, Q12H  insulin glargine, 1-200 Units, Subcutaneous, Nightly - Glucommander  insulin lispro, 1-200 Units, Subcutaneous, 4x Daily With Meals &  Nightly  ipratropium-albuterol, 3 mL, Nebulization, 4x Daily - RT  metoprolol tartrate, 25 mg, Oral, Q12H  pantoprazole, 40 mg, Oral, Q AM  senna-docusate sodium, 2 tablet, Oral, BID  sodium chloride, 10 mL, Intravenous, Q12H        Infusions dilTIAZem, 5-15 mg/hr, Last Rate: 10 mg/hr (07/18/23 0800)  Pharmacy Consult - Steroid Insulin Protocol,         PRN Medications   senna-docusate sodium **AND** polyethylene glycol **AND** bisacodyl **AND** bisacodyl    dextrose    dextrose    glucagon (human recombinant)    HYDROcodone-acetaminophen    HYDROmorphone    insulin lispro    ipratropium-albuterol    ondansetron    Pharmacy Consult - Steroid Insulin Protocol    [COMPLETED] Insert Peripheral IV **AND** sodium chloride    sodium chloride    sodium chloride     Physical Findings        Trending Physical   Appearance, NFPE 7/18: NFPE completed, consistent with nutrition diagnosis of malnutrition using AND/ASPEN criteria. See MSA below.      --  Edema  2+ ankles, feet  3+ legs     Bowel Function Last documented BM on 7/17     Tubes None      Chewing/Swallowing No reported difficulty      Skin Stage 2 to bilateral medial gluteal pressure injury     --  Current Nutrition Orders & Evaluation of Intake       Oral Nutrition     Food Allergies NKFA    Current PO Diet Diet: Diabetic Diets; Consistent Carbohydrate; Texture: Regular Texture (IDDSI 7); Fluid Consistency: Thin (IDDSI 0)   Supplement Boost Glucose Control BID (Provides 380 kcals, 32 g protein if consumed)      PO Evaluation     Trending % PO Intake 7/18: %   --  Nutritional Risk Screening        NRS-2002 Score          Nutrition Diagnosis         Nutrition Dx Problem 1 Moderate chronic disease related malnutrition related to decreased appetite in the setting of a catabolic illness as evidenced by PO intake meeting less than 75% of estimated energy requirement for greater than or equal to 1 month, moderate fluid accumulation, >5% wt loss in 1 month, and  moderate muscle wasting per NFPE.       Nutrition Dx Problem 2        Intervention Goal         Intervention Goal(s) PO intake > 75%, ONS acceptance, wt maintenance     Nutrition Intervention        RD Action Continue current diet and ONS     Nutrition Prescription          Diet Prescription Consistent CHO   Supplement Prescription Boost Glucose Control BID (Provides 380 kcals, 32 g protein if consumed)      --  Monitor/Evaluation        Monitor Per protocol, PO intake, Supplement intake, Pertinent labs, Weight     Malnutrition Severity Assessment      Patient meets criteria for : Moderate (non-severe) Malnutrition  Malnutrition Type (last 8 hours)       Malnutrition Severity Assessment       Row Name 07/18/23 1425       Malnutrition Severity Assessment    Malnutrition Type Chronic Disease - Related Malnutrition      Row Name 07/18/23 1425       Insufficient Energy Intake     Insufficient Energy Intake Findings Moderate    Insufficient Energy Intake  <75% of est. energy requirement for > or equal to 1 month      Row Name 07/18/23 1425       Unintentional Weight Loss     Unintentional Weight Loss Findings Severe    Unintentional Weight Loss  Weight loss greater than 5% in one month      Row Name 07/18/23 1425       Muscle Loss    Loss of Muscle Mass Findings Moderate    Orthodox Region Moderate - slight depression    Clavicle Bone Region Moderate - some protrusion in females, visible in males    Acromion Bone Region Moderate - acromion may slightly protrude    Scapular Bone Region Moderate - mild depression, bones may show slightly    Dorsal Hand Region Moderate - slight depression    Patellar Region Moderate - patella more prominent, less muscle definition around patella    Anterior Thigh Region Moderate - mild depression on inner thigh    Posterior Calf Region Moderate - some roundness, slight firmness      Row Name 07/18/23 1425       Fluid Accumulation (Edema)    Fluid Acumulation Findings Moderate    Fluid  Accumulation  Moderate equals 2+ pitting edema      Row Name 07/18/23 1425       Criteria Met (Must meet criteria for severity in at least 2 of these categories: M Wasting, Fat Loss, Fluid, Secondary Signs, Wt. Status, Intake)    Patient meets criteria for  Moderate (non-severe) Malnutrition                       Electronically signed by:  Marcie Montoya RD  07/18/23 09:29 EDT

## 2023-07-18 NOTE — SIGNIFICANT NOTE
"Around 2046 patient began receiving respiratory treatment of pulmicort and duo neb. Within the next few minutes it was noted that patient HR increased into the 150-160 range. Patient stated they were asymptomatic with a stable blood pressure map within the upper 70's. Scheduled metoprolol was given immediately along with a stat EKG which read \"SVT\" with an appearance of underlying aflutter. Several attempts to vagal the patient were ineffective.  Both hospitalist and cardio were contacted with new orders from Dr. Beard to restart patient on Cardizem drip. Patient was restarted on Cardizem @ a rate of 5 which was then advanced to 10 within 15 minutes as noted in the mar. Cardizem was held at 2225 for 15 min x 4 with no rebound tachycardia noted so drip was not continued afterward. Vss      Also of note norco makes the patient confused.     0500 patient woke up to use urinal. Went back into tachycardia up to 150. Cardizem restarted at 5. Patient states he I assymptomatic still. vss  "

## 2023-07-18 NOTE — PROGRESS NOTES
CARDIOLOGY FOLLOW-UP PROGRESS NOTE      Reason for follow-up:    Atrial Fibrillation  Edema     Attending: Raoul Castillo MD      Subjective .     Denies chest pain or dyspnea at present  Periods of PAF vs. MAT this am  Back on IV Cardizem     Review of Systems   Constitutional: Negative for chills and fever.   HENT:  Negative for ear discharge and nosebleeds.    Eyes:  Negative for discharge and redness.   Cardiovascular:  Negative for chest pain, orthopnea, palpitations, paroxysmal nocturnal dyspnea and syncope.   Respiratory:  Positive for shortness of breath. Negative for cough and wheezing.    Endocrine: Negative for heat intolerance.   Skin:  Negative for rash.   Musculoskeletal:  Negative for arthritis and myalgias.   Gastrointestinal:  Negative for abdominal pain, melena, nausea and vomiting.   Genitourinary:  Negative for dysuria and hematuria.   Neurological:  Negative for dizziness, light-headedness, numbness and tremors.   Psychiatric/Behavioral:  Negative for depression. The patient is not nervous/anxious.    Pertinent items are noted in HPI, all other systems reviewed and negative  Allergies: Ibuprofen and Penicillins    Scheduled Meds:amiodarone, 200 mg, Oral, Q24H  aspirin, 81 mg, Oral, Daily  budesonide, 0.5 mg, Nebulization, BID - RT  calcium 500 mg vitamin D 5 mcg (200 UT), 1 tablet, Oral, Daily  dexamethasone, 4 mg, Oral, Q12H   And  insulin regular, 2 Units, Subcutaneous, Q12H  dilTIAZem, 30 mg, Oral, Q8H  docusate sodium, 100 mg, Oral, BID  ferric gluconate, 250 mg, Intravenous, Daily  furosemide, 20 mg, Intravenous, Daily  gabapentin, 200 mg, Oral, Nightly  guaiFENesin, 600 mg, Oral, Q12H  insulin glargine, 1-200 Units, Subcutaneous, Nightly - Glucommander  insulin lispro, 1-200 Units, Subcutaneous, 4x Daily With Meals & Nightly  ipratropium-albuterol, 3 mL, Nebulization, 4x Daily - RT  metoprolol tartrate, 25 mg, Oral, BID  pantoprazole, 40 mg, Oral, Q AM  senna-docusate sodium, 2  tablet, Oral, BID  sodium chloride, 10 mL, Intravenous, Q12H        Continuous Infusions:dilTIAZem, 5-15 mg/hr, Last Rate: 5 mg/hr (07/18/23 1246)  Pharmacy Consult - Steroid Insulin Protocol,       PRN Meds:.  senna-docusate sodium **AND** polyethylene glycol **AND** bisacodyl **AND** bisacodyl    dextrose    dextrose    glucagon (human recombinant)    insulin lispro    ipratropium-albuterol    Morphine    ondansetron    Pharmacy Consult - Steroid Insulin Protocol    [COMPLETED] Insert Peripheral IV **AND** sodium chloride    sodium chloride    sodium chloride    traMADol    Objective     VITAL SIGNS  Patient Vitals for the past 24 hrs:   BP Temp Temp src Pulse Resp SpO2   07/18/23 1400 136/76 98.6 °F (37 °C) Oral 79 20 98 %   07/18/23 1246 -- -- -- 87 -- --   07/18/23 0901 120/63 98 °F (36.7 °C) Oral (!) 133 24 95 %   07/18/23 0834 113/71 -- -- (!) 144 -- --   07/18/23 0715 119/57 -- -- (!) 156 -- 97 %   07/18/23 0713 -- -- -- (!) 157 18 96 %   07/18/23 0710 -- -- -- (!) 157 18 94 %   07/18/23 0709 -- -- -- (!) 124 18 96 %   07/18/23 0706 -- -- -- 110 18 96 %   07/18/23 0700 101/62 -- -- 109 -- 94 %   07/18/23 0645 99/53 -- -- 111 -- 92 %   07/18/23 0630 106/60 -- -- 90 -- 93 %   07/18/23 0615 103/72 -- -- 106 -- 97 %   07/18/23 0600 114/74 -- -- 96 -- 97 %   07/18/23 0515 125/69 -- -- (!) 134 -- 98 %   07/18/23 0508 128/75 97.5 °F (36.4 °C) Oral 118 19 100 %   07/18/23 0100 105/68 -- -- 81 -- 98 %   07/18/23 0031 111/69 97.8 °F (36.6 °C) Oral 83 20 96 %   07/18/23 0000 106/66 -- -- 79 -- 96 %   07/17/23 2345 98/61 -- -- 90 -- 97 %   07/17/23 2330 100/68 -- -- 84 -- 96 %   07/17/23 2315 107/68 -- -- 84 -- 97 %   07/17/23 2300 91/62 -- -- 108 -- 96 %   07/17/23 2245 98/62 -- -- 92 -- 96 %   07/17/23 2230 104/69 -- -- 79 -- 94 %   07/17/23 2215 95/60 -- -- 103 -- 93 %   07/17/23 2212 122/64 -- -- 110 -- --   07/17/23 2200 122/64 -- -- 109 -- 91 %   07/17/23 2147 100/65 -- -- (!) 130 -- --   07/17/23 2145 100/65 --  "-- (!) 153 -- 92 %   07/17/23 2132 98/63 -- -- (!) 156 -- --   07/17/23 2130 98/63 -- -- (!) 155 -- 90 %   07/17/23 2111 104/71 97.6 °F (36.4 °C) Oral (!) 158 27 93 %   07/17/23 2100 102/69 -- -- (!) 159 -- 91 %   07/17/23 2059 98/67 -- -- (!) 160 -- --   07/17/23 2052 -- -- -- (!) 159 22 93 %   07/17/23 2047 -- -- -- (!) 159 22 96 %   07/17/23 2046 -- -- -- (!) 151 20 96 %   07/17/23 2041 -- -- -- (!) 124 18 95 %        Flowsheet Rows      Flowsheet Row First Filed Value   Admission Height 177.8 cm (70\") Documented at 07/15/2023 1652   Admission Weight 72.6 kg (160 lb) Documented at 07/15/2023 1652            Body mass index is 21.7 kg/m².      Intake/Output Summary (Last 24 hours) at 7/18/2023 1616  Last data filed at 7/18/2023 1500  Gross per 24 hour   Intake 2019 ml   Output 2650 ml   Net -631 ml        TELEMETRY:     SR with PACS    Physical Exam:  Constitutional:       Appearance: Well-developed.   Eyes:      General: No scleral icterus.        Right eye: No discharge.   HENT:      Head: Normocephalic and atraumatic.   Neck:      Thyroid: No thyromegaly.      Lymphadenopathy: No cervical adenopathy.   Pulmonary:      Effort: Pulmonary effort is normal. No respiratory distress.      Breath sounds: Normal breath sounds. No wheezing. No rales.   Cardiovascular:      Normal rate. Regular rhythm.      No gallop.    Edema:     Peripheral edema absent.   Abdominal:      Tenderness: There is no abdominal tenderness.   Skin:     Findings: No erythema or rash.   Neurological:      Mental Status: Alert and oriented to person, place, and time.          Results Review:   I reviewed the patient's new clinical results.    CBC    Results from last 7 days   Lab Units 07/17/23 2254 07/17/23 0216 07/15/23  2133 07/15/23  1712   WBC 10*3/mm3 11.70* 12.30* 11.40* 11.40*   HEMOGLOBIN g/dL 8.8* 9.5* 9.9* 10.0*   PLATELETS 10*3/mm3 395 445 415 404     BMP   Results from last 7 days   Lab Units 07/18/23  1437 07/17/23  0216 " 07/15/23  2133 07/15/23  1712   SODIUM mmol/L 130* 142 142 138   POTASSIUM mmol/L 4.6 4.5 5.4* 4.6   CHLORIDE mmol/L 87* 94* 95* 96*   CO2 mmol/L 35.0* 41.0* 38.0* 36.0*   BUN mg/dL 19 17 23 21   CREATININE mg/dL 0.40* 0.35* 0.59* 0.37*   GLUCOSE mg/dL 372* 91 450* 355*   MAGNESIUM mg/dL 1.5*  --  1.7  --      Cr Clearance Estimated Creatinine Clearance: 173.9 mL/min (A) (by C-G formula based on SCr of 0.4 mg/dL (L)).  Coag     HbA1C   Lab Results   Component Value Date    HGBA1C 7.30 (H) 07/15/2023     Blood Glucose   Glucose   Date/Time Value Ref Range Status   07/18/2023 1128 334 (H) 70 - 105 mg/dL Final     Comment:     Serial Number: [id number]:  [id number]   07/18/2023 0710 188 (H) 70 - 105 mg/dL Final     Comment:     Serial Number: [id number]:  [id number]   07/18/2023 0225 154 (H) 70 - 105 mg/dL Final     Comment:     Serial Number: [id number]:  [id number]   07/17/2023 2106 198 (H) 70 - 105 mg/dL Final     Comment:     Serial Number: [id number]:  [id number]   07/17/2023 1643 234 (H) 70 - 105 mg/dL Final     Comment:     Serial Number: [id number]:  [id number]   07/17/2023 1119 194 (H) 70 - 105 mg/dL Final     Comment:     Serial Number: [id number]:  [id number]   07/17/2023 0814 97 70 - 105 mg/dL Final     Comment:     Serial Number: [id number]:  [id number]   07/17/2023 0732 60 (L) 70 - 105 mg/dL Final     Comment:     Serial Number: [id number]:  [id number]     Infection   Results from last 7 days   Lab Units 07/15/23  2133   PROCALCITONIN ng/mL 0.22     CMP   Results from last 7 days   Lab Units 07/18/23  1437 07/17/23  0216 07/15/23  2133 07/15/23  1712   SODIUM mmol/L 130* 142 142 138   POTASSIUM mmol/L 4.6 4.5 5.4* 4.6   CHLORIDE mmol/L 87* 94* 95* 96*   CO2 mmol/L 35.0* 41.0* 38.0* 36.0*   BUN mg/dL 19 17 23 21   CREATININE mg/dL 0.40* 0.35* 0.59* 0.37*   GLUCOSE mg/dL 372* 91 450* 355*   ALBUMIN g/dL  --   --   --  2.6*   BILIRUBIN mg/dL  --   --   --  0.2    ALK PHOS U/L  --   --   --  178*   AST (SGOT) U/L  --   --   --  11   ALT (SGPT) U/L  --   --   --  28     ABG      UA      OLIVIA  No results found for: POCMETH, POCAMPHET, POCBARBITUR, POCBENZO, POCCOCAINE, POCOPIATES, POCOXYCODO, POCPHENCYC, POCPROPOXY, POCTHC, POCTRICYC  Lysis Labs   Results from last 7 days   Lab Units 07/18/23  1437 07/17/23  2254 07/17/23  0216 07/15/23  2133 07/15/23  1712   HEMOGLOBIN g/dL  --  8.8* 9.5* 9.9* 10.0*   PLATELETS 10*3/mm3  --  395 445 415 404   CREATININE mg/dL 0.40*  --  0.35* 0.59* 0.37*     Radiology(recent) No radiology results for the last day    Imaging Results (Last 24 Hours)       ** No results found for the last 24 hours. **            Results from last 7 days   Lab Units 07/15/23  2133   HSTROP T ng/L 30*       EKG          I personally viewed and interpreted the patient's EKG/Telemetry data:        ECHOCARDIOGRAM:     Results for orders placed during the hospital encounter of 06/16/23    Adult Transthoracic Echo Complete W/ Cont if Necessary Per Protocol    Interpretation Summary    Left ventricular systolic function is normal. Left ventricular ejection fraction appears to be 61 - 65%.    Left ventricular diastolic function is consistent with (grade I) impaired relaxation.    There is mild bileaflet mitral valve prolapse present.    Estimated right ventricular systolic pressure from tricuspid regurgitation is mildly elevated (35-45 mmHg). Calculated right ventricular systolic pressure from tricuspid regurgitation is 44 mmHg.        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 MAT this am with RVR  Will increase BB and add short acting Cardizem, reduce IV Cardizem  Recent non invasive ischemic eval negative for ischemia  Echo  with preserved LV function  Per oncology not recommended long term a/c due to brain mets and known rectal bleeding      Additional recommendations per Dr. Johnson    Patient is seen and examined and findings are verified.  All data is reviewed by me personally.  Assessment and plan formulated by APC was done after discussion with attending.  I spent more than 50% of time in taking care of the patient.    Patient is sitting up in bed.  No complaints.    Normal S1 and S2.  No pericardial rub or murmur abdominal exam is benign.    Patient currently in sinus rhythm.  Patient had A-fib/MAT but right now is in sinus rhythm with lot of PACs.  I will recommend to start amiodarone 200 mg daily.  Patient is started on Lopressor and Cardizem.  According to oncology, they do not want anticoagulation because of risk of bleeding.  I have discussed with patient he understand the risk and agreed with the plan.    Electronically signed by Qamar Johnson MD, 07/18/23, 4:16 PM EDT.      I discussed the patients findings and my recommendations with patient and RN                  Electronically signed by NOREEN Peng, 07/17/23, 8:31 AM EDT.          Qamar Johnson MD  07/18/23  16:16 EDT

## 2023-07-18 NOTE — PROGRESS NOTES
"Subjective   Isra Davenport is a 68 y.o. male.   Seen for diabetes f/u.  C/o leg swelling.        Objective     /58 (BP Location: Left arm, Patient Position: Lying)   Pulse 83   Temp 97.3 °F (36.3 °C) (Oral)   Resp (!) 30   Ht 177.8 cm (70\")   Wt 68.6 kg (151 lb 3.8 oz) Comment: Nurse notified of gain  SpO2 92%   BMI 21.70 kg/m²   Blood sugar  60/97 @ 7 am,  194 @ lunch, 234 @ supper    ASSESSMENT  Diabetes type 2, with hyperglycemia  Patient is stable    PLAN    Added steroid protocol to prevent very high sugars post dexamethasone.  Continue sq glucommander.         Liudmila Levin MD  7/17/2023  20:42 EDT    "

## 2023-07-18 NOTE — PLAN OF CARE
Goal Outcome Evaluation:  Pt started on amio and increased po cardizem and metoprolol.  Pt converted back to SR with pac and pvc.  Rate of 80-90.  Cardizem gtt weaned off.  Tolerating well. Pain meds also cahanged due to previous shift saying norco made pt very confused.  Pt up to chair.  Ok to remove staples.  Will cont to monitor.                         DISCHARGE

## 2023-07-18 NOTE — PROGRESS NOTES
"Enter Query Response Below      Query Response: Hyperglycemia expected   Electronically signed by Raoul Castillo MD, 23, 2:55 PM EDT.              If applicable, please update the problem list.   Patient: Isra Davenport        : 1955  Account: [id number]           Admit Date:         How to Respond to this query:       a. Click New Note     b. Answer query within the yellow box.                c. Update the Problem List, if applicable.      If you have any questions about this query contact me at: [email or web address]       ,    The 7/15/23 H&P and the - Progress Note state \"Hyperglycemia- likely steroid induced -A1C 7.3- add glucomander - add steroid protocol.\"  The patient's glucose has ranged from .      Please clarify the following:    Hyperglycemia expected  Hyperglycemia not expected  Other- specify_______  Unable to determine    By submitting this query, we are merely seeking further clarification of documentation to accurately reflect all conditions that you are monitoring, evaluating, treating or that extend the hospitalization or utilize additional resources of care. Please utilize your independent clinical judgment when addressing the question(s) above.     This query and your response, once completed, will be entered into the legal medical record.    Sincerely,  Krystina VELA, RN, CCDS  Clinical Documentation Improvement Program  [email or web address]  "

## 2023-07-18 NOTE — CASE MANAGEMENT/SOCIAL WORK
Continued Stay Note  [facility]     Patient Name: Isra Davenport  MRN: [id number]  Today's Date: 7/18/2023    Admit Date: 7/15/2023    Plan: [facility] referral pending acceptance. Will require precert. PASRR approved. From home alone with no home O2.   Discharge Plan       Row Name 07/18/23 1514       Plan    Patient/Family in Agreement with Plan yes    Plan Comments Discussed with [facility] liaison that she would need the information on radiation plan for patient. EDILBERTO discussed with the [facility] center radiation oncology nurse that patient needs 7 more xrt treatments not for transportation reasons but for payment reasons. CM notified liaison who reported she would cost it out for short-term rehab. CM met with patient at bedside to discuss dc plan, IMM letter, and readmission assessment. DC Barriers: Cardizem gtt.                Case Management Readmission Assessment Note    Case Management Readmission Assessment (all recorded)       Readmission Interview       Row Name 07/18/23 1513             Readmission Indications    Is this hospitalization related to the prior hospital diagnosis? Yes      What was the reason you were admitted? Previous hospitalization from 6/16/23-6/29/23 in which patient had hip surgery and port placed during previous admission. Current admission diagnosis of atrial fibrillation with RVR.        Row Name 07/18/23 6590             Recommendation for rehospitalization    Did you speak with your physician prior to coming to the hospital No      Who recommended you return to the hospital? Other (comment)  Patient reported pain and swelling in legs.      Did you seek care elsewhere prior to coming to the hospital? No        Row Name 07/18/23 1512             Follow up appointment    Do you have a PCP? Yes      Did you have an appointment with PCP/specialist after hospitalization within 7 days? No      Did you keep your appointment? No  Radiation oncology appt scheduled.      If you did  not keep appointment, why? Patient illness        Row Name 07/18/23 1510             Medications    Did you have newly prescribed medications at discharge? Yes      Are you taking all of you prescribed medications? No      If not, why? Drug unavailable;Other (comment)  Patient reported that when he discharged, he went to the pharmacy and his prescriptions were not ready. Reported that when he got home, he had a message that his medications were ready. Reported he did not  because pharmacy was closed on weekend.        Row Name 07/18/23 1510             Discharge Instructions    Were you given a number of someone to call if you had questions or concerns? Yes        Row Name 07/18/23 1510             Index discharge location/services    Where did you go upon discharge? Home with services      Do you have supportive family or friends in the home? Yes  Patient reported that he went to his brother and sister-in-law's house since therapy recommending he should not go home alone.      What services were arranged at discharge? Home Health  Confirmed that patient had HH visits both PT and nursing        Row Name 07/18/23 6562             Discharge Readiness    On a scale of 1-5 (5 being well prepared), how ready were you for discharge 5      Recommendation based on interview Education on diagnosis/self management;Additional caregiver support;Transportation assistance;Goals of care discussion/advanced care planning;Financial assistance                  Yun Kirkland RN     Office Phone: [phone]  Office Cell: [phone]

## 2023-07-18 NOTE — THERAPY TREATMENT NOTE
"Subjective: Pt agreeable to therapeutic plan of care. Reports he is eager to get out of bed.    Objective:     Bed mobility - CGA  Transfers - CGA and with rolling walker  Ambulation - 8 ft forward/backward CGA and with rolling walker    Therapeutic Exercise - Went over seated BLE exercise program for 1x10 reps each (MIP, LAQ, hip abduction/adduction, heel slides, to complete 2-3x/day). Educated patient on desensitization techniques for B feet 2*/2 increased sensitivity/pain/hyperalgesia.    Weightbearing status: WBAT RLE    Vitals: Desaturates to 87% Sp02 on RA during gait training bout, recovers to >91% Sp02 w/ rest break and 3L 02 donned.    Pain: 4/10 B foot pain at rest, increases to 8/10 w/ transfers/gait training    Intervention for pain: Repositioned and Therapeutic Presence, desensitization techniques, provided waffle cushion for comfort    Education: Provided education on the importance of mobility in the acute care setting, Verbal/Tactile Cues, Transfer Training, and Gait Training    Assessment: Isra Davenport presents with functional mobility impairments which indicate the need for skilled intervention. Tolerating session today without incident, required CGA for bed mobility/transfers/gait training 8 ft forward/backward. Pt primarily limited by B foot pain/hyperalgesia, educated on desensitization techniques with reports of improved pain/sensitivity. Recommending SNF at d/c and will continue to follow and progress as tolerated. PPE: gloves    Plan/Recommendations:   Moderate Intensity Therapy recommended post-acute care. This is recommended as therapy feels the patient would require 3-4 days per week and wouldn't tolerate \"3 hour daily\" rehab intensity. SNF would be the preferred choice. If the patient does not agree to SNF, arrange HH or OP depending on home bound status. If patient is medically complex, consider LTACH.. Pt requires no DME at discharge.     Pt desires Skilled Rehab placement at " discharge. Pt cooperative; agreeable to therapeutic recommendations and plan of care.         Basic Mobility 6-click:  Rollin = Total, A lot = 2, A little = 3; 4 = None  Supine>Sit:   1 = Total, A lot = 2, A little = 3; 4 = None   Sit>Stand with arms:  1 = Total, A lot = 2, A little = 3; 4 = None  Bed>Chair:   1 = Total, A lot = 2, A little = 3; 4 = None  Ambulate in room:  1 = Total, A lot = 2, A little = 3; 4 = None  3-5 Steps with railin = Total, A lot = 2, A little = 3; 4 = None  Score: 18    Post-Tx Position: Up in Chair, Alarms activated, and Call light and personal items within reach  PPE: gloves

## 2023-07-18 NOTE — PROGRESS NOTES
[facility] Medicine Services Daily Progress Note    Patient Name: Isra Davenport  : 1955  MRN: [id number]  Primary Care Physician:  Mayco Carreon MD  Date of admission: 7/15/2023      Subjective      Chief Complaint: Pain     History of Present Illness: Isra Davenport is a 68 y.o. 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  He is status post intramedullary nailing of the hip on 2023 and a Crflsd-q-Qiac placed on 2023 he is currently undergoing chemotherapy and plan for palliative radiation who presented to [facility] on 7/15/2023 complaining of pain and lower extremity edema     Review of records show patient had presented to chemotherapy with complaint of lower extremity edema and what he describes as neuropathic pain.  He had ultrasound that was negative for DVT and was prescribed Lasix.  He was also prescribed Norco 5/325 for pain.  Patient reports when he went to  prescriptions from the pharmacy the pharmacy was closed on Monday and he was unable to fill prescription.  He presents to the ED today with uncontrolled pain.  It was noted from previous admission he was discharged on low pressure for diagnosis of sinus tachycardia     ED work-up included chest x-ray that showed a right internal jugular Port-A-Cath in place.  Heart size stable.  Airspace disease in the left lung compatible with pneumonia.  Chronic elevation of the left hemidiaphragm.  No pleural effusion.  Right lung was clear.  Bilateral foot x-ray was negative for fracture.  There is healed fracture of the right tibia and fibula.  Troponin 26, potassium 4.6, sodium 138, glucose 355, creatinine 0.3, BUN is 21.  Albumin 2.6.  White count 11.4, hemoglobin 10.  On admission heart rate 84 but while he was being treated in the ED heart rate jumped into the  150s and was found to be in acute A-fib with RVR.  O2 sats dropped to 88% was placed on 2 L nasal cannula.  He was given a Cardizem bolus and Lopressor bolus and resumed on a Cardizem drip.  Patient denies any chest pain or feeling of chest palpitations.  Denies any fever chills or increased shortness of breath.  Reports positive for productive cough.  Positive for swelling of bilateral lower extremities below the calf and extreme tenderness to touch bilateral feet.     7/16/23 patient seen and examined in bed no acute distress, vital signs stable, K-5.4, hr 81, dyspnea on high flow oxygen.  We will start Cardizem p.o.  7/17/2023 patient seen and examined in bed no acute distress, he is on 3 L of oxygen, blood pressure 93/52.  Discussed with RN.  7/18/2023 patient seen and examined in bed no acute distress, dyspnea on 3 L, heart rate 155.  Discussed with RN.    ROS Review of Systems   Constitutional: Positive for malaise/fatigue.   Cardiovascular:  Positive for leg swelling.   Respiratory:  Positive for cough and sputum production.    Skin: Negative.    Musculoskeletal:  Positive for back pain.   Gastrointestinal: Negative.    Genitourinary: Negative.    Neurological:  Positive for paresthesias.   Psychiatric/B      Objective      Vitals:   Temp:  [97.3 °F (36.3 °C)-98 °F (36.7 °C)] 98 °F (36.7 °C)  Heart Rate:  [] 133  Resp:  [18-30] 24  BP: ()/(53-75) 120/63  Flow (L/min):  [2-3] 3    Physical Exam Constitutional:       Appearance: He is ill-appearing.   Eyes:      Pupils: Pupils are equal, round, and reactive to light.   Cardiovascular:      Rate and Rhythm: Tachycardia present. Rhythm irregular.   Pulmonary:      Breath sounds: Rhonchi present.   Abdominal:      General: Abdomen is flat.      Palpations: Abdomen is soft.   Musculoskeletal:      Right lower leg: Edema present.      Left lower leg: Edema present.   Skin:     General: Skin is warm and dry.   Neurological:      Mental Status: He is alert  and oriented to person, place, and time.   Psychiatric:         Mood and Affect: Mood normal.           Result Review    Result Review:  I have personally reviewed the results from the time of this admission to 7/18/2023 09:46 EDT and agree with these findings:  []  Laboratory  []  Microbiology  []  Radiology  []  EKG/Telemetry   []  Cardiology/Vascular   []  Pathology  []  Old records  []  Other:  Most notable findings include:     Wounds (last 24 hours)       LDA Wound       Row Name 07/18/23 0400 07/18/23 0000 07/17/23 2000       Wound 06/17/23 0915 Right anterior hip Incision    Wound - Properties Group Placement Date: 06/17/23  -HB Placement Time: 0915 -HB Side: Right  -HB Orientation: anterior  -HB Location: hip  -HB Primary Wound Type: Incision  -HB    Dressing Appearance -- -- open to air  -MF    Closure -- -- Approximated;Staples  -MF    Periwound Temperature -- -- warm  -MF    Periwound Skin Turgor -- -- soft  -MF    Drainage Amount -- -- none  -MF    Dressing Care -- -- open to air  -MF    Retired Wound - Properties Group Placement Date: 06/17/23  -HB Placement Time: 0915  -HB Side: Right  -HB Orientation: anterior  -HB Location: hip  -HB Primary Wound Type: Incision  -HB    Retired Wound - Properties Group Date first assessed: 06/17/23  -HB Time first assessed: 0915  -HB Side: Right  -HB Location: hip  -HB Primary Wound Type: Incision  -HB       Wound 07/15/23 2100 Bilateral medial gluteal Pressure Injury    Wound - Properties Group Placement Date: 07/15/23  -MF Placement Time: 2100  -MF Present on Hospital Admission: Y  -MF Side: Bilateral  -MF Orientation: medial  -MF Location: gluteal  -MF Primary Wound Type: Pressure inj  -MF    Dressing Appearance -- -- open to air  -MF    Periwound intact  -MF intact  -MF intact  -MF    Periwound Temperature warm  -MF warm  -MF warm  -MF    Periwound Skin Turgor soft  -MF soft  -MF soft  -MF    Drainage Amount -- -- none  -MF    Care, Wound -- -- soap and water   -MF    Dressing Care -- -- open to air;other (see comments)  patient refuses dressing  -MF    Non-Pressure Wound Severity -- -- Limited to breakdown of skin  -MF    Retired Wound - Properties Group Placement Date: 07/15/23  -MF Placement Time: 2100 -MF Present on Hospital Admission: Y  -MF Side: Bilateral  -MF Orientation: medial  -MF Location: gluteal  -MF Primary Wound Type: Pressure inj  -MF    Retired Wound - Properties Group Date first assessed: 07/15/23  -MF Time first assessed: 2100 -MF Present on Hospital Admission: Y  -MF Side: Bilateral  -MF Location: gluteal  -MF Primary Wound Type: Pressure inj  -MF      Row Name 07/17/23 1615 07/17/23 1215          Wound 06/17/23 0915 Right anterior hip Incision    Wound - Properties Group Placement Date: 06/17/23  -HB Placement Time: 0915 -HB Side: Right  -HB Orientation: anterior  -HB Location: hip  -HB Primary Wound Type: Incision  -HB    Closure Approximated;Staples  -DA Approximated;Staples  -DA     Periwound intact;dry  -DA intact;dry  -DA     Periwound Temperature warm  -DA warm  -DA     Periwound Skin Turgor soft  -DA soft  -DA     Drainage Amount none  -DA none  -DA     Retired Wound - Properties Group Placement Date: 06/17/23  -HB Placement Time: 0915 -HB Side: Right  -HB Orientation: anterior  -HB Location: hip  -HB Primary Wound Type: Incision  -HB    Retired Wound - Properties Group Date first assessed: 06/17/23  -HB Time first assessed: 0915  -HB Side: Right  -HB Location: hip  -HB Primary Wound Type: Incision  -HB       Wound 07/15/23 2100 Bilateral medial gluteal Pressure Injury    Wound - Properties Group Placement Date: 07/15/23  -MF Placement Time: 2100 -MF Present on Hospital Admission: Y  -MF Side: Bilateral  -MF Orientation: medial  -MF Location: gluteal  -MF Primary Wound Type: Pressure inj  -MF    Closure Open to air  -DA Open to air  -DA     Periwound intact  -DA intact  -DA     Periwound Temperature warm  -DA warm  -DA     Periwound Skin  Turgor soft  -DA soft  -DA     Drainage Amount none  -DA none  -DA     Retired Wound - Properties Group Placement Date: 07/15/23  -MF Placement Time: 2100  -MF Present on Hospital Admission: Y  -MF Side: Bilateral  -MF Orientation: medial  -MF Location: gluteal  -MF Primary Wound Type: Pressure inj  -MF    Retired Wound - Properties Group Date first assessed: 07/15/23  -MF Time first assessed: 2100  -MF Present on Hospital Admission: Y  -MF Side: Bilateral  -MF Location: gluteal  -MF Primary Wound Type: Pressure inj  -MF              User Key  (r) = Recorded By, (t) = Taken By, (c) = Cosigned By      Initials Name Provider Type     Cleo Britton RN Registered Nurse    Pamela Taveras RN Registered Nurse     Danyell Torres, RN Registered Nurse                    CBC:      Lab 07/17/23  2254 07/17/23  0216 07/15/23  2133 07/15/23  1712   WBC 11.70* 12.30* 11.40* 11.40*   HEMOGLOBIN 8.8* 9.5* 9.9* 10.0*   HEMATOCRIT 28.7* 31.5* 32.5* 31.8*   PLATELETS 395 445 415 404   NEUTROS ABS 11.12* 10.70* 10.30* 10.20*   LYMPHS ABS  --  0.90 0.60* 0.60*   MONOS ABS  --  0.80 0.50 0.60   EOS ABS  --  0.00 0.00 0.00   MCV 84.9 85.3 84.5 84.8       CMP:        Lab 07/17/23  0216 07/15/23  2133 07/15/23  1712   SODIUM 142 142 138   POTASSIUM 4.5 5.4* 4.6   CHLORIDE 94* 95* 96*   CO2 41.0* 38.0* 36.0*   ANION GAP 7.0 9.0 6.0   BUN 17 23 21   CREATININE 0.35* 0.59* 0.37*   EGFR 124.5 106.3 122.4   GLUCOSE 91 450* 355*   CALCIUM 9.7 9.2 10.0   MAGNESIUM  --  1.7  --    TOTAL PROTEIN  --   --  6.8   ALBUMIN  --   --  2.6*   GLOBULIN  --   --  4.2   ALT (SGPT)  --   --  28   AST (SGOT)  --   --  11   BILIRUBIN  --   --  0.2   ALK PHOS  --   --  178*       No results found for: ACANTHNAEG, AFBCX, BPERTUSSISCX, BLOODCX  No results found for: BCIDPCR, CXREFLEX, CSFCX, CULTURETIS  No results found for: CULTURES, HSVCX, URCX  No results found for: EYECULTURE, GCCX, HSVCULTURE, LABHSV  No results found for: LEGIONELLA, MRSACX,  MUMPSCX, MYCOPLASCX  No results found for: NOCARDIACX, STOOLCX  No results found for: THROATCX, UNSTIMCULT, URINECX, CULTURE, VZVCULTUR  No results found for: VIRALCULTU, WOUNDCX      Assessment & Plan      Brief Patient Summary:  Isra Davenport is a 68 y.o. male who presented to chemotherapy with complaint of lower extremity edema and what he describes as neuropathic pain.  He had ultrasound that was negative for DVT and was prescribed Lasix.  He was also prescribed Norco 5/325 for pain.  Patient reports when he went to  prescriptions from the pharmacy, the pharmacy was closed on Monday and he was unable to fill prescription.  He presents to the ED today with uncontrolled pain.  It was noted from previous admission he was discharged on low pressure for diagnosis of sinus tachycardia         aspirin, 81 mg, Oral, Daily  azithromycin, 500 mg, Intravenous, Q24H  budesonide, 0.5 mg, Nebulization, BID - RT  calcium 500 mg vitamin D 5 mcg (200 UT), 1 tablet, Oral, Daily  dexamethasone, 4 mg, Oral, Q12H   And  insulin regular, 2 Units, Subcutaneous, Q12H  dilTIAZem CD, 120 mg, Oral, Q24H  docusate sodium, 100 mg, Oral, BID  ferric gluconate, 250 mg, Intravenous, Daily  furosemide, 20 mg, Intravenous, Daily  gabapentin, 200 mg, Oral, Nightly  guaiFENesin, 600 mg, Oral, Q12H  insulin glargine, 1-200 Units, Subcutaneous, Nightly - Glucommander  insulin lispro, 1-200 Units, Subcutaneous, 4x Daily With Meals & Nightly  ipratropium-albuterol, 3 mL, Nebulization, 4x Daily - RT  metoprolol tartrate, 25 mg, Oral, Q12H  pantoprazole, 40 mg, Oral, Q AM  senna-docusate sodium, 2 tablet, Oral, BID  sodium chloride, 10 mL, Intravenous, Q12H       dilTIAZem, 5-15 mg/hr, Last Rate: 10 mg/hr (07/18/23 0800)  Pharmacy Consult - Steroid Insulin Protocol,          Active Hospital Problems:  Active Hospital Problems    Diagnosis     **Atrial fibrillation with RVR     Atrial fibrillation        New onset A-fib with RVR  - Started on  Cardiezem drip in ED and given lopressor  - consulted cardiology  - Had considered pharmacologic AC but with known brain mets concerned with risk for bleed so will consult oncology for recommendation on AC  - continue VTE with SCD until seen by cardiology and oncology     Pain and Edema BLE  - appears Neuropathic  - pt also with known bone cancer and arthritis   - consider neuropathy 2/2 chemotherapy  - will continue Lasix for edema  -Was ruled out for DVT   - will add gabapentin 200mg QHS for sign of neuropathic pain  - check BNP  - will resume pt Norco that was previously ordered and add prn Dilaudid   -echo 6/22/23: Left ventricular systolic function is normal. Left ventricular ejection fraction appears to be 61 - 65%.   Left ventricular diastolic function is consistent with (grade I) impaired relaxation.     Hyperglycemia- likely steroid induced   -A1C 7.3  - add glucomander  - add steroid protocol      Hypoxia- consider 2/2 known lung cancer   - cxr with pneumonia and elevated WBC and known tobacco use  - will cover possible COPD vs pneumonia vs carcinoma with empiric Azithromycin  - wean O2 as tolerated   - duo nebs prn     Anemia  - continue ferrous sulfate  - hgb stable     Stage IV squamous cell lung cancer with mets to brain and bone  - currently treated with chemotherapy   - continue decadron as previously ordered by oncology  - continue nexium   Per oncology   Check iron saturation.continue ferrous sulfate 325 mg by mouth daily.Consider IV iron if iron sat is low.  Radiation oncology consultation to resume radiation therapy when able  Await molecular testing results with plan for chemotherapy post completion of radiation.  Start Xgeva and daily Ca+VitD for bone metastases support as outpatient.  dexamethasone  4 mg by mouth daily.  Continue ASA 81 mg by mouth daily.  Would not recommend full dose anticoagulation for A-fib secondary to brain mets and known rectal bleeding.  Continue as needed hydrocodone,  as needed Dilaudid, and scheduled gabapentin for pain control.  Continue Pepcid p.o. twice daily and as needed Zofran    DVT prophylaxis:  Mechanical DVT prophylaxis orders are present.    CODE STATUS:    Medical Intervention Limits: NO intubation (DNI)  Code Status (Patient has no pulse and is not breathing): No CPR (Do Not Attempt to Resuscitate)  Medical Interventions (Patient has pulse or is breathing): Limited Support      Disposition:  I expect patient to be discharged NH.    I have utilized all available, immediate resources to obtain, update, or review the patient's current medications including all prescriptions, over-the-counter products, herbals, cannabis/cannabidiol products, and vitamin.mineral/dietary (nutritional) supplements.      Medical Intervention Limits: NO intubation (DNI)  Code Status (Patient has no pulse and is not breathing): No CPR (Do Not Attempt to Resuscitate)  Medical Interventions (Patient has pulse or is breathing): Limited Support    Next of kin of Power of :       Admission Status:  I believe this patient meets ADMIT status.              This patient has been examined wearing appropriate Personal Protective Equipment and discussed with  RN . 07/18/23      Electronically signed by Raoul Castillo MD, 07/18/23, 09:46 EDT.  [facility] Hospitalist Team

## 2023-07-18 NOTE — PROGRESS NOTES
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 male admitted to [facility] through the ED on 7/15/2023 with complaints of uncontrolled pain and BLE edema.  In the ED, he developed tachycardia in the 150s and was found to be in A-fib with RVR.  He was started on supplemental oxygen for O2 sat drop to 88%.  He received Cardizem bolus followed by drip and Lopressor bolus.  In the ED, CBC revealed WBC 11.4, hemoglobin 10.0, MCV 84.8, and platelets 404,000.  Creatinine and LFTs were not elevated.  CXR revealed left basilar pneumonia.  He reported left lower extremity pain with x-ray of the left tip/fib and left foot negative for acute findings.    PMH was significant for recently diagnosed stage IV squamous cell carcinoma of the left upper lobe lung.  He was post radiation therapy to the brain and currently receiving radiation therapy to the right hip and femur (started 2023 with 10 fractions planned).  He was last seen by our service as an outpatient on 2023 where he was prescribed Lasix and hydrocodone/APAP 5/325 mg as needed for his BLE edema and pain.  BLE venous Doppler performed as an outpatient on 2023 was negative for DVT/SVT and showed bilateral fluid retention.  At time of consultation 2023, hemoglobin had dropped to 9.9.     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  Our service had initially evaluated him during 2023 - 2023 hospitalization during which lung cancer diagnosis was made and initial work-up completed.  He had received radiation therapy to  the brain and is currently receiving radiation to the hip/femur.  He was also diagnosed with iron deficiency anemia during the hospital stay where he was treated with IV iron and discharged on oral ferrous sulfate with GERD prophylaxis.  Complete anemia work-up with the exception of bone marrow was performed during the hospital stay with findings negative for additional etiologies.  Incidentally his copper level was elevated without multivitamin supplementation.  CBC on 7/14/2023 revealed WBC 13, hemoglobin 10.76, and platelets 329,000.  He was continued on daily oral iron.  Molecular testing on his tumor pathology was pending with plans to start chemotherapy after radiation was completed.  He was also to start Xgeva for bone metastases support and was prescribed calcium with vitamin D supplementation.     PCP: Mayco Carreon MD    INTERVAL HISTORY:  7/17/2023 - white blood count 12.30, hemoglobin 9.5, iron 22 (), iron saturation 10 (), TIBC 224 (298-536). Calculated iron deficit is 662 mg. Started Ferrlecit 250 mg IV x 3 days.  Molecular testing on tumor specimen revealed low-level PD-L1 positivity.  7/18/2023-hemoglobin 8.8.  Patient developed SVT overnight and was treated with Cardizem.  Weight loss 33 pounds in 5 weeks.  Changed Pepcid to Protonix.  Palliative care and radiation oncology both consulted.  Physical therapy recommends skilled nursing facility at discharge.    History of present illness reviewed since last visit and changes noted on 07/18/23.    Subjective   Complains of constipation and wants to proceed with physical therapy in the hospital.    ROS:  Review of Systems   Constitutional:  Negative for activity change, appetite change, chills, fatigue, fever and unexpected weight change.   HENT:  Negative for mouth sores, sore throat and tinnitus.    Eyes:  Negative for photophobia, pain, redness and visual disturbance.   Respiratory:  Negative for cough and shortness of breath.     Cardiovascular:  Negative for chest pain, palpitations and leg swelling.   Gastrointestinal:  Positive for constipation. Negative for abdominal pain, diarrhea, nausea and vomiting.   Genitourinary:  Negative for dysuria, enuresis and hematuria.   Musculoskeletal:  Negative for arthralgias, back pain, myalgias and neck pain.   Skin:  Negative for rash and wound.   Allergic/Immunologic: Negative for environmental allergies.   Neurological:  Negative for dizziness, seizures, weakness, light-headedness, numbness and headaches.   Hematological:  Negative for adenopathy. Does not bruise/bleed easily.   Psychiatric/Behavioral:  Negative for dysphoric mood. The patient is not nervous/anxious.       MEDICATIONS:    Scheduled Meds:  aspirin, 81 mg, Oral, Daily  azithromycin, 500 mg, Intravenous, Q24H  budesonide, 0.5 mg, Nebulization, BID - RT  dexamethasone, 4 mg, Oral, Q12H   And  insulin regular, 2 Units, Subcutaneous, Q12H  dilTIAZem CD, 120 mg, Oral, Q24H  famotidine, 20 mg, Oral, BID AC  ferric gluconate, 250 mg, Intravenous, Daily  ferrous sulfate, 324 mg, Oral, Daily With Breakfast  furosemide, 20 mg, Intravenous, Daily  gabapentin, 200 mg, Oral, Nightly  guaiFENesin, 600 mg, Oral, Q12H  insulin glargine, 1-200 Units, Subcutaneous, Nightly - Glucommander  insulin lispro, 1-200 Units, Subcutaneous, 4x Daily With Meals & Nightly  ipratropium-albuterol, 3 mL, Nebulization, 4x Daily - RT  metoprolol tartrate, 25 mg, Oral, Q12H  senna-docusate sodium, 2 tablet, Oral, BID  sodium chloride, 10 mL, Intravenous, Q12H    Continuous Infusions:  dilTIAZem, 5-15 mg/hr, Last Rate: 5 mg/hr (07/18/23 0530)  Pharmacy Consult - Steroid Insulin Protocol,        PRN Meds:    senna-docusate sodium **AND** polyethylene glycol **AND** bisacodyl **AND** bisacodyl    dextrose    dextrose    glucagon (human recombinant)    HYDROcodone-acetaminophen    HYDROmorphone    insulin lispro    ipratropium-albuterol    ondansetron    Pharmacy Consult  "- Steroid Insulin Protocol    [COMPLETED] Insert Peripheral IV **AND** sodium chloride    sodium chloride    sodium chloride     ALLERGIES:    Allergies   Allergen Reactions    Ibuprofen Itching    Penicillins Hives     Objective    VITALS:   /57   Pulse (!) 156   Temp 97.5 °F (36.4 °C) (Oral)   Resp 18   Ht 177.8 cm (70\")   Wt 68.6 kg (151 lb 3.8 oz) Comment: Nurse notified of gain  SpO2 97%   BMI 21.70 kg/m²     PHYSICAL EXAM:  Physical Exam  Vitals reviewed.   Constitutional:       General: He is not in acute distress.     Appearance: He is well-developed. He is not diaphoretic.   HENT:      Head: Normocephalic and atraumatic.      Nose:      Comments: O2 per nasal cannula.     Mouth/Throat:      Pharynx: No oropharyngeal exudate.   Eyes:      Conjunctiva/sclera: Conjunctivae normal.   Neck:      Thyroid: No thyromegaly.      Vascular: No JVD.   Cardiovascular:      Rate and Rhythm: Regular rhythm. Tachycardia present.      Heart sounds: Normal heart sounds. No murmur heard.     Comments: Cardiac monitor leads.   Pulmonary:      Effort: Pulmonary effort is normal. No respiratory distress.      Breath sounds: Normal breath sounds.      Comments: Right port not accessed.  Abdominal:      General: Bowel sounds are normal.      Palpations: Abdomen is soft.      Tenderness: There is no abdominal tenderness. There is no guarding.   Musculoskeletal:         General: Normal range of motion.      Cervical back: Normal range of motion and neck supple. No rigidity.      Right lower le+ Edema present.      Left lower le+ Edema present.      Comments: Left hand oxygen saturation monitor.   Skin:     General: Skin is warm and dry.      Findings: No erythema or rash.      Comments: Left chest wall tattoo.  Left upper extremity IV.   Neurological:      Mental Status: He is alert and oriented to person, place, and time.      Sensory: No sensory deficit.   Psychiatric:         Behavior: Behavior normal.     "   RECENT LABS:  Lab Results (last 24 hours)       Procedure Component Value Units Date/Time    POC Glucose Once [[id number]]  (Abnormal) Collected: 07/18/23 0710    Specimen: Blood Updated: 07/18/23 0722     Glucose 188 mg/dL      Comment: Serial Number: [id number]:  [id number]       POC Glucose Once [[id number]]  (Abnormal) Collected: 07/18/23 0225    Specimen: Blood Updated: 07/18/23 0226     Glucose 154 mg/dL      Comment: Serial Number: [id number]:  [id number]       Manual Differential [[id number]]  (Abnormal) Collected: 07/17/23 2254    Specimen: Blood Updated: 07/18/23 0006     Neutrophil % 91.0 %      Lymphocyte % 2.0 %      Monocyte % 2.0 %      Bands %  4.0 %      Atypical Lymphocyte % 1.0 %      Neutrophils Absolute 11.12 10*3/mm3      Lymphocytes Absolute 0.35 10*3/mm3      Monocytes Absolute 0.23 10*3/mm3      Anisocytosis Slight/1+     Microcytes Slight/1+     WBC Morphology Normal     Platelet Estimate Adequate     Large Platelets Slight/1+    CBC & Differential [[id number]]  (Abnormal) Collected: 07/17/23 2254    Specimen: Blood Updated: 07/18/23 0006    Narrative:      The following orders were created for panel order CBC & Differential.  Procedure                               Abnormality         Status                     ---------                               -----------         ------                     CBC Auto Differential[[id number]]        Abnormal            Final result               Scan Slide[[id number]]                                       Final result                 Please view results for these tests on the individual orders.    Scan Slide [[id number]] Collected: 07/17/23 2254    Specimen: Blood Updated: 07/18/23 0006     Scan Slide --    CBC Auto Differential [[id number]]  (Abnormal) Collected: 07/17/23 2254    Specimen: Blood Updated: 07/18/23 0006     WBC 11.70 10*3/mm3      RBC 3.38 10*6/mm3      Hemoglobin 8.8 g/dL      Hematocrit 28.7 %      MCV 84.9 fL      MCH 26.1 pg       MCHC 30.8 g/dL      RDW 20.1 %      RDW-SD 59.9 fl      MPV 9.2 fL      Platelets 395 10*3/mm3     TSH [[id number]]  (Abnormal) Collected: 07/17/23 2254    Specimen: Blood Updated: 07/17/23 2338     TSH 0.145 uIU/mL     POC Glucose Once [[id number]]  (Abnormal) Collected: 07/17/23 2106    Specimen: Blood Updated: 07/17/23 2108     Glucose 198 mg/dL      Comment: Serial Number: [id number]:  [id number]       POC Glucose Once [[id number]]  (Abnormal) Collected: 07/17/23 1643    Specimen: Blood Updated: 07/17/23 1644     Glucose 234 mg/dL      Comment: Serial Number: [id number]:  [id number]       POC Glucose Once [[id number]]  (Abnormal) Collected: 07/17/23 1119    Specimen: Blood Updated: 07/17/23 1121     Glucose 194 mg/dL      Comment: Serial Number: [id number]:  [id number]             PENDING RESULTS: None.    IMAGING REVIEWED:  No radiology results for the last day    I have reviewed the patient's labs, imaging, reports, and other clinician documentation.    Assessment & Plan   ASSESSMENT  Stage IV squamous cell carcinoma left upper lobe lung-s/p radiation to the brain and currently receiving radiation to the right hip/femur.  Molecular testing on tumor pathology revealed low-level PD-L1 positivity with plan for outpatient palliative chemotherapy and immunotherapy once radiation therapy has completed.  Plan to start outpatient Xgeva for bone metastases support.  Radiation has been consulted and will recommend to resume radiation to the left hip.  DANN with malabsorption and poor tolerance of oral iron/Elevated copper level-he has not been on multivitamin containing copper. He had reported bright red blood per rectum as outpatient with 2 colonoscopies in the past 6 years that were unremarkable per his report. Received IV iron last admit.  On daily oral iron, Pepcid, aspirin and dexamethasone. Hemoglobin dropping somewhat post admission. Iron studies showed continued DANN, slightly improved from last  admission, and initiated on IV iron replacement.  Changed Pepcid to Protonix.  Left lung pneumonia-likely post-obstructive and dexamethasone.  Per pulmonary.    BLE edema and pain-recent outpatient BLE Doppler was negative for DVT/SVT.  On Lasix.  Gabapentin and Dilaudid for pain control added by primary team.  A-fib with RVR-new onset noted in ED.  Cardiology consulted.  Receiving as needed Cardizem drip and on aspirin.  Chronic nausea, Uncontrolled DM2, Low TSH, constipation and severe wt. loss-changed Pepcid to Protonix.  Will discontinue oral iron.  Started Glucerna twice daily and Colace.  Endocrine managing.    Smoking-cessation counseling continued.  He had been started on nicotine patch as outpatient continued.  Recent pathological right hip fracture-physical therapy working with patient.     PLAN  Follow CBC  Discontinue ferrous sulfate 325 mg by mouth daily.  Continue Ferrlecit 250 mg IV x 3 days, day 2/3.  Continue dexamethasone 4 mg by mouth twice daily.  Continue ASA 81 mg by mouth daily.  Would not recommend full dose anticoagulation for A-fib secondary to brain metastasis and known rectal bleeding.  Continue PRN Norco and  Dilaudid and scheduled gabapentin for pain control.  Change Pepcid to Protonix.    Start Glucerna twice daily and Colace.    Await radiation oncology consultation to resume radiation therapy.   Combination of chemotherapy and immunotherapy post radiation.  Start Xgeva for bone metastases support as outpatient.  Calcium plus vitamin D daily.    Patient seen and evaluated by Dr. Dupree.  Electronically signed by NOREEN Phillips, 07/18/23, 9:06 AM EDT.          I have personally performed a face-to-face diagnostic evaluation on this patient. I have performed a complete history and physical examination, reviewed laboratory studies, and radiographic examinations.  I have completed the majority and substantive portion of the medical decision making.  I have formulated the  assessment on this patient and the plan of action as noted above. I have discussed the case with Aminah Houser NP, have edited/reviewed the note, and agree with the care plan.  The patient is complaining of constipation.  He also thinks physical therapy has not been working with him.  On examination, right chest wall port is not accessed and lower extremity edema is 1+.  Hemoglobin is 8.8.  Heart rate remains high and cardiology is following.  He can resume radiation to the hip.  We will plan on systemic chemotherapy and immunotherapy post completion of radiation.    I discussed the patient's findings and my recommendations with patient.    Part of this note may be an electronic transcription/translation of spoken language to printed text using the Dragon Dictation System.     Electronically signed by Mina Dupree MD, 07/18/23, 1:01 PM EDT.

## 2023-07-18 NOTE — PROGRESS NOTES
"PULMONARY CRITICAL CARE PROGRESS  NOTE      PATIENT IDENTIFICATION:  Name: Isra Davenport  MRN: [id number]  :  1955     Age: 68 y.o.  Sex: male    DATE OF Note:  2023   Referring Physician: Jese SMITH MD                  Subjective:   Up in chair,  still on O2,  no SOB, no chest or abd pain, no bowel or bladder issues   In A-fib this am and Cardizem drip restarted    Objective:  tMax 24 hrs: Temp (24hrs), Av.6 °F (36.4 °C), Min:97.3 °F (36.3 °C), Max:98 °F (36.7 °C)      Vitals Ranges:   Temp:  [97.3 °F (36.3 °C)-98 °F (36.7 °C)] 98 °F (36.7 °C)  Heart Rate:  [] 87  Resp:  [18-30] 24  BP: ()/(53-75) 120/63    Intake and Output Last 3 Shifts:   I/O last 3 completed shifts:  In: 2269 [P.O.:1760; I.V.:9; IV Piggyback:500]  Out: 2500 [Urine:2500]    Exam:  /63 (BP Location: Left arm, Patient Position: Lying)   Pulse 87   Temp 98 °F (36.7 °C) (Oral)   Resp 24   Ht 177.8 cm (70\")   Wt 68.6 kg (151 lb 3.8 oz) Comment: Nurse notified of gain  SpO2 95%   BMI 21.70 kg/m²     General Appearance:     HEENT:  Normocephalic, without obvious abnormality. Conjunctivae/corneas clear.  Normal external ear canals. Nares normal, no drainage     Neck:  Supple, symmetrical, trachea midline. No JVD.  Lungs /Chest wall:   Bilateral basal rhonchi, respirations unlabored, symmetrical wall movement.     Heart:  Regular rate and rhythm, systolic murmur PMI left sternal border  Abdomen: Soft, nontender, no masses, no organomegaly.    Extremities: Trace edema, no clubbing or cyanosis        Medications:  amiodarone, 200 mg, Oral, Q24H  aspirin, 81 mg, Oral, Daily  budesonide, 0.5 mg, Nebulization, BID - RT  calcium 500 mg vitamin D 5 mcg (200 UT), 1 tablet, Oral, Daily  dexamethasone, 4 mg, Oral, Q12H   And  insulin regular, 2 Units, Subcutaneous, Q12H  dilTIAZem, 30 mg, Oral, Q8H  docusate sodium, 100 mg, Oral, BID  ferric gluconate, 250 mg, Intravenous, Daily  furosemide, 20 mg, Intravenous, " Daily  gabapentin, 200 mg, Oral, Nightly  guaiFENesin, 600 mg, Oral, Q12H  insulin glargine, 1-200 Units, Subcutaneous, Nightly - Glucommander  insulin lispro, 1-200 Units, Subcutaneous, 4x Daily With Meals & Nightly  ipratropium-albuterol, 3 mL, Nebulization, 4x Daily - RT  metoprolol tartrate, 25 mg, Oral, BID  pantoprazole, 40 mg, Oral, Q AM  senna-docusate sodium, 2 tablet, Oral, BID  sodium chloride, 10 mL, Intravenous, Q12H        Infusion:  dilTIAZem, 5-15 mg/hr, Last Rate: 5 mg/hr (07/18/23 1246)  Pharmacy Consult - Steroid Insulin Protocol,          PRN:    senna-docusate sodium **AND** polyethylene glycol **AND** bisacodyl **AND** bisacodyl    dextrose    dextrose    glucagon (human recombinant)    HYDROcodone-acetaminophen    HYDROmorphone    insulin lispro    ipratropium-albuterol    ondansetron    Pharmacy Consult - Steroid Insulin Protocol    [COMPLETED] Insert Peripheral IV **AND** sodium chloride    sodium chloride    sodium chloride  Data Review:  All labs (24hrs):   Recent Results (from the past 24 hour(s))   POC Glucose Once    Collection Time: 07/17/23  4:43 PM    Specimen: Blood   Result Value Ref Range    Glucose 234 (H) 70 - 105 mg/dL   ECG 12 Lead Tachycardia    Collection Time: 07/17/23  8:57 PM   Result Value Ref Range    QT Interval 323 ms   POC Glucose Once    Collection Time: 07/17/23  9:06 PM    Specimen: Blood   Result Value Ref Range    Glucose 198 (H) 70 - 105 mg/dL   TSH    Collection Time: 07/17/23 10:54 PM    Specimen: Blood   Result Value Ref Range    TSH 0.145 (L) 0.270 - 4.200 uIU/mL   CBC Auto Differential    Collection Time: 07/17/23 10:54 PM    Specimen: Blood   Result Value Ref Range    WBC 11.70 (H) 3.40 - 10.80 10*3/mm3    RBC 3.38 (L) 4.14 - 5.80 10*6/mm3    Hemoglobin 8.8 (L) 13.0 - 17.7 g/dL    Hematocrit 28.7 (L) 37.5 - 51.0 %    MCV 84.9 79.0 - 97.0 fL    MCH 26.1 (L) 26.6 - 33.0 pg    MCHC 30.8 (L) 31.5 - 35.7 g/dL    RDW 20.1 (H) 12.3 - 15.4 %    RDW-SD 59.9 (H)  37.0 - 54.0 fl    MPV 9.2 6.0 - 12.0 fL    Platelets 395 140 - 450 10*3/mm3   Scan Slide    Collection Time: 07/17/23 10:54 PM    Specimen: Blood   Result Value Ref Range    Scan Slide     Manual Differential    Collection Time: 07/17/23 10:54 PM    Specimen: Blood   Result Value Ref Range    Neutrophil % 91.0 (H) 42.7 - 76.0 %    Lymphocyte % 2.0 (L) 19.6 - 45.3 %    Monocyte % 2.0 (L) 5.0 - 12.0 %    Bands %  4.0 0.0 - 5.0 %    Atypical Lymphocyte % 1.0 0.0 - 5.0 %    Neutrophils Absolute 11.12 (H) 1.70 - 7.00 10*3/mm3    Lymphocytes Absolute 0.35 (L) 0.70 - 3.10 10*3/mm3    Monocytes Absolute 0.23 0.10 - 0.90 10*3/mm3    Anisocytosis Slight/1+ None Seen    Microcytes Slight/1+ None Seen    WBC Morphology Normal Normal    Platelet Estimate Adequate Normal    Large Platelets Slight/1+ None Seen   POC Glucose Once    Collection Time: 07/18/23  2:25 AM    Specimen: Blood   Result Value Ref Range    Glucose 154 (H) 70 - 105 mg/dL   POC Glucose Once    Collection Time: 07/18/23  7:10 AM    Specimen: Blood   Result Value Ref Range    Glucose 188 (H) 70 - 105 mg/dL   POC Glucose Once    Collection Time: 07/18/23 11:28 AM    Specimen: Blood   Result Value Ref Range    Glucose 334 (H) 70 - 105 mg/dL   ECG 12 Lead Rhythm Change    Collection Time: 07/18/23 11:30 AM   Result Value Ref Range    QT Interval 330 ms        Imaging:  XR Chest 1 View  Narrative: XR CHEST 1 VW    Date of Exam: 7/15/2023 5:58 PM EDT    Indication: edema    Comparison: 6/29/2023    Findings:  Right internal jugular Port-A-Cath remains in place and unchanged in position. Heart size is stable. There is chronic elevation of left ami diaphragm. There is were airspace disease compatible with pneumonia. Right lung is clear. No pleural effusion. No   evidence pneumothorax.  Impression: Impression:    1. Worsening left basilar airspace disease compatible with pneumonia.  2. Chronic elevation of the left hemidiaphragm.    Electronically Signed: Jeet Panda     7/15/2023 6:09 PM EDT    Workstation ID: [id number]  XR Foot 3+ View Bilateral  Narrative: XR FOOT 3+ VW BILATERAL    Date of Exam: 7/15/2023 6:04 PM EDT    Indication: pain    Comparison: None available.    Findings:  Right foot: There is no acute fracture or dislocation. No bony erosion or abnormal periosteal reaction. Soft tissues are unremarkable.    Left foot: There is no acute fracture or dislocation. No bony erosion or abnormal periosteal reaction. There is mild diffuse soft tissue swelling.  Impression: Impression:    1. No acute bony abnormality of the feet.    Electronically Signed: Jeet Panda    7/15/2023 6:34 PM EDT    Workstation ID: [id number]  XR Tibia Fibula 2 View Bilateral  Narrative: XR TIBIA FIBULA 2 VW BILATERAL    Date of Exam: 7/15/2023 6:04 PM EDT    Indication: pain    Comparison: None available.    Findings:  Left tibia and fibula: There is no acute fracture or dislocation. Soft tissues are unremarkable.    Right tibia and fibula: There is smooth deformity of the midshaft of both the tibia and fibula related to old healed fractures. There is single surgical screw in place through the mid tibia. There is no evidence of acute fracture or dislocation. No   abnormal periosteal reaction identified. Soft tissues are unremarkable.  Impression: Impression:    1. No acute bony abnormality of the left tibia or fibula.  2. Old healed fractures of the right tibia and fibula. No acute bony abnormality.    Electronically Signed: Jeet Panda    7/15/2023 6:31 PM EDT    Workstation ID: [id number]       ASSESSMENT:  Acute hypoxic respiratory distress  Pneumonia  COPD  Stage IV squamous cell carcinoma of the left lung with mets to the bone and brain  DM II  S/p intramedullary nailing of the hip  S/p [id number]  S/p chemotherapy/plan for palliative radiation     PLAN:  Restarted Cardizem drip per cardiology and they are adding short acting Cardizem to wean IV and increasing  BB  Antibiotics  Bronchodilators  Inhaled corticosteroids  IV steroids  Incentive spirometer  Wean O2 to keep sats > 88%  Diuresis and monitor [id number]  Oncology, Cardiology, Endocrinology following  Electrolytes/ glycemic control  No DVT and GI prophylaxis     Discussed with Dr. Jazmine Nathan, NOREEN   7/18/2023  13:30 EDT    I personally have examined  and interviewed the patient. I have reviewed the history, data, problems, assessment and plan with our NP.  Total Critical care time in direct medical management (   ) minutes, This time specifically excludes time spent performing procedures.    Emiliano Lucero MD   7/18/2023  22:19 EDT

## 2023-07-18 NOTE — CONSULTS
Palliative Care Consultation    Patient Name: Isra Davenport  : 1955  MRN: [id number]  Allergies: Ibuprofen and Penicillins    Requesting clinician:  Anna  Reason for consult: Consultation for clarification of goals of care and code status.      Patient Code Status:   Code Status and Medical Interventions:   Ordered at: 07/15/23 2105     Medical Intervention Limits:    NO intubation (DNI)     Code Status (Patient has no pulse and is not breathing):    No CPR (Do Not Attempt to Resuscitate)     Medical Interventions (Patient has pulse or is breathing):    Limited Support           Chief Complaint:    Uncontrolled pain    History of Present Illness    Isra Davenport is a 68 y.o. male who presented to [facility] ED on 7/15 with reports of bilateral lower extremity swelling. He also complained of uncontrolled BLE pain. He stated that he was recently prescribed Norco for pain but has been unable to fill the prescription. He denied nausea, vomiting, shortness of breath, or chest pain.    In ED: Glucose 355, Creatinine 0.37, albumin 2.6, Alk phos 178, WBC 11.4, Hgb 10    XR Chest 1 View   Result Date: 7/15/2023  Impression: 1. Worsening left basilar airspace disease compatible with pneumonia. 2. Chronic elevation of the left hemidiaphragm    Of note: Patient has metastatic cancer and is on current treatment.    He was noted to be in afib in ED. Started on cardizem drip and pain medications. Hem onc consulted for known cancer. Cardiology consulted for Afib.     Patient started on IV antibiotics for pneumonia. Endocrinology following for steroid induced diabetes.    On  Patient noted with SVT event while getting breathing treatment. Cardizem drip off and on for management.      Palliative consult for goals of care discussion in an acutely ill patient with lung cancer.    VITAL SIGNS:   Temp:  [97.3 °F (36.3 °C)-98 °F (36.7 °C)] 98 °F (36.7 °C)  Heart Rate:  [] 133  Resp:  [18-30] 24  BP: ()/(53-75)  120/63       PMH: Lung Ca, BPH    Past Surgical History:   Procedure Laterality Date    BACK SURGERY  2006    BRONCHOSCOPY N/A 6/19/2023    Procedure: BRONCHOSCOPY WITH BIOPSY X1 AREA, FINE NEEDLE ASPIRATION, BRUSHING, AND BRONCHIAL WASHING;  Surgeon: Reggie Abarca MD;  Location: [facility] ENDOSCOPY;  Service: Pulmonary;  Laterality: N/A;  POST: MUCOUS PLUGS  AND LUNG MASS    HIP INTERTROCHANTERIC NAILING Right 6/17/2023    Procedure: HIP INTERTROCHANTERIC NAILING;  Surgeon: Levi Blackmon II, MD;  Location: [facility] MAIN OR;  Service: Orthopedics;  Laterality: Right;    LEG SURGERY Bilateral 08/25/1985    PORTACATH PLACEMENT Right 6/29/2023    Procedure: INSERTION OF PORTACATH;  Surgeon: Jese Goss MD;  Location: [facility] MAIN OR;  Service: General;  Laterality: Right;       History reviewed. No pertinent family history.    Social History     Tobacco Use    Smoking status: Every Day     Packs/day: 1.00     Years: 15.00     Pack years: 15.00     Types: Cigarettes     Passive exposure: Current    Smokeless tobacco: Never   Vaping Use    Vaping Use: Never used   Substance Use Topics    Alcohol use: Yes     Alcohol/week: 2.0 standard drinks     Types: 2 Cans of beer per week    Drug use: Never           LABS:    Results from last 7 days   Lab Units 07/17/23  2254   WBC 10*3/mm3 11.70*   HEMOGLOBIN g/dL 8.8*   HEMATOCRIT % 28.7*   PLATELETS 10*3/mm3 395     Results from last 7 days   Lab Units 07/17/23  0216   SODIUM mmol/L 142   POTASSIUM mmol/L 4.5   CHLORIDE mmol/L 94*   CO2 mmol/L 41.0*   BUN mg/dL 17   CREATININE mg/dL 0.35*   GLUCOSE mg/dL 91   CALCIUM mg/dL 9.7     Results from last 7 days   Lab Units 07/17/23  0216 07/15/23  2133 07/15/23  1712   SODIUM mmol/L 142   < > 138   POTASSIUM mmol/L 4.5   < > 4.6   CHLORIDE mmol/L 94*   < > 96*   CO2 mmol/L 41.0*   < > 36.0*   BUN mg/dL 17   < > 21   CREATININE mg/dL 0.35*   < > 0.37*   CALCIUM mg/dL 9.7   < > 10.0   BILIRUBIN mg/dL  --   --  0.2    ALK PHOS U/L  --   --  178*   ALT (SGPT) U/L  --   --  28   AST (SGOT) U/L  --   --  11   GLUCOSE mg/dL 91   < > 355*    < > = values in this interval not displayed.         IMAGING STUDIES:  No radiology results for the last day      I reviewed the patient's new clinical results including labs, imaging, and vitals.        Scheduled Meds:  aspirin, 81 mg, Oral, Daily  budesonide, 0.5 mg, Nebulization, BID - RT  calcium 500 mg vitamin D 5 mcg (200 UT), 1 tablet, Oral, Daily  dexamethasone, 4 mg, Oral, Q12H   And  insulin regular, 2 Units, Subcutaneous, Q12H  dilTIAZem, 30 mg, Oral, Q8H  docusate sodium, 100 mg, Oral, BID  ferric gluconate, 250 mg, Intravenous, Daily  furosemide, 20 mg, Intravenous, Daily  gabapentin, 200 mg, Oral, Nightly  guaiFENesin, 600 mg, Oral, Q12H  insulin glargine, 1-200 Units, Subcutaneous, Nightly - Glucommander  insulin lispro, 1-200 Units, Subcutaneous, 4x Daily With Meals & Nightly  ipratropium-albuterol, 3 mL, Nebulization, 4x Daily - RT  metoprolol tartrate, 25 mg, Oral, Q8H  pantoprazole, 40 mg, Oral, Q AM  senna-docusate sodium, 2 tablet, Oral, BID  sodium chloride, 10 mL, Intravenous, Q12H      Continuous Infusions:  dilTIAZem, 5-15 mg/hr, Last Rate: 10 mg/hr (07/18/23 0800)  Pharmacy Consult - Steroid Insulin Protocol,         I have reviewed patient's current medication list.     Review of Systems   Constitutional:  Positive for fatigue.   Respiratory:  Positive for cough and shortness of breath.    Cardiovascular:         Feet swelling   Neurological:  Positive for weakness and numbness.   All other systems reviewed and are negative.      Physical Exam  Vitals and nursing note reviewed.   Constitutional:       Appearance: Normal appearance.   HENT:      Head: Normocephalic and atraumatic.      Nose: Nose normal.      Comments: Nasal canula      Mouth/Throat:      Mouth: Mucous membranes are dry.      Pharynx: Oropharynx is clear.   Eyes:      Extraocular Movements: Extraocular  movements intact.      Conjunctiva/sclera: Conjunctivae normal.      Pupils: Pupils are equal, round, and reactive to light.   Cardiovascular:      Rate and Rhythm: Tachycardia present.      Pulses: Normal pulses.   Pulmonary:      Effort: Pulmonary effort is normal.   Abdominal:      General: Abdomen is flat.   Musculoskeletal:         General: Normal range of motion.      Cervical back: Normal range of motion.   Skin:     General: Skin is warm and dry.   Neurological:      General: No focal deficit present.      Mental Status: He is alert and oriented to person, place, and time. Mental status is at baseline.           PROBLEM LIST:    Atrial fibrillation with RVR    Atrial fibrillation          ASSESSMENT/PLAN:    Afib: with RVR. Started on cardizem drip. Cardiology consulted.     BLE swelling: and pain. Described neuropathic pain. Started on gabapentin and norco.     Pneumonia: noted on CT scan at admission with eelvated WBC. Started on IV antibiotics and neb treatments.     Lung Ca: with mets to the bone and brain. Currently on radiation and plan for chemotherapy Follows with Dr. Dupree in outpatient. Hem onc consulted.     DM: new. Likely secondary to steroid treatment for cancer. Endocrinology consulted.     These illnesses and their management contribute to the need for a palliative consult and advanced care planning.      ADVANCED CARE PLANNIN/18 Met with patient this am. He is awake, alert and oriented. We discussed his current clinical status and overall goals of care. Patient tells me that he is concerned about his foot pain but does feel like the pain medications he is on, is helping. He states that he follows with Dr. Dupree and is currently in the process of getting radiation and hopefully starting chemotherapy too. We discussed continuation of aggressive treatment with additional treatment for his cancer vs hospice services. He tells me that he was just recently diagnosed and that he still  "wants to \"give it a try.\" He states that if it isnt working, or if he does not feel well during treatment, he would likely make the decision to pursue hospice at that time. He tells me that he does not want to be put on any life support and no CPR. We discussed advanced directives. He is agreeable to completion of a living will and possibly a POST form. He states that he lives with his brother and sister in law and that if he was unable to speak for himself, he would want them to make decisions on his behalf, not his son. He is agreeable to completing a health care representative form naming his brother. This information was shared with nursing. Palliative  to follow up to complete forms.       Advanced Directives: Patient does not have advance directive  Health Care Directive on file: No  Health Care Surrogate:      Palliative Performance Scale Score:    Comments:           Decisional Capacity: yes  Patient's understanding of illness: adequate  Patient goals of care:  DNR/DNI      Thank you for this consult and allowing us to participate in patient's plan of care. Palliative Care Team will continue to follow patient.       I spent 53 minutes reviewing providers documentation, medication records, assessing and examining patient, discussing with family, answering questions, providing guidance about a plan of care, and coordinating care with other healthcare members. More than 50% of time spent face to face discussing disease education, current clinical status, and medication management.     I spent an additional 21 minutes on advanced care planning, goals of care, and code status discussion.  I obtained consent for ACP discussion.     Nataly Price, APRN  7/18/2023  "

## 2023-07-19 PROBLEM — E44.0 MODERATE MALNUTRITION: Status: ACTIVE | Noted: 2023-01-01

## 2023-07-19 LAB
ANION GAP SERPL CALCULATED.3IONS-SCNC: 8 MMOL/L (ref 5–15)
ANISOCYTOSIS BLD QL: ABNORMAL
BUN SERPL-MCNC: 15 MG/DL (ref 8–23)
BUN/CREAT SERPL: 38.5 (ref 7–25)
CA-I SERPL ISE-MCNC: 1.19 MMOL/L (ref 1.2–1.3)
CALCIUM SPEC-SCNC: 9.1 MG/DL (ref 8.6–10.5)
CHLORIDE SERPL-SCNC: 89 MMOL/L (ref 98–107)
CO2 SERPL-SCNC: 37 MMOL/L (ref 22–29)
CREAT SERPL-MCNC: 0.39 MG/DL (ref 0.76–1.27)
DEPRECATED RDW RBC AUTO: 59.1 FL (ref 37–54)
EGFRCR SERPLBLD CKD-EPI 2021: 120.5 ML/MIN/1.73
ERYTHROCYTE [DISTWIDTH] IN BLOOD BY AUTOMATED COUNT: 19.8 % (ref 12.3–15.4)
GLUCOSE BLDC GLUCOMTR-MCNC: 261 MG/DL (ref 70–105)
GLUCOSE BLDC GLUCOMTR-MCNC: 337 MG/DL (ref 70–105)
GLUCOSE BLDC GLUCOMTR-MCNC: 374 MG/DL (ref 70–105)
GLUCOSE BLDC GLUCOMTR-MCNC: 391 MG/DL (ref 70–105)
GLUCOSE SERPL-MCNC: 381 MG/DL (ref 65–99)
HCT VFR BLD AUTO: 28.3 % (ref 37.5–51)
HGB BLD-MCNC: 9 G/DL (ref 13–17.7)
LARGE PLATELETS: ABNORMAL
LYMPHOCYTES # BLD MANUAL: 0.39 10*3/MM3 (ref 0.7–3.1)
LYMPHOCYTES NFR BLD MANUAL: 5 % (ref 5–12)
MAGNESIUM SERPL-MCNC: 1.9 MG/DL (ref 1.6–2.4)
MCH RBC QN AUTO: 26.9 PG (ref 26.6–33)
MCHC RBC AUTO-ENTMCNC: 31.6 G/DL (ref 31.5–35.7)
MCV RBC AUTO: 85.1 FL (ref 79–97)
METAMYELOCYTES NFR BLD MANUAL: 1 % (ref 0–0)
MONOCYTES # BLD: 0.65 10*3/MM3 (ref 0.1–0.9)
NEUTROPHILS # BLD AUTO: 11.74 10*3/MM3 (ref 1.7–7)
NEUTROPHILS NFR BLD MANUAL: 88 % (ref 42.7–76)
NEUTS BAND NFR BLD MANUAL: 3 % (ref 0–5)
PHOSPHATE SERPL-MCNC: 2.7 MG/DL (ref 2.5–4.5)
PLATELET # BLD AUTO: 486 10*3/MM3 (ref 140–450)
PMV BLD AUTO: 8.7 FL (ref 6–12)
POTASSIUM SERPL-SCNC: 4.1 MMOL/L (ref 3.5–5.2)
RBC # BLD AUTO: 3.33 10*6/MM3 (ref 4.14–5.8)
SCAN SLIDE: NORMAL
SODIUM SERPL-SCNC: 134 MMOL/L (ref 136–145)
VARIANT LYMPHS NFR BLD MANUAL: 3 % (ref 19.6–45.3)
WBC MORPH BLD: NORMAL
WBC NRBC COR # BLD: 12.9 10*3/MM3 (ref 3.4–10.8)

## 2023-07-19 PROCEDURE — 25010000002 DIGOXIN PER 500 MCG: Performed by: INTERNAL MEDICINE

## 2023-07-19 PROCEDURE — 83735 ASSAY OF MAGNESIUM: CPT | Performed by: INTERNAL MEDICINE

## 2023-07-19 PROCEDURE — 94799 UNLISTED PULMONARY SVC/PX: CPT

## 2023-07-19 PROCEDURE — 63710000001 INSULIN LISPRO (HUMAN) PER 5 UNITS: Performed by: NURSE PRACTITIONER

## 2023-07-19 PROCEDURE — 80048 BASIC METABOLIC PNL TOTAL CA: CPT | Performed by: INTERNAL MEDICINE

## 2023-07-19 PROCEDURE — 97535 SELF CARE MNGMENT TRAINING: CPT

## 2023-07-19 PROCEDURE — 85025 COMPLETE CBC W/AUTO DIFF WBC: CPT | Performed by: NURSE PRACTITIONER

## 2023-07-19 PROCEDURE — 99232 SBSQ HOSP IP/OBS MODERATE 35: CPT | Performed by: NURSE PRACTITIONER

## 2023-07-19 PROCEDURE — 63710000001 DEXAMETHASONE PER 0.25 MG: Performed by: INTERNAL MEDICINE

## 2023-07-19 PROCEDURE — 97116 GAIT TRAINING THERAPY: CPT

## 2023-07-19 PROCEDURE — 63710000001 INSULIN LISPRO (HUMAN) PER 5 UNITS: Performed by: INTERNAL MEDICINE

## 2023-07-19 PROCEDURE — 94664 DEMO&/EVAL PT USE INHALER: CPT

## 2023-07-19 PROCEDURE — 25010000002 FUROSEMIDE PER 20 MG: Performed by: NURSE PRACTITIONER

## 2023-07-19 PROCEDURE — 84100 ASSAY OF PHOSPHORUS: CPT | Performed by: INTERNAL MEDICINE

## 2023-07-19 PROCEDURE — 97530 THERAPEUTIC ACTIVITIES: CPT

## 2023-07-19 PROCEDURE — 93010 ELECTROCARDIOGRAM REPORT: CPT | Performed by: INTERNAL MEDICINE

## 2023-07-19 PROCEDURE — 63710000001 INSULIN REGULAR HUMAN PER 5 UNITS: Performed by: INTERNAL MEDICINE

## 2023-07-19 PROCEDURE — 25010000002 NA FERRIC GLUC CPLX PER 12.5 MG: Performed by: NURSE PRACTITIONER

## 2023-07-19 PROCEDURE — 63710000001 INSULIN GLARGINE PER 5 UNITS: Performed by: INTERNAL MEDICINE

## 2023-07-19 PROCEDURE — 99231 SBSQ HOSP IP/OBS SF/LOW 25: CPT | Performed by: INTERNAL MEDICINE

## 2023-07-19 PROCEDURE — 82948 REAGENT STRIP/BLOOD GLUCOSE: CPT

## 2023-07-19 PROCEDURE — 93005 ELECTROCARDIOGRAM TRACING: CPT | Performed by: INTERNAL MEDICINE

## 2023-07-19 PROCEDURE — 82330 ASSAY OF CALCIUM: CPT | Performed by: INTERNAL MEDICINE

## 2023-07-19 PROCEDURE — 85007 BL SMEAR W/DIFF WBC COUNT: CPT | Performed by: NURSE PRACTITIONER

## 2023-07-19 RX ORDER — DEXAMETHASONE 4 MG/1
4 TABLET ORAL EVERY 12 HOURS SCHEDULED
Status: DISCONTINUED | OUTPATIENT
Start: 2023-07-19 | End: 2023-07-21

## 2023-07-19 RX ORDER — DILTIAZEM HYDROCHLORIDE 5 MG/ML
10 INJECTION INTRAVENOUS ONCE
Status: COMPLETED | OUTPATIENT
Start: 2023-07-19 | End: 2023-07-19

## 2023-07-19 RX ORDER — MIDODRINE HYDROCHLORIDE 5 MG/1
5 TABLET ORAL
Status: DISCONTINUED | OUTPATIENT
Start: 2023-07-19 | End: 2023-07-23

## 2023-07-19 RX ORDER — DEXTROSE MONOHYDRATE 25 G/50ML
25 INJECTION, SOLUTION INTRAVENOUS
Status: DISCONTINUED | OUTPATIENT
Start: 2023-07-19 | End: 2023-07-27 | Stop reason: HOSPADM

## 2023-07-19 RX ORDER — IBUPROFEN 600 MG/1
1 TABLET ORAL
Status: DISCONTINUED | OUTPATIENT
Start: 2023-07-19 | End: 2023-07-27 | Stop reason: HOSPADM

## 2023-07-19 RX ORDER — INSULIN LISPRO 100 [IU]/ML
2-9 INJECTION, SOLUTION INTRAVENOUS; SUBCUTANEOUS
Status: DISCONTINUED | OUTPATIENT
Start: 2023-07-19 | End: 2023-07-27 | Stop reason: HOSPADM

## 2023-07-19 RX ORDER — INSULIN LISPRO 100 [IU]/ML
12 INJECTION, SOLUTION INTRAVENOUS; SUBCUTANEOUS
Status: DISCONTINUED | OUTPATIENT
Start: 2023-07-20 | End: 2023-07-20

## 2023-07-19 RX ORDER — DIGOXIN 0.25 MG/ML
500 INJECTION INTRAMUSCULAR; INTRAVENOUS ONCE
Status: COMPLETED | OUTPATIENT
Start: 2023-07-19 | End: 2023-07-19

## 2023-07-19 RX ORDER — NICOTINE POLACRILEX 4 MG
15 LOZENGE BUCCAL
Status: DISCONTINUED | OUTPATIENT
Start: 2023-07-19 | End: 2023-07-27 | Stop reason: HOSPADM

## 2023-07-19 RX ADMIN — MIDODRINE HYDROCHLORIDE 5 MG: 5 TABLET ORAL at 17:25

## 2023-07-19 RX ADMIN — INSULIN HUMAN 2 UNITS: 100 INJECTION, SOLUTION PARENTERAL at 08:09

## 2023-07-19 RX ADMIN — Medication 10 ML: at 21:56

## 2023-07-19 RX ADMIN — AMIODARONE HYDROCHLORIDE 200 MG: 200 TABLET ORAL at 08:09

## 2023-07-19 RX ADMIN — INSULIN LISPRO 11 UNITS: 100 INJECTION, SOLUTION INTRAVENOUS; SUBCUTANEOUS at 08:09

## 2023-07-19 RX ADMIN — DOCUSATE SODIUM 100 MG: 100 CAPSULE, LIQUID FILLED ORAL at 21:55

## 2023-07-19 RX ADMIN — SODIUM CHLORIDE 250 MG: 9 INJECTION, SOLUTION INTRAVENOUS at 12:23

## 2023-07-19 RX ADMIN — SENNOSIDES AND DOCUSATE SODIUM 2 TABLET: 8.6; 5 TABLET ORAL at 08:09

## 2023-07-19 RX ADMIN — SODIUM PHOSPHATE, MONOBASIC, MONOHYDRATE AND SODIUM PHOSPHATE, DIBASIC, ANHYDROUS 15 MMOL: 142; 276 INJECTION, SOLUTION INTRAVENOUS at 01:04

## 2023-07-19 RX ADMIN — Medication 1 TABLET: at 08:09

## 2023-07-19 RX ADMIN — GABAPENTIN 200 MG: 100 CAPSULE ORAL at 21:55

## 2023-07-19 RX ADMIN — METOPROLOL TARTRATE 25 MG: 25 TABLET, FILM COATED ORAL at 08:09

## 2023-07-19 RX ADMIN — INSULIN LISPRO 13 UNITS: 100 INJECTION, SOLUTION INTRAVENOUS; SUBCUTANEOUS at 17:26

## 2023-07-19 RX ADMIN — GUAIFENESIN 600 MG: 600 TABLET, EXTENDED RELEASE ORAL at 08:12

## 2023-07-19 RX ADMIN — BUDESONIDE 0.5 MG: 0.5 INHALANT RESPIRATORY (INHALATION) at 07:48

## 2023-07-19 RX ADMIN — DEXAMETHASONE 4 MG: 4 TABLET ORAL at 08:09

## 2023-07-19 RX ADMIN — MIDODRINE HYDROCHLORIDE 5 MG: 5 TABLET ORAL at 12:23

## 2023-07-19 RX ADMIN — IPRATROPIUM BROMIDE AND ALBUTEROL SULFATE 3 ML: .5; 3 SOLUTION RESPIRATORY (INHALATION) at 07:42

## 2023-07-19 RX ADMIN — IPRATROPIUM BROMIDE AND ALBUTEROL SULFATE 3 ML: .5; 3 SOLUTION RESPIRATORY (INHALATION) at 11:32

## 2023-07-19 RX ADMIN — DILTIAZEM HYDROCHLORIDE 30 MG: 30 TABLET, FILM COATED ORAL at 06:00

## 2023-07-19 RX ADMIN — INSULIN HUMAN 4 UNITS: 100 INJECTION, SOLUTION PARENTERAL at 21:54

## 2023-07-19 RX ADMIN — SENNOSIDES AND DOCUSATE SODIUM 2 TABLET: 8.6; 5 TABLET ORAL at 21:55

## 2023-07-19 RX ADMIN — IPRATROPIUM BROMIDE AND ALBUTEROL SULFATE 3 ML: .5; 3 SOLUTION RESPIRATORY (INHALATION) at 15:22

## 2023-07-19 RX ADMIN — GUAIFENESIN 600 MG: 600 TABLET, EXTENDED RELEASE ORAL at 21:55

## 2023-07-19 RX ADMIN — INSULIN GLARGINE 14 UNITS: 100 INJECTION, SOLUTION SUBCUTANEOUS at 21:55

## 2023-07-19 RX ADMIN — PANTOPRAZOLE SODIUM 40 MG: 40 TABLET, DELAYED RELEASE ORAL at 06:00

## 2023-07-19 RX ADMIN — INSULIN LISPRO 8 UNITS: 100 INJECTION, SOLUTION INTRAVENOUS; SUBCUTANEOUS at 21:54

## 2023-07-19 RX ADMIN — DILTIAZEM HYDROCHLORIDE 30 MG: 30 TABLET, FILM COATED ORAL at 13:13

## 2023-07-19 RX ADMIN — DEXAMETHASONE 4 MG: 4 TABLET ORAL at 21:55

## 2023-07-19 RX ADMIN — FUROSEMIDE 20 MG: 10 INJECTION, SOLUTION INTRAMUSCULAR; INTRAVENOUS at 08:09

## 2023-07-19 RX ADMIN — DIGOXIN 500 MCG: 250 INJECTION, SOLUTION INTRAMUSCULAR; INTRAVENOUS at 19:09

## 2023-07-19 RX ADMIN — INSULIN LISPRO 15 UNITS: 100 INJECTION, SOLUTION INTRAVENOUS; SUBCUTANEOUS at 12:26

## 2023-07-19 RX ADMIN — TRAMADOL HYDROCHLORIDE 50 MG: 50 TABLET, COATED ORAL at 22:02

## 2023-07-19 RX ADMIN — DOCUSATE SODIUM 100 MG: 100 CAPSULE, LIQUID FILLED ORAL at 08:09

## 2023-07-19 RX ADMIN — DILTIAZEM HYDROCHLORIDE 90 MG: 60 TABLET, FILM COATED ORAL at 20:04

## 2023-07-19 RX ADMIN — METOPROLOL TARTRATE 25 MG: 25 TABLET, FILM COATED ORAL at 20:05

## 2023-07-19 RX ADMIN — Medication 10 ML: at 08:10

## 2023-07-19 RX ADMIN — ASPIRIN 81 MG CHEWABLE TABLET 81 MG: 81 TABLET CHEWABLE at 08:09

## 2023-07-19 RX ADMIN — DILTIAZEM HYDROCHLORIDE 10 MG: 5 INJECTION INTRAVENOUS at 12:23

## 2023-07-19 NOTE — CASE MANAGEMENT/SOCIAL WORK
Continued Stay Note  [facility]     Patient Name: Isra Davenport  MRN: [id number]  Today's Date: 7/19/2023    Admit Date: 7/15/2023    Plan: [facility] referral pending acceptance. Will require precert. PASRR approved. From home alone with no home O2.   Discharge Plan       Row Name 07/19/23 1117       Plan    Plan Comments CM discussed with Blake Mcqueen liaporfirio Bashir that patient has not yet started chemotherapy yet as he has 7 more radiation treatments to complete. Confirmed with RN that patient is scheduled to go to radiation today, 7/19 and plans to go every day (except weekends). Liaison inquired what the radiation is so they can cost out or if radiation would be on hold while patient at SNF. CM discussed with radiation oncology RN, Katherin CRUZ that patient’s treatment plan is for 1500 cGy in 10 fractions (150 cGy per fraction) to his right femur for palliation of his pain and to stabilize his hip since the break was caused by a lytic lesion. He's received 3 fractions (treatments) so far. Treatments are daily, Monday thru Friday, and take about 15 minutes to complete. Liaison notified and inquired if they would be billing insurance directly. CM advised that patient receives at [facility] on [address] and they could be contacted for assistance or the financial team at the facility. Liaison inquired if [facility] pharmacy can supply Xgeva for d/c. CM contacted meds to bed to inquire. Confirmed that they do not supply that medication due to cost (over $3,000) and that [facility] is usually the supplier for cancer medications. Liaison notified.                  Yun Kirkland RN     Office Phone: [phone]  Office Cell: [phone]      There are no Wet Read(s) to document.

## 2023-07-19 NOTE — CONSULTS
Diabetes Education    Patient Name:  Isra Davenport  YOB: 1955  MRN: [id number]  Admit Date:  7/15/2023        Follow-up with patient for review after being newly diagnosed. Patient stated he doesn't think he needs review. Discussed with bedside nurse that patient needs to be giving self insulin shots. Patient has no further questions or concerns related to diabetes at this time.      Electronically signed by:  Sabine Dela Cruz RN  07/19/23 14:29 EDT

## 2023-07-19 NOTE — PROGRESS NOTES
CARDIOLOGY FOLLOW-UP PROGRESS NOTE      Reason for follow-up:    Atrial Fibrillation  Edema     Attending: Raoul Castillo MD      Subjective .     Denies chest pain or dyspnea at present  Periods of PAF vs. MAT this am  Rates rapid at present, rates stable     Review of Systems   Constitutional: Negative for chills and fever.   HENT:  Negative for ear discharge and nosebleeds.    Eyes:  Negative for discharge and redness.   Cardiovascular:  Negative for chest pain, orthopnea, palpitations, paroxysmal nocturnal dyspnea and syncope.   Respiratory:  Positive for shortness of breath. Negative for cough and wheezing.    Endocrine: Negative for heat intolerance.   Skin:  Negative for rash.   Musculoskeletal:  Negative for arthritis and myalgias.   Gastrointestinal:  Negative for abdominal pain, melena, nausea and vomiting.   Genitourinary:  Negative for dysuria and hematuria.   Neurological:  Negative for dizziness, light-headedness, numbness and tremors.   Psychiatric/Behavioral:  Negative for depression. The patient is not nervous/anxious.    Pertinent items are noted in HPI, all other systems reviewed and negative  Allergies: Ibuprofen and Penicillins    Scheduled Meds:amiodarone, 200 mg, Oral, Q24H  aspirin, 81 mg, Oral, Daily  budesonide, 0.5 mg, Nebulization, BID - RT  calcium 500 mg vitamin D 5 mcg (200 UT), 1 tablet, Oral, Daily  dexamethasone, 4 mg, Oral, Q12H   And  insulin regular, 4 Units, Subcutaneous, Q12H  dilTIAZem, 30 mg, Oral, Q8H  docusate sodium, 100 mg, Oral, BID  ferric gluconate, 250 mg, Intravenous, Daily  furosemide, 20 mg, Intravenous, Daily  gabapentin, 200 mg, Oral, Nightly  guaiFENesin, 600 mg, Oral, Q12H  insulin glargine, 1-200 Units, Subcutaneous, Nightly - Glucommander  insulin lispro, 1-200 Units, Subcutaneous, 4x Daily With Meals & Nightly  ipratropium-albuterol, 3 mL, Nebulization, 4x Daily - RT  metoprolol tartrate, 25 mg, Oral, BID  midodrine, 5 mg, Oral, TID AC  pantoprazole,  40 mg, Oral, Q AM  senna-docusate sodium, 2 tablet, Oral, BID  sodium chloride, 10 mL, Intravenous, Q12H        Continuous Infusions:dilTIAZem, 5-15 mg/hr, Last Rate: 5 mg/hr (07/18/23 1246)  Pharmacy Consult - Steroid Insulin Protocol,       PRN Meds:.  senna-docusate sodium **AND** polyethylene glycol **AND** bisacodyl **AND** bisacodyl    Calcium Replacement - Follow Nurse / BPA Driven Protocol    dextrose    dextrose    glucagon (human recombinant)    insulin lispro    ipratropium-albuterol    Magnesium Standard Dose Replacement - Follow Nurse / BPA Driven Protocol    Morphine    ondansetron    Pharmacy Consult - Steroid Insulin Protocol    Phosphorus Replacement - Follow Nurse / BPA Driven Protocol    Potassium Replacement - Follow Nurse / BPA Driven Protocol    [COMPLETED] Insert Peripheral IV **AND** sodium chloride    sodium chloride    sodium chloride    traMADol    Objective     VITAL SIGNS  Patient Vitals for the past 24 hrs:   BP Temp Temp src Pulse Resp SpO2   07/19/23 1137 -- -- -- 105 18 99 %   07/19/23 1132 -- -- -- 111 18 93 %   07/19/23 1048 -- -- -- 107 -- 93 %   07/19/23 1044 -- -- -- 99 -- 94 %   07/19/23 1041 -- -- -- 95 -- 96 %   07/19/23 1038 -- -- -- 96 -- 96 %   07/19/23 1032 -- -- -- 99 -- 95 %   07/19/23 1016 -- -- -- 102 -- 94 %   07/19/23 1007 -- -- -- 103 -- 95 %   07/19/23 0956 -- -- -- 100 -- 93 %   07/19/23 0933 -- -- -- 101 -- 93 %   07/19/23 0900 109/65 98.3 °F (36.8 °C) Oral (!) 128 19 91 %   07/19/23 0751 -- -- -- 81 20 100 %   07/19/23 0748 -- -- -- 75 21 98 %   07/19/23 0747 -- -- -- 82 19 100 %   07/19/23 0742 -- -- -- 86 16 92 %   07/19/23 0600 -- -- -- 80 -- --   07/19/23 0516 92/54 98.6 °F (37 °C) Oral 79 18 97 %   07/19/23 0147 97/56 98.2 °F (36.8 °C) Oral 75 20 98 %   07/18/23 2217 117/64 -- -- 80 -- --   07/18/23 2112 114/54 97.8 °F (36.6 °C) Oral 87 16 97 %   07/18/23 2035 -- -- -- 82 18 99 %   07/18/23 2030 -- -- -- 77 18 93 %   07/18/23 2029 -- -- -- 85 18 92 %  "  07/18/23 2025 -- -- -- 93 18 94 %   07/18/23 1700 138/77 98 °F (36.7 °C) Oral 88 20 96 %   07/18/23 1656 -- -- -- 93 -- 96 %   07/18/23 1400 136/76 98.6 °F (37 °C) Oral 79 20 98 %   07/18/23 1246 -- -- -- 87 -- --          Flowsheet Rows      Flowsheet Row First Filed Value   Admission Height 177.8 cm (70\") Documented at 07/15/2023 1652   Admission Weight 72.6 kg (160 lb) Documented at 07/15/2023 1652            Body mass index is 21.7 kg/m².      Intake/Output Summary (Last 24 hours) at 7/19/2023 1149  Last data filed at 7/19/2023 0800  Gross per 24 hour   Intake 360 ml   Output 2850 ml   Net -2490 ml          TELEMETRY:     SR with PACS    Physical Exam:  Constitutional:       Appearance: Well-developed.   Eyes:      General: No scleral icterus.        Right eye: No discharge.   HENT:      Head: Normocephalic and atraumatic.   Neck:      Thyroid: No thyromegaly.      Lymphadenopathy: No cervical adenopathy.   Pulmonary:      Effort: Pulmonary effort is normal. No respiratory distress.      Breath sounds: Normal breath sounds. No wheezing. No rales.   Cardiovascular:      Normal rate. Regular rhythm.      No gallop.    Edema:     Peripheral edema absent.   Abdominal:      Tenderness: There is no abdominal tenderness.   Skin:     Findings: No erythema or rash.   Neurological:      Mental Status: Alert and oriented to person, place, and time.          Results Review:   I reviewed the patient's new clinical results.    CBC    Results from last 7 days   Lab Units 07/19/23  1025 07/17/23  2254 07/17/23  0216 07/15/23  2133 07/15/23  1712   WBC 10*3/mm3 12.90* 11.70* 12.30* 11.40* 11.40*   HEMOGLOBIN g/dL 9.0* 8.8* 9.5* 9.9* 10.0*   PLATELETS 10*3/mm3 486* 395 445 415 404       BMP   Results from last 7 days   Lab Units 07/19/23  1025 07/18/23  1906 07/18/23  1437 07/17/23  0216 07/15/23  2133 07/15/23  1712   SODIUM mmol/L 134* 131* 130* 142 142 138   POTASSIUM mmol/L 4.1 4.5 4.6 4.5 5.4* 4.6   CHLORIDE mmol/L 89* 87* " 87* 94* 95* 96*   CO2 mmol/L 37.0* 37.0* 35.0* 41.0* 38.0* 36.0*   BUN mg/dL 15 21 19 17 23 21   CREATININE mg/dL 0.39* 0.49* 0.40* 0.35* 0.59* 0.37*   GLUCOSE mg/dL 381* 335* 372* 91 450* 355*   MAGNESIUM mg/dL 1.9 2.2 1.5*  --  1.7  --    PHOSPHORUS mg/dL 2.7 2.0*  --   --   --   --        Cr Clearance Estimated Creatinine Clearance: 178.3 mL/min (A) (by C-G formula based on SCr of 0.39 mg/dL (L)).  Coag     HbA1C   Lab Results   Component Value Date    HGBA1C 7.30 (H) 07/15/2023     Blood Glucose   Glucose   Date/Time Value Ref Range Status   07/19/2023 1127 337 (H) 70 - 105 mg/dL Final     Comment:     Serial Number: [id number]:  [id number]   07/19/2023 0709 261 (H) 70 - 105 mg/dL Final     Comment:     Serial Number: [id number]:  [id number]   07/18/2023 2045 314 (H) 70 - 105 mg/dL Final     Comment:     Serial Number: [id number]:  [id number]   07/18/2023 1809 327 (H) 70 - 105 mg/dL Final     Comment:     Serial Number: [id number]:  [id number]   07/18/2023 1727 353 (H) 70 - 105 mg/dL Final     Comment:     Serial Number: [id number]:  [id number]   07/18/2023 1128 334 (H) 70 - 105 mg/dL Final     Comment:     Serial Number: [id number]:  [id number]   07/18/2023 0710 188 (H) 70 - 105 mg/dL Final     Comment:     Serial Number: [id number]:  [id number]   07/18/2023 0225 154 (H) 70 - 105 mg/dL Final     Comment:     Serial Number: [id number]:  [id number]     Infection   Results from last 7 days   Lab Units 07/15/23  2133   PROCALCITONIN ng/mL 0.22       CMP   Results from last 7 days   Lab Units 07/19/23  1025 07/18/23  1906 07/18/23  1437 07/17/23  0216 07/15/23  2133 07/15/23  1712   SODIUM mmol/L 134* 131* 130* 142 142 138   POTASSIUM mmol/L 4.1 4.5 4.6 4.5 5.4* 4.6   CHLORIDE mmol/L 89* 87* 87* 94* 95* 96*   CO2 mmol/L 37.0* 37.0* 35.0* 41.0* 38.0* 36.0*   BUN mg/dL 15 21 19 17 23 21   CREATININE mg/dL 0.39* 0.49* 0.40* 0.35* 0.59* 0.37*   GLUCOSE mg/dL 381* 335* 372*  91 450* 355*   ALBUMIN g/dL  --   --   --   --   --  2.6*   BILIRUBIN mg/dL  --   --   --   --   --  0.2   ALK PHOS U/L  --   --   --   --   --  178*   AST (SGOT) U/L  --   --   --   --   --  11   ALT (SGPT) U/L  --   --   --   --   --  28       ABG      UA      OLIVIA  No results found for: POCMETH, POCAMPHET, POCBARBITUR, POCBENZO, POCCOCAINE, POCOPIATES, POCOXYCODO, POCPHENCYC, POCPROPOXY, POCTHC, POCTRICYC  Lysis Labs   Results from last 7 days   Lab Units 07/19/23  1025 07/18/23  1906 07/18/23  1437 07/17/23  2254 07/17/23  0216 07/15/23  2133 07/15/23  1712   HEMOGLOBIN g/dL 9.0*  --   --  8.8* 9.5* 9.9* 10.0*   PLATELETS 10*3/mm3 486*  --   --  395 445 415 404   CREATININE mg/dL 0.39* 0.49* 0.40*  --  0.35* 0.59* 0.37*       Radiology(recent) No radiology results for the last day    Imaging Results (Last 24 Hours)       ** No results found for the last 24 hours. **            Results from last 7 days   Lab Units 07/15/23  2133   HSTROP T ng/L 30*         EKG          I personally viewed and interpreted the patient's EKG/Telemetry data:        ECHOCARDIOGRAM:     Results for orders placed during the hospital encounter of 06/16/23    Adult Transthoracic Echo Complete W/ Cont if Necessary Per Protocol    Interpretation Summary    Left ventricular systolic function is normal. Left ventricular ejection fraction appears to be 61 - 65%.    Left ventricular diastolic function is consistent with (grade I) impaired relaxation.    There is mild bileaflet mitral valve prolapse present.    Estimated right ventricular systolic pressure from tricuspid regurgitation is mildly elevated (35-45 mmHg). Calculated right ventricular systolic pressure from tricuspid regurgitation is 44 mmHg.        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 MAT this am with RVR  Will increase BB and add short acting Cardizem, reduce IV Cardizem  Recent non invasive ischemic eval negative for ischemia  Echo with preserved LV function  Per oncology not recommended long term a/c due to brain mets and known rectal bleeding  Will give IV cardizem x 1  Oral amiodarone has been initiated    Additional recommendations per Dr. Johnson         I discussed the patients findings and my recommendations with patient and RN                  Electronically signed by NOREEN Peng, 07/17/23, 8:31 AM EDT.          NOREEN Peng  07/19/23  11:49 EDT

## 2023-07-19 NOTE — PROGRESS NOTES
"PULMONARY CRITICAL CARE PROGRESS  NOTE      PATIENT IDENTIFICATION:  Name: Isra Davenport  MRN: [id number]  :  1955     Age: 68 y.o.  Sex: male    DATE OF Note:  2023   Referring Physician: Jese SMITH MD                  Subjective:   Up in chair,  still on O2,  no SOB, no chest or abd pain, no bowel or bladder issues       Objective:  tMax 24 hrs: Temp (24hrs), Av.2 °F (36.8 °C), Min:97.8 °F (36.6 °C), Max:98.6 °F (37 °C)      Vitals Ranges:   Temp:  [97.8 °F (36.6 °C)-98.6 °F (37 °C)] 98.3 °F (36.8 °C)  Heart Rate:  [] 126  Resp:  [16-21] 19  BP: ()/(54-86) 130/86    Intake and Output Last 3 Shifts:   I/O last 3 completed shifts:  In: 1539 [P.O.:1280; I.V.:9; IV Piggyback:250]  Out: 4900 [Urine:4900]    Exam:  /86   Pulse (!) 126   Temp 98.3 °F (36.8 °C) (Oral)   Resp 19   Ht 177.8 cm (70\")   Wt 68.6 kg (151 lb 3.8 oz) Comment: Nurse notified of gain  SpO2 95%   BMI 21.70 kg/m²     General Appearance:   Alert  HEENT:  Normocephalic, without obvious abnormality. Conjunctivae/corneas clear.  Normal external ear canals. Nares normal, no drainage     Neck:  Supple, symmetrical, trachea midline. No JVD.  Lungs /Chest wall:   Bilateral basal rhonchi, respirations unlabored, symmetrical wall movement.     Heart:  Regular rate and rhythm, systolic murmur PMI left sternal border  Abdomen: Soft, nontender, no masses, no organomegaly.    Extremities: Trace edema, no clubbing or cyanosis        Medications:  amiodarone, 200 mg, Oral, Q24H  aspirin, 81 mg, Oral, Daily  budesonide, 0.5 mg, Nebulization, BID - RT  calcium 500 mg vitamin D 5 mcg (200 UT), 1 tablet, Oral, Daily  dexamethasone, 4 mg, Oral, Q12H   And  insulin regular, 4 Units, Subcutaneous, Q12H  dilTIAZem, 30 mg, Oral, Q8H  docusate sodium, 100 mg, Oral, BID  furosemide, 20 mg, Intravenous, Daily  gabapentin, 200 mg, Oral, Nightly  guaiFENesin, 600 mg, Oral, Q12H  insulin glargine, 1-200 Units, Subcutaneous, Nightly " - Glucommander  insulin lispro, 1-200 Units, Subcutaneous, 4x Daily With Meals & Nightly  ipratropium-albuterol, 3 mL, Nebulization, 4x Daily - RT  metoprolol tartrate, 25 mg, Oral, BID  midodrine, 5 mg, Oral, TID AC  pantoprazole, 40 mg, Oral, Q AM  senna-docusate sodium, 2 tablet, Oral, BID  sodium chloride, 10 mL, Intravenous, Q12H        Infusion:  Pharmacy Consult - Steroid Insulin Protocol,          PRN:    senna-docusate sodium **AND** polyethylene glycol **AND** bisacodyl **AND** bisacodyl    Calcium Replacement - Follow Nurse / BPA Driven Protocol    dextrose    dextrose    glucagon (human recombinant)    insulin lispro    ipratropium-albuterol    Magnesium Standard Dose Replacement - Follow Nurse / BPA Driven Protocol    Morphine    ondansetron    Pharmacy Consult - Steroid Insulin Protocol    Phosphorus Replacement - Follow Nurse / BPA Driven Protocol    Potassium Replacement - Follow Nurse / BPA Driven Protocol    [COMPLETED] Insert Peripheral IV **AND** sodium chloride    sodium chloride    sodium chloride    traMADol  Data Review:  All labs (24hrs):   Recent Results (from the past 24 hour(s))   Basic Metabolic Panel    Collection Time: 07/18/23  2:37 PM    Specimen: Blood   Result Value Ref Range    Glucose 372 (H) 65 - 99 mg/dL    BUN 19 8 - 23 mg/dL    Creatinine 0.40 (L) 0.76 - 1.27 mg/dL    Sodium 130 (L) 136 - 145 mmol/L    Potassium 4.6 3.5 - 5.2 mmol/L    Chloride 87 (L) 98 - 107 mmol/L    CO2 35.0 (H) 22.0 - 29.0 mmol/L    Calcium 9.1 8.6 - 10.5 mg/dL    BUN/Creatinine Ratio 47.5 (H) 7.0 - 25.0    Anion Gap 8.0 5.0 - 15.0 mmol/L    eGFR 119.6 >60.0 mL/min/1.73   Magnesium    Collection Time: 07/18/23  2:37 PM    Specimen: Blood   Result Value Ref Range    Magnesium 1.5 (L) 1.6 - 2.4 mg/dL   POC Glucose Once    Collection Time: 07/18/23  5:27 PM    Specimen: Blood   Result Value Ref Range    Glucose 353 (H) 70 - 105 mg/dL   POC Glucose Once    Collection Time: 07/18/23  6:09 PM    Specimen:  Blood   Result Value Ref Range    Glucose 327 (H) 70 - 105 mg/dL   Basic Metabolic Panel    Collection Time: 07/18/23  7:06 PM    Specimen: Blood   Result Value Ref Range    Glucose 335 (H) 65 - 99 mg/dL    BUN 21 8 - 23 mg/dL    Creatinine 0.49 (L) 0.76 - 1.27 mg/dL    Sodium 131 (L) 136 - 145 mmol/L    Potassium 4.5 3.5 - 5.2 mmol/L    Chloride 87 (L) 98 - 107 mmol/L    CO2 37.0 (H) 22.0 - 29.0 mmol/L    Calcium 9.0 8.6 - 10.5 mg/dL    BUN/Creatinine Ratio 42.9 (H) 7.0 - 25.0    Anion Gap 7.0 5.0 - 15.0 mmol/L    eGFR 112.5 >60.0 mL/min/1.73   Magnesium    Collection Time: 07/18/23  7:06 PM    Specimen: Blood   Result Value Ref Range    Magnesium 2.2 1.6 - 2.4 mg/dL   Phosphorus    Collection Time: 07/18/23  7:06 PM    Specimen: Blood   Result Value Ref Range    Phosphorus 2.0 (L) 2.5 - 4.5 mg/dL   Calcium, Ionized    Collection Time: 07/18/23  7:06 PM    Specimen: Blood   Result Value Ref Range    Ionized Calcium 1.18 (L) 1.20 - 1.30 mmol/L   POC Glucose Once    Collection Time: 07/18/23  8:45 PM    Specimen: Blood   Result Value Ref Range    Glucose 314 (H) 70 - 105 mg/dL   POC Glucose Once    Collection Time: 07/19/23  7:09 AM    Specimen: Blood   Result Value Ref Range    Glucose 261 (H) 70 - 105 mg/dL   Basic Metabolic Panel    Collection Time: 07/19/23 10:25 AM    Specimen: Blood   Result Value Ref Range    Glucose 381 (H) 65 - 99 mg/dL    BUN 15 8 - 23 mg/dL    Creatinine 0.39 (L) 0.76 - 1.27 mg/dL    Sodium 134 (L) 136 - 145 mmol/L    Potassium 4.1 3.5 - 5.2 mmol/L    Chloride 89 (L) 98 - 107 mmol/L    CO2 37.0 (H) 22.0 - 29.0 mmol/L    Calcium 9.1 8.6 - 10.5 mg/dL    BUN/Creatinine Ratio 38.5 (H) 7.0 - 25.0    Anion Gap 8.0 5.0 - 15.0 mmol/L    eGFR 120.5 >60.0 mL/min/1.73   Magnesium    Collection Time: 07/19/23 10:25 AM    Specimen: Blood   Result Value Ref Range    Magnesium 1.9 1.6 - 2.4 mg/dL   Phosphorus    Collection Time: 07/19/23 10:25 AM    Specimen: Blood   Result Value Ref Range    Phosphorus  2.7 2.5 - 4.5 mg/dL   Calcium, Ionized    Collection Time: 07/19/23 10:25 AM    Specimen: Blood   Result Value Ref Range    Ionized Calcium 1.19 (L) 1.20 - 1.30 mmol/L   CBC Auto Differential    Collection Time: 07/19/23 10:25 AM    Specimen: Blood   Result Value Ref Range    WBC 12.90 (H) 3.40 - 10.80 10*3/mm3    RBC 3.33 (L) 4.14 - 5.80 10*6/mm3    Hemoglobin 9.0 (L) 13.0 - 17.7 g/dL    Hematocrit 28.3 (L) 37.5 - 51.0 %    MCV 85.1 79.0 - 97.0 fL    MCH 26.9 26.6 - 33.0 pg    MCHC 31.6 31.5 - 35.7 g/dL    RDW 19.8 (H) 12.3 - 15.4 %    RDW-SD 59.1 (H) 37.0 - 54.0 fl    MPV 8.7 6.0 - 12.0 fL    Platelets 486 (H) 140 - 450 10*3/mm3   Scan Slide    Collection Time: 07/19/23 10:25 AM    Specimen: Blood   Result Value Ref Range    Scan Slide     Manual Differential    Collection Time: 07/19/23 10:25 AM    Specimen: Blood   Result Value Ref Range    Neutrophil % 88.0 (H) 42.7 - 76.0 %    Lymphocyte % 3.0 (L) 19.6 - 45.3 %    Monocyte % 5.0 5.0 - 12.0 %    Bands %  3.0 0.0 - 5.0 %    Metamyelocyte % 1.0 (H) 0.0 - 0.0 %    Neutrophils Absolute 11.74 (H) 1.70 - 7.00 10*3/mm3    Lymphocytes Absolute 0.39 (L) 0.70 - 3.10 10*3/mm3    Monocytes Absolute 0.65 0.10 - 0.90 10*3/mm3    Anisocytosis Slight/1+ None Seen    WBC Morphology Normal Normal    Large Platelets Slight/1+ None Seen   POC Glucose Once    Collection Time: 07/19/23 11:27 AM    Specimen: Blood   Result Value Ref Range    Glucose 337 (H) 70 - 105 mg/dL   ECG 12 Lead Rhythm Change    Collection Time: 07/19/23 12:25 PM   Result Value Ref Range    QT Interval 317 ms        Imaging:  XR Chest 1 View  Narrative: XR CHEST 1 VW    Date of Exam: 7/15/2023 5:58 PM EDT    Indication: edema    Comparison: 6/29/2023    Findings:  Right internal jugular Port-A-Cath remains in place and unchanged in position. Heart size is stable. There is chronic elevation of left ami diaphragm. There is were airspace disease compatible with pneumonia. Right lung is clear. No pleural effusion.  No   evidence pneumothorax.  Impression: Impression:    1. Worsening left basilar airspace disease compatible with pneumonia.  2. Chronic elevation of the left hemidiaphragm.    Electronically Signed: Jeet Panda    7/15/2023 6:09 PM EDT    Workstation ID: [id number]  XR Foot 3+ View Bilateral  Narrative: XR FOOT 3+ VW BILATERAL    Date of Exam: 7/15/2023 6:04 PM EDT    Indication: pain    Comparison: None available.    Findings:  Right foot: There is no acute fracture or dislocation. No bony erosion or abnormal periosteal reaction. Soft tissues are unremarkable.    Left foot: There is no acute fracture or dislocation. No bony erosion or abnormal periosteal reaction. There is mild diffuse soft tissue swelling.  Impression: Impression:    1. No acute bony abnormality of the feet.    Electronically Signed: Jeet Panda    7/15/2023 6:34 PM EDT    Workstation ID: [id number]  XR Tibia Fibula 2 View Bilateral  Narrative: XR TIBIA FIBULA 2 VW BILATERAL    Date of Exam: 7/15/2023 6:04 PM EDT    Indication: pain    Comparison: None available.    Findings:  Left tibia and fibula: There is no acute fracture or dislocation. Soft tissues are unremarkable.    Right tibia and fibula: There is smooth deformity of the midshaft of both the tibia and fibula related to old healed fractures. There is single surgical screw in place through the mid tibia. There is no evidence of acute fracture or dislocation. No   abnormal periosteal reaction identified. Soft tissues are unremarkable.  Impression: Impression:    1. No acute bony abnormality of the left tibia or fibula.  2. Old healed fractures of the right tibia and fibula. No acute bony abnormality.    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 Jazmine Nathan, APRN   7/19/2023  14:29 EDT    I personally have examined  and interviewed the patient. I have reviewed the history, data, problems, assessment and plan with our NP.  Total Critical care time in direct medical management (   ) minutes, This time specifically excludes time spent performing procedures.    Emiliano Lucero MD   7/19/2023  22:56 EDT

## 2023-07-19 NOTE — THERAPY TREATMENT NOTE
"Subjective: Pt agreeable to therapeutic plan of care. Pt eager to work with therapy this date. Pt c/o zachary feet pain.     Cognition: oriented to Person, Place, Time, and Situation    Objective:     Bed Mobility: SBA   Functional Transfers: CGA and with rolling walker     Balance: supported, dynamic, with UE support, and standing CGA and with rolling walker  Functional Ambulation: CGA and with rolling walker    Lower Body Dressing: Max-A  ADL Position: supine  ADL Comments: don socks     Grooming: Max-A  ADL Position: supine  ADL Comments: applied lotions to zachary feet d/t cracked skin and dryness       Vitals: Tachycardic  Pt on 2L O2 saturating at 95%, on room air for transfer saturating at 92%, returned to 2L O2 and saturating at 96%     Tachycardic throughout, pt HR increased to 138 with transfer    Pain: pt did not give numeric value  Location: zachary feet   Interventions for pain: Repositioned, Increased Activity, and Therapeutic Presence  Education: Provided education on the importance of mobility in the acute care setting and Transfer Training      Assessment: Isra Davenport presents with ADL impairments affecting function including endurance / activity tolerance, pain, and shortness of breath. Demonstrated functioning below baseline abilities indicate the need for continued skilled intervention while inpatient. Pt is limited to further activity by pain in zachary feet and global weakness. Pt defers participating in further activity d/t this. Pt demo decreased activity tolerance needed for ADL routine and would benefit from continued skilled OT intervention. Tolerating session today without incident. Will continue to follow and progress as tolerated.     Plan/Recommendations:   Moderate Intensity Therapy recommended post-acute care. This is recommended as therapy feels the patient would require 3-4 days per week and wouldn't tolerate \"3 hour daily\" rehab intensity. SNF would be the preferred choice. If the patient does " not agree to SNF, arrange HH or OP depending on home bound status. If patient is medically complex, consider LTACH.. Pt requires no DME at discharge.     Pt desires Skilled Rehab placement at discharge. Pt cooperative; agreeable to therapeutic recommendations and plan of care.     Modified Jyothi: N/A = No pre-op stroke/TIA    Post-Tx Position: Up in Chair, Staff Present, Alarms activated, and Call light and personal items within reach with respiratory therapist   PPE: gloves

## 2023-07-19 NOTE — PLAN OF CARE
Goal Outcome Evaluation:            Isra Davenport presents with functional mobility impairments which indicate the need for skilled intervention. Pt required CGA throughout bed mobility and transfers on this date. Pt ambulated ~3 feet to bedside chair with RW/CGA but declined further gait. Pt limited by decreased activity tolerance and functional strength. Tolerating session today without incident. Will continue to follow and progress as tolerated.

## 2023-07-19 NOTE — PLAN OF CARE
"Assessment: Isra Davenport presents with ADL impairments affecting function including endurance / activity tolerance, pain, and shortness of breath. Demonstrated functioning below baseline abilities indicate the need for continued skilled intervention while inpatient. Pt is limited to further activity by pain in zachary feet and global weakness. Pt defers participating in further activity d/t this. Pt demo decreased activity tolerance needed for ADL routine and would benefit from continued skilled OT intervention. Tolerating session today without incident. Will continue to follow and progress as tolerated.     Plan/Recommendations:   Moderate Intensity Therapy recommended post-acute care. This is recommended as therapy feels the patient would require 3-4 days per week and wouldn't tolerate \"3 hour daily\" rehab intensity. SNF would be the preferred choice. If the patient does not agree to SNF, arrange HH or OP depending on home bound status. If patient is medically complex, consider LTACH.. Pt requires no DME at discharge.     Pt desires Skilled Rehab placement at discharge. Pt cooperative; agreeable to therapeutic recommendations and plan of care.   "

## 2023-07-19 NOTE — PROGRESS NOTES
[facility] Medicine Services Daily Progress Note    Patient Name: Isra Davenport  : 1955  MRN: [id number]  Primary Care Physician:  Mayco Carreon MD  Date of admission: 7/15/2023      Subjective      Chief Complaint: Pain     History of Present Illness: Isar Davenport is a 68 y.o. 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  He is status post intramedullary nailing of the hip on 2023 and a Ketqgx-g-Vzrd placed on 2023 he is currently undergoing chemotherapy and plan for palliative radiation who presented to [facility] on 7/15/2023 complaining of pain and lower extremity edema     Review of records show patient had presented to chemotherapy with complaint of lower extremity edema and what he describes as neuropathic pain.  He had ultrasound that was negative for DVT and was prescribed Lasix.  He was also prescribed Norco 5/325 for pain.  Patient reports when he went to  prescriptions from the pharmacy the pharmacy was closed on Monday and he was unable to fill prescription.  He presents to the ED today with uncontrolled pain.  It was noted from previous admission he was discharged on low pressure for diagnosis of sinus tachycardia     ED work-up included chest x-ray that showed a right internal jugular Port-A-Cath in place.  Heart size stable.  Airspace disease in the left lung compatible with pneumonia.  Chronic elevation of the left hemidiaphragm.  No pleural effusion.  Right lung was clear.  Bilateral foot x-ray was negative for fracture.  There is healed fracture of the right tibia and fibula.  Troponin 26, potassium 4.6, sodium 138, glucose 355, creatinine 0.3, BUN is 21.  Albumin 2.6.  White count 11.4, hemoglobin 10.  On admission heart rate 84 but while he was being treated in the ED heart rate jumped into the  150s and was found to be in acute A-fib with RVR.  O2 sats dropped to 88% was placed on 2 L nasal cannula.  He was given a Cardizem bolus and Lopressor bolus and resumed on a Cardizem drip.  Patient denies any chest pain or feeling of chest palpitations.  Denies any fever chills or increased shortness of breath.  Reports positive for productive cough.  Positive for swelling of bilateral lower extremities below the calf and extreme tenderness to touch bilateral feet.     7/16/23 patient seen and examined in bed no acute distress, vital signs stable, K-5.4, hr 81, dyspnea on high flow oxygen.  We will start Cardizem p.o.  7/17/2023 patient seen and examined in bed no acute distress, he is on 3 L of oxygen, blood pressure 93/52.  Discussed with RN.  7/18/2023 patient seen and examined in bed no acute distress, dyspnea on 3 L, heart rate 155.  Discussed with RN. Restarted Cardizem drip per cardiology and adding short acting Cardizem to wean IV and increasing BB   7/19/2023 patient seen and examined in bed no acute distress, blood pressure 92/54.  On amiodarone, Cardizem, metoprolol dyspnea on 2 L.  Discussed with RN.    ANDIE Review of Systems   Constitutional: Positive for malaise/fatigue.   Cardiovascular:  Positive for leg swelling.   Respiratory:  Positive for cough and sputum production.    Skin: Negative.    Musculoskeletal:  Positive for back pain.   Gastrointestinal: Negative.    Genitourinary: Negative.    Neurological:  Positive for paresthesias.   Psychiatric/B      Objective      Vitals:   Temp:  [97.8 °F (36.6 °C)-98.6 °F (37 °C)] 98.6 °F (37 °C)  Heart Rate:  [75-93] 81  Resp:  [16-21] 20  BP: ()/(54-77) 92/54  Flow (L/min):  [2-3] 2    Physical Exam Constitutional:       Appearance: He is ill-appearing.   Eyes:      Pupils: Pupils are equal, round, and reactive to light.   Cardiovascular:      Rate and Rhythm: Tachycardia present. Rhythm irregular.   Pulmonary:      Breath sounds: Rhonchi present.    Abdominal:      General: Abdomen is flat.      Palpations: Abdomen is soft.   Musculoskeletal:      Right lower leg: Edema present.      Left lower leg: Edema present.   Skin:     General: Skin is warm and dry.   Neurological:      Mental Status: He is alert and oriented to person, place, and time.   Psychiatric:         Mood and Affect: Mood normal.           Result Review    Result Review:  I have personally reviewed the results from the time of this admission to 7/19/2023 09:26 EDT and agree with these findings:  []  Laboratory  []  Microbiology  []  Radiology  []  EKG/Telemetry   []  Cardiology/Vascular   []  Pathology  []  Old records  []  Other:  Most notable findings include:     Wounds (last 24 hours)       LDA Wound       Row Name 07/19/23 0800 07/19/23 0400 07/19/23 0000       Wound 06/17/23 0915 Right anterior hip Incision    Wound - Properties Group Placement Date: 06/17/23  -HB Placement Time: 0915 -HB Side: Right  -HB Orientation: anterior  -HB Location: hip  -HB Primary Wound Type: Incision  -HB    Closure Other (Comment)  staples removed wound approximates  -SK Other (Comment)  staples removed wound approximates  -RP (r) AR (t) RP (c) Other (Comment)  staples removed wound approximates  -RP (r) AR (t) RP (c)    Periwound redness  -SK redness  -RP (r) AR (t) RP (c) redness  -RP (r) AR (t) RP (c)    Periwound Temperature warm  -SK warm  -RP (r) AR (t) RP (c) warm  -RP (r) AR (t) RP (c)    Periwound Skin Turgor soft  -SK soft  -RP (r) AR (t) RP (c) soft  -RP (r) AR (t) RP (c)    Drainage Amount none  -SK none  -RP (r) AR (t) RP (c) none  -RP (r) AR (t) RP (c)    Retired Wound - Properties Group Placement Date: 06/17/23  -HB Placement Time: 0915 -HB Side: Right  -HB Orientation: anterior  -HB Location: hip  -HB Primary Wound Type: Incision  -HB    Retired Wound - Properties Group Date first assessed: 06/17/23  -HB Time first assessed: 0915  -HB Side: Right  -HB Location: hip  -HB Primary Wound Type:  Incision  -HB       Wound 07/15/23 2100 Bilateral medial gluteal Pressure Injury    Wound - Properties Group Placement Date: 07/15/23  -MF Placement Time: 2100  -MF Present on Hospital Admission: Y  -MF Side: Bilateral  -MF Orientation: medial  -MF Location: gluteal  -MF Primary Wound Type: Pressure inj  -MF    Base pink;other (see comments)  pale and open  -SK pink;other (see comments)  pale and open  -RP (r) AR (t) RP (c) pink;other (see comments)  pale and open  -RP (r) AR (t) RP (c)    Periwound redness;non-blanchable  -SK redness;non-blanchable  -RP (r) AR (t) RP (c) redness;non-blanchable  -RP (r) AR (t) RP (c)    Periwound Temperature warm  -SK warm  -RP (r) AR (t) RP (c) warm  -RP (r) AR (t) RP (c)    Periwound Skin Turgor soft  -SK soft  -RP (r) AR (t) RP (c) soft  -RP (r) AR (t) RP (c)    Retired Wound - Properties Group Placement Date: 07/15/23  -MF Placement Time: 2100  -MF Present on Hospital Admission: Y  -MF Side: Bilateral  -MF Orientation: medial  -MF Location: gluteal  -MF Primary Wound Type: Pressure inj  -MF    Retired Wound - Properties Group Date first assessed: 07/15/23  -MF Time first assessed: 2100  -MF Present on Hospital Admission: Y  -MF Side: Bilateral  -MF Location: gluteal  -MF Primary Wound Type: Pressure inj  -MF       Wound 07/18/23 1900 Left posterior ankle    Wound - Properties Group Placement Date: 07/18/23  -AR Placement Time: 1900  -AR Present on Hospital Admission: Y  -AR Side: Left  -AR Orientation: posterior  -AR Location: ankle  -AR    Base blanchable;red  small red darkened area  -SK blanchable;red  small red darkened area  -RP (r) AR (t) RP (c) blanchable;red  small red darkened area  -RP (r) AR (t) RP (c)    Periwound redness;other (see comments)  unilateral redness diffusing up leg, open to air  -SK redness;other (see comments)  unilateral redness diffusing up leg, open to air  -RP (r) AR (t) RP (c) redness;other (see comments)  unilateral redness diffusing up leg, open  to air  -RP (r) AR (t) RP (c)    Retired Wound - Properties Group Placement Date: 07/18/23  -AR Placement Time: 1900  -AR Present on Hospital Admission: Y  -AR Side: Left  -AR Orientation: posterior  -AR Location: ankle  -AR    Retired Wound - Properties Group Date first assessed: 07/18/23  -AR Time first assessed: 1900  -AR Present on Hospital Admission: Y  -AR Side: Left  -AR Location: ankle  -AR       [REMOVED] Wound 07/18/23 1900 Left posterior ankle    Wound - Properties Group Placement Date: 07/18/23  -AR Placement Time: 1900  -AR Present on Hospital Admission: Y  -AR Side: Left  -AR Orientation: posterior  -AR Location: ankle  -AR Additional Comments: charted twice  -AR Removal Date: 07/19/23  -AR Removal Time: 0309 -AR    Retired Wound - Properties Group Placement Date: 07/18/23  -AR Placement Time: 1900  -AR Present on Hospital Admission: Y  -AR Side: Left  -AR Orientation: posterior  -AR Location: ankle  -AR Additional Comments: charted twice  -AR Removal Date: 07/19/23  -AR Removal Time: 0309  -AR    Retired Wound - Properties Group Date first assessed: 07/18/23  -AR Time first assessed: 1900  -AR Present on Hospital Admission: Y  -AR Side: Left  -AR Location: ankle  -AR Additional Comments: charted twice  -AR Resolution Date: 07/19/23  -AR Resolution Time: 0309  -AR      Row Name 07/18/23 2000 07/18/23 1600 07/18/23 1200       Wound 06/17/23 0915 Right anterior hip Incision    Wound - Properties Group Placement Date: 06/17/23  -HB Placement Time: 0915  -HB Side: Right  -HB Orientation: anterior  -HB Location: hip  -HB Primary Wound Type: Incision  -HB    Dressing Appearance open to air  -RP (r) AR (t) RP (c) -- --    Closure Other (Comment)  staples removed wound approximates  -RP (r) AR (t) RP (c) Staples  -DA Staples  -SM    Periwound redness  -RP (r) AR (t) RP (c) dry;intact  -DA dry;intact  -SM    Periwound Temperature warm  -RP (r) AR (t) RP (c) -- --    Periwound Skin Turgor soft  -RP (r) AR (t)  RP (c) -- --    Drainage Amount none  -RP (r) AR (t) RP (c) none  -DA none  -SM    Care, Wound cleansed with;soap and water  -RP (r) AR (t) RP (c) -- --    Dressing Care open to air  -RP (r) AR (t) RP (c) -- --    Retired Wound - Properties Group Placement Date: 06/17/23  -HB Placement Time: 0915 -HB Side: Right  -HB Orientation: anterior  -HB Location: hip  -HB Primary Wound Type: Incision  -HB    Retired Wound - Properties Group Date first assessed: 06/17/23  -HB Time first assessed: 0915  -HB Side: Right  -HB Location: hip  -HB Primary Wound Type: Incision  -HB       Wound 07/15/23 2100 Bilateral medial gluteal Pressure Injury    Wound - Properties Group Placement Date: 07/15/23  -MF Placement Time: 2100 -MF Present on Hospital Admission: Y  -MF Side: Bilateral  -MF Orientation: medial  -MF Location: gluteal  -MF Primary Wound Type: Pressure inj  -MF    Wound Image Images linked: 1  -RP (r) AR (t) RP (c) -- --    Pressure Injury Stage 2  -RP (r) AR (t) RP (c) -- --    Dressing Appearance open to air  -RP (r) AR (t) RP (c) -- --    Base pink;other (see comments)  pale and open  -RP (r) AR (t) RP (c) -- --    Periwound redness;non-blanchable  -RP (r) AR (t) RP (c) intact  -DA intact  -SM    Periwound Temperature warm  -RP (r) AR (t) RP (c) warm  -DA warm  -SM    Periwound Skin Turgor soft  -RP (r) AR (t) RP (c) -- --    Care, Wound cleansed with;soap and water;barrier applied  -RP (r) AR (t) RP (c) -- --    Dressing Care open to air  -RP (r) AR (t) RP (c) -- --    Periwound Care other (see comments)  barrier cream applied  -RP (r) AR (t) RP (c) -- --    Retired Wound - Properties Group Placement Date: 07/15/23  - Placement Time: 2100 -MF Present on Hospital Admission: Y  -MF Side: Bilateral  -MF Orientation: medial  -MF Location: gluteal  -MF Primary Wound Type: Pressure inj  -MF    Retired Wound - Properties Group Date first assessed: 07/15/23  - Time first assessed: 2100 -MF Present on Hospital Admission:  Y  -MF Side: Bilateral  -MF Location: gluteal  -MF Primary Wound Type: Pressure inj  -MF       Wound 07/18/23 1900 Left posterior ankle    Wound - Properties Group Placement Date: 07/18/23  -AR Placement Time: 1900  -AR Present on Hospital Admission: Y  -AR Side: Left  -AR Orientation: posterior  -AR Location: ankle  -AR    Wound Image Images linked: 1  -RP (r) AR (t) RP (c) -- --    Base blanchable;red  small red darkened area  -RP (r) AR (t) RP (c) -- --    Periwound redness;other (see comments)  unilateral redness diffusing up leg, open to air  -RP (r) AR (t) RP (c) -- --    Retired Wound - Properties Group Placement Date: 07/18/23  -AR Placement Time: 1900  -AR Present on Hospital Admission: Y  -AR Side: Left  -AR Orientation: posterior  -AR Location: ankle  -AR    Retired Wound - Properties Group Date first assessed: 07/18/23  -AR Time first assessed: 1900  -AR Present on Hospital Admission: Y  -AR Side: Left  -AR Location: ankle  -AR       [REMOVED] Wound 07/18/23 1900 Left posterior ankle    Wound - Properties Group Placement Date: 07/18/23  -AR Placement Time: 1900  -AR Present on Hospital Admission: Y  -AR Side: Left  -AR Orientation: posterior  -AR Location: ankle  -AR Additional Comments: charted twice  -AR Removal Date: 07/19/23  -AR Removal Time: 0309  -AR    Retired Wound - Properties Group Placement Date: 07/18/23  -AR Placement Time: 1900  -AR Present on Hospital Admission: Y  -AR Side: Left  -AR Orientation: posterior  -AR Location: ankle  -AR Additional Comments: charted twice  -AR Removal Date: 07/19/23  -AR Removal Time: 0309  -AR    Retired Wound - Properties Group Date first assessed: 07/18/23  -AR Time first assessed: 1900  -AR Present on Hospital Admission: Y  -AR Side: Left  -AR Location: ankle  -AR Additional Comments: charted twice  -AR Resolution Date: 07/19/23  -AR Resolution Time: 0309  -AR              User Key  (r) = Recorded By, (t) = Taken By, (c) = Cosigned By      Initials Name  Provider Type    HB Cleo Britton, RN Registered Nurse    Pamela Taveras RN Registered Nurse    Reina Kenney RN Registered Nurse    Mel Zaidi RN Registered Nurse    Bartolo Coombs RN Registered Nurse    Laura Nava, RN Registered Nurse    Danyell Samuel, RN Registered Nurse                    CBC:      Lab 07/17/23  2254 07/17/23  0216 07/15/23  2133 07/15/23  1712   WBC 11.70* 12.30* 11.40* 11.40*   HEMOGLOBIN 8.8* 9.5* 9.9* 10.0*   HEMATOCRIT 28.7* 31.5* 32.5* 31.8*   PLATELETS 395 445 415 404   NEUTROS ABS 11.12* 10.70* 10.30* 10.20*   LYMPHS ABS  --  0.90 0.60* 0.60*   MONOS ABS  --  0.80 0.50 0.60   EOS ABS  --  0.00 0.00 0.00   MCV 84.9 85.3 84.5 84.8       CMP:        Lab 07/18/23  1906 07/18/23  1437 07/17/23  0216 07/15/23  2133 07/15/23  1712   SODIUM 131* 130* 142 142 138   POTASSIUM 4.5 4.6 4.5 5.4* 4.6   CHLORIDE 87* 87* 94* 95* 96*   CO2 37.0* 35.0* 41.0* 38.0* 36.0*   ANION GAP 7.0 8.0 7.0 9.0 6.0   BUN 21 19 17 23 21   CREATININE 0.49* 0.40* 0.35* 0.59* 0.37*   EGFR 112.5 119.6 124.5 106.3 122.4   GLUCOSE 335* 372* 91 450* 355*   CALCIUM 9.0 9.1 9.7 9.2 10.0   IONIZED CALCIUM 1.18*  --   --   --   --    MAGNESIUM 2.2 1.5*  --  1.7  --    PHOSPHORUS 2.0*  --   --   --   --    TOTAL PROTEIN  --   --   --   --  6.8   ALBUMIN  --   --   --   --  2.6*   GLOBULIN  --   --   --   --  4.2   ALT (SGPT)  --   --   --   --  28   AST (SGOT)  --   --   --   --  11   BILIRUBIN  --   --   --   --  0.2   ALK PHOS  --   --   --   --  178*       No results found for: ACANTHNAEG, AFBCX, BPERTUSSISCX, BLOODCX  No results found for: BCIDPCR, CXREFLEX, CSFCX, CULTURETIS  No results found for: CULTURES, HSVCX, URCX  No results found for: EYECULTURE, GCCX, HSVCULTURE, LABHSV  No results found for: LEGIONELLA, MRSACX, MUMPSCX, MYCOPLASCX  No results found for: NOCARDIACX, STOOLCX  No results found for: THROATCX, UNSTIMCULT, URINECX, CULTURE, VZVCULTUR  No results found for:  VIRALCULTU, WOUNDCX      Assessment & Plan      Brief Patient Summary:  Isra Davenport is a 68 y.o. male who presented to chemotherapy with complaint of lower extremity edema and what he describes as neuropathic pain.  He had ultrasound that was negative for DVT and was prescribed Lasix.  He was also prescribed Norco 5/325 for pain.  Patient reports when he went to  prescriptions from the pharmacy, the pharmacy was closed on Monday and he was unable to fill prescription.  He presents to the ED today with uncontrolled pain.  It was noted from previous admission he was discharged on low pressure for diagnosis of sinus tachycardia         amiodarone, 200 mg, Oral, Q24H  aspirin, 81 mg, Oral, Daily  budesonide, 0.5 mg, Nebulization, BID - RT  calcium 500 mg vitamin D 5 mcg (200 UT), 1 tablet, Oral, Daily  dexamethasone, 4 mg, Oral, Q12H   And  insulin regular, 2 Units, Subcutaneous, Q12H  dilTIAZem, 30 mg, Oral, Q8H  docusate sodium, 100 mg, Oral, BID  ferric gluconate, 250 mg, Intravenous, Daily  furosemide, 20 mg, Intravenous, Daily  gabapentin, 200 mg, Oral, Nightly  guaiFENesin, 600 mg, Oral, Q12H  insulin glargine, 1-200 Units, Subcutaneous, Nightly - Glucommander  insulin lispro, 1-200 Units, Subcutaneous, 4x Daily With Meals & Nightly  ipratropium-albuterol, 3 mL, Nebulization, 4x Daily - RT  metoprolol tartrate, 25 mg, Oral, BID  pantoprazole, 40 mg, Oral, Q AM  senna-docusate sodium, 2 tablet, Oral, BID  sodium chloride, 10 mL, Intravenous, Q12H       dilTIAZem, 5-15 mg/hr, Last Rate: 5 mg/hr (07/18/23 1246)  Pharmacy Consult - Steroid Insulin Protocol,          Active Hospital Problems:  Active Hospital Problems    Diagnosis     **Atrial fibrillation with RVR     Atrial fibrillation        New onset A-fib with RVR  - Started on Cardiezem drip in ED and given l metoprolol, Cardizem p.o. and amiodarone  - consulted cardiology  - Had considered pharmacologic AC but with known brain mets concerned with risk  for bleed so will consult oncology for recommendation on AC  - continue VTE with SCD until seen by cardiology and oncology     Pain and Edema BLE  - appears Neuropathic  - pt also with known bone cancer and arthritis   - consider neuropathy 2/2 chemotherapy  - will continue Lasix for edema  -Was ruled out for DVT   - will add gabapentin 200mg QHS for sign of neuropathic pain  - check BNP  - will resume pt Norco that was previously ordered and add prn Dilaudid   -echo 6/22/23: Left ventricular systolic function is normal. Left ventricular ejection fraction appears to be 61 - 65%.   Left ventricular diastolic function is consistent with (grade I) impaired relaxation.     Hyperglycemia- likely steroid induced   -A1C 7.3  - add glucomander  - add steroid protocol      Hypoxia- consider 2/2 known lung cancer   - cxr with pneumonia and elevated WBC and known tobacco use  - will cover possible COPD vs pneumonia vs carcinoma with empiric Azithromycin  - wean O2 as tolerated   - duo nebs prn     Anemia  - continue ferrous sulfate  - hgb stable     Stage IV squamous cell lung cancer with mets to brain and bone  - currently treated with chemotherapy   - continue decadron as previously ordered by oncology  - continue nexium   Per oncology   Check iron saturation.continue ferrous sulfate 325 mg by mouth daily.Consider IV iron if iron sat is low.  Radiation oncology consultation to resume radiation therapy when able  Await molecular testing results with plan for chemotherapy post completion of radiation.  Start Xgeva and daily Ca+VitD for bone metastases support as outpatient.  dexamethasone  4 mg by mouth daily.  Continue ASA 81 mg by mouth daily.  Would not recommend full dose anticoagulation for A-fib secondary to brain mets and known rectal bleeding.  Continue as needed hydrocodone, as needed Dilaudid, and scheduled gabapentin for pain control.  Continue Pepcid p.o. twice daily and as needed Zofran    DVT  prophylaxis:  Mechanical DVT prophylaxis orders are present.    CODE STATUS:    Medical Intervention Limits: NO intubation (DNI)  Code Status (Patient has no pulse and is not breathing): No CPR (Do Not Attempt to Resuscitate)  Medical Interventions (Patient has pulse or is breathing): Limited Support      Disposition:  I expect patient to be discharged NH.    I have utilized all available, immediate resources to obtain, update, or review the patient's current medications including all prescriptions, over-the-counter products, herbals, cannabis/cannabidiol products, and vitamin.mineral/dietary (nutritional) supplements.      Medical Intervention Limits: NO intubation (DNI)  Code Status (Patient has no pulse and is not breathing): No CPR (Do Not Attempt to Resuscitate)  Medical Interventions (Patient has pulse or is breathing): Limited Support    Next of kin of Power of :       Admission Status:  I believe this patient meets ADMIT status.              This patient has been examined wearing appropriate Personal Protective Equipment and discussed with  RN . 07/19/23      Electronically signed by Raoul Castillo MD, 07/19/23, 09:26 EDT.  [facility], [place] Hospitalist Team

## 2023-07-19 NOTE — THERAPY TREATMENT NOTE
"Subjective: Pt agreeable to therapeutic plan of care. Pt expressed wanting to get up and sit in bedside chair.    Objective:     Bed mobility - CGA  Transfers - CGA and with rolling walker  Ambulation - 3 feet CGA and with rolling walker    Vitals: Tachycardic; pt performed bed to chair transfer on RA with O2 sats reading 92%.    Pain: N/a  Intervention for pain: N/A    Education: Provided education on the importance of mobility in the acute care setting, Verbal/Tactile Cues, Transfer Training, Gait Training, and Energy conservation strategies    Assessment: Isra Davenport presents with functional mobility impairments which indicate the need for skilled intervention. Pt required CGA throughout bed mobility and transfers on this date. Pt ambulated ~3 feet to bedside chair with RW/CGA but declined further gait. Pt limited by decreased activity tolerance and functional strength. Tolerating session today without incident. Will continue to follow and progress as tolerated.     Plan/Recommendations:   Moderate Intensity Therapy recommended post-acute care. This is recommended as therapy feels the patient would require 3-4 days per week and wouldn't tolerate \"3 hour daily\" rehab intensity. SNF would be the preferred choice. If the patient does not agree to SNF, arrange HH or OP depending on home bound status. If patient is medically complex, consider LTACH.. Pt requires no DME at discharge.     Pt desires Skilled Rehab placement at discharge. Pt cooperative; agreeable to therapeutic recommendations and plan of care.         Basic Mobility 6-click:  Rollin = Total, A lot = 2, A little = 3; 4 = None  Supine>Sit:   1 = Total, A lot = 2, A little = 3; 4 = None   Sit>Stand with arms:  1 = Total, A lot = 2, A little = 3; 4 = None  Bed>Chair:   1 = Total, A lot = 2, A little = 3; 4 = None  Ambulate in room:  1 = Total, A lot = 2, A little = 3; 4 = None  3-5 Steps with railin = Total, A lot = 2, A little = 3; 4 = " None  Score: 18    Modified [place]: N/A = No pre-op stroke/TIA    Post-Tx Position: Up in Chair, Alarms activated, and Call light and personal items within reach  PPE: gloves

## 2023-07-19 NOTE — PROGRESS NOTES
Palliative Care Social Work Progress Note    Code Status:do not resuscitate    Goals of Care: Limited Additonal Intervention     Narrative: Palliative care  met with pt to review POST form.  POST form reviewed and completed.  Emotional support provided.  Completed form verified by Palliative NP.  Form copied to chart.    Plan:   -Will continue to follow             BSOSMAN Sarmiento

## 2023-07-19 NOTE — PROGRESS NOTES
"Subjective   Isra Davenport is a 68 y.o. male.   Seen for diabetes f/u.  Concerned re high blood sugars.        Objective     /62   Pulse 113   Temp 98.3 °F (36.8 °C) (Oral)   Resp 22   Ht 177.8 cm (70\")   Wt 68.6 kg (151 lb 3.8 oz) Comment: Nurse notified of gain  SpO2 95%   BMI 21.70 kg/m²   Blood sugar  261 this am,  337 @ lunch, 374 @ supper    ASSESSMENT  Diabetes type 2, with hyperglycemia  Patient is stable    PLAN    Will discontinue sq glucommander & start basal / bolus  & adjust as needed.         Liudmila Levin MD  7/19/2023  19:52 EDT    "

## 2023-07-19 NOTE — PLAN OF CARE
Goal Outcome Evaluation:     Patient is comfortable and compliant. On 3LO2 via nasal canula and maintaining sinus arrhythmia throughout night. Removed sutures from hip wound edges approximates but redness is present where sutures were in place. Treated sacral and gluteal areas having compromised skin with barrier cream. Educated on the importance of preventing central line associated infection and an adequate diet. Patient had large BM this am. Patient is doing well this morning and has no complaints at this time.

## 2023-07-19 NOTE — PROGRESS NOTES
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 male admitted to [facility] through the ED on 7/15/2023 with complaints of uncontrolled pain and BLE edema.  In the ED, he developed tachycardia in the 150s and was found to be in A-fib with RVR.  He was started on supplemental oxygen for O2 sat drop to 88%.  He received Cardizem bolus followed by drip and Lopressor bolus.  In the ED, CBC revealed WBC 11.4, hemoglobin 10.0, MCV 84.8, and platelets 404,000.  Creatinine and LFTs were not elevated.  CXR revealed left basilar pneumonia.  He reported left lower extremity pain with x-ray of the left tip/fib and left foot negative for acute findings.    PMH was significant for recently diagnosed stage IV squamous cell carcinoma of the left upper lobe lung.  He was post radiation therapy to the brain and currently receiving radiation therapy to the right hip and femur (started 2023 with 10 fractions planned).  He was last seen by our service as an outpatient on 2023 where he was prescribed Lasix and hydrocodone/APAP 5/325 mg as needed for his BLE edema and pain.  BLE venous Doppler performed as an outpatient on 2023 was negative for DVT/SVT and showed bilateral fluid retention.  At time of consultation 2023, hemoglobin had dropped to 9.9.     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  Our service had initially evaluated him during 2023 - 2023 hospitalization during which lung cancer diagnosis was made and initial work-up completed.  He had received radiation therapy to  the brain and is currently receiving radiation to the hip/femur.  He was also diagnosed with iron deficiency anemia during the hospital stay where he was treated with IV iron and discharged on oral ferrous sulfate with GERD prophylaxis.  Complete anemia work-up with the exception of bone marrow was performed during the hospital stay with findings negative for additional etiologies.  Incidentally his copper level was elevated without multivitamin supplementation.  CBC on 7/14/2023 revealed WBC 13, hemoglobin 10.76, and platelets 329,000.  He was continued on daily oral iron.  Molecular testing on his tumor pathology was pending with plans to start chemotherapy after radiation was completed.  He was also to start Xgeva for bone metastases support and was prescribed calcium with vitamin D supplementation.     PCP: Mayco Carreon MD    INTERVAL HISTORY:  7/17/2023 - white blood count 12.30, hemoglobin 9.5, iron 22 (), iron saturation 10 (), TIBC 224 (298-536). Calculated iron deficit is 662 mg. Started Ferrlecit 250 mg IV x 3 days.  Molecular testing on tumor specimen revealed low-level PD-L1 positivity.  7/18/2023-hemoglobin 8.8.  Patient developed SVT overnight and was treated with Cardizem.  Weight loss 33 pounds in 5 weeks.  Changed Pepcid to Protonix.  Palliative care and radiation oncology both consulted.  Physical therapy recommending skilled nursing facility at discharge.  7/19/2023-patient decided DNR/DNI but wants to continue aggressive treatment and try chemotherapy/immunotherapy.    History of present illness reviewed since last visit and changes noted on 07/19/23.    Subjective   patient states he feels well.    ROS:  Review of Systems   Constitutional:  Negative for activity change, appetite change, chills, fatigue, fever and unexpected weight change.   HENT:  Negative for mouth sores, sore throat and tinnitus.    Eyes:  Negative for photophobia, pain, redness and visual disturbance.    Respiratory:  Negative for cough and shortness of breath.    Cardiovascular:  Negative for chest pain, palpitations and leg swelling.   Gastrointestinal:  Negative for abdominal pain, constipation, diarrhea, nausea and vomiting.   Genitourinary:  Negative for dysuria, enuresis and hematuria.   Musculoskeletal:  Negative for arthralgias, back pain, myalgias and neck pain.   Skin:  Negative for rash and wound.   Allergic/Immunologic: Negative for environmental allergies.   Neurological:  Negative for dizziness, seizures, weakness, light-headedness, numbness and headaches.   Hematological:  Negative for adenopathy. Does not bruise/bleed easily.   Psychiatric/Behavioral:  Negative for agitation and dysphoric mood. The patient is not nervous/anxious.       MEDICATIONS:    Scheduled Meds:  amiodarone, 200 mg, Oral, Q24H  aspirin, 81 mg, Oral, Daily  budesonide, 0.5 mg, Nebulization, BID - RT  calcium 500 mg vitamin D 5 mcg (200 UT), 1 tablet, Oral, Daily  dexamethasone, 4 mg, Oral, Q12H   And  insulin regular, 4 Units, Subcutaneous, Q12H  dilTIAZem, 30 mg, Oral, Q8H  docusate sodium, 100 mg, Oral, BID  ferric gluconate, 250 mg, Intravenous, Daily  furosemide, 20 mg, Intravenous, Daily  gabapentin, 200 mg, Oral, Nightly  guaiFENesin, 600 mg, Oral, Q12H  insulin glargine, 1-200 Units, Subcutaneous, Nightly - Glucommander  insulin lispro, 1-200 Units, Subcutaneous, 4x Daily With Meals & Nightly  ipratropium-albuterol, 3 mL, Nebulization, 4x Daily - RT  metoprolol tartrate, 25 mg, Oral, BID  midodrine, 5 mg, Oral, TID AC  pantoprazole, 40 mg, Oral, Q AM  senna-docusate sodium, 2 tablet, Oral, BID  sodium chloride, 10 mL, Intravenous, Q12H    Continuous Infusions:  dilTIAZem, 5-15 mg/hr, Last Rate: 5 mg/hr (07/18/23 1246)  Pharmacy Consult - Steroid Insulin Protocol,        PRN Meds:    senna-docusate sodium **AND** polyethylene glycol **AND** bisacodyl **AND** bisacodyl    Calcium Replacement - Follow Nurse / BPA Driven  "Protocol    dextrose    dextrose    glucagon (human recombinant)    insulin lispro    ipratropium-albuterol    Magnesium Standard Dose Replacement - Follow Nurse / BPA Driven Protocol    Morphine    ondansetron    Pharmacy Consult - Steroid Insulin Protocol    Phosphorus Replacement - Follow Nurse / BPA Driven Protocol    Potassium Replacement - Follow Nurse / BPA Driven Protocol    [COMPLETED] Insert Peripheral IV **AND** sodium chloride    sodium chloride    sodium chloride    traMADol     ALLERGIES:    Allergies   Allergen Reactions    Ibuprofen Itching    Penicillins Hives     Objective    VITALS:   /65   Pulse (!) 128   Temp 98.3 °F (36.8 °C) (Oral)   Resp 19   Ht 177.8 cm (70\")   Wt 68.6 kg (151 lb 3.8 oz) Comment: Nurse notified of gain  SpO2 91%   BMI 21.70 kg/m²     PHYSICAL EXAM:  Physical Exam  Vitals reviewed.   Constitutional:       General: He is not in acute distress.     Appearance: He is well-developed. He is not diaphoretic.   HENT:      Head: Normocephalic and atraumatic.      Nose: No rhinorrhea.      Comments: O2 per nasal cannula.     Mouth/Throat:      Pharynx: No oropharyngeal exudate.   Eyes:      Conjunctiva/sclera: Conjunctivae normal.   Neck:      Thyroid: No thyromegaly.      Vascular: No JVD.   Cardiovascular:      Rate and Rhythm: Regular rhythm. Tachycardia present.      Heart sounds: Normal heart sounds. No murmur heard.     Comments: Cardiac monitor leads.   Pulmonary:      Effort: Pulmonary effort is normal. No respiratory distress.      Comments: Right port not accessed.  Decreased breath sounds bilaterally.  Abdominal:      General: Bowel sounds are normal.      Palpations: Abdomen is soft.      Tenderness: There is no abdominal tenderness. There is no guarding.   Musculoskeletal:         General: Normal range of motion.      Cervical back: Normal range of motion and neck supple. No rigidity.      Right lower le+ Edema present.      Left lower le+ Edema " present.      Comments: Left hand oxygen saturation monitor.   Skin:     General: Skin is warm and dry.      Findings: No erythema or rash.      Comments: Left chest wall tattoo.  Left upper extremity IV.   Neurological:      Mental Status: He is alert and oriented to person, place, and time.      Sensory: No sensory deficit.   Psychiatric:         Behavior: Behavior normal.       RECENT LABS:  Lab Results (last 24 hours)       Procedure Component Value Units Date/Time    POC Glucose Once [[id number]]  (Abnormal) Collected: 07/19/23 0709    Specimen: Blood Updated: 07/19/23 0711     Glucose 261 mg/dL      Comment: Serial Number: [id number]:  [id number]       POC Glucose Once [[id number]]  (Abnormal) Collected: 07/18/23 2045    Specimen: Blood Updated: 07/18/23 2047     Glucose 314 mg/dL      Comment: Serial Number: [id number]:  [id number]       Magnesium [[id number]]  (Normal) Collected: 07/18/23 1906    Specimen: Blood Updated: 07/18/23 1941     Magnesium 2.2 mg/dL     Basic Metabolic Panel [[id number]]  (Abnormal) Collected: 07/18/23 1906    Specimen: Blood Updated: 07/18/23 1940     Glucose 335 mg/dL      BUN 21 mg/dL      Creatinine 0.49 mg/dL      Sodium 131 mmol/L      Potassium 4.5 mmol/L      Chloride 87 mmol/L      CO2 37.0 mmol/L      Calcium 9.0 mg/dL      BUN/Creatinine Ratio 42.9     Anion Gap 7.0 mmol/L      eGFR 112.5 mL/min/1.73     Narrative:      GFR Normal >60  Chronic Kidney Disease <60  Kidney Failure <15      Phosphorus [[id number]]  (Abnormal) Collected: 07/18/23 1906    Specimen: Blood Updated: 07/18/23 1940     Phosphorus 2.0 mg/dL     Calcium, Ionized [[id number]]  (Abnormal) Collected: 07/18/23 1906    Specimen: Blood Updated: 07/18/23 1924     Ionized Calcium 1.18 mmol/L     POC Glucose Once [[id number]]  (Abnormal) Collected: 07/18/23 1809    Specimen: Blood Updated: 07/18/23 1810     Glucose 327 mg/dL      Comment: Serial Number: [id number]:  [id number]       POC Glucose Once  [[id number]]  (Abnormal) Collected: 07/18/23 1727    Specimen: Blood Updated: 07/18/23 1729     Glucose 353 mg/dL      Comment: Serial Number: [id number]:  [id number]       Magnesium [[id number]]  (Abnormal) Collected: 07/18/23 1437    Specimen: Blood Updated: 07/18/23 1600     Magnesium 1.5 mg/dL     Basic Metabolic Panel [[id number]]  (Abnormal) Collected: 07/18/23 1437    Specimen: Blood Updated: 07/18/23 1600     Glucose 372 mg/dL      BUN 19 mg/dL      Creatinine 0.40 mg/dL      Sodium 130 mmol/L      Potassium 4.6 mmol/L      Chloride 87 mmol/L      CO2 35.0 mmol/L      Calcium 9.1 mg/dL      BUN/Creatinine Ratio 47.5     Anion Gap 8.0 mmol/L      eGFR 119.6 mL/min/1.73     Narrative:      GFR Normal >60  Chronic Kidney Disease <60  Kidney Failure <15      POC Glucose Once [[id number]]  (Abnormal) Collected: 07/18/23 1128    Specimen: Blood Updated: 07/18/23 1131     Glucose 334 mg/dL      Comment: Serial Number: [id number]:  [id number]             PENDING RESULTS: None.    IMAGING REVIEWED:  No radiology results for the last day    I have reviewed the patient's labs, imaging, reports, and other clinician documentation.    Assessment & Plan   ASSESSMENT  Stage IV squamous cell carcinoma left upper lobe lung-s/p radiation to the brain and currently receiving radiation to the right hip/femur.  Molecular testing on tumor pathology revealed low-level PD-L1 positivity.  Orders have been written for outpatient palliative chemotherapy and immunotherapy once radiation therapy has completed.  Plan to start outpatient Xgeva for bone metastases support.  Radiation has been consulted, but he has not resumed radiation yet.  Patient has decided to pursue treatment as planned but has chosen to be a DNR/DNI.  Dexamethasone taper in progress and continued calcium plus vitamin D..  DANN with malabsorption and poor tolerance of oral iron/Elevated copper level-he has not been on multivitamin containing copper. He had  reported bright red blood per rectum as outpatient with 2 colonoscopies in the past 6 years that were unremarkable per his report. Received IV iron last admit.  On IV iron, Protonix, aspirin and dexamethasone. Hemoglobin dropping somewhat post admission. Iron studies showed continued DANN with a slight improvement from last admission. Initiated on IV iron replacement.    Left lung pneumonia-likely post-obstructive. On dexamethasone.  Per pulmonary.    BLE edema and pain-recent outpatient BLE Doppler was negative for DVT/SVT.  On Lasix and Gabapentin.  Improved.  A-fib with RVR-new onset noted in ED.  Cardiology consulted.  Receiving as needed Cardizem drip and on aspirin.  Chronic nausea, Uncontrolled DM2, Low TSH, constipation and severe wt. Loss-on Protonix and oral iron d/c'ed.  Receiving Glucerna twice daily and Colace.  Endocrine managing.    Smoking-cessation counseling continued.  He had been started on nicotine patch as outpatient continued.  Recent pathological right hip fracture-physical therapy working with patient.  On calcium plus vitamin D.     PLAN  CBC in AM.  Continue Ferrlecit 250 mg IV x 3 days, day 3/3.  Continue dexamethasone 4 mg by mouth twice daily and recommend taper to daily dosing on 7/21/2023.  Continue ASA 81 mg by mouth daily.  Would not recommend full dose anticoagulation for A-fib secondary to brain metastasis and known rectal bleeding.  Continue PRN Norco and  Dilaudid and scheduled gabapentin for pain control.  Continue Protonix.      Continue Glucerna twice daily and Colace.    Radiation therapy to resume when heart rate controlled.   Plan for combination of chemotherapy and immunotherapy as an outpatient post radiation.  Start Xgeva for bone metastases support as outpatient.  Continue calcium plus vitamin D daily.  Recommend outpatient GI evaluation.    Patient seen and evaluated by Dr. Dupree.  Electronically signed by Aminah Houser, NOREEN, 07/19/23, 10:19 AM EDT.          I  have personally performed a face-to-face diagnostic evaluation on this patient. I have performed a complete history and physical examination, reviewed laboratory studies, and radiographic examinations.  I have completed the majority and substantive portion of the medical decision making.  I have formulated the assessment on this patient and the plan of action as noted above. I have discussed the case with Aminah Houser NP, have edited/reviewed the note, and agree with the care plan.  He claims to be feeling well.  On examination, breath sounds are decreased.  Heart rate remains high.  Blood sugars remain high.  Radiation is on hold till heart rate better controlled.  We will continue IV iron replacement for the anemia.    I discussed the patient's findings and my recommendations with patient.    Part of this note may be an electronic transcription/translation of spoken language to printed text using the Dragon Dictation System.     Electronically signed by Mina Dupree MD, 07/19/23, 6:01 PM EDT.

## 2023-07-20 LAB
ANION GAP SERPL CALCULATED.3IONS-SCNC: 6 MMOL/L (ref 5–15)
ANISOCYTOSIS BLD QL: ABNORMAL
BUN SERPL-MCNC: 16 MG/DL (ref 8–23)
BUN/CREAT SERPL: 51.6 (ref 7–25)
CA-I SERPL ISE-MCNC: 1.22 MMOL/L (ref 1.2–1.3)
CALCIUM SPEC-SCNC: 9.3 MG/DL (ref 8.6–10.5)
CHLORIDE SERPL-SCNC: 94 MMOL/L (ref 98–107)
CO2 SERPL-SCNC: 38 MMOL/L (ref 22–29)
CREAT SERPL-MCNC: 0.31 MG/DL (ref 0.76–1.27)
DEPRECATED RDW RBC AUTO: 59.1 FL (ref 37–54)
EGFRCR SERPLBLD CKD-EPI 2021: 129.2 ML/MIN/1.73
ERYTHROCYTE [DISTWIDTH] IN BLOOD BY AUTOMATED COUNT: 19.8 % (ref 12.3–15.4)
GLUCOSE BLDC GLUCOMTR-MCNC: 205 MG/DL (ref 70–105)
GLUCOSE BLDC GLUCOMTR-MCNC: 235 MG/DL (ref 70–105)
GLUCOSE BLDC GLUCOMTR-MCNC: 276 MG/DL (ref 70–105)
GLUCOSE BLDC GLUCOMTR-MCNC: 296 MG/DL (ref 70–105)
GLUCOSE BLDC GLUCOMTR-MCNC: 329 MG/DL (ref 70–105)
GLUCOSE BLDC GLUCOMTR-MCNC: 418 MG/DL (ref 70–105)
GLUCOSE SERPL-MCNC: 123 MG/DL (ref 65–99)
HCT VFR BLD AUTO: 29.1 % (ref 37.5–51)
HGB BLD-MCNC: 9 G/DL (ref 13–17.7)
LYMPHOCYTES # BLD MANUAL: 0.4 10*3/MM3 (ref 0.7–3.1)
LYMPHOCYTES NFR BLD MANUAL: 4 % (ref 5–12)
MAGNESIUM SERPL-MCNC: 1.7 MG/DL (ref 1.6–2.4)
MCH RBC QN AUTO: 26.4 PG (ref 26.6–33)
MCHC RBC AUTO-ENTMCNC: 30.9 G/DL (ref 31.5–35.7)
MCV RBC AUTO: 85.3 FL (ref 79–97)
MONOCYTES # BLD: 0.54 10*3/MM3 (ref 0.1–0.9)
MYELOCYTES NFR BLD MANUAL: 2 % (ref 0–0)
NEUTROPHILS # BLD AUTO: 12.19 10*3/MM3 (ref 1.7–7)
NEUTROPHILS NFR BLD MANUAL: 89 % (ref 42.7–76)
NEUTS BAND NFR BLD MANUAL: 2 % (ref 0–5)
PHOSPHATE SERPL-MCNC: 1.6 MG/DL (ref 2.5–4.5)
PHOSPHATE SERPL-MCNC: 2.3 MG/DL (ref 2.5–4.5)
PLATELET # BLD AUTO: 484 10*3/MM3 (ref 140–450)
PMV BLD AUTO: 8.5 FL (ref 6–12)
POTASSIUM SERPL-SCNC: 4.3 MMOL/L (ref 3.5–5.2)
QT INTERVAL: 317 MS
QT INTERVAL: 323 MS
QT INTERVAL: 330 MS
RBC # BLD AUTO: 3.41 10*6/MM3 (ref 4.14–5.8)
SCAN SLIDE: NORMAL
SMALL PLATELETS BLD QL SMEAR: ABNORMAL
SODIUM SERPL-SCNC: 138 MMOL/L (ref 136–145)
VARIANT LYMPHS NFR BLD MANUAL: 3 % (ref 19.6–45.3)
WBC MORPH BLD: NORMAL
WBC NRBC COR # BLD: 13.4 10*3/MM3 (ref 3.4–10.8)

## 2023-07-20 PROCEDURE — 80048 BASIC METABOLIC PNL TOTAL CA: CPT | Performed by: INTERNAL MEDICINE

## 2023-07-20 PROCEDURE — 63710000001 INSULIN GLARGINE PER 5 UNITS: Performed by: INTERNAL MEDICINE

## 2023-07-20 PROCEDURE — 63710000001 DEXAMETHASONE PER 0.25 MG: Performed by: INTERNAL MEDICINE

## 2023-07-20 PROCEDURE — 94761 N-INVAS EAR/PLS OXIMETRY MLT: CPT

## 2023-07-20 PROCEDURE — 97140 MANUAL THERAPY 1/> REGIONS: CPT

## 2023-07-20 PROCEDURE — 85025 COMPLETE CBC W/AUTO DIFF WBC: CPT | Performed by: NURSE PRACTITIONER

## 2023-07-20 PROCEDURE — 94799 UNLISTED PULMONARY SVC/PX: CPT

## 2023-07-20 PROCEDURE — 99231 SBSQ HOSP IP/OBS SF/LOW 25: CPT | Performed by: INTERNAL MEDICINE

## 2023-07-20 PROCEDURE — 25010000002 MORPHINE PER 10 MG: Performed by: INTERNAL MEDICINE

## 2023-07-20 PROCEDURE — 97530 THERAPEUTIC ACTIVITIES: CPT

## 2023-07-20 PROCEDURE — 82330 ASSAY OF CALCIUM: CPT | Performed by: INTERNAL MEDICINE

## 2023-07-20 PROCEDURE — 25010000002 FUROSEMIDE PER 20 MG: Performed by: NURSE PRACTITIONER

## 2023-07-20 PROCEDURE — 84100 ASSAY OF PHOSPHORUS: CPT | Performed by: INTERNAL MEDICINE

## 2023-07-20 PROCEDURE — 63710000001 INSULIN LISPRO (HUMAN) PER 5 UNITS: Performed by: INTERNAL MEDICINE

## 2023-07-20 PROCEDURE — 97112 NEUROMUSCULAR REEDUCATION: CPT

## 2023-07-20 PROCEDURE — 63710000001 INSULIN REGULAR HUMAN PER 5 UNITS: Performed by: INTERNAL MEDICINE

## 2023-07-20 PROCEDURE — 99232 SBSQ HOSP IP/OBS MODERATE 35: CPT | Performed by: INTERNAL MEDICINE

## 2023-07-20 PROCEDURE — 83735 ASSAY OF MAGNESIUM: CPT | Performed by: INTERNAL MEDICINE

## 2023-07-20 PROCEDURE — 82948 REAGENT STRIP/BLOOD GLUCOSE: CPT

## 2023-07-20 PROCEDURE — 85007 BL SMEAR W/DIFF WBC COUNT: CPT | Performed by: NURSE PRACTITIONER

## 2023-07-20 PROCEDURE — 63710000001 INSULIN LISPRO (HUMAN) PER 5 UNITS: Performed by: HOSPITALIST

## 2023-07-20 RX ORDER — AMIODARONE HYDROCHLORIDE 200 MG/1
400 TABLET ORAL EVERY 12 HOURS SCHEDULED
Status: DISCONTINUED | OUTPATIENT
Start: 2023-07-20 | End: 2023-07-22

## 2023-07-20 RX ORDER — INSULIN LISPRO 100 [IU]/ML
20 INJECTION, SOLUTION INTRAVENOUS; SUBCUTANEOUS ONCE
Status: COMPLETED | OUTPATIENT
Start: 2023-07-20 | End: 2023-07-20

## 2023-07-20 RX ORDER — METOPROLOL TARTRATE 5 MG/5ML
5 INJECTION INTRAVENOUS ONCE
Status: COMPLETED | OUTPATIENT
Start: 2023-07-20 | End: 2023-07-20

## 2023-07-20 RX ORDER — DILTIAZEM HYDROCHLORIDE 60 MG/1
60 TABLET, FILM COATED ORAL EVERY 6 HOURS SCHEDULED
Status: DISCONTINUED | OUTPATIENT
Start: 2023-07-21 | End: 2023-07-22

## 2023-07-20 RX ORDER — METOPROLOL TARTRATE 50 MG/1
50 TABLET, FILM COATED ORAL 2 TIMES DAILY
Status: DISCONTINUED | OUTPATIENT
Start: 2023-07-20 | End: 2023-07-20

## 2023-07-20 RX ORDER — SODIUM PHOSPHATE IN 0.9 % NACL 15MMOL/100
15 PLASTIC BAG, INJECTION (ML) INTRAVENOUS
Status: COMPLETED | OUTPATIENT
Start: 2023-07-20 | End: 2023-07-20

## 2023-07-20 RX ORDER — DILTIAZEM HYDROCHLORIDE 60 MG/1
60 TABLET, FILM COATED ORAL EVERY 8 HOURS SCHEDULED
Status: DISCONTINUED | OUTPATIENT
Start: 2023-07-20 | End: 2023-07-20

## 2023-07-20 RX ORDER — METOPROLOL TARTRATE 5 MG/5ML
INJECTION INTRAVENOUS EVERY 6 HOURS
Status: CANCELLED | OUTPATIENT
Start: 2023-07-20

## 2023-07-20 RX ORDER — INSULIN LISPRO 100 [IU]/ML
14 INJECTION, SOLUTION INTRAVENOUS; SUBCUTANEOUS
Status: DISCONTINUED | OUTPATIENT
Start: 2023-07-21 | End: 2023-07-24

## 2023-07-20 RX ADMIN — ASPIRIN 81 MG CHEWABLE TABLET 81 MG: 81 TABLET CHEWABLE at 08:45

## 2023-07-20 RX ADMIN — INSULIN LISPRO 4 UNITS: 100 INJECTION, SOLUTION INTRAVENOUS; SUBCUTANEOUS at 08:50

## 2023-07-20 RX ADMIN — DEXAMETHASONE 4 MG: 4 TABLET ORAL at 08:45

## 2023-07-20 RX ADMIN — SODIUM CHLORIDE, POTASSIUM CHLORIDE, SODIUM LACTATE AND CALCIUM CHLORIDE 1000 ML: 600; 310; 30; 20 INJECTION, SOLUTION INTRAVENOUS at 21:43

## 2023-07-20 RX ADMIN — NYSTATIN 500000 UNITS: 100000 SUSPENSION ORAL at 12:11

## 2023-07-20 RX ADMIN — INSULIN GLARGINE 14 UNITS: 100 INJECTION, SOLUTION SUBCUTANEOUS at 08:49

## 2023-07-20 RX ADMIN — METOPROLOL TARTRATE 5 MG: 1 INJECTION, SOLUTION INTRAVENOUS at 17:55

## 2023-07-20 RX ADMIN — PANTOPRAZOLE SODIUM 40 MG: 40 TABLET, DELAYED RELEASE ORAL at 05:47

## 2023-07-20 RX ADMIN — MIDODRINE HYDROCHLORIDE 5 MG: 5 TABLET ORAL at 08:45

## 2023-07-20 RX ADMIN — GABAPENTIN 200 MG: 100 CAPSULE ORAL at 21:33

## 2023-07-20 RX ADMIN — SENNOSIDES AND DOCUSATE SODIUM 2 TABLET: 8.6; 5 TABLET ORAL at 08:45

## 2023-07-20 RX ADMIN — INSULIN LISPRO 12 UNITS: 100 INJECTION, SOLUTION INTRAVENOUS; SUBCUTANEOUS at 08:49

## 2023-07-20 RX ADMIN — DILTIAZEM HYDROCHLORIDE 90 MG: 60 TABLET, FILM COATED ORAL at 05:47

## 2023-07-20 RX ADMIN — INSULIN LISPRO 6 UNITS: 100 INJECTION, SOLUTION INTRAVENOUS; SUBCUTANEOUS at 17:15

## 2023-07-20 RX ADMIN — GUAIFENESIN 600 MG: 600 TABLET, EXTENDED RELEASE ORAL at 08:45

## 2023-07-20 RX ADMIN — IPRATROPIUM BROMIDE AND ALBUTEROL SULFATE 3 ML: .5; 3 SOLUTION RESPIRATORY (INHALATION) at 15:31

## 2023-07-20 RX ADMIN — SODIUM PHOSPHATE, MONOBASIC, MONOHYDRATE AND SODIUM PHOSPHATE, DIBASIC, ANHYDROUS 15 MMOL: 142; 276 INJECTION, SOLUTION INTRAVENOUS at 07:06

## 2023-07-20 RX ADMIN — DOCUSATE SODIUM 100 MG: 100 CAPSULE, LIQUID FILLED ORAL at 21:34

## 2023-07-20 RX ADMIN — SENNOSIDES AND DOCUSATE SODIUM 2 TABLET: 8.6; 5 TABLET ORAL at 21:34

## 2023-07-20 RX ADMIN — MORPHINE SULFATE 2 MG: 2 INJECTION, SOLUTION INTRAMUSCULAR; INTRAVENOUS at 17:16

## 2023-07-20 RX ADMIN — FUROSEMIDE 20 MG: 10 INJECTION, SOLUTION INTRAMUSCULAR; INTRAVENOUS at 08:45

## 2023-07-20 RX ADMIN — MIDODRINE HYDROCHLORIDE 5 MG: 5 TABLET ORAL at 16:58

## 2023-07-20 RX ADMIN — IPRATROPIUM BROMIDE AND ALBUTEROL SULFATE 3 ML: .5; 3 SOLUTION RESPIRATORY (INHALATION) at 11:50

## 2023-07-20 RX ADMIN — INSULIN HUMAN 4 UNITS: 100 INJECTION, SOLUTION PARENTERAL at 21:40

## 2023-07-20 RX ADMIN — INSULIN GLARGINE 14 UNITS: 100 INJECTION, SOLUTION SUBCUTANEOUS at 21:40

## 2023-07-20 RX ADMIN — METOPROLOL TARTRATE 25 MG: 25 TABLET, FILM COATED ORAL at 08:45

## 2023-07-20 RX ADMIN — INSULIN LISPRO 12 UNITS: 100 INJECTION, SOLUTION INTRAVENOUS; SUBCUTANEOUS at 17:15

## 2023-07-20 RX ADMIN — NYSTATIN 500000 UNITS: 100000 SUSPENSION ORAL at 17:15

## 2023-07-20 RX ADMIN — INSULIN LISPRO 20 UNITS: 100 INJECTION, SOLUTION INTRAVENOUS; SUBCUTANEOUS at 21:35

## 2023-07-20 RX ADMIN — DILTIAZEM HYDROCHLORIDE 60 MG: 60 TABLET, FILM COATED ORAL at 14:26

## 2023-07-20 RX ADMIN — DEXAMETHASONE 4 MG: 4 TABLET ORAL at 21:34

## 2023-07-20 RX ADMIN — INSULIN LISPRO 12 UNITS: 100 INJECTION, SOLUTION INTRAVENOUS; SUBCUTANEOUS at 12:12

## 2023-07-20 RX ADMIN — BUDESONIDE 0.5 MG: 0.5 INHALANT RESPIRATORY (INHALATION) at 20:40

## 2023-07-20 RX ADMIN — METOPROLOL TARTRATE 75 MG: 25 TABLET, FILM COATED ORAL at 21:33

## 2023-07-20 RX ADMIN — GUAIFENESIN 600 MG: 600 TABLET, EXTENDED RELEASE ORAL at 21:34

## 2023-07-20 RX ADMIN — DOCUSATE SODIUM 100 MG: 100 CAPSULE, LIQUID FILLED ORAL at 08:45

## 2023-07-20 RX ADMIN — Medication 1 TABLET: at 08:45

## 2023-07-20 RX ADMIN — AMIODARONE HYDROCHLORIDE 400 MG: 200 TABLET ORAL at 21:34

## 2023-07-20 RX ADMIN — IPRATROPIUM BROMIDE AND ALBUTEROL SULFATE 3 ML: .5; 3 SOLUTION RESPIRATORY (INHALATION) at 20:40

## 2023-07-20 RX ADMIN — INSULIN HUMAN 4 UNITS: 100 INJECTION, SOLUTION PARENTERAL at 08:49

## 2023-07-20 RX ADMIN — Medication 10 ML: at 08:51

## 2023-07-20 RX ADMIN — AMIODARONE HYDROCHLORIDE 200 MG: 200 TABLET ORAL at 08:45

## 2023-07-20 RX ADMIN — SODIUM PHOSPHATE, MONOBASIC, MONOHYDRATE AND SODIUM PHOSPHATE, DIBASIC, ANHYDROUS 15 MMOL: 142; 276 INJECTION, SOLUTION INTRAVENOUS at 04:25

## 2023-07-20 RX ADMIN — MIDODRINE HYDROCHLORIDE 5 MG: 5 TABLET ORAL at 12:12

## 2023-07-20 RX ADMIN — INSULIN LISPRO 4 UNITS: 100 INJECTION, SOLUTION INTRAVENOUS; SUBCUTANEOUS at 12:12

## 2023-07-20 RX ADMIN — NYSTATIN 500000 UNITS: 100000 SUSPENSION ORAL at 22:13

## 2023-07-20 NOTE — PROGRESS NOTES
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 male admitted to [facility] through the ED on 7/15/2023 with complaints of uncontrolled pain and BLE edema.  In the ED, he developed tachycardia in the 150s and was found to be in A-fib with RVR.  He was started on supplemental oxygen for O2 sat drop to 88%.  He received Cardizem bolus followed by drip and Lopressor bolus.  In the ED, CBC revealed WBC 11.4, hemoglobin 10.0, MCV 84.8, and platelets 404,000.  Creatinine and LFTs were not elevated.  CXR revealed left basilar pneumonia.  He reported left lower extremity pain with x-ray of the left tip/fib and left foot negative for acute findings.    PMH was significant for recently diagnosed stage IV squamous cell carcinoma of the left upper lobe lung.  He was post radiation therapy to the brain and currently receiving radiation therapy to the right hip and femur (started 2023 with 10 fractions planned).  He was last seen by our service as an outpatient on 2023 where he was prescribed Lasix and hydrocodone/APAP 5/325 mg as needed for his BLE edema and pain.  BLE venous Doppler performed as an outpatient on 2023 was negative for DVT/SVT and showed bilateral fluid retention.  At time of consultation 2023, hemoglobin had dropped to 9.9.     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  Our service had initially evaluated him during 2023 - 2023 hospitalization during which lung cancer diagnosis was made and initial work-up completed.  He had received radiation therapy to  the brain and is currently receiving radiation to the hip/femur.  He was also diagnosed with iron deficiency anemia during the hospital stay where he was treated with IV iron and discharged on oral ferrous sulfate with GERD prophylaxis.  Complete anemia work-up with the exception of bone marrow was performed during the hospital stay with findings negative for additional etiologies.  Incidentally his copper level was elevated without multivitamin supplementation.  CBC on 7/14/2023 revealed WBC 13, hemoglobin 10.76, and platelets 329,000.  He was continued on daily oral iron.  Molecular testing on his tumor pathology was pending with plans to start chemotherapy after radiation was completed.  He was also to start Xgeva for bone metastases support and was prescribed calcium with vitamin D supplementation.     PCP: Mayco Carreon MD    INTERVAL HISTORY:  7/17/2023 - white blood count 12.30, hemoglobin 9.5, iron 22 (), iron saturation 10 (), TIBC 224 (298-536). Calculated iron deficit is 662 mg. Started Ferrlecit 250 mg IV x 3 days.  Molecular testing on tumor specimen revealed low-level PD-L1 positivity.  7/18/2023-hemoglobin 8.8.  Patient developed SVT overnight and was treated with Cardizem.  Weight loss 33 pounds in 5 weeks.  Changed Pepcid to Protonix.  Palliative care and radiation oncology both consulted.  Physical therapy recommending skilled nursing facility at discharge.  7/19/2023-patient decided DNR/DNI but wants to continue aggressive treatment and try chemotherapy/immunotherapy.  7/20/2023-WBC 13.4, hemoglobin 9.    History of present illness reviewed since last visit and changes noted on 07/20/23.    Subjective   not happy about extended stay in the hospital.  Denies problems.    ROS:  Review of Systems   Constitutional:  Negative for activity change, appetite change, chills, fatigue, fever and unexpected weight change.   HENT:  Negative for mouth sores, sore throat and tinnitus.     Eyes:  Negative for photophobia, pain, redness and visual disturbance.   Respiratory:  Negative for apnea, cough and shortness of breath.    Cardiovascular:  Negative for chest pain, palpitations and leg swelling.   Gastrointestinal:  Negative for abdominal pain, constipation, diarrhea, nausea and vomiting.   Genitourinary:  Negative for dysuria, enuresis and hematuria.   Musculoskeletal:  Negative for arthralgias, back pain, myalgias and neck pain.   Skin:  Negative for rash and wound.   Allergic/Immunologic: Negative for environmental allergies.   Neurological:  Negative for dizziness, seizures, weakness, light-headedness, numbness and headaches.   Hematological:  Negative for adenopathy. Does not bruise/bleed easily.   Psychiatric/Behavioral:  Positive for agitation. Negative for dysphoric mood. The patient is not nervous/anxious.       MEDICATIONS:    Scheduled Meds:  amiodarone, 200 mg, Oral, Q24H  aspirin, 81 mg, Oral, Daily  budesonide, 0.5 mg, Nebulization, BID - RT  calcium 500 mg vitamin D 5 mcg (200 UT), 1 tablet, Oral, Daily  dexamethasone, 4 mg, Oral, Q12H   And  insulin regular, 4 Units, Subcutaneous, Q12H  dilTIAZem, 90 mg, Oral, Q8H  docusate sodium, 100 mg, Oral, BID  furosemide, 20 mg, Intravenous, Daily  gabapentin, 200 mg, Oral, Nightly  guaiFENesin, 600 mg, Oral, Q12H  insulin glargine, 14 Units, Subcutaneous, Q12H  insulin lispro, 12 Units, Subcutaneous, TID With Meals  insulin lispro, 2-9 Units, Subcutaneous, 4x Daily AC & at Bedtime  ipratropium-albuterol, 3 mL, Nebulization, 4x Daily - RT  metoprolol tartrate, 25 mg, Oral, BID  midodrine, 5 mg, Oral, TID AC  pantoprazole, 40 mg, Oral, Q AM  senna-docusate sodium, 2 tablet, Oral, BID  sodium chloride, 10 mL, Intravenous, Q12H  sodium phosphate, 15 mmol, Intravenous, Q3H    Continuous Infusions:  Pharmacy Consult - Steroid Insulin Protocol,        PRN Meds:    senna-docusate sodium **AND** polyethylene glycol **AND** bisacodyl **AND**  "bisacodyl    Calcium Replacement - Follow Nurse / BPA Driven Protocol    dextrose    dextrose    glucagon (human recombinant)    ipratropium-albuterol    Magnesium Standard Dose Replacement - Follow Nurse / BPA Driven Protocol    Morphine    ondansetron    Pharmacy Consult - Steroid Insulin Protocol    Phosphorus Replacement - Follow Nurse / BPA Driven Protocol    Potassium Replacement - Follow Nurse / BPA Driven Protocol    [COMPLETED] Insert Peripheral IV **AND** sodium chloride    sodium chloride    sodium chloride    traMADol     ALLERGIES:    Allergies   Allergen Reactions    Ibuprofen Itching    Penicillins Hives     Objective    VITALS:   /66 (BP Location: Left arm, Patient Position: Lying)   Pulse 87   Temp 98.5 °F (36.9 °C) (Oral)   Resp 18   Ht 177.8 cm (70\")   Wt 68.6 kg (151 lb 3.8 oz) Comment: Nurse notified of gain  SpO2 92%   BMI 21.70 kg/m²     PHYSICAL EXAM:  Physical Exam  Vitals reviewed.   Constitutional:       General: He is not in acute distress.     Appearance: He is well-developed. He is not diaphoretic.   HENT:      Head: Normocephalic and atraumatic.      Nose: No rhinorrhea.      Comments: O2 per nasal cannula.     Mouth/Throat:      Pharynx: Oropharyngeal exudate present.      Comments: Oral thrush present.  Eyes:      Conjunctiva/sclera: Conjunctivae normal.   Neck:      Thyroid: No thyromegaly.      Vascular: No JVD.   Cardiovascular:      Rate and Rhythm: Regular rhythm. Tachycardia present.      Heart sounds: Normal heart sounds. No murmur heard.     Comments: Cardiac monitor leads.   Pulmonary:      Effort: Pulmonary effort is normal. No respiratory distress.      Comments: Right port not accessed.  Decreased breath sounds bilaterally.  Abdominal:      General: Bowel sounds are normal.      Palpations: Abdomen is soft.      Tenderness: There is no abdominal tenderness. There is no guarding.   Musculoskeletal:         General: Normal range of motion.      Cervical back: " Normal range of motion and neck supple. No rigidity.      Right lower le+ Edema present.      Left lower le+ Edema present.      Comments: Left hand oxygen saturation monitor.   Skin:     General: Skin is warm and dry.      Findings: No erythema or rash.      Comments: Left chest wall tattoo.  Left upper extremity IV.   Neurological:      Mental Status: He is alert and oriented to person, place, and time.      Sensory: No sensory deficit.   Psychiatric:         Behavior: Behavior normal.      Comments: Agitated.       RECENT LABS:  Lab Results (last 24 hours)       Procedure Component Value Units Date/Time    POC Glucose Once [[id number]]  (Abnormal) Collected: 23    Specimen: Blood Updated: 23     Glucose 205 mg/dL      Comment: Serial Number: [id number]:  [id number]       Manual Differential [[id number]]  (Abnormal) Collected: 23    Specimen: Blood Updated: 23     Neutrophil % 89.0 %      Lymphocyte % 3.0 %      Monocyte % 4.0 %      Bands %  2.0 %      Myelocyte % 2.0 %      Neutrophils Absolute 12.19 10*3/mm3      Lymphocytes Absolute 0.40 10*3/mm3      Monocytes Absolute 0.54 10*3/mm3      Anisocytosis Slight/1+     WBC Morphology Normal     Platelet Estimate Increased    CBC & Differential [[id number]]  (Abnormal) Collected: 23    Specimen: Blood Updated: 23    Narrative:      The following orders were created for panel order CBC & Differential.  Procedure                               Abnormality         Status                     ---------                               -----------         ------                     CBC Auto Differential[[id number]]        Abnormal            Final result               Scan Slide[[id number]]                                       Final result                 Please view results for these tests on the individual orders.    Scan Slide [[id number]] Collected: 23    Specimen: Blood Updated:  07/20/23 0239     Scan Slide --     Comment: See Manual Differential Results       CBC Auto Differential [[id number]]  (Abnormal) Collected: 07/20/23 0106    Specimen: Blood Updated: 07/20/23 0239     WBC 13.40 10*3/mm3      RBC 3.41 10*6/mm3      Hemoglobin 9.0 g/dL      Hematocrit 29.1 %      MCV 85.3 fL      MCH 26.4 pg      MCHC 30.9 g/dL      RDW 19.8 %      RDW-SD 59.1 fl      MPV 8.5 fL      Platelets 484 10*3/mm3     Narrative:      The previously reported component NRBC is no longer being reported. Previous result was 0.5 /100 WBC (Reference Range: 0.0-0.2 /100 WBC) on 7/20/2023 at [facility] EDT.    Phosphorus [[id number]]  (Abnormal) Collected: 07/20/23 0106    Specimen: Blood Updated: 07/20/23 0201     Phosphorus 1.6 mg/dL     Magnesium [[id number]]  (Normal) Collected: 07/20/23 0106    Specimen: Blood Updated: 07/20/23 0159     Magnesium 1.7 mg/dL     Basic Metabolic Panel [[id number]]  (Abnormal) Collected: 07/20/23 0106    Specimen: Blood Updated: 07/20/23 0158     Glucose 123 mg/dL      BUN 16 mg/dL      Creatinine 0.31 mg/dL      Sodium 138 mmol/L      Potassium 4.3 mmol/L      Chloride 94 mmol/L      CO2 38.0 mmol/L      Calcium 9.3 mg/dL      BUN/Creatinine Ratio 51.6     Anion Gap 6.0 mmol/L      eGFR 129.2 mL/min/1.73     Narrative:      GFR Normal >60  Chronic Kidney Disease <60  Kidney Failure <15      Calcium, Ionized [[id number]]  (Normal) Collected: 07/20/23 0106    Specimen: Blood Updated: 07/20/23 0155     Ionized Calcium 1.22 mmol/L     POC Glucose Once [[id number]]  (Abnormal) Collected: 07/19/23 1908    Specimen: Blood Updated: 07/19/23 1910     Glucose 391 mg/dL      Comment: Serial Number: [id number]:  [id number]       POC Glucose Once [[id number]]  (Abnormal) Collected: 07/19/23 1649    Specimen: Blood Updated: 07/19/23 1650     Glucose 374 mg/dL      Comment: Serial Number: [id number]:  [id number]       POC Glucose Once [[id number]]  (Abnormal) Collected: 07/19/23 1127    Specimen:  Blood Updated: 07/19/23 1129     Glucose 337 mg/dL      Comment: Serial Number: [id number]:  [id number]       Manual Differential [[id number]]  (Abnormal) Collected: 07/19/23 1025    Specimen: Blood Updated: 07/19/23 1116     Neutrophil % 88.0 %      Lymphocyte % 3.0 %      Monocyte % 5.0 %      Bands %  3.0 %      Metamyelocyte % 1.0 %      Neutrophils Absolute 11.74 10*3/mm3      Lymphocytes Absolute 0.39 10*3/mm3      Monocytes Absolute 0.65 10*3/mm3      Anisocytosis Slight/1+     WBC Morphology Normal     Large Platelets Slight/1+    CBC & Differential [[id number]]  (Abnormal) Collected: 07/19/23 1025    Specimen: Blood Updated: 07/19/23 1116    Narrative:      The following orders were created for panel order CBC & Differential.  Procedure                               Abnormality         Status                     ---------                               -----------         ------                     CBC Auto Differential[[id number]]        Abnormal            Final result               Scan Slide[[id number]]                                       Final result                 Please view results for these tests on the individual orders.    Scan Slide [[id number]] Collected: 07/19/23 1025    Specimen: Blood Updated: 07/19/23 1116     Scan Slide --     Comment: See Manual Differential Results       CBC Auto Differential [[id number]]  (Abnormal) Collected: 07/19/23 1025    Specimen: Blood Updated: 07/19/23 1116     WBC 12.90 10*3/mm3      RBC 3.33 10*6/mm3      Hemoglobin 9.0 g/dL      Hematocrit 28.3 %      MCV 85.1 fL      MCH 26.9 pg      MCHC 31.6 g/dL      RDW 19.8 %      RDW-SD 59.1 fl      MPV 8.7 fL      Platelets 486 10*3/mm3     Narrative:      The previously reported component NRBC is no longer being reported. Previous result was 0.1 /100 WBC (Reference Range: 0.0-0.2 /100 WBC) on 7/19/2023 at 1044 EDT.    Phosphorus [[id number]]  (Normal) Collected: 07/19/23 1025    Specimen: Blood Updated: 07/19/23  1106     Phosphorus 2.7 mg/dL     Magnesium [[id number]]  (Normal) Collected: 07/19/23 1025    Specimen: Blood Updated: 07/19/23 1104     Magnesium 1.9 mg/dL     Basic Metabolic Panel [[id number]]  (Abnormal) Collected: 07/19/23 1025    Specimen: Blood Updated: 07/19/23 1104     Glucose 381 mg/dL      BUN 15 mg/dL      Creatinine 0.39 mg/dL      Sodium 134 mmol/L      Potassium 4.1 mmol/L      Chloride 89 mmol/L      CO2 37.0 mmol/L      Calcium 9.1 mg/dL      BUN/Creatinine Ratio 38.5     Anion Gap 8.0 mmol/L      eGFR 120.5 mL/min/1.73     Narrative:      GFR Normal >60  Chronic Kidney Disease <60  Kidney Failure <15      Calcium, Ionized [[id number]]  (Abnormal) Collected: 07/19/23 1025    Specimen: Blood Updated: 07/19/23 1040     Ionized Calcium 1.19 mmol/L           PENDING RESULTS: None.    IMAGING REVIEWED:  No radiology results for the last day    I have reviewed the patient's labs, imaging, reports, and other clinician documentation.    Assessment & Plan   ASSESSMENT  Stage IV squamous cell carcinoma left upper lobe lung-s/p radiation to the brain and currently receiving radiation to the right hip/femur.  Molecular testing on tumor pathology revealed low-level PD-L1 positivity.  Orders have been written for outpatient palliative chemotherapy and immunotherapy once radiation therapy has completed.  Plan to start outpatient Xgeva for bone metastases support.  Radiation has been consulted, but he has not resumed radiation yet due to increased heart rate.  Patient has decided to pursue treatment as planned but has chosen to be a DNR/DNI.  Dexamethasone taper in progress and continued calcium plus vitamin D..  DANN with malabsorption and poor tolerance of oral iron/Elevated copper level-he has not been on multivitamin containing copper. He had reported bright red blood per rectum as outpatient with 2 colonoscopies in the past 6 years that were unremarkable per his report. Received IV iron last admit.  On IV iron,  Protonix, aspirin and dexamethasone. Hemoglobin dropping somewhat post admission. Iron studies showed continued DANN with a slight improvement from last admission.  Given Ferrlecit 250 mg IV daily x3.    Left lung pneumonia-likely post-obstructive. On dexamethasone.  Per pulmonary.    BLE edema and pain-recent outpatient BLE Doppler was negative for DVT/SVT.  On Lasix and Gabapentin.  Improved.  A-fib with RVR-new onset noted in ED.  Cardiology consulted.  Receiving as needed Cardizem p.o./drip and on aspirin and amiodarone.  Chronic nausea, Uncontrolled DM2, Low TSH, constipation and severe wt. Loss-on Protonix and oral iron d/c'ed.  Receiving Glucerna twice daily and Colace.  Endocrine managing.    Smoking-cessation counseling continued.  He had been started on nicotine patch as outpatient continued.  Recent pathological right hip fracture-physical therapy working with patient.  On calcium plus vitamin D.  Oral thrush-nystatin started.     PLAN  CBC in AM.  Nystatin 100,000 units/mL 5 mL p.o. 4 times daily started.  Continue dexamethasone 4 mg by mouth twice daily and recommend taper to daily dosing on 7/21/2023.  Continue ASA 81 mg by mouth daily.  Would not recommend full dose anticoagulation for A-fib secondary to brain metastasis and known rectal bleeding unless benefits exceed risks.  Continue PRN Norco and  Dilaudid and scheduled gabapentin for pain control.  Continue Protonix.      Continue Glucerna twice daily and Colace.    Radiation therapy to resume when heart rate controlled.   Plan for combination of chemotherapy and immunotherapy as an outpatient post radiation.  Start Xgeva for bone metastases support as outpatient.  Continue calcium plus vitamin D daily.  Recommend outpatient GI evaluation.              I discussed the patient's findings and my recommendations with patient.    Part of this note may be an electronic transcription/translation of spoken language to printed text using the Dragon  Dictation System.     Electronically signed by Mina Dupree MD, 07/20/23, 8:03 AM EDT.

## 2023-07-20 NOTE — CASE MANAGEMENT/SOCIAL WORK
Continued Stay Note  [facility]     Patient Name: Isra Davenport  MRN: [id number]  Today's Date: 7/20/2023    Admit Date: 7/15/2023    Plan: [facility] referral pending acceptance. Will require precert. PASRR approved. From home alone.   Discharge Plan       Row Name 07/20/23 1350       Plan    Plan [facility] referral pending acceptance. Will require precert. PASRR approved. From home alone.    Plan Comments CM contacted [facility] liaison, they are reviewing cost of meds at this time, will notify CM of decision for acceptance. Barrier to D/C: 2L O2, IV lasix, cardiology adjusting meds.                      Expected Discharge Date and Time       Expected Discharge Date Expected Discharge Time    Jul 22, 2023           Phone communication or documentation only - no physical contact with patient or family.     KRISTI VazquezN, RN    [address] [address]    Office: [phone]  Fax: [phone]

## 2023-07-20 NOTE — NURSING NOTE
Appears to have gone back into RVR in last 30 min., scheduled 90mg po cardizem given soon after, will reeval to see if po meds help control rate

## 2023-07-20 NOTE — PLAN OF CARE
Goal Outcome Evaluation:      VSS after RVR beginning of shift, iv dig/increased po cardizem dose given, has been controlled aflutter/fib since, denies soa/cp, up to bedside commode multiple x's- BM, phos being replaced this AM,

## 2023-07-20 NOTE — PROGRESS NOTES
CARDIOLOGY FOLLOW-UP PROGRESS NOTE      Reason for follow-up:    Atrial Fibrillation  Edema     Attending: Raoul Castillo MD      Subjective .     Denies chest pain or dyspnea at present  Periods of PAF vs. MAT this am  Rates rapid earlier up to 150     Review of Systems   Constitutional: Negative for chills and fever.   HENT:  Negative for ear discharge and nosebleeds.    Eyes:  Negative for discharge and redness.   Cardiovascular:  Negative for chest pain, orthopnea, palpitations, paroxysmal nocturnal dyspnea and syncope.   Respiratory:  Positive for shortness of breath. Negative for cough and wheezing.    Endocrine: Negative for heat intolerance.   Skin:  Negative for rash.   Musculoskeletal:  Negative for arthritis and myalgias.   Gastrointestinal:  Negative for abdominal pain, melena, nausea and vomiting.   Genitourinary:  Negative for dysuria and hematuria.   Neurological:  Negative for dizziness, light-headedness, numbness and tremors.   Psychiatric/Behavioral:  Negative for depression. The patient is not nervous/anxious.    Pertinent items are noted in HPI, all other systems reviewed and negative  Allergies: Ibuprofen and Penicillins    Scheduled Meds:amiodarone, 400 mg, Oral, Q12H  aspirin, 81 mg, Oral, Daily  budesonide, 0.5 mg, Nebulization, BID - RT  calcium 500 mg vitamin D 5 mcg (200 UT), 1 tablet, Oral, Daily  dexamethasone, 4 mg, Oral, Q12H   And  insulin regular, 4 Units, Subcutaneous, Q12H  dilTIAZem, 60 mg, Oral, Q8H  docusate sodium, 100 mg, Oral, BID  furosemide, 20 mg, Intravenous, Daily  gabapentin, 200 mg, Oral, Nightly  guaiFENesin, 600 mg, Oral, Q12H  insulin glargine, 14 Units, Subcutaneous, Q12H  insulin lispro, 12 Units, Subcutaneous, TID With Meals  insulin lispro, 2-9 Units, Subcutaneous, 4x Daily AC & at Bedtime  ipratropium-albuterol, 3 mL, Nebulization, 4x Daily - RT  metoprolol tartrate, 25 mg, Oral, BID  midodrine, 5 mg, Oral, TID AC  nystatin, 5 mL, Oral, 4x  "Daily  pantoprazole, 40 mg, Oral, Q AM  senna-docusate sodium, 2 tablet, Oral, BID  sodium chloride, 10 mL, Intravenous, Q12H        Continuous Infusions:Pharmacy Consult - Steroid Insulin Protocol,       PRN Meds:.  senna-docusate sodium **AND** polyethylene glycol **AND** bisacodyl **AND** bisacodyl    Calcium Replacement - Follow Nurse / BPA Driven Protocol    dextrose    dextrose    glucagon (human recombinant)    ipratropium-albuterol    Magnesium Standard Dose Replacement - Follow Nurse / BPA Driven Protocol    Morphine    ondansetron    Pharmacy Consult - Steroid Insulin Protocol    Phosphorus Replacement - Follow Nurse / BPA Driven Protocol    Potassium Replacement - Follow Nurse / BPA Driven Protocol    [COMPLETED] Insert Peripheral IV **AND** sodium chloride    sodium chloride    sodium chloride    traMADol    Objective     VITAL SIGNS  Patient Vitals for the past 24 hrs:   BP Temp Temp src Pulse Resp SpO2   07/20/23 0900 115/69 98.4 °F (36.9 °C) Oral 93 21 --   07/20/23 0230 114/66 98.5 °F (36.9 °C) Oral 87 18 --   07/19/23 2325 98/58 98 °F (36.7 °C) Oral 81 20 92 %   07/19/23 1700 107/62 98.3 °F (36.8 °C) Oral 113 22 95 %   07/19/23 1526 -- -- -- 110 20 99 %   07/19/23 1522 -- -- -- 102 20 94 %   07/19/23 1300 130/86 -- -- (!) 126 19 95 %   07/19/23 1137 -- -- -- 105 18 99 %   07/19/23 1132 -- -- -- 111 18 93 %          Flowsheet Rows      Flowsheet Row First Filed Value   Admission Height 177.8 cm (70\") Documented at 07/15/2023 1652   Admission Weight 72.6 kg (160 lb) Documented at 07/15/2023 1652            Body mass index is 21.7 kg/m².      Intake/Output Summary (Last 24 hours) at 7/20/2023 1105  Last data filed at 7/20/2023 0500  Gross per 24 hour   Intake 720 ml   Output 1000 ml   Net -280 ml          TELEMETRY:     SR with PACS    Physical Exam:  Constitutional:       Appearance: Well-developed.   Eyes:      General: No scleral icterus.        Right eye: No discharge.   HENT:      Head: " Normocephalic and atraumatic.   Neck:      Thyroid: No thyromegaly.      Lymphadenopathy: No cervical adenopathy.   Pulmonary:      Effort: Pulmonary effort is normal. No respiratory distress.      Breath sounds: Normal breath sounds. No wheezing. No rales.   Cardiovascular:      Normal rate. Regular rhythm.      No gallop.    Edema:     Peripheral edema absent.   Abdominal:      Tenderness: There is no abdominal tenderness.   Skin:     Findings: No erythema or rash.   Neurological:      Mental Status: Alert and oriented to person, place, and time.          Results Review:   I reviewed the patient's new clinical results.    CBC    Results from last 7 days   Lab Units 07/20/23  0106 07/19/23  1025 07/17/23  2254 07/17/23  0216 07/15/23  2133 07/15/23  1712   WBC 10*3/mm3 13.40* 12.90* 11.70* 12.30* 11.40* 11.40*   HEMOGLOBIN g/dL 9.0* 9.0* 8.8* 9.5* 9.9* 10.0*   PLATELETS 10*3/mm3 484* 486* 395 445 415 404       BMP   Results from last 7 days   Lab Units 07/20/23  0106 07/19/23  1025 07/18/23  1906 07/18/23  1437 07/17/23  0216 07/15/23  2133 07/15/23  1712   SODIUM mmol/L 138 134* 131* 130* 142 142 138   POTASSIUM mmol/L 4.3 4.1 4.5 4.6 4.5 5.4* 4.6   CHLORIDE mmol/L 94* 89* 87* 87* 94* 95* 96*   CO2 mmol/L 38.0* 37.0* 37.0* 35.0* 41.0* 38.0* 36.0*   BUN mg/dL 16 15 21 19 17 23 21   CREATININE mg/dL 0.31* 0.39* 0.49* 0.40* 0.35* 0.59* 0.37*   GLUCOSE mg/dL 123* 381* 335* 372* 91 450* 355*   MAGNESIUM mg/dL 1.7 1.9 2.2 1.5*  --  1.7  --    PHOSPHORUS mg/dL 1.6* 2.7 2.0*  --   --   --   --        Cr Clearance Estimated Creatinine Clearance: 224.4 mL/min (A) (by C-G formula based on SCr of 0.31 mg/dL (L)).  Coag     HbA1C   Lab Results   Component Value Date    HGBA1C 7.30 (H) 07/15/2023     Blood Glucose   Glucose   Date/Time Value Ref Range Status   07/20/2023 0704 205 (H) 70 - 105 mg/dL Final     Comment:     Serial Number: [id number]:  [id number]   07/19/2023 1908 391 (H) 70 - 105 mg/dL Final     Comment:      Serial Number: [id number]:  [id number]   07/19/2023 1649 374 (H) 70 - 105 mg/dL Final     Comment:     Serial Number: [id number]:  [id number]   07/19/2023 1127 337 (H) 70 - 105 mg/dL Final     Comment:     Serial Number: [id number]:  [id number]   07/19/2023 0709 261 (H) 70 - 105 mg/dL Final     Comment:     Serial Number: [id number]:  [id number]   07/18/2023 2045 314 (H) 70 - 105 mg/dL Final     Comment:     Serial Number: [id number]:  [id number]   07/18/2023 1809 327 (H) 70 - 105 mg/dL Final     Comment:     Serial Number: [id number]:  [id number]   07/18/2023 1727 353 (H) 70 - 105 mg/dL Final     Comment:     Serial Number: [id number]:  [id number]     Infection   Results from last 7 days   Lab Units 07/15/23  2133   PROCALCITONIN ng/mL 0.22       CMP   Results from last 7 days   Lab Units 07/20/23  0106 07/19/23  1025 07/18/23  1906 07/18/23  1437 07/17/23  0216 07/15/23  2133 07/15/23  1712   SODIUM mmol/L 138 134* 131* 130* 142 142 138   POTASSIUM mmol/L 4.3 4.1 4.5 4.6 4.5 5.4* 4.6   CHLORIDE mmol/L 94* 89* 87* 87* 94* 95* 96*   CO2 mmol/L 38.0* 37.0* 37.0* 35.0* 41.0* 38.0* 36.0*   BUN mg/dL 16 15 21 19 17 23 21   CREATININE mg/dL 0.31* 0.39* 0.49* 0.40* 0.35* 0.59* 0.37*   GLUCOSE mg/dL 123* 381* 335* 372* 91 450* 355*   ALBUMIN g/dL  --   --   --   --   --   --  2.6*   BILIRUBIN mg/dL  --   --   --   --   --   --  0.2   ALK PHOS U/L  --   --   --   --   --   --  178*   AST (SGOT) U/L  --   --   --   --   --   --  11   ALT (SGPT) U/L  --   --   --   --   --   --  28       ABG      UA      OLIVIA  No results found for: POCMETH, POCAMPHET, POCBARBITUR, POCBENZO, POCCOCAINE, POCOPIATES, POCOXYCODO, POCPHENCYC, POCPROPOXY, POCTHC, POCTRICYC  Lysis Labs   Results from last 7 days   Lab Units 07/20/23  0106 07/19/23  1025 07/18/23  1906 07/18/23  1437 07/17/23  2254 07/17/23  0216 07/15/23  2133 07/15/23  1712   HEMOGLOBIN g/dL 9.0* 9.0*  --   --  8.8* 9.5* 9.9* 10.0*    PLATELETS 10*3/mm3 484* 486*  --   --  395 445 415 404   CREATININE mg/dL 0.31* 0.39* 0.49* 0.40*  --  0.35* 0.59* 0.37*       Radiology(recent) No radiology results for the last day    Imaging Results (Last 24 Hours)       ** No results found for the last 24 hours. **            Results from last 7 days   Lab Units 07/15/23  2133   HSTROP T ng/L 30*         EKG          I personally viewed and interpreted the patient's EKG/Telemetry data:        ECHOCARDIOGRAM:     Results for orders placed during the hospital encounter of 06/16/23    Adult Transthoracic Echo Complete W/ Cont if Necessary Per Protocol    Interpretation Summary    Left ventricular systolic function is normal. Left ventricular ejection fraction appears to be 61 - 65%.    Left ventricular diastolic function is consistent with (grade I) impaired relaxation.    There is mild bileaflet mitral valve prolapse present.    Estimated right ventricular systolic pressure from tricuspid regurgitation is mildly elevated (35-45 mmHg). Calculated right ventricular systolic pressure from tricuspid regurgitation is 44 mmHg.        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 MAT this am with RVR  Increase amiodarone  Reduce cardizem back to 60 q 8  Recent non invasive ischemic eval negative for ischemia  Echo with preserved LV function  Per oncology not recommended long term a/c due to brain mets and known rectal bleeding       Additional recommendations per Dr. Johnson    Patient is seen and examined and findings are verified.  All data is reviewed by me personally.  Assessment and plan formulated by APC was done after discussion with attending.  I spent more than 50% of time in taking care of the  patient.    Patient is sitting in the chair.  Patient does not have any symptom due to palpitation.    Normal S1 and S2.  No pericardial rub or murmur abdominal exam is benign    Patient continues to have breakthrough atrial fibrillation.  I would recommend to increase amiodarone 400 mg twice daily.  I would increase Cardizem to 60 mg every 6.  Continue beta-blocker.  I would increase Lopressor to 75 mg twice daily.  If patient continues to have atrial fibrillation.  I would consider possible ablative therapy and will request EP consultation with Dr. Willis Gonzalez         I discussed the patients findings and my recommendations with patient and RN                  Electronically signed by NOREEN Peng, 07/17/23, 8:31 AM EDT.          NOREEN Peng  07/20/23  11:05 EDT

## 2023-07-20 NOTE — PROGRESS NOTES
[facility] Medicine Services Daily Progress Note    Patient Name: Isra Davenport  : 1955  MRN: [id number]  Primary Care Physician:  Mayco Carreon MD  Date of admission: 7/15/2023      Subjective      Chief Complaint: Pain     History of Present Illness: Isra Davenport is a 68 y.o. 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  He is status post intramedullary nailing of the hip on 2023 and a [id number] placed on 2023 he is currently undergoing chemotherapy and plan for palliative radiation who presented to [facility] on 7/15/2023 complaining of pain and lower extremity edema     Review of records show patient had presented to chemotherapy with complaint of lower extremity edema and what he describes as neuropathic pain.  He had ultrasound that was negative for DVT and was prescribed Lasix.  He was also prescribed Norco 5/325 for pain.  Patient reports when he went to  prescriptions from the pharmacy the pharmacy was closed on Monday and he was unable to fill prescription.  He presents to the ED today with uncontrolled pain.  It was noted from previous admission he was discharged on low pressure for diagnosis of sinus tachycardia     ED work-up included chest x-ray that showed a right internal jugular Port-A-Cath in place.  Heart size stable.  Airspace disease in the left lung compatible with pneumonia.  Chronic elevation of the left hemidiaphragm.  No pleural effusion.  Right lung was clear.  Bilateral foot x-ray was negative for fracture.  There is healed fracture of the right tibia and fibula.  Troponin 26, potassium 4.6, sodium 138, glucose 355, creatinine 0.3, BUN is 21.  Albumin 2.6.  White count 11.4, hemoglobin 10.  On admission heart rate 84 but while he was being treated in the ED heart rate jumped into the  150s and was found to be in acute A-fib with RVR.  O2 sats dropped to 88% was placed on 2 L nasal cannula.  He was given a Cardizem bolus and Lopressor bolus and resumed on a Cardizem drip.  Patient denies any chest pain or feeling of chest palpitations.  Denies any fever chills or increased shortness of breath.  Reports positive for productive cough.  Positive for swelling of bilateral lower extremities below the calf and extreme tenderness to touch bilateral feet.     7/16/23 patient seen and examined in bed no acute distress, vital signs stable, K-5.4, hr 81, dyspnea on high flow oxygen.  We will start Cardizem p.o.  7/17/2023 patient seen and examined in bed no acute distress, he is on 3 L of oxygen, blood pressure 93/52.  Discussed with RN.  7/18/2023 patient seen and examined in bed no acute distress, dyspnea on 3 L, heart rate 155.  Discussed with RN. Restarted Cardizem drip per cardiology and adding short acting Cardizem to wean IV and increasing BB   7/19/2023 patient seen and examined in bed no acute distress, blood pressure 92/54.  On amiodarone, Cardizem, metoprolol dyspnea on 2 L.  Discussed with RN.  7/20/2023 patient seen and examined in bed no acute distress, his heart rate still elevated.  Presently on amiodarone, Cardizem, metoprolol. discussed with RN.    ROS Review of Systems   Constitutional: Positive for malaise/fatigue.   Cardiovascular:  Positive for leg swelling.   Respiratory:  Positive for cough and sputum production.    Skin: Negative.    Musculoskeletal:  Positive for back pain.   Gastrointestinal: Negative.    Genitourinary: Negative.    Neurological:  Positive for paresthesias.   Objective      Vitals:   Temp:  [98 °F (36.7 °C)-98.5 °F (36.9 °C)] 98.4 °F (36.9 °C)  Heart Rate:  [] 93  Resp:  [18-22] 21  BP: ()/(58-86) 115/69  Flow (L/min):  [2] 2    Physical Exam Constitutional:       Appearance: He is ill-appearing.   Eyes:      Pupils: Pupils are equal, round, and  reactive to light.   Cardiovascular:      Rate and Rhythm: Tachycardia present. Rhythm irregular.   Pulmonary:      Breath sounds: Rhonchi present.   Abdominal:      General: Abdomen is flat.      Palpations: Abdomen is soft.   Musculoskeletal:      Right lower leg: Edema present.      Left lower leg: Edema present.   Skin:     General: Skin is warm and dry.   Neurological:      Mental Status: He is alert and oriented to person, place, and time.   Psychiatric:         Mood and Affect: Mood normal.     Result Review    Result Review:  I have personally reviewed the results from the time of this admission to 7/20/2023 11:14 EDT and agree with these findings:  []  Laboratory  []  Microbiology  []  Radiology  []  EKG/Telemetry   []  Cardiology/Vascular   []  Pathology  []  Old records  []  Other:  Most notable findings include:     Wounds (last 24 hours)       LDA Wound       Row Name 07/20/23 0805 07/19/23 2000 07/19/23 1600       Wound 06/17/23 0915 Right anterior hip Incision    Wound - Properties Group Placement Date: 06/17/23 - Placement Time: 0915 -HB Side: Right  -HB Orientation: anterior  -HB Location: hip  -HB Primary Wound Type: Incision  -HB    Dressing Appearance -- dry;intact;open to air  -JN --    Closure Other (Comment);Approximated  healing  -TL Other (Comment);Approximated  healing  -JN Other (Comment)  staples removed wound approximates  -SK    Base clean;dry  -TL clean;dry  -JN --    Periwound intact;blanchable  -TL intact;blanchable  -JN redness  -SK    Periwound Temperature -- -- warm  -SK    Periwound Skin Turgor -- -- soft  -SK    Drainage Amount none  -TL none  -JN none  -SK    Dressing Care -- open to air  -JN --    Retired Wound - Properties Group Placement Date: 06/17/23  -HB Placement Time: 0915 -HB Side: Right  -HB Orientation: anterior  -HB Location: hip  -HB Primary Wound Type: Incision  -HB    Retired Wound - Properties Group Date first assessed: 06/17/23  - Time first assessed:  0915  -HB Side: Right  -HB Location: hip  -HB Primary Wound Type: Incision  -HB       Wound 07/15/23 2100 Bilateral medial gluteal Pressure Injury    Wound - Properties Group Placement Date: 07/15/23  -MF Placement Time: 2100  -MF Present on Hospital Admission: Y  -MF Side: Bilateral  -MF Orientation: medial  -MF Location: gluteal  -MF Primary Wound Type: Pressure inj  -MF    Dressing Appearance -- open to air  -JN --    Base blanchable;pink  -TL blanchable;pink  -JN pink;other (see comments)  pale and open  -SK    Periwound blanchable;redness  -TL blanchable;redness  -JN redness;non-blanchable  -SK    Periwound Temperature -- -- warm  -SK    Periwound Skin Turgor -- -- soft  -SK    Drainage Amount none  -TL none  -JN --    Dressing Care open to air  -TL -- --    Periwound Care -- barrier ointment applied  -JN --    Retired Wound - Properties Group Placement Date: 07/15/23  -MF Placement Time: 2100  -MF Present on Hospital Admission: Y  -MF Side: Bilateral  -MF Orientation: medial  -MF Location: gluteal  -MF Primary Wound Type: Pressure inj  -MF    Retired Wound - Properties Group Date first assessed: 07/15/23  -MF Time first assessed: 2100 -MF Present on Hospital Admission: Y  -MF Side: Bilateral  -MF Location: gluteal  -MF Primary Wound Type: Pressure inj  -MF       Wound 07/18/23 1900 Left posterior ankle    Wound - Properties Group Placement Date: 07/18/23  -AR Placement Time: 1900  -AR Present on Hospital Admission: Y  -AR Side: Left  -AR Orientation: posterior  -AR Location: ankle  -AR    Base -- -- blanchable;red  small red darkened area  -SK    Periwound -- -- redness;other (see comments)  unilateral redness diffusing up leg, open to air  -SK    Retired Wound - Properties Group Placement Date: 07/18/23  -AR Placement Time: 1900  -AR Present on Hospital Admission: Y  -AR Side: Left  -AR Orientation: posterior  -AR Location: ankle  -AR    Retired Wound - Properties Group Date first assessed: 07/18/23  -AR  Time first assessed: 1900  -AR Present on Hospital Admission: Y  -AR Side: Left  -AR Location: ankle  -AR      Row Name 07/19/23 1200             Wound 06/17/23 0915 Right anterior hip Incision    Wound - Properties Group Placement Date: 06/17/23  -HB Placement Time: 0915 -HB Side: Right  -HB Orientation: anterior  -HB Location: hip  -HB Primary Wound Type: Incision  -HB    Closure Other (Comment)  staples removed wound approximates  -SK      Periwound redness  -SK      Periwound Temperature warm  -SK      Periwound Skin Turgor soft  -SK      Drainage Amount none  -SK      Retired Wound - Properties Group Placement Date: 06/17/23  -HB Placement Time: 0915 -HB Side: Right  -HB Orientation: anterior  -HB Location: hip  -HB Primary Wound Type: Incision  -HB    Retired Wound - Properties Group Date first assessed: 06/17/23  -HB Time first assessed: 0915  -HB Side: Right  -HB Location: hip  -HB Primary Wound Type: Incision  -HB       Wound 07/15/23 2100 Bilateral medial gluteal Pressure Injury    Wound - Properties Group Placement Date: 07/15/23  -MF Placement Time: 2100  -MF Present on Hospital Admission: Y  -MF Side: Bilateral  -MF Orientation: medial  -MF Location: gluteal  -MF Primary Wound Type: Pressure inj  -MF    Base pink;other (see comments)  pale and open  -SK      Periwound redness;non-blanchable  -SK      Periwound Temperature warm  -SK      Periwound Skin Turgor soft  -SK      Retired Wound - Properties Group Placement Date: 07/15/23  -MF Placement Time: 2100  -MF Present on Hospital Admission: Y  -MF Side: Bilateral  -MF Orientation: medial  -MF Location: gluteal  -MF Primary Wound Type: Pressure inj  -MF    Retired Wound - Properties Group Date first assessed: 07/15/23  -MF Time first assessed: 2100  -MF Present on Hospital Admission: Y  -MF Side: Bilateral  -MF Location: gluteal  -MF Primary Wound Type: Pressure inj  -MF       Wound 07/18/23 1900 Left posterior ankle    Wound - Properties Group  Placement Date: 07/18/23  -AR Placement Time: 1900  -AR Present on Hospital Admission: Y  -AR Side: Left  -AR Orientation: posterior  -AR Location: ankle  -AR    Base blanchable;red  small red darkened area  -SK      Periwound redness;other (see comments)  unilateral redness diffusing up leg, open to air  -SK      Retired Wound - Properties Group Placement Date: 07/18/23  -AR Placement Time: 1900  -AR Present on Hospital Admission: Y  -AR Side: Left  -AR Orientation: posterior  -AR Location: ankle  -AR    Retired Wound - Properties Group Date first assessed: 07/18/23  -AR Time first assessed: 1900  -AR Present on Hospital Admission: Y  -AR Side: Left  -AR Location: ankle  -AR              User Key  (r) = Recorded By, (t) = Taken By, (c) = Cosigned By      Initials Name Provider Type    Cleo Rich RN Registered Nurse    Reina Kenney RN Registered Nurse    Harman Hughes RN Registered Nurse    Jazmyne Brooke RN Registered Nurse    Laura Nava, RN Registered Nurse    Danyell Samuel RN Registered Nurse                    CBC:      Lab 07/20/23  0106 07/19/23  1025 07/17/23  2254 07/17/23  0216 07/15/23  2133 07/15/23  1712   WBC 13.40* 12.90* 11.70* 12.30* 11.40* 11.40*   HEMOGLOBIN 9.0* 9.0* 8.8* 9.5* 9.9* 10.0*   HEMATOCRIT 29.1* 28.3* 28.7* 31.5* 32.5* 31.8*   PLATELETS 484* 486* 395 445 415 404   NEUTROS ABS 12.19* 11.74* 11.12* 10.70* 10.30* 10.20*   LYMPHS ABS  --   --   --  0.90 0.60* 0.60*   MONOS ABS  --   --   --  0.80 0.50 0.60   EOS ABS  --   --   --  0.00 0.00 0.00   MCV 85.3 85.1 84.9 85.3 84.5 84.8       CMP:        Lab 07/20/23  0106 07/19/23  1025 07/18/23  1906 07/18/23  1437 07/17/23  0216 07/15/23  2133 07/15/23  1712   SODIUM 138 134* 131* 130* 142 142 138   POTASSIUM 4.3 4.1 4.5 4.6 4.5 5.4* 4.6   CHLORIDE 94* 89* 87* 87* 94* 95* 96*   CO2 38.0* 37.0* 37.0* 35.0* 41.0* 38.0* 36.0*   ANION GAP 6.0 8.0 7.0 8.0 7.0 9.0 6.0   BUN 16 15 21 19 17 23 21    CREATININE 0.31* 0.39* 0.49* 0.40* 0.35* 0.59* 0.37*   EGFR 129.2 120.5 112.5 119.6 124.5 106.3 122.4   GLUCOSE 123* 381* 335* 372* 91 450* 355*   CALCIUM 9.3 9.1 9.0 9.1 9.7 9.2 10.0   IONIZED CALCIUM 1.22 1.19* 1.18*  --   --   --   --    MAGNESIUM 1.7 1.9 2.2 1.5*  --  1.7  --    PHOSPHORUS 1.6* 2.7 2.0*  --   --   --   --    TOTAL PROTEIN  --   --   --   --   --   --  6.8   ALBUMIN  --   --   --   --   --   --  2.6*   GLOBULIN  --   --   --   --   --   --  4.2   ALT (SGPT)  --   --   --   --   --   --  28   AST (SGOT)  --   --   --   --   --   --  11   BILIRUBIN  --   --   --   --   --   --  0.2   ALK PHOS  --   --   --   --   --   --  178*       No results found for: ACANTHNAEG, AFBCX, BPERTUSSISCX, BLOODCX  No results found for: BCIDPCR, CXREFLEX, CSFCX, CULTURETIS  No results found for: CULTURES, HSVCX, URCX  No results found for: EYECULTURE, GCCX, HSVCULTURE, LABHSV  No results found for: LEGIONELLA, MRSACX, MUMPSCX, MYCOPLASCX  No results found for: NOCARDIACX, STOOLCX  No results found for: THROATCX, UNSTIMCULT, URINECX, CULTURE, VZVCULTUR  No results found for: VIRALCULTU, WOUNDCX      Assessment & Plan      Brief Patient Summary:  Isra Davenport is a 68 y.o. male who presented to chemotherapy with complaint of lower extremity edema and what he describes as neuropathic pain.  He had ultrasound that was negative for DVT and was prescribed Lasix.  He was also prescribed Norco 5/325 for pain.  Patient reports when he went to  prescriptions from the pharmacy, the pharmacy was closed on Monday and he was unable to fill prescription.  He presents to the ED today with uncontrolled pain.  It was noted from previous admission he was discharged on low pressure for diagnosis of sinus tachycardia         amiodarone, 400 mg, Oral, Q12H  aspirin, 81 mg, Oral, Daily  budesonide, 0.5 mg, Nebulization, BID - RT  calcium 500 mg vitamin D 5 mcg (200 UT), 1 tablet, Oral, Daily  dexamethasone, 4 mg, Oral, Q12H    And  insulin regular, 4 Units, Subcutaneous, Q12H  dilTIAZem, 60 mg, Oral, Q8H  docusate sodium, 100 mg, Oral, BID  furosemide, 20 mg, Intravenous, Daily  gabapentin, 200 mg, Oral, Nightly  guaiFENesin, 600 mg, Oral, Q12H  insulin glargine, 14 Units, Subcutaneous, Q12H  insulin lispro, 12 Units, Subcutaneous, TID With Meals  insulin lispro, 2-9 Units, Subcutaneous, 4x Daily AC & at Bedtime  ipratropium-albuterol, 3 mL, Nebulization, 4x Daily - RT  metoprolol tartrate, 25 mg, Oral, BID  midodrine, 5 mg, Oral, TID AC  nystatin, 5 mL, Oral, 4x Daily  pantoprazole, 40 mg, Oral, Q AM  senna-docusate sodium, 2 tablet, Oral, BID  sodium chloride, 10 mL, Intravenous, Q12H       Pharmacy Consult - Steroid Insulin Protocol,          Active Hospital Problems:  Active Hospital Problems    Diagnosis     **Atrial fibrillation with RVR     Moderate malnutrition     Atrial fibrillation        New onset A-fib with RVR  - Started on Cardiezem drip in ED dc   -continue metoprolol po, Cardizem p.o. and amiodarone po  - consulted cardiology  Back in AF vs. MAT this am with RVR  Increase amiodarone  Reduce cardizem back to 60 q 8hrs  Recent non invasive ischemic eval negative for ischemia  Echo with preserved LV function  - Had considered pharmacologic AC but with known brain mets concerned with risk for bleed so will consult oncology for recommendation on AC  - continue VTE with SCD until seen by cardiology and oncology     Pain and Edema BLE  - appears Neuropathic  - pt also with known bone cancer and arthritis   - consider neuropathy 2/2 chemotherapy  - will continue Lasix for edema  -Was ruled out for DVT   - will add gabapentin 200mg QHS for sign of neuropathic pain  - check BNP  - will resume pt Norco that was previously ordered and add prn Dilaudid   -echo 6/22/23: Left ventricular systolic function is normal. Left ventricular ejection fraction appears to be 61 - 65%.   Left ventricular diastolic function is consistent with (grade  I) impaired relaxation.     Hyperglycemia- likely steroid induced   -A1C 7.3  - add glucomander  - add steroid protocol      Hypoxia- consider 2/2 known lung cancer   - cxr with pneumonia and elevated WBC and known tobacco use  - will cover possible COPD vs pneumonia vs carcinoma with empiric Azithromycin  - wean O2 as tolerated   - duo nebs prn     Anemia  - continue ferrous sulfate  - hgb stable     Stage IV squamous cell lung cancer with mets to brain and bone  - currently treated with chemotherapy   - continue decadron as previously ordered by oncology  - continue nexium   Per oncology   Check iron saturation.continue ferrous sulfate 325 mg by mouth daily.Consider IV iron if iron sat is low.  Radiation oncology consultation to resume radiation therapy when able  Await molecular testing results with plan for chemotherapy post completion of radiation.  Start Xgeva and daily Ca+VitD for bone metastases support as outpatient.  dexamethasone  4 mg by mouth daily.  Continue ASA 81 mg by mouth daily.  Would not recommend full dose anticoagulation for A-fib secondary to brain mets and known rectal bleeding.  Continue as needed hydrocodone, as needed Dilaudid, and scheduled gabapentin for pain control.  Continue Pepcid p.o. twice daily and as needed Zofran    DVT prophylaxis:  Mechanical DVT prophylaxis orders are present.    CODE STATUS:    Medical Intervention Limits: NO intubation (DNI)  Code Status (Patient has no pulse and is not breathing): No CPR (Do Not Attempt to Resuscitate)  Medical Interventions (Patient has pulse or is breathing): Limited Support      Disposition:  I expect patient to be discharged NH.    I have utilized all available, immediate resources to obtain, update, or review the patient's current medications including all prescriptions, over-the-counter products, herbals, cannabis/cannabidiol products, and vitamin.mineral/dietary (nutritional) supplements.      Medical Intervention Limits: NO  intubation (DNI)  Code Status (Patient has no pulse and is not breathing): No CPR (Do Not Attempt to Resuscitate)  Medical Interventions (Patient has pulse or is breathing): Limited Support    Next of kin of Power of :       Admission Status:  I believe this patient meets ADMIT status.              This patient has been examined wearing appropriate Personal Protective Equipment and discussed with  RN . 07/20/23      Electronically signed by Raoul Castillo MD, 07/20/23, 11:14 EDT.  [facility] [place] Hospitalist Team

## 2023-07-20 NOTE — NURSING NOTE
Patient up to chair for dinner. Tray brought in by dietary aide. Patient's heart rate increase from 80s to 150s. Patient asymptomatic. MD notified.

## 2023-07-20 NOTE — PLAN OF CARE
Goal Outcome Evaluation:     Isra Davenport presents with functional mobility impairments which indicate the need for skilled intervention. PT performed tactile stimulation/soft tissue massage B feet w/ pt in supine at start of session for desensitization 2*/2 hyperalgesia B feet and with reports of improvement in symptoms. Session limited to short-distance ambulatory transfer from EOB to recliner, ~3 ft with RW requiring CGA, secondary to tachycardic HR response. Resting HR fluctuating between 80-103bpm, elevated to 152bpm during short-distance ambulatory transfer from EOB to recliner with RW, activity immediately stopped, improved HR to 102 bpm with seated rest break. Pt was on RA upon arrival w/ no Sp02 monitor on. PT retrieved and donned Sp02 monitor and noted Sp02 to be at 75% on RA sitting in recliner. 2L 02 donned with improvement to 92% Sp02 after ~2 minutes. RN notified. Recommending SNF at d/c and continue seeing 5x/week a BHF for progression of mobility. PPE: gloves    Kristyn He, PT, DPT

## 2023-07-20 NOTE — THERAPY TREATMENT NOTE
Subjective: Pt agreeable to therapeutic plan of care. Pt w/ episodesof A-fib w/ RVR around 6:30am, RN reports resolved w/ medication and HR  bpm at rest. RN okays therapy session.    Objective:     Bed mobility - SBA (supine to sit transfer)  Transfers - CGA and with rolling walker  Ambulation - 3 ft to recliner CGA and with rolling walker (distance limited by tachycardia)    Desensitization: PT performed tactile stimulation/soft tissue massage B feet w/ pt in supine at start of session for desensitization 2*/2 hyperalgesia B feet and with reports of improvement in symptoms.     Vitals: Tachycardic: Resting HR fluctuating between 80-103bpm, elevated to 152bpm during short-distance ambulatory transfer from EOB to recliner with RW, activity immediately stopped, improved HR to 102 bpm with seated rest break. Pt was on RA upon arrival w/ no Sp02 monitor on. PT retrieved and donned Sp02 monitor and noted Sp02 to be at 75% on RA sitting in recliner. 2L 02 donned with improvement to 92% Sp02 after ~2 minutes. RN notified.    Pain: 6 VAS   Location: B feet  Intervention for pain: Repositioned and Therapeutic Presence, desensitization/manual therapy/massage    Education: Provided education on the importance of mobility in the acute care setting, Verbal/Tactile Cues, and Transfer Training    Assessment: Isra Davenport presents with functional mobility impairments which indicate the need for skilled intervention. PT performed tactile stimulation/soft tissue massage B feet w/ pt in supine at start of session for desensitization 2*/2 hyperalgesia B feet and with reports of improvement in symptoms. Session limited to short-distance ambulatory transfer from EOB to recliner, ~3 ft with RW requiring CGA, secondary to tachycardic HR response. Resting HR fluctuating between 80-103bpm, elevated to 152bpm during short-distance ambulatory transfer from EOB to recliner with RW, activity immediately stopped, improved HR to 102 bpm  "with seated rest break. Pt was on RA upon arrival w/ no Sp02 monitor on. PT retrieved and donned Sp02 monitor and noted Sp02 to be at 75% on RA sitting in recliner. 2L 02 donned with improvement to 92% Sp02 after ~2 minutes. RN notified. Recommending SNF at d/c and continue seeing 5x/week a BHF for progression of mobility. PPE: gloves    Plan/Recommendations:   Moderate Intensity Therapy recommended post-acute care. This is recommended as therapy feels the patient would require 3-4 days per week and wouldn't tolerate \"3 hour daily\" rehab intensity. SNF would be the preferred choice. If the patient does not agree to SNF, arrange HH or OP depending on home bound status. If patient is medically complex, consider LTACH.. Pt requires no DME at discharge.     Pt desires Skilled Rehab placement at discharge. Pt cooperative; agreeable to therapeutic recommendations and plan of care.         Basic Mobility 6-click:  Rollin = Total, A lot = 2, A little = 3; 4 = None  Supine>Sit:   1 = Total, A lot = 2, A little = 3; 4 = None   Sit>Stand with arms:  1 = Total, A lot = 2, A little = 3; 4 = None  Bed>Chair:   1 = Total, A lot = 2, A little = 3; 4 = None  Ambulate in room:  1 = Total, A lot = 2, A little = 3; 4 = None  3-5 Steps with railin = Total, A lot = 2, A little = 3; 4 = None  Score: 18    Post-Tx Position: Up in Chair, Alarms activated, and Call light and personal items within reach  PPE: gloves     "

## 2023-07-20 NOTE — PROGRESS NOTES
"PULMONARY CRITICAL CARE PROGRESS  NOTE      PATIENT IDENTIFICATION:  Name: Isra Davenport  MRN: [id number]  :  1955     Age: 68 y.o.  Sex: male    DATE OF Note:  2023   Referring Physician: Jese SMITH MD                  Subjective:   Up in chair,  still on O2,  no SOB, no chest or abd pain, no bowel or bladder issues , still weak      Objective:  tMax 24 hrs: Temp (24hrs), Av.3 °F (36.8 °C), Min:98 °F (36.7 °C), Max:98.5 °F (36.9 °C)      Vitals Ranges:   Temp:  [98 °F (36.7 °C)-98.5 °F (36.9 °C)] 98.5 °F (36.9 °C)  Heart Rate:  [] 83  Resp:  [17-22] 20  BP: ()/(58-69) 113/64    Intake and Output Last 3 Shifts:   I/O last 3 completed shifts:  In: 960 [P.O.:960]  Out: 3150 [Urine:3150]    Exam:  /64   Pulse 83   Temp 98.5 °F (36.9 °C) (Oral)   Resp 20   Ht 177.8 cm (70\")   Wt 68.6 kg (151 lb 3.8 oz) Comment: Nurse notified of gain  SpO2 93%   BMI 21.70 kg/m²     General Appearance:   Alert  HEENT:  Normocephalic, without obvious abnormality. Conjunctivae/corneas clear.  Normal external ear canals. Nares normal, no drainage     Neck:  Supple, symmetrical, trachea midline. No JVD.  Lungs /Chest wall:   Bilateral basal rhonchi, respirations unlabored, symmetrical wall movement.     Heart:  Regular rate and rhythm, systolic murmur PMI left sternal border  Abdomen: Soft, nontender, no masses, no organomegaly.    Extremities: Trace edema, no clubbing or cyanosis        Medications:  amiodarone, 400 mg, Oral, Q12H  aspirin, 81 mg, Oral, Daily  budesonide, 0.5 mg, Nebulization, BID - RT  calcium 500 mg vitamin D 5 mcg (200 UT), 1 tablet, Oral, Daily  dexamethasone, 4 mg, Oral, Q12H   And  insulin regular, 4 Units, Subcutaneous, Q12H  dilTIAZem, 60 mg, Oral, Q8H  docusate sodium, 100 mg, Oral, BID  furosemide, 20 mg, Intravenous, Daily  gabapentin, 200 mg, Oral, Nightly  guaiFENesin, 600 mg, Oral, Q12H  insulin glargine, 14 Units, Subcutaneous, Q12H  insulin lispro, 12 Units, " Subcutaneous, TID With Meals  insulin lispro, 2-9 Units, Subcutaneous, 4x Daily AC & at Bedtime  ipratropium-albuterol, 3 mL, Nebulization, 4x Daily - RT  metoprolol tartrate, 25 mg, Oral, BID  midodrine, 5 mg, Oral, TID AC  nystatin, 5 mL, Oral, 4x Daily  pantoprazole, 40 mg, Oral, Q AM  senna-docusate sodium, 2 tablet, Oral, BID  sodium chloride, 10 mL, Intravenous, Q12H        Infusion:  Pharmacy Consult - Steroid Insulin Protocol,          PRN:    senna-docusate sodium **AND** polyethylene glycol **AND** bisacodyl **AND** bisacodyl    Calcium Replacement - Follow Nurse / BPA Driven Protocol    dextrose    dextrose    glucagon (human recombinant)    ipratropium-albuterol    Magnesium Standard Dose Replacement - Follow Nurse / BPA Driven Protocol    Morphine    ondansetron    Pharmacy Consult - Steroid Insulin Protocol    Phosphorus Replacement - Follow Nurse / BPA Driven Protocol    Potassium Replacement - Follow Nurse / BPA Driven Protocol    [COMPLETED] Insert Peripheral IV **AND** sodium chloride    sodium chloride    sodium chloride    traMADol  Data Review:  All labs (24hrs):   Recent Results (from the past 24 hour(s))   POC Glucose Once    Collection Time: 07/19/23  4:49 PM    Specimen: Blood   Result Value Ref Range    Glucose 374 (H) 70 - 105 mg/dL   POC Glucose Once    Collection Time: 07/19/23  7:08 PM    Specimen: Blood   Result Value Ref Range    Glucose 391 (H) 70 - 105 mg/dL   Basic Metabolic Panel    Collection Time: 07/20/23  1:06 AM    Specimen: Blood   Result Value Ref Range    Glucose 123 (H) 65 - 99 mg/dL    BUN 16 8 - 23 mg/dL    Creatinine 0.31 (L) 0.76 - 1.27 mg/dL    Sodium 138 136 - 145 mmol/L    Potassium 4.3 3.5 - 5.2 mmol/L    Chloride 94 (L) 98 - 107 mmol/L    CO2 38.0 (H) 22.0 - 29.0 mmol/L    Calcium 9.3 8.6 - 10.5 mg/dL    BUN/Creatinine Ratio 51.6 (H) 7.0 - 25.0    Anion Gap 6.0 5.0 - 15.0 mmol/L    eGFR 129.2 >60.0 mL/min/1.73   Magnesium    Collection Time: 07/20/23  1:06 AM     Specimen: Blood   Result Value Ref Range    Magnesium 1.7 1.6 - 2.4 mg/dL   Phosphorus    Collection Time: 07/20/23  1:06 AM    Specimen: Blood   Result Value Ref Range    Phosphorus 1.6 (C) 2.5 - 4.5 mg/dL   Calcium, Ionized    Collection Time: 07/20/23  1:06 AM    Specimen: Blood   Result Value Ref Range    Ionized Calcium 1.22 1.20 - 1.30 mmol/L   CBC Auto Differential    Collection Time: 07/20/23  1:06 AM    Specimen: Blood   Result Value Ref Range    WBC 13.40 (H) 3.40 - 10.80 10*3/mm3    RBC 3.41 (L) 4.14 - 5.80 10*6/mm3    Hemoglobin 9.0 (L) 13.0 - 17.7 g/dL    Hematocrit 29.1 (L) 37.5 - 51.0 %    MCV 85.3 79.0 - 97.0 fL    MCH 26.4 (L) 26.6 - 33.0 pg    MCHC 30.9 (L) 31.5 - 35.7 g/dL    RDW 19.8 (H) 12.3 - 15.4 %    RDW-SD 59.1 (H) 37.0 - 54.0 fl    MPV 8.5 6.0 - 12.0 fL    Platelets 484 (H) 140 - 450 10*3/mm3   Scan Slide    Collection Time: 07/20/23  1:06 AM    Specimen: Blood   Result Value Ref Range    Scan Slide     Manual Differential    Collection Time: 07/20/23  1:06 AM    Specimen: Blood   Result Value Ref Range    Neutrophil % 89.0 (H) 42.7 - 76.0 %    Lymphocyte % 3.0 (L) 19.6 - 45.3 %    Monocyte % 4.0 (L) 5.0 - 12.0 %    Bands %  2.0 0.0 - 5.0 %    Myelocyte % 2.0 (H) 0.0 - 0.0 %    Neutrophils Absolute 12.19 (H) 1.70 - 7.00 10*3/mm3    Lymphocytes Absolute 0.40 (L) 0.70 - 3.10 10*3/mm3    Monocytes Absolute 0.54 0.10 - 0.90 10*3/mm3    Anisocytosis Slight/1+ None Seen    WBC Morphology Normal Normal    Platelet Estimate Increased Normal   POC Glucose Once    Collection Time: 07/20/23  7:04 AM    Specimen: Blood   Result Value Ref Range    Glucose 205 (H) 70 - 105 mg/dL   POC Glucose Once    Collection Time: 07/20/23 11:14 AM    Specimen: Blood   Result Value Ref Range    Glucose 235 (H) 70 - 105 mg/dL        Imaging:  XR Chest 1 View  Narrative: XR CHEST 1 VW    Date of Exam: 7/15/2023 5:58 PM EDT    Indication: edema    Comparison: 6/29/2023    Findings:  Right internal jugular Port-A-Cath  remains in place and unchanged in position. Heart size is stable. There is chronic elevation of left ami diaphragm. There is were airspace disease compatible with pneumonia. Right lung is clear. No pleural effusion. No   evidence pneumothorax.  Impression: Impression:    1. Worsening left basilar airspace disease compatible with pneumonia.  2. Chronic elevation of the left hemidiaphragm.    Electronically Signed: Jeet Panda    7/15/2023 6:09 PM EDT    Workstation ID: [id number]  XR Foot 3+ View Bilateral  Narrative: XR FOOT 3+ VW BILATERAL    Date of Exam: 7/15/2023 6:04 PM EDT    Indication: pain    Comparison: None available.    Findings:  Right foot: There is no acute fracture or dislocation. No bony erosion or abnormal periosteal reaction. Soft tissues are unremarkable.    Left foot: There is no acute fracture or dislocation. No bony erosion or abnormal periosteal reaction. There is mild diffuse soft tissue swelling.  Impression: Impression:    1. No acute bony abnormality of the feet.    Electronically Signed: Jeet Panda    7/15/2023 6:34 PM EDT    Workstation ID: [id number]  XR Tibia Fibula 2 View Bilateral  Narrative: XR TIBIA FIBULA 2 VW BILATERAL    Date of Exam: 7/15/2023 6:04 PM EDT    Indication: pain    Comparison: None available.    Findings:  Left tibia and fibula: There is no acute fracture or dislocation. Soft tissues are unremarkable.    Right tibia and fibula: There is smooth deformity of the midshaft of both the tibia and fibula related to old healed fractures. There is single surgical screw in place through the mid tibia. There is no evidence of acute fracture or dislocation. No   abnormal periosteal reaction identified. Soft tissues are unremarkable.  Impression: Impression:    1. No acute bony abnormality of the left tibia or fibula.  2. Old healed fractures of the right tibia and fibula. No acute bony abnormality.    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 Jazmine Nathan, APRN   7/20/2023  15:21 EDT    I personally have examined  and interviewed the patient. I have reviewed the history, data, problems, assessment and plan with our NP.  Total Critical care time in direct medical management (   ) minutes, This time specifically excludes time spent performing procedures.    Emiliano Lucero MD   7/20/2023  23:01 EDT

## 2023-07-20 NOTE — NURSING NOTE
Talked with Fernanda Villafana NP and made aware of HR in the 150'a during shift change this morning but when the nurse went to call cardiology he had went back down in the 110's.  She is going to talk with cardiology and possibly change some meds around

## 2023-07-21 LAB
ANISOCYTOSIS BLD QL: ABNORMAL
DEPRECATED RDW RBC AUTO: 63.4 FL (ref 37–54)
ERYTHROCYTE [DISTWIDTH] IN BLOOD BY AUTOMATED COUNT: 20.2 % (ref 12.3–15.4)
GLUCOSE BLDC GLUCOMTR-MCNC: 273 MG/DL (ref 70–105)
GLUCOSE BLDC GLUCOMTR-MCNC: 274 MG/DL (ref 70–105)
GLUCOSE BLDC GLUCOMTR-MCNC: 303 MG/DL (ref 70–105)
GLUCOSE BLDC GLUCOMTR-MCNC: 310 MG/DL (ref 70–105)
HCT VFR BLD AUTO: 27 % (ref 37.5–51)
HGB BLD-MCNC: 8.5 G/DL (ref 13–17.7)
HOLD SPECIMEN: NORMAL
LARGE PLATELETS: ABNORMAL
LYMPHOCYTES # BLD MANUAL: 0.76 10*3/MM3 (ref 0.7–3.1)
LYMPHOCYTES NFR BLD MANUAL: 5 % (ref 5–12)
MCH RBC QN AUTO: 26.5 PG (ref 26.6–33)
MCHC RBC AUTO-ENTMCNC: 31.4 G/DL (ref 31.5–35.7)
MCV RBC AUTO: 84.6 FL (ref 79–97)
METAMYELOCYTES NFR BLD MANUAL: 1 % (ref 0–0)
MICROCYTES BLD QL: ABNORMAL
MONOCYTES # BLD: 0.64 10*3/MM3 (ref 0.1–0.9)
NEUTROPHILS # BLD AUTO: 11.18 10*3/MM3 (ref 1.7–7)
NEUTROPHILS NFR BLD MANUAL: 75 % (ref 42.7–76)
NEUTS BAND NFR BLD MANUAL: 13 % (ref 0–5)
NRBC SPEC MANUAL: 1 /100 WBC (ref 0–0.2)
PLATELET # BLD AUTO: 444 10*3/MM3 (ref 140–450)
PMV BLD AUTO: 8.9 FL (ref 6–12)
POLYCHROMASIA BLD QL SMEAR: ABNORMAL
RBC # BLD AUTO: 3.19 10*6/MM3 (ref 4.14–5.8)
SCAN SLIDE: NORMAL
SMALL PLATELETS BLD QL SMEAR: ADEQUATE
VARIANT LYMPHS NFR BLD MANUAL: 6 % (ref 19.6–45.3)
WBC MORPH BLD: NORMAL
WBC NRBC COR # BLD: 12.7 10*3/MM3 (ref 3.4–10.8)

## 2023-07-21 PROCEDURE — 94799 UNLISTED PULMONARY SVC/PX: CPT

## 2023-07-21 PROCEDURE — 85025 COMPLETE CBC W/AUTO DIFF WBC: CPT | Performed by: NURSE PRACTITIONER

## 2023-07-21 PROCEDURE — 97530 THERAPEUTIC ACTIVITIES: CPT

## 2023-07-21 PROCEDURE — 25010000002 MORPHINE PER 10 MG: Performed by: INTERNAL MEDICINE

## 2023-07-21 PROCEDURE — 63710000001 INSULIN GLARGINE PER 5 UNITS: Performed by: INTERNAL MEDICINE

## 2023-07-21 PROCEDURE — 25010000002 FUROSEMIDE PER 20 MG: Performed by: NURSE PRACTITIONER

## 2023-07-21 PROCEDURE — 63710000001 INSULIN LISPRO (HUMAN) PER 5 UNITS: Performed by: INTERNAL MEDICINE

## 2023-07-21 PROCEDURE — 99231 SBSQ HOSP IP/OBS SF/LOW 25: CPT | Performed by: INTERNAL MEDICINE

## 2023-07-21 PROCEDURE — 99232 SBSQ HOSP IP/OBS MODERATE 35: CPT | Performed by: INTERNAL MEDICINE

## 2023-07-21 PROCEDURE — 82948 REAGENT STRIP/BLOOD GLUCOSE: CPT

## 2023-07-21 PROCEDURE — 99222 1ST HOSP IP/OBS MODERATE 55: CPT | Performed by: INTERNAL MEDICINE

## 2023-07-21 PROCEDURE — 97110 THERAPEUTIC EXERCISES: CPT

## 2023-07-21 PROCEDURE — 85007 BL SMEAR W/DIFF WBC COUNT: CPT | Performed by: NURSE PRACTITIONER

## 2023-07-21 RX ORDER — METOPROLOL TARTRATE 75 MG/1
75 TABLET, FILM COATED ORAL 2 TIMES DAILY
Qty: 90 TABLET | Refills: 0 | Status: SHIPPED | OUTPATIENT
Start: 2023-07-21 | End: 2023-08-01

## 2023-07-21 RX ORDER — INSULIN LISPRO 100 [IU]/ML
2-9 INJECTION, SOLUTION INTRAVENOUS; SUBCUTANEOUS
Qty: 10 ML | Refills: 12 | Status: SHIPPED | OUTPATIENT
Start: 2023-07-21 | End: 2023-08-01

## 2023-07-21 RX ORDER — AMIODARONE HYDROCHLORIDE 400 MG/1
400 TABLET ORAL EVERY 12 HOURS SCHEDULED
Qty: 60 TABLET | Refills: 0 | Status: SHIPPED | OUTPATIENT
Start: 2023-07-21 | End: 2023-07-27 | Stop reason: HOSPADM

## 2023-07-21 RX ORDER — GUAIFENESIN 600 MG/1
600 TABLET, EXTENDED RELEASE ORAL EVERY 12 HOURS SCHEDULED
Qty: 60 TABLET | Refills: 0 | Status: SHIPPED | OUTPATIENT
Start: 2023-07-21 | End: 2023-07-27 | Stop reason: HOSPADM

## 2023-07-21 RX ORDER — AMOXICILLIN 250 MG
2 CAPSULE ORAL 2 TIMES DAILY
Qty: 30 TABLET | Refills: 0 | Status: SHIPPED | OUTPATIENT
Start: 2023-07-21

## 2023-07-21 RX ORDER — DEXAMETHASONE 4 MG/1
4 TABLET ORAL
Status: DISCONTINUED | OUTPATIENT
Start: 2023-07-22 | End: 2023-07-21

## 2023-07-21 RX ORDER — INSULIN ASPART 100 [IU]/ML
6 INJECTION, SOLUTION INTRAVENOUS; SUBCUTANEOUS
Qty: 15 ML | Refills: 2 | Status: SHIPPED | OUTPATIENT
Start: 2023-07-21 | End: 2023-07-27 | Stop reason: HOSPADM

## 2023-07-21 RX ORDER — MIDODRINE HYDROCHLORIDE 5 MG/1
5 TABLET ORAL
Qty: 60 TABLET | Refills: 0 | Status: SHIPPED | OUTPATIENT
Start: 2023-07-21 | End: 2023-07-27 | Stop reason: HOSPADM

## 2023-07-21 RX ORDER — IPRATROPIUM BROMIDE AND ALBUTEROL SULFATE 2.5; .5 MG/3ML; MG/3ML
3 SOLUTION RESPIRATORY (INHALATION) EVERY 4 HOURS PRN
Qty: 360 ML | Refills: 0
Start: 2023-07-21

## 2023-07-21 RX ORDER — DILTIAZEM HYDROCHLORIDE 60 MG/1
60 TABLET, FILM COATED ORAL EVERY 6 HOURS SCHEDULED
Qty: 90 TABLET | Refills: 0 | Status: SHIPPED | OUTPATIENT
Start: 2023-07-21 | End: 2023-07-27 | Stop reason: HOSPADM

## 2023-07-21 RX ORDER — LANCETS
1 EACH MISCELLANEOUS
Qty: 100 EACH | Refills: 2 | Status: SHIPPED | OUTPATIENT
Start: 2023-07-21

## 2023-07-21 RX ORDER — PANTOPRAZOLE SODIUM 40 MG/1
40 TABLET, DELAYED RELEASE ORAL
Qty: 60 TABLET | Refills: 0 | Status: SHIPPED | OUTPATIENT
Start: 2023-07-22

## 2023-07-21 RX ORDER — PEN NEEDLE, DIABETIC 30 GX3/16"
1 NEEDLE, DISPOSABLE MISCELLANEOUS
Qty: 200 EACH | Refills: 2 | Status: SHIPPED | OUTPATIENT
Start: 2023-07-21

## 2023-07-21 RX ORDER — TRAMADOL HYDROCHLORIDE 50 MG/1
50 TABLET ORAL EVERY 6 HOURS PRN
Qty: 12 TABLET | Refills: 0 | Status: SHIPPED | OUTPATIENT
Start: 2023-07-21 | End: 2023-07-25

## 2023-07-21 RX ORDER — HYDROCODONE BITARTRATE AND ACETAMINOPHEN 5; 325 MG/1; MG/1
1 TABLET ORAL EVERY 6 HOURS PRN
Status: DISCONTINUED | OUTPATIENT
Start: 2023-07-21 | End: 2023-07-27 | Stop reason: HOSPADM

## 2023-07-21 RX ORDER — DEXAMETHASONE 4 MG/1
4 TABLET ORAL
Status: DISCONTINUED | OUTPATIENT
Start: 2023-07-22 | End: 2023-07-22

## 2023-07-21 RX ORDER — FERROUS SULFATE TAB EC 324 MG (65 MG FE EQUIVALENT) 324 (65 FE) MG
324 TABLET DELAYED RESPONSE ORAL
Status: DISCONTINUED | OUTPATIENT
Start: 2023-07-21 | End: 2023-07-27 | Stop reason: HOSPADM

## 2023-07-21 RX ORDER — MORPHINE SULFATE 2 MG/ML
2 INJECTION, SOLUTION INTRAMUSCULAR; INTRAVENOUS EVERY 4 HOURS PRN
Status: DISCONTINUED | OUTPATIENT
Start: 2023-07-21 | End: 2023-07-27 | Stop reason: HOSPADM

## 2023-07-21 RX ORDER — NALOXONE HYDROCHLORIDE 4 MG/.1ML
SPRAY NASAL
Qty: 2 EACH | Refills: 0 | Status: SHIPPED | OUTPATIENT
Start: 2023-07-21

## 2023-07-21 RX ORDER — BLOOD SUGAR DIAGNOSTIC
1 STRIP MISCELLANEOUS
Qty: 100 EACH | Refills: 2 | Status: SHIPPED | OUTPATIENT
Start: 2023-07-21

## 2023-07-21 RX ORDER — BUDESONIDE 0.5 MG/2ML
0.5 INHALANT ORAL
Qty: 60 EACH | Refills: 0 | Status: SHIPPED | OUTPATIENT
Start: 2023-07-21 | End: 2023-07-27 | Stop reason: HOSPADM

## 2023-07-21 RX ORDER — POLYETHYLENE GLYCOL 3350 17 G/17G
17 POWDER, FOR SOLUTION ORAL DAILY PRN
Qty: 30 EACH | Refills: 0 | Status: SHIPPED | OUTPATIENT
Start: 2023-07-21

## 2023-07-21 RX ORDER — NICOTINE 21 MG/24HR
1 PATCH, TRANSDERMAL 24 HOURS TRANSDERMAL
Status: DISCONTINUED | OUTPATIENT
Start: 2023-07-21 | End: 2023-07-27 | Stop reason: HOSPADM

## 2023-07-21 RX ADMIN — Medication 10 ML: at 09:21

## 2023-07-21 RX ADMIN — MIDODRINE HYDROCHLORIDE 5 MG: 5 TABLET ORAL at 09:20

## 2023-07-21 RX ADMIN — AMIODARONE HYDROCHLORIDE 400 MG: 200 TABLET ORAL at 20:32

## 2023-07-21 RX ADMIN — INSULIN LISPRO 7 UNITS: 100 INJECTION, SOLUTION INTRAVENOUS; SUBCUTANEOUS at 20:40

## 2023-07-21 RX ADMIN — INSULIN LISPRO 6 UNITS: 100 INJECTION, SOLUTION INTRAVENOUS; SUBCUTANEOUS at 18:14

## 2023-07-21 RX ADMIN — Medication 1000 ML: at 20:41

## 2023-07-21 RX ADMIN — INSULIN GLARGINE 14 UNITS: 100 INJECTION, SOLUTION SUBCUTANEOUS at 20:40

## 2023-07-21 RX ADMIN — HYDROCODONE BITARTRATE AND ACETAMINOPHEN 1 TABLET: 5; 325 TABLET ORAL at 17:34

## 2023-07-21 RX ADMIN — DILTIAZEM HYDROCHLORIDE 60 MG: 60 TABLET, FILM COATED ORAL at 05:27

## 2023-07-21 RX ADMIN — NYSTATIN 500000 UNITS: 100000 SUSPENSION ORAL at 09:19

## 2023-07-21 RX ADMIN — INSULIN LISPRO 14 UNITS: 100 INJECTION, SOLUTION INTRAVENOUS; SUBCUTANEOUS at 09:19

## 2023-07-21 RX ADMIN — Medication 10 ML: at 20:41

## 2023-07-21 RX ADMIN — IPRATROPIUM BROMIDE AND ALBUTEROL SULFATE 3 ML: .5; 3 SOLUTION RESPIRATORY (INHALATION) at 15:58

## 2023-07-21 RX ADMIN — INSULIN LISPRO 14 UNITS: 100 INJECTION, SOLUTION INTRAVENOUS; SUBCUTANEOUS at 13:35

## 2023-07-21 RX ADMIN — IPRATROPIUM BROMIDE AND ALBUTEROL SULFATE 3 ML: .5; 3 SOLUTION RESPIRATORY (INHALATION) at 08:00

## 2023-07-21 RX ADMIN — BUDESONIDE 0.5 MG: 0.5 INHALANT RESPIRATORY (INHALATION) at 08:00

## 2023-07-21 RX ADMIN — SENNOSIDES AND DOCUSATE SODIUM 2 TABLET: 8.6; 5 TABLET ORAL at 09:20

## 2023-07-21 RX ADMIN — FERROUS SULFATE TAB EC 324 MG (65 MG FE EQUIVALENT) 324 MG: 324 (65 FE) TABLET DELAYED RESPONSE at 11:27

## 2023-07-21 RX ADMIN — PANTOPRAZOLE SODIUM 40 MG: 40 TABLET, DELAYED RELEASE ORAL at 05:27

## 2023-07-21 RX ADMIN — GUAIFENESIN 600 MG: 600 TABLET, EXTENDED RELEASE ORAL at 20:39

## 2023-07-21 RX ADMIN — INSULIN LISPRO 7 UNITS: 100 INJECTION, SOLUTION INTRAVENOUS; SUBCUTANEOUS at 13:35

## 2023-07-21 RX ADMIN — DILTIAZEM HYDROCHLORIDE 60 MG: 60 TABLET, FILM COATED ORAL at 17:34

## 2023-07-21 RX ADMIN — HYDROCODONE BITARTRATE AND ACETAMINOPHEN 1 TABLET: 5; 325 TABLET ORAL at 11:30

## 2023-07-21 RX ADMIN — METOPROLOL TARTRATE 75 MG: 25 TABLET, FILM COATED ORAL at 09:20

## 2023-07-21 RX ADMIN — DOCUSATE SODIUM 100 MG: 100 CAPSULE, LIQUID FILLED ORAL at 09:20

## 2023-07-21 RX ADMIN — MIDODRINE HYDROCHLORIDE 5 MG: 5 TABLET ORAL at 17:34

## 2023-07-21 RX ADMIN — NICOTINE 1 PATCH: 14 PATCH, EXTENDED RELEASE TRANSDERMAL at 11:27

## 2023-07-21 RX ADMIN — DILTIAZEM HYDROCHLORIDE 60 MG: 60 TABLET, FILM COATED ORAL at 01:23

## 2023-07-21 RX ADMIN — TRAMADOL HYDROCHLORIDE 50 MG: 50 TABLET, COATED ORAL at 05:36

## 2023-07-21 RX ADMIN — MORPHINE SULFATE 2 MG: 2 INJECTION, SOLUTION INTRAMUSCULAR; INTRAVENOUS at 09:19

## 2023-07-21 RX ADMIN — MIDODRINE HYDROCHLORIDE 5 MG: 5 TABLET ORAL at 13:36

## 2023-07-21 RX ADMIN — INSULIN LISPRO 6 UNITS: 100 INJECTION, SOLUTION INTRAVENOUS; SUBCUTANEOUS at 09:19

## 2023-07-21 RX ADMIN — Medication 1 TABLET: at 09:20

## 2023-07-21 RX ADMIN — IPRATROPIUM BROMIDE AND ALBUTEROL SULFATE 3 ML: .5; 3 SOLUTION RESPIRATORY (INHALATION) at 18:28

## 2023-07-21 RX ADMIN — MORPHINE SULFATE 2 MG: 2 INJECTION, SOLUTION INTRAMUSCULAR; INTRAVENOUS at 20:39

## 2023-07-21 RX ADMIN — INSULIN GLARGINE 14 UNITS: 100 INJECTION, SOLUTION SUBCUTANEOUS at 09:18

## 2023-07-21 RX ADMIN — ASPIRIN 81 MG CHEWABLE TABLET 81 MG: 81 TABLET CHEWABLE at 09:20

## 2023-07-21 RX ADMIN — DOCUSATE SODIUM 100 MG: 100 CAPSULE, LIQUID FILLED ORAL at 20:39

## 2023-07-21 RX ADMIN — GUAIFENESIN 600 MG: 600 TABLET, EXTENDED RELEASE ORAL at 09:20

## 2023-07-21 RX ADMIN — BUDESONIDE 0.5 MG: 0.5 INHALANT RESPIRATORY (INHALATION) at 18:32

## 2023-07-21 RX ADMIN — IPRATROPIUM BROMIDE AND ALBUTEROL SULFATE 3 ML: .5; 3 SOLUTION RESPIRATORY (INHALATION) at 23:04

## 2023-07-21 RX ADMIN — GABAPENTIN 200 MG: 100 CAPSULE ORAL at 20:39

## 2023-07-21 RX ADMIN — AMIODARONE HYDROCHLORIDE 400 MG: 200 TABLET ORAL at 09:20

## 2023-07-21 RX ADMIN — INSULIN LISPRO 14 UNITS: 100 INJECTION, SOLUTION INTRAVENOUS; SUBCUTANEOUS at 18:13

## 2023-07-21 RX ADMIN — METOPROLOL TARTRATE 75 MG: 25 TABLET, FILM COATED ORAL at 22:33

## 2023-07-21 RX ADMIN — SENNOSIDES AND DOCUSATE SODIUM 2 TABLET: 8.6; 5 TABLET ORAL at 20:39

## 2023-07-21 RX ADMIN — DILTIAZEM HYDROCHLORIDE 60 MG: 60 TABLET, FILM COATED ORAL at 13:36

## 2023-07-21 RX ADMIN — Medication 10 ML: at 05:29

## 2023-07-21 RX ADMIN — FUROSEMIDE 20 MG: 10 INJECTION, SOLUTION INTRAMUSCULAR; INTRAVENOUS at 09:21

## 2023-07-21 NOTE — PROGRESS NOTES
[facility] Medicine Services Daily Progress Note    Patient Name: Isra Davenport  : 1955  MRN: [id number]  Primary Care Physician:  Mayco Carreon MD  Date of admission: 7/15/2023      Subjective      Chief Complaint: Pain     History of Present Illness: Isra Davenport is a 68 y.o. 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  He is status post intramedullary nailing of the hip on 2023 and a Tlglhn-v-Isln placed on 2023 he is currently undergoing chemotherapy and plan for palliative radiation who presented to [facility] on 7/15/2023 complaining of pain and lower extremity edema     Review of records show patient had presented to chemotherapy with complaint of lower extremity edema and what he describes as neuropathic pain.  He had ultrasound that was negative for DVT and was prescribed Lasix.  He was also prescribed Norco 5/325 for pain.  Patient reports when he went to  prescriptions from the pharmacy the pharmacy was closed on Monday and he was unable to fill prescription.  He presents to the ED today with uncontrolled pain.  It was noted from previous admission he was discharged on low pressure for diagnosis of sinus tachycardia     ED work-up included chest x-ray that showed a right internal jugular Port-A-Cath in place.  Heart size stable.  Airspace disease in the left lung compatible with pneumonia.  Chronic elevation of the left hemidiaphragm.  No pleural effusion.  Right lung was clear.  Bilateral foot x-ray was negative for fracture.  There is healed fracture of the right tibia and fibula.  Troponin 26, potassium 4.6, sodium 138, glucose 355, creatinine 0.3, BUN is 21.  Albumin 2.6.  White count 11.4, hemoglobin 10.  On admission heart rate 84 but while he was being treated in the ED heart rate jumped into the  150s and was found to be in acute A-fib with RVR.  O2 sats dropped to 88% was placed on 2 L nasal cannula.  He was given a Cardizem bolus and Lopressor bolus and resumed on a Cardizem drip.  Patient denies any chest pain or feeling of chest palpitations.  Denies any fever chills or increased shortness of breath.  Reports positive for productive cough.  Positive for swelling of bilateral lower extremities below the calf and extreme tenderness to touch bilateral feet.     7/16/23 patient seen and examined in bed no acute distress, vital signs stable, K-5.4, hr 81, dyspnea on high flow oxygen.  We will start Cardizem p.o.  7/17/2023 patient seen and examined in bed no acute distress, he is on 3 L of oxygen, blood pressure 93/52.  Discussed with RN.  7/18/2023 patient seen and examined in bed no acute distress, dyspnea on 3 L, heart rate 155.  Discussed with RN. Restarted Cardizem drip per cardiology and adding short acting Cardizem to wean IV and increasing BB   7/19/2023 patient seen and examined in bed no acute distress, blood pressure 92/54.  On amiodarone, Cardizem, metoprolol dyspnea on 2 L.  Discussed with RN.  7/20/2023 patient seen and examined in bed no acute distress, his heart rate still elevated.  Presently on amiodarone, Cardizem, metoprolol. discussed with RN.  7/21/2023 patient seen and examined in bed no acute distress, very anxious to follow-up with home.  Discussed with RN, discussed with .  Awaiting placement.  Dyspnea on 3 L of oxygen, fatigue and weakness.    ROS Review of Systems   Constitutional: Positive for malaise/fatigue.   Cardiovascular:  Positive for leg swelling.   Respiratory:  Positive for cough and sputum production.    Skin: Negative.    Musculoskeletal:  Positive for back pain.   Gastrointestinal: Negative.    Genitourinary: Negative.    Neurological:  Positive for paresthesias.   Objective      Vitals:   Temp:  [98.4 °F (36.9 °C)-99.5 °F (37.5 °C)] 99 °F (37.2 °C)  Heart  Rate:  [] 79  Resp:  [17-22] 19  BP: (103-127)/(58-74) 103/58  Flow (L/min):  [2-3] 3    Physical Exam Constitutional:       Appearance: He is ill-appearing.   Eyes:      Pupils: Pupils are equal, round, and reactive to light.   Cardiovascular:      Rate and Rhythm irregular.   Pulmonary:      Breath sounds: Rhonchi present.   Abdominal:      General: Abdomen is flat.      Palpations: Abdomen is soft.   Musculoskeletal:      Right lower leg: Edema present.      Left lower leg: Edema present.   Skin:     General: Skin is warm and dry.   Neurological:      Mental Status: He is alert and oriented to person, place, and time.   Psychiatric:         Mood and Affect: Mood normal.     Result Review    Result Review:  I have personally reviewed the results from the time of this admission to 7/21/2023 10:46 EDT and agree with these findings:  []  Laboratory  []  Microbiology  []  Radiology  []  EKG/Telemetry   []  Cardiology/Vascular   []  Pathology  []  Old records  []  Other:  Most notable findings include:     Wounds (last 24 hours)       LDA Wound       Row Name 07/21/23 0000 07/20/23 2000 07/20/23 1200       Wound 06/17/23 0915 Right anterior hip Incision    Wound - Properties Group Placement Date: 06/17/23  -HB Placement Time: 0915 -HB Side: Right  -HB Orientation: anterior  -HB Location: hip  -HB Primary Wound Type: Incision  -HB    Dressing Appearance -- open to air  -RP (r) AR (t) RP (c) --    Closure -- -- Other (Comment);Approximated  healing  -HM    Base -- -- clean;dry  -HM    Periwound -- intact  -RP (r) AR (t) RP (c) intact;blanchable  -HM    Periwound Temperature -- warm  -RP (r) AR (t) RP (c) --    Drainage Amount -- -- none  -HM    Retired Wound - Properties Group Placement Date: 06/17/23  -HB Placement Time: 0915 -HB Side: Right  -HB Orientation: anterior  -HB Location: hip  -HB Primary Wound Type: Incision  -HB    Retired Wound - Properties Group Date first assessed: 06/17/23  -HB Time first  assessed: 0915 -HB Side: Right  -HB Location: hip  -HB Primary Wound Type: Incision  -HB       Wound 07/15/23 2100 Bilateral medial gluteal Pressure Injury    Wound - Properties Group Placement Date: 07/15/23  -MF Placement Time: 2100  -MF Present on Hospital Admission: Y  -MF Side: Bilateral  -MF Orientation: medial  -MF Location: gluteal  -MF Primary Wound Type: Pressure inj  -MF    Dressing Appearance -- dry;intact  -RP (r) AR (t) RP (c) --    Base -- -- blanchable;pink  -HM    Periwound -- -- blanchable;redness  -HM    Drainage Amount -- -- none  -HM    Retired Wound - Properties Group Placement Date: 07/15/23  -MF Placement Time: 2100  -MF Present on Hospital Admission: Y  -MF Side: Bilateral  -MF Orientation: medial  -MF Location: gluteal  -MF Primary Wound Type: Pressure inj  -MF    Retired Wound - Properties Group Date first assessed: 07/15/23  -MF Time first assessed: 2100  -MF Present on Hospital Admission: Y  -MF Side: Bilateral  -MF Location: gluteal  -MF Primary Wound Type: Pressure inj  -MF       Wound 07/18/23 1900 Left posterior ankle    Wound - Properties Group Placement Date: 07/18/23  -AR Placement Time: 1900  -AR Present on Hospital Admission: Y  -AR Side: Left  -AR Orientation: posterior  -AR Location: ankle  -AR    Base --  -AR scab  -RP (r) AR (t) RP (c) --    Periwound --  -AR redness;swelling;warm  -RP (r) AR (t) RP (c) --    Care, Wound -- estefanía boot  -RP (r) AR (t) RP (c) --    Retired Wound - Properties Group Placement Date: 07/18/23  -AR Placement Time: 1900  -AR Present on Hospital Admission: Y  -AR Side: Left  -AR Orientation: posterior  -AR Location: ankle  -AR    Retired Wound - Properties Group Date first assessed: 07/18/23  -AR Time first assessed: 1900  -AR Present on Hospital Admission: Y  -AR Side: Left  -AR Location: ankle  -AR              User Key  (r) = Recorded By, (t) = Taken By, (c) = Cosigned By      Initials Name Provider Type    Cleo Rich, RN Registered Nurse      Avril Gan, RN Registered Nurse    Bartolo Coombs, RN Registered Nurse    AR Laura King, RN Registered Nurse    Danyell Samuel, RN Registered Nurse                    CBC:      Lab 07/20/23  2358 07/20/23  0106 07/19/23  1025 07/17/23  2254 07/17/23  0216 07/15/23  2133 07/15/23  1712   WBC 12.70* 13.40* 12.90* 11.70* 12.30* 11.40* 11.40*   HEMOGLOBIN 8.5* 9.0* 9.0* 8.8* 9.5* 9.9* 10.0*   HEMATOCRIT 27.0* 29.1* 28.3* 28.7* 31.5* 32.5* 31.8*   PLATELETS 444 484* 486* 395 445 415 404   NEUTROS ABS 11.18* 12.19* 11.74* 11.12* 10.70* 10.30* 10.20*   LYMPHS ABS  --   --   --   --  0.90 0.60* 0.60*   MONOS ABS  --   --   --   --  0.80 0.50 0.60   EOS ABS  --   --   --   --  0.00 0.00 0.00   MCV 84.6 85.3 85.1 84.9 85.3 84.5 84.8     CMP:        Lab 07/20/23  1543 07/20/23  0106 07/19/23  1025 07/18/23  1906 07/18/23  1437 07/17/23  0216 07/15/23  2133 07/15/23  1712   SODIUM  --  138 134* 131* 130* 142 142 138   POTASSIUM  --  4.3 4.1 4.5 4.6 4.5 5.4* 4.6   CHLORIDE  --  94* 89* 87* 87* 94* 95* 96*   CO2  --  38.0* 37.0* 37.0* 35.0* 41.0* 38.0* 36.0*   ANION GAP  --  6.0 8.0 7.0 8.0 7.0 9.0 6.0   BUN  --  16 15 21 19 17 23 21   CREATININE  --  0.31* 0.39* 0.49* 0.40* 0.35* 0.59* 0.37*   EGFR  --  129.2 120.5 112.5 119.6 124.5 106.3 122.4   GLUCOSE  --  123* 381* 335* 372* 91 450* 355*   CALCIUM  --  9.3 9.1 9.0 9.1 9.7 9.2 10.0   IONIZED CALCIUM  --  1.22 1.19* 1.18*  --   --   --   --    MAGNESIUM  --  1.7 1.9 2.2 1.5*  --  1.7  --    PHOSPHORUS 2.3* 1.6* 2.7 2.0*  --   --   --   --    TOTAL PROTEIN  --   --   --   --   --   --   --  6.8   ALBUMIN  --   --   --   --   --   --   --  2.6*   GLOBULIN  --   --   --   --   --   --   --  4.2   ALT (SGPT)  --   --   --   --   --   --   --  28   AST (SGOT)  --   --   --   --   --   --   --  11   BILIRUBIN  --   --   --   --   --   --   --  0.2   ALK PHOS  --   --   --   --   --   --   --  178*     No results found for: ACANTHNAEG, AFBCX, BPERTUSSISCX,  BLOODCX  No results found for: BCIDPCR, CXREFLEX, CSFCX, CULTURETIS  No results found for: CULTURES, HSVCX, URCX  No results found for: EYECULTURE, GCCX, HSVCULTURE, LABHSV  No results found for: LEGIONELLA, MRSACX, MUMPSCX, MYCOPLASCX  No results found for: NOCARDIACX, STOOLCX  No results found for: THROATCX, UNSTIMCULT, URINECX, CULTURE, VZVCULTUR  No results found for: VIRALCULTU, WOUNDCX      Assessment & Plan      Brief Patient Summary:  Isra Davenport is a 68 y.o. male who presented to chemotherapy with complaint of lower extremity edema and what he describes as neuropathic pain.  He had ultrasound that was negative for DVT and was prescribed Lasix.  He was also prescribed Norco 5/325 for pain.  Patient reports when he went to  prescriptions from the pharmacy, the pharmacy was closed on Monday and he was unable to fill prescription.  He presents to the ED today with uncontrolled pain.  It was noted from previous admission he was discharged on low pressure for diagnosis of sinus tachycardia         amiodarone, 400 mg, Oral, Q12H  aspirin, 81 mg, Oral, Daily  budesonide, 0.5 mg, Nebulization, BID - RT  calcium 500 mg vitamin D 5 mcg (200 UT), 1 tablet, Oral, Daily  [START ON 7/22/2023] dexamethasone, 4 mg, Oral, Daily With Breakfast   And  [START ON 7/22/2023] insulin regular, 4 Units, Subcutaneous, Daily With Breakfast  dilTIAZem, 60 mg, Oral, Q6H  docusate sodium, 100 mg, Oral, BID  ferrous sulfate, 324 mg, Oral, Daily With Breakfast  furosemide, 20 mg, Intravenous, Daily  gabapentin, 200 mg, Oral, Nightly  guaiFENesin, 600 mg, Oral, Q12H  insulin glargine, 14 Units, Subcutaneous, Q12H  insulin lispro, 14 Units, Subcutaneous, TID With Meals  insulin lispro, 2-9 Units, Subcutaneous, 4x Daily AC & at Bedtime  ipratropium-albuterol, 3 mL, Nebulization, 4x Daily - RT  metoprolol tartrate, 75 mg, Oral, BID  midodrine, 5 mg, Oral, TID AC  nicotine, 1 patch, Transdermal, Q24H  nystatin, 5 mL, Oral, 4x  Daily  pantoprazole, 40 mg, Oral, Q AM  senna-docusate sodium, 2 tablet, Oral, BID  sodium chloride, 10 mL, Intravenous, Q12H       Pharmacy Consult - Steroid Insulin Protocol,          Active Hospital Problems:  Active Hospital Problems    Diagnosis     **Atrial fibrillation with RVR     Moderate malnutrition     Atrial fibrillation        New onset A-fib with RVR  -Cardizem drip  dc   -continue metoprolol po, Cardizem p.o. and amiodarone po  -consulted cardiology  Reduce cardizem back to 60 q 6hrs  Recent non invasive ischemic eval negative for ischemia  Echo with preserved LV function  - Had considered pharmacologic AC but with known brain mets concerned with risk for bleed so will consult oncology for recommendation on AC  - continue VTE with SCD until seen by cardiology and oncology     Pain and Edema BLE  - appears Neuropathic  - pt also with known bone cancer and arthritis   - consider neuropathy 2/2 chemotherapy  - will continue Lasix for edema  -Was ruled out for DVT   -added gabapentin 200mg QHS for sign of neuropathic pain  - check [id number]  - will resume pt Norco and add prn morphine  -echo 6/22/23: Left ventricular systolic function is normal. Left ventricular ejection fraction appears to be 61 - 65%.   Left ventricular diastolic function is consistent with (grade I) impaired relaxation.     Hyperglycemia- likely steroid induced   -A1C 7.3  - added RISS, lantus  - add steroid protocol      Hypoxia- consider 2/2 known lung cancer   - cxr with pneumonia and elevated WBC and known tobacco use  - covered possible COPD vs pneumonia vs carcinoma with empiric Azithromycin-dc  - wean O2 as tolerated   - duo nebs prn     Anemia  - continue ferrous sulfate  - hgb stable     Stage IV squamous cell lung cancer with mets to brain and bone  - currently treated with chemotherapy   - continue decadron as previously ordered by oncology  - continue PPI   Per oncology   Check iron saturation.continue ferrous sulfate 325  mg by mouth daily.Consider IV iron if iron sat is low.  Radiation oncology consultation to resume radiation therapy when able  Await molecular testing results with plan for chemotherapy post completion of radiation.  Start Xgeva and daily Ca+VitD for bone metastases support as outpatient.  dexamethasone  4 mg by mouth daily.  Continue ASA 81 mg by mouth daily.  Would not recommend full dose anticoagulation for A-fib secondary to brain mets and known rectal bleeding.  Continue as needed hydrocodone, as needed Dilaudid, and scheduled gabapentin for pain control.  Continue Pepcid p.o. twice daily and as needed Zofran    DVT prophylaxis:  Mechanical DVT prophylaxis orders are present.    CODE STATUS:    Medical Intervention Limits: NO intubation (DNI)  Code Status (Patient has no pulse and is not breathing): No CPR (Do Not Attempt to Resuscitate)  Medical Interventions (Patient has pulse or is breathing): Limited Support      Disposition:  I expect patient to be discharged NH.    I have utilized all available, immediate resources to obtain, update, or review the patient's current medications including all prescriptions, over-the-counter products, herbals, cannabis/cannabidiol products, and vitamin.mineral/dietary (nutritional) supplements.      Medical Intervention Limits: NO intubation (DNI)  Code Status (Patient has no pulse and is not breathing): No CPR (Do Not Attempt to Resuscitate)  Medical Interventions (Patient has pulse or is breathing): Limited Support    Next of kin of Power of :       Admission Status:  I believe this patient meets ADMIT status.              This patient has been examined wearing appropriate Personal Protective Equipment and discussed with  RN . 07/21/23      Electronically signed by Raoul Castillo MD, 07/21/23, 10:46 EDT.  [facility], operated by [facility] Hospitalist Team

## 2023-07-21 NOTE — PLAN OF CARE
Goal Outcome Evaluation:   Patient is doing better today, not as agitated, and norco is better controlling his pain in his bilateral feet. Patient will discharge to rehab when applicable and will go to radiation therapy starting Monday. 3  Problem: Skin Injury Risk Increased  Goal: Skin Health and Integrity  Outcome: Ongoing, Progressing  Intervention: Optimize Skin Protection  Description: Perform a full pressure injury risk assessment, as indicated by screening, upon admission to care unit.  Reassess skin (injury risk, full inspection) frequently (e.g., scheduled interval, with change in condition) to provide optimal early detection and prevention.  Maintain adequate tissue perfusion (e.g., encourage fluid balance; avoid crossing legs, constrictive clothing or devices) to promote tissue oxygenation.  Maintain head of bed at lowest degree of elevation tolerated, considering medical condition and other restrictions.  Avoid positioning onto an area that remains reddened.  Minimize incontinence and moisture (e.g., toileting schedule; moisture-wicking pad, diaper or incontinence collection device; skin moisture barrier).  Cleanse skin promptly and gently when soiled utilizing a pH-balanced cleanser.  Relieve and redistribute pressure (e.g., scheduled position changes, weight shifts, use of support surface, medical device repositioning, protective dressing application, use of positioning device, microclimate control, use of pressure-injury-monitor  Encourage increased activity, such as sitting in a chair at the bedside or early mobilization, when able to tolerate.  Recent Flowsheet Documentation  Taken 7/21/2023 1549 by Kami Castillo, RN  Pressure Reduction Techniques:   frequent weight shift encouraged   weight shift assistance provided  Skin Protection:   adhesive use limited   tubing/devices free from skin contact  Taken 7/21/2023 1200 by Kami Castillo RN  Pressure Reduction Techniques:   frequent weight shift  encouraged   weight shift assistance provided  Pressure Reduction Devices:   specialty bed utilized   pressure-redistributing mattress utilized   positioning supports utilized   alternating pressure pump (ADD)  Skin Protection:   adhesive use limited   tubing/devices free from skin contact  Taken 7/21/2023 0800 by Kami Castillo RN  Pressure Reduction Techniques:   frequent weight shift encouraged   weight shift assistance provided  Head of Bed (HOB) Positioning: HOB elevated  Pressure Reduction Devices:   specialty bed utilized   pressure-redistributing mattress utilized   positioning supports utilized   alternating pressure pump (ADD)  Skin Protection:   adhesive use limited   tubing/devices free from skin contact    liters oxygen at this time and heart rate is controlled. Will continue to monitor.

## 2023-07-21 NOTE — PLAN OF CARE
Goal Outcome Evaluation:   Patient was agitated starting shift this evening yet cooperative with care. He stated he felt like we wasn't getting answers and wants to go home. A&Ox4 and tolerating 2.5LO2 well AEB adequate oxygenation status. Became hyperglycemic early during shift, 418, reached out to on call per parameter. MD ordered 20U lispro to treat. Sugar trended down. Running sinus arrhythmia first half night then converted to NSR after diltiazem admin. L ankle and foot have redness and edema. L posterior tibial pulse +1 and heels are reddened and boggy. Encouraged patient to use estefanía boots to relieve pain and redness and patient agreed. C/o pain 8/10 in legs, ankles, and feet bilaterally. Patient requested tramadol for pain. Patient is now resting comfortably with call light in reach.

## 2023-07-21 NOTE — THERAPY TREATMENT NOTE
"Subjective: Pt agreeable to therapeutic plan of care. Pt displayed increased confusion and poor short term memory during tx session.    Objective:     Bed mobility - N/A or Not attempted.  Transfers - CGA and with rolling walker  Ambulation -  N/A or Not attempted. Pt declined ambulation    Therapeutic Exercise - 10 Reps B LE AROM supported sitting / chair    Vitals: WNL    Pain: Pt expressed bottom hurting throughout tx session  Intervention for pain: Increased Activity and Therapeutic Presence    Education: Provided education on the importance of mobility in the acute care setting, Verbal/Tactile Cues, Transfer Training, Gait Training, and Energy conservation strategies    Assessment: Isra Davenport presents with functional mobility impairments which indicate the need for skilled intervention. Pt with increased confusion today vs previous tx session with this PTA. Pt required CGA/RW with cuing for proper hand placement to improve safety. Pt declined gait training on this date despite education on importance. Pt limited due to decreased activity tolerance and functional strength. Tolerating session today without incident. Will continue to follow and progress as tolerated.     Plan/Recommendations:   Moderate Intensity Therapy recommended post-acute care. This is recommended as therapy feels the patient would require 3-4 days per week and wouldn't tolerate \"3 hour daily\" rehab intensity. SNF would be the preferred choice. If the patient does not agree to SNF, arrange HH or OP depending on home bound status. If patient is medically complex, consider LTACH.. Pt requires no DME at discharge.     Pt desires Skilled Rehab placement at discharge. Pt cooperative; agreeable to therapeutic recommendations and plan of care.         Basic Mobility 6-click:  Rollin = Total, A lot = 2, A little = 3; 4 = None  Supine>Sit:   1 = Total, A lot = 2, A little = 3; 4 = None   Sit>Stand with arms:  1 = Total, A lot = 2, A " little = 3; 4 = None  Bed>Chair:   1 = Total, A lot = 2, A little = 3; 4 = None  Ambulate in room:  1 = Total, A lot = 2, A little = 3; 4 = None  3-5 Steps with railin = Total, A lot = 2, A little = 3; 4 = None  Score: 17    Modified [place]: N/A = No pre-op stroke/TIA    Post-Tx Position: Up in Chair, Alarms activated, and Call light and personal items within reach  PPE: gloves

## 2023-07-21 NOTE — PROGRESS NOTES
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 male admitted to [facility] through the ED on 7/15/2023 with complaints of uncontrolled pain and BLE edema.  In the ED, he developed tachycardia in the 150s and was found to be in A-fib with RVR.  He was started on supplemental oxygen for O2 sat drop to 88%.  He received Cardizem bolus followed by drip and Lopressor bolus.  In the ED, CBC revealed WBC 11.4, hemoglobin 10.0, MCV 84.8, and platelets 404,000.  Creatinine and LFTs were not elevated.  CXR revealed left basilar pneumonia.  He reported left lower extremity pain with x-ray of the left tip/fib and left foot negative for acute findings.    PMH was significant for recently diagnosed stage IV squamous cell carcinoma of the left upper lobe lung.  He was post radiation therapy to the brain () and currently receiving radiation therapy to the right hip and femur (started 2023 with 10 fractions planned).  He was last seen by our service as an outpatient on 2023 where he was prescribed Lasix and hydrocodone/APAP 5/325 mg as needed for his BLE edema and pain.  BLE venous Doppler performed as an outpatient on 2023 was negative for DVT/SVT and showed bilateral fluid retention.  At time of consultation 2023, hemoglobin had dropped to 9.9.     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  Our service had initially evaluated him during 2023 - 2023 hospitalization during which lung cancer diagnosis was made and initial work-up completed.  He had received radiation  therapy to the brain and is currently receiving radiation to the hip/femur.  He was also diagnosed with iron deficiency anemia during the hospital stay where he was treated with IV iron and discharged on oral ferrous sulfate with GERD prophylaxis.  Complete anemia work-up with the exception of bone marrow was performed during the hospital stay with findings negative for additional etiologies.  Incidentally his copper level was elevated without multivitamin supplementation.  CBC on 7/14/2023 revealed WBC 13, hemoglobin 10.76, and platelets 329,000.  He was continued on daily oral iron.  Molecular testing on his tumor pathology was pending with plans to start chemotherapy after radiation was completed.  He was also to start Xgeva for bone metastases support and was prescribed calcium with vitamin D supplementation.     PCP: Mayco Carreon MD    INTERVAL HISTORY:  7/17/2023 - white blood count 12.30, hemoglobin 9.5, iron 22 (), iron saturation 10 (), TIBC 224 (298-536). Calculated iron deficit is 662 mg. Started Ferrlecit 250 mg IV x 3 days.  Molecular testing on tumor specimen revealed low-level PD-L1 positivity.  7/18/2023-hemoglobin 8.8.  Patient developed SVT overnight and was treated with Cardizem.  Weight loss 33 pounds in 5 weeks.  Changed Pepcid to Protonix.  Palliative care and radiation oncology both consulted.  Physical therapy recommending skilled nursing facility at discharge.  7/19/2023-patient decided DNR/DNI but wants to continue aggressive treatment and try chemotherapy/immunotherapy.  7/20/2023-WBC 13.4, hemoglobin 9. Hospice was consulted by primary team for inpatient hospice admission.  7/21/2023-WBC 12.7, hemoglobin 8.5.  Dexamethasone decreased to 4 mg po daily.     History of present illness reviewed since last visit and changes noted on 07/21/23.    Subjective   patient has been agitated and wants to go home.    ROS:  Review of Systems   Constitutional:  Negative for activity  change, appetite change, chills, fatigue, fever and unexpected weight change.   HENT:  Negative for mouth sores, sore throat and tinnitus.    Eyes:  Negative for photophobia, pain, redness and visual disturbance.   Respiratory:  Negative for apnea, cough and shortness of breath.    Cardiovascular:  Negative for chest pain, palpitations and leg swelling.   Gastrointestinal:  Negative for abdominal pain, constipation, diarrhea, nausea and vomiting.   Genitourinary:  Negative for dysuria, enuresis and hematuria.   Musculoskeletal:  Negative for arthralgias, back pain, myalgias and neck pain.   Skin:  Negative for rash and wound.   Allergic/Immunologic: Negative for environmental allergies.   Neurological:  Negative for dizziness, seizures, speech difficulty, weakness, light-headedness, numbness and headaches.   Hematological:  Negative for adenopathy. Does not bruise/bleed easily.   Psychiatric/Behavioral:  Positive for agitation. Negative for dysphoric mood. The patient is not nervous/anxious.       MEDICATIONS:    Scheduled Meds:  amiodarone, 400 mg, Oral, Q12H  aspirin, 81 mg, Oral, Daily  budesonide, 0.5 mg, Nebulization, BID - RT  calcium 500 mg vitamin D 5 mcg (200 UT), 1 tablet, Oral, Daily  dexamethasone, 4 mg, Oral, Q12H   And  insulin regular, 4 Units, Subcutaneous, Q12H  dilTIAZem, 60 mg, Oral, Q6H  docusate sodium, 100 mg, Oral, BID  furosemide, 20 mg, Intravenous, Daily  gabapentin, 200 mg, Oral, Nightly  guaiFENesin, 600 mg, Oral, Q12H  insulin glargine, 14 Units, Subcutaneous, Q12H  insulin lispro, 14 Units, Subcutaneous, TID With Meals  insulin lispro, 2-9 Units, Subcutaneous, 4x Daily AC & at Bedtime  ipratropium-albuterol, 3 mL, Nebulization, 4x Daily - RT  metoprolol tartrate, 75 mg, Oral, BID  midodrine, 5 mg, Oral, TID AC  nystatin, 5 mL, Oral, 4x Daily  pantoprazole, 40 mg, Oral, Q AM  senna-docusate sodium, 2 tablet, Oral, BID  sodium chloride, 10 mL, Intravenous, Q12H    Continuous Infusions:   "Pharmacy Consult - Steroid Insulin Protocol,        PRN Meds:    senna-docusate sodium **AND** polyethylene glycol **AND** bisacodyl **AND** bisacodyl    Calcium Replacement - Follow Nurse / BPA Driven Protocol    dextrose    dextrose    glucagon (human recombinant)    ipratropium-albuterol    Magnesium Standard Dose Replacement - Follow Nurse / BPA Driven Protocol    Morphine    ondansetron    Pharmacy Consult - Steroid Insulin Protocol    Phosphorus Replacement - Follow Nurse / BPA Driven Protocol    Potassium Replacement - Follow Nurse / BPA Driven Protocol    [COMPLETED] Insert Peripheral IV **AND** sodium chloride    sodium chloride    sodium chloride    traMADol     ALLERGIES:    Allergies   Allergen Reactions    Ibuprofen Itching    Penicillins Hives     Objective    VITALS:   /73   Pulse 82   Temp 98.6 °F (37 °C) (Oral)   Resp 21   Ht 177.8 cm (70\")   Wt 70.8 kg (156 lb 1.4 oz) Comment: pts previous weight was on 7/17. no extra linen on the bed  SpO2 91%   BMI 22.40 kg/m²     PHYSICAL EXAM:  Physical Exam  Vitals reviewed.   Constitutional:       General: He is not in acute distress.     Appearance: He is well-developed. He is not diaphoretic.   HENT:      Head: Normocephalic and atraumatic.      Nose: No rhinorrhea.      Comments: O2 per nasal cannula.     Mouth/Throat:      Pharynx: No oropharyngeal exudate.      Comments: Oral thrush present.  Eyes:      Conjunctiva/sclera: Conjunctivae normal.   Neck:      Thyroid: No thyromegaly.      Vascular: No JVD.   Cardiovascular:      Rate and Rhythm: Normal rate and regular rhythm.      Heart sounds: Normal heart sounds. No murmur heard.     Comments: Cardiac monitor leads.   Pulmonary:      Effort: Pulmonary effort is normal. No respiratory distress.      Comments: Right port not accessed.  Decreased breath sounds bilaterally.  Abdominal:      General: Bowel sounds are normal.      Palpations: Abdomen is soft.      Tenderness: There is no abdominal " tenderness. There is no guarding.   Musculoskeletal:         General: Normal range of motion.      Cervical back: Normal range of motion and neck supple. No rigidity.      Right lower le+ Edema present.      Left lower le+ Edema present.      Comments: Left hand oxygen saturation monitor.   Skin:     General: Skin is warm and dry.      Findings: No erythema or rash.      Comments: Left chest wall tattoo.  Left upper extremity IV.   Neurological:      Mental Status: He is alert and oriented to person, place, and time.      Sensory: No sensory deficit.   Psychiatric:         Mood and Affect: Mood normal.         Behavior: Behavior normal.         Thought Content: Thought content normal.         Judgment: Judgment normal.       RECENT LABS:  Lab Results (last 24 hours)       Procedure Component Value Units Date/Time    POC Glucose Once [[id number]]  (Abnormal) Collected: 23    Specimen: Blood Updated: 23     Glucose 274 mg/dL      Comment: Serial Number: [id number]:  [id number]       Manual Differential [[id number]]  (Abnormal) Collected: 23    Specimen: Blood Updated: 23     Neutrophil % 75.0 %      Lymphocyte % 6.0 %      Monocyte % 5.0 %      Bands %  13.0 %      Metamyelocyte % 1.0 %      Neutrophils Absolute 11.18 10*3/mm3      Lymphocytes Absolute 0.76 10*3/mm3      Monocytes Absolute 0.64 10*3/mm3      nRBC 1.0 /100 WBC      Anisocytosis Slight/1+     Microcytes Slight/1+     Polychromasia Slight/1+     WBC Morphology Normal     Platelet Estimate Adequate     Large Platelets Slight/1+    CBC & Differential [[id number]]  (Abnormal) Collected: 23    Specimen: Blood Updated: 23    Narrative:      The following orders were created for panel order CBC & Differential.  Procedure                               Abnormality         Status                     ---------                               -----------         ------                      CBC Auto Differential[[id number]]        Abnormal            Final result               Scan Slide[[id number]]                                       Final result                 Please view results for these tests on the individual orders.    Scan Slide [[id number]] Collected: 07/20/23 2358    Specimen: Blood Updated: 07/21/23 0220     Scan Slide --     Comment: See Manual Differential Results       CBC Auto Differential [[id number]]  (Abnormal) Collected: 07/20/23 2358    Specimen: Blood Updated: 07/21/23 0220     WBC 12.70 10*3/mm3      RBC 3.19 10*6/mm3      Hemoglobin 8.5 g/dL      Hematocrit 27.0 %      MCV 84.6 fL      MCH 26.5 pg      MCHC 31.4 g/dL      RDW 20.2 %      RDW-SD 63.4 fl      MPV 8.9 fL      Platelets 444 10*3/mm3     Narrative:      The previously reported component NRBC is no longer being reported. Previous result was 0.5 /100 WBC (Reference Range: 0.0-0.2 /100 WBC) on 7/21/2023 at 0100 EDT.    Extra Tubes [[id number]] Collected: 07/20/23 2358    Specimen: Blood, Venous Line Updated: 07/21/23 0100    Narrative:      The following orders were created for panel order Extra Tubes.  Procedure                               Abnormality         Status                     ---------                               -----------         ------                     Green Top (Gel)[[id number]]                                  Final result                 Please view results for these tests on the individual orders.    Green Top (Gel) [[id number]] Collected: 07/20/23 2358    Specimen: Blood Updated: 07/21/23 0100     Extra Tube Hold for add-ons.     Comment: Auto resulted.       POC Glucose Once [[id number]]  (Abnormal) Collected: 07/20/23 2337    Specimen: Blood Updated: 07/20/23 2338     Glucose 276 mg/dL      Comment: Serial Number: [id number]:  [id number]       POC Glucose Once [[id number]]  (Abnormal) Collected: 07/20/23 2245    Specimen: Blood Updated: 07/20/23 2247     Glucose 329 mg/dL      Comment:  Serial Number: [id number]:  [id number]       POC Glucose Once [[id number]]  (Abnormal) Collected: 07/20/23 2019    Specimen: Blood Updated: 07/20/23 2021     Glucose 418 mg/dL      Comment: Serial Number: [id number]:  [id number]       Phosphorus [[id number]]  (Abnormal) Collected: 07/20/23 1543    Specimen: Blood Updated: 07/20/23 1628     Phosphorus 2.3 mg/dL     POC Glucose Once [[id number]]  (Abnormal) Collected: 07/20/23 1607    Specimen: Blood Updated: 07/20/23 1608     Glucose 296 mg/dL      Comment: Serial Number: [id number]:  [id number]       POC Glucose Once [[id number]]  (Abnormal) Collected: 07/20/23 1114    Specimen: Blood Updated: 07/20/23 1115     Glucose 235 mg/dL      Comment: Serial Number: [id number]:  [id number]             PENDING RESULTS: None.    IMAGING REVIEWED:  No radiology results for the last day    I have reviewed the patient's labs, imaging, reports, and other clinician documentation.    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  Unable to continue radiation during hospitalization due to intermittent A-fib.  Planned to restart on Monday.  Molecular testing on tumor pathology revealed low-level PD-L1 positivity.  Orders have been written for outpatient palliative chemotherapy and immunotherapy once radiation therapy has completed.  Plan to start outpatient Xgeva for bone metastases support. He has decided to pursue treatment as planned, but has chosen to be a DNR/DNI.  Dexamethasone tapered again, to 4 mg po daily. Continued calcium plus vitamin D..  DANN with malabsorption and poor tolerance of oral iron/Elevated copper level-he has not been on multivitamin containing copper. He had reported bright red blood per rectum as outpatient with 2 colonoscopies in the past 6 years that were unremarkable per his report. Received IV iron last admit and  again this admit.  On Protonix, aspirin and dexamethasone. Hemoglobin continues to slowly fall.  Iron studies showed continued DANN with a slight improvement from last admission. Started oral Ferrous sulfate.     Left lung pneumonia-likely post-obstructive. On dexamethasone.  Per pulmonary.    BLE edema and pain-recent outpatient BLE Doppler was negative for DVT/SVT.  On Lasix and Gabapentin. Minimal use of analgesics.   A-fib with RVR-new onset noted in ED.  Cardiology consulted.  On ASA.   Chronic nausea, Uncontrolled DM2, Low TSH, and severe wt. Loss-on Protonix and oral iron d/c'ed.  Receiving Glucerna twice daily and Colace.  Endocrine managing.  Will taper Dexamethasone again.   Smoking-cessation counseling continued.  He had been started on nicotine patch as outpatient, continued.  Recent pathological right hip fracture-physical therapy working with patient.  On calcium plus vitamin D.  Oral thrush-on nystatin.   Agitation-due to prolonged hospitalization and clinical status.  Tapered dexamethasone again and resumed Nicotine patch.      PLAN  Will follow CBC.   Continue Nystatin 100,000 units/mL 5 mL p.o. 4 times daily.  Decrease dexamethasone 4 mg to daily.   Continue ASA 81 mg by mouth daily.  Would not recommend full dose anticoagulation for A-fib secondary to brain metastasis and known rectal bleeding unless benefits exceed risks.  Continue PRN Tramadol and Morphine.  Continue gabapentin for pain control.  Continue Protonix.      Continue Glucerna twice daily and Colace.    Radiation therapy to resume when heart rate controlled.   Plan for combination of chemotherapy and immunotherapy as an outpatient post radiation.  Start Xgeva for bone metastases support as outpatient.  Continue calcium plus vitamin D daily.  Recommend outpatient GI evaluation.  Brain MRI as outpatient to f/u on metastasis, post XRT.   Start Ferrous sulfate 325mg po daily.   Nicotine patch.     Patient seen and evaluated by   Joon.  Electronically signed by NOREEN Phillips, 07/21/23, 9:18 AM EDT.'      I have personally performed a face-to-face diagnostic evaluation on this patient. I have performed a complete history and physical examination, reviewed laboratory studies, and radiographic examinations.  I have completed the majority and substantive portion of the medical decision making.  I have formulated the assessment on this patient and the plan of action as noted above. I have discussed the case with Aminah Houser NP, have edited/reviewed the note, and agree with the care plan.  He claims to be feeling fine and wants to go home.  On examination, oral thrush is present.  Hemoglobin is 8.5.  Radiation is on hold waiting for heart rate control.  Plan is for him to start on chemotherapy and immunotherapy post completion of radiation.            I discussed the patient's findings and my recommendations with patient.    Part of this note may be an electronic transcription/translation of spoken language to printed text using the Dragon Dictation System.     Electronically signed by Mina Dupree MD, 07/21/23, 2:15 PM EDT.

## 2023-07-21 NOTE — PROGRESS NOTES
CARDIOLOGY FOLLOW-UP PROGRESS NOTE      Reason for follow-up:    Atrial Fibrillation  Edema     Attending: Raoul Castillo MD      Subjective .     Denies chest pain or dyspnea at present  Still with periods of RVR despite titrated medical therapy        Review of Systems   Constitutional: Negative for chills and fever.   HENT:  Negative for ear discharge and nosebleeds.    Eyes:  Negative for discharge and redness.   Cardiovascular:  Negative for chest pain, orthopnea, palpitations, paroxysmal nocturnal dyspnea and syncope.   Respiratory:  Positive for shortness of breath. Negative for cough and wheezing.    Endocrine: Negative for heat intolerance.   Skin:  Negative for rash.   Musculoskeletal:  Negative for arthritis and myalgias.   Gastrointestinal:  Negative for abdominal pain, melena, nausea and vomiting.   Genitourinary:  Negative for dysuria and hematuria.   Neurological:  Negative for dizziness, light-headedness, numbness and tremors.   Psychiatric/Behavioral:  Negative for depression. The patient is not nervous/anxious.    Pertinent items are noted in HPI, all other systems reviewed and negative  Allergies: Ibuprofen and Penicillins    Scheduled Meds:amiodarone, 400 mg, Oral, Q12H  aspirin, 81 mg, Oral, Daily  budesonide, 0.5 mg, Nebulization, BID - RT  calcium 500 mg vitamin D 5 mcg (200 UT), 1 tablet, Oral, Daily  dexamethasone, 4 mg, Oral, Q12H   And  insulin regular, 4 Units, Subcutaneous, Q12H  dilTIAZem, 60 mg, Oral, Q6H  docusate sodium, 100 mg, Oral, BID  furosemide, 20 mg, Intravenous, Daily  gabapentin, 200 mg, Oral, Nightly  guaiFENesin, 600 mg, Oral, Q12H  insulin glargine, 14 Units, Subcutaneous, Q12H  insulin lispro, 14 Units, Subcutaneous, TID With Meals  insulin lispro, 2-9 Units, Subcutaneous, 4x Daily AC & at Bedtime  ipratropium-albuterol, 3 mL, Nebulization, 4x Daily - RT  metoprolol tartrate, 75 mg, Oral, BID  midodrine, 5 mg, Oral, TID AC  nystatin, 5 mL, Oral, 4x  Daily  pantoprazole, 40 mg, Oral, Q AM  senna-docusate sodium, 2 tablet, Oral, BID  sodium chloride, 10 mL, Intravenous, Q12H        Continuous Infusions:Pharmacy Consult - Steroid Insulin Protocol,       PRN Meds:.  senna-docusate sodium **AND** polyethylene glycol **AND** bisacodyl **AND** bisacodyl    Calcium Replacement - Follow Nurse / BPA Driven Protocol    dextrose    dextrose    glucagon (human recombinant)    ipratropium-albuterol    Magnesium Standard Dose Replacement - Follow Nurse / BPA Driven Protocol    Morphine    ondansetron    Pharmacy Consult - Steroid Insulin Protocol    Phosphorus Replacement - Follow Nurse / BPA Driven Protocol    Potassium Replacement - Follow Nurse / BPA Driven Protocol    [COMPLETED] Insert Peripheral IV **AND** sodium chloride    sodium chloride    sodium chloride    traMADol    Objective     VITAL SIGNS  Patient Vitals for the past 24 hrs:   BP Temp Temp src Pulse Resp SpO2 Weight   07/21/23 0808 -- -- -- -- 21 91 % --   07/21/23 0805 -- -- -- -- 18 91 % --   07/21/23 0803 -- -- -- -- 20 -- --   07/21/23 0800 -- -- -- -- 20 -- --   07/21/23 0527 116/73 -- -- 82 -- -- --   07/21/23 0504 120/74 98.6 °F (37 °C) Oral 82 20 94 % 70.8 kg (156 lb 1.4 oz)   07/21/23 0123 127/60 98.6 °F (37 °C) Oral 81 20 94 % --   07/20/23 2133 121/66 -- -- 88 -- -- --   07/20/23 2118 122/63 99.5 °F (37.5 °C) Oral 84 21 92 % --   07/20/23 2047 -- -- -- 82 18 97 % --   07/20/23 2045 -- -- -- 81 18 91 % --   07/20/23 2044 -- -- -- 82 18 91 % --   07/20/23 2040 -- -- -- 82 18 91 % --   07/20/23 1626 120/65 98.4 °F (36.9 °C) Oral (!) 132 17 93 % --   07/20/23 1533 -- -- -- -- 20 -- --   07/20/23 1529 -- -- -- -- 19 96 % --   07/20/23 1300 113/64 98.5 °F (36.9 °C) Oral 83 20 93 % --   07/20/23 1153 -- -- -- -- 17 97 % --   07/20/23 1150 -- -- -- -- 22 95 % --   07/20/23 0900 115/69 98.4 °F (36.9 °C) Oral 93 21 -- --          Flowsheet Rows      Flowsheet Row First Filed Value   Admission Height 177.8 cm  "(70\") Documented at 07/15/2023 1652   Admission Weight 72.6 kg (160 lb) Documented at 07/15/2023 1652            Body mass index is 22.4 kg/m².      Intake/Output Summary (Last 24 hours) at 7/21/2023 0840  Last data filed at 7/21/2023 0123  Gross per 24 hour   Intake 1680 ml   Output 550 ml   Net 1130 ml          TELEMETRY:     SR with PACS    Physical Exam:  Constitutional:       Appearance: Well-developed.   Eyes:      General: No scleral icterus.        Right eye: No discharge.   HENT:      Head: Normocephalic and atraumatic.   Neck:      Thyroid: No thyromegaly.      Lymphadenopathy: No cervical adenopathy.   Pulmonary:      Effort: Pulmonary effort is normal. No respiratory distress.      Breath sounds: Normal breath sounds. No wheezing. No rales.   Cardiovascular:      Normal rate. Regular rhythm.      No gallop.    Edema:     Peripheral edema absent.   Abdominal:      Tenderness: There is no abdominal tenderness.   Skin:     Findings: No erythema or rash.   Neurological:      Mental Status: Alert and oriented to person, place, and time.          Results Review:   I reviewed the patient's new clinical results.    CBC    Results from last 7 days   Lab Units 07/20/23  2358 07/20/23  0106 07/19/23  1025 07/17/23  2254 07/17/23  0216 07/15/23  2133 07/15/23  1712   WBC 10*3/mm3 12.70* 13.40* 12.90* 11.70* 12.30* 11.40* 11.40*   HEMOGLOBIN g/dL 8.5* 9.0* 9.0* 8.8* 9.5* 9.9* 10.0*   PLATELETS 10*3/mm3 444 484* 486* 395 445 415 404       BMP   Results from last 7 days   Lab Units 07/20/23  1543 07/20/23  0106 07/19/23  1025 07/18/23  1906 07/18/23  1437 07/17/23  0216 07/15/23  2133 07/15/23  1712   SODIUM mmol/L  --  138 134* 131* 130* 142 142 138   POTASSIUM mmol/L  --  4.3 4.1 4.5 4.6 4.5 5.4* 4.6   CHLORIDE mmol/L  --  94* 89* 87* 87* 94* 95* 96*   CO2 mmol/L  --  38.0* 37.0* 37.0* 35.0* 41.0* 38.0* 36.0*   BUN mg/dL  --  16 15 21 19 17 23 21   CREATININE mg/dL  --  0.31* 0.39* 0.49* 0.40* 0.35* 0.59* 0.37* "   GLUCOSE mg/dL  --  123* 381* 335* 372* 91 450* 355*   MAGNESIUM mg/dL  --  1.7 1.9 2.2 1.5*  --  1.7  --    PHOSPHORUS mg/dL 2.3* 1.6* 2.7 2.0*  --   --   --   --        Cr Clearance Estimated Creatinine Clearance: 231.6 mL/min (A) (by C-G formula based on SCr of 0.31 mg/dL (L)).  Coag     HbA1C   Lab Results   Component Value Date    HGBA1C 7.30 (H) 07/15/2023     Blood Glucose   Glucose   Date/Time Value Ref Range Status   07/21/2023 0712 274 (H) 70 - 105 mg/dL Final     Comment:     Serial Number: [id number]:  [id number]   07/20/2023 2337 276 (H) 70 - 105 mg/dL Final     Comment:     Serial Number: [id number]:  [id number]   07/20/2023 2245 329 (H) 70 - 105 mg/dL Final     Comment:     Serial Number: [id number]:  [id number]   07/20/2023 2019 418 (C) 70 - 105 mg/dL Final     Comment:     Serial Number: [id number]:  [id number]   07/20/2023 1607 296 (H) 70 - 105 mg/dL Final     Comment:     Serial Number: [id number]:  [id number]   07/20/2023 1114 235 (H) 70 - 105 mg/dL Final     Comment:     Serial Number: [id number]:  [id number]   07/20/2023 0704 205 (H) 70 - 105 mg/dL Final     Comment:     Serial Number: [id number]:  [id number]   07/19/2023 1908 391 (H) 70 - 105 mg/dL Final     Comment:     Serial Number: [id number]:  [id number]     Infection   Results from last 7 days   Lab Units 07/15/23  2133   PROCALCITONIN ng/mL 0.22       CMP   Results from last 7 days   Lab Units 07/20/23  0106 07/19/23  1025 07/18/23  1906 07/18/23  1437 07/17/23  0216 07/15/23  2133 07/15/23  1712   SODIUM mmol/L 138 134* 131* 130* 142 142 138   POTASSIUM mmol/L 4.3 4.1 4.5 4.6 4.5 5.4* 4.6   CHLORIDE mmol/L 94* 89* 87* 87* 94* 95* 96*   CO2 mmol/L 38.0* 37.0* 37.0* 35.0* 41.0* 38.0* 36.0*   BUN mg/dL 16 15 21 19 17 23 21   CREATININE mg/dL 0.31* 0.39* 0.49* 0.40* 0.35* 0.59* 0.37*   GLUCOSE mg/dL 123* 381* 335* 372* 91 450* 355*   ALBUMIN g/dL  --   --   --   --   --   --  2.6*    BILIRUBIN mg/dL  --   --   --   --   --   --  0.2   ALK PHOS U/L  --   --   --   --   --   --  178*   AST (SGOT) U/L  --   --   --   --   --   --  11   ALT (SGPT) U/L  --   --   --   --   --   --  28       ABG      UA      OLIVIA  No results found for: POCMETH, POCAMPHET, POCBARBITUR, POCBENZO, POCCOCAINE, POCOPIATES, POCOXYCODO, POCPHENCYC, POCPROPOXY, POCTHC, POCTRICYC  Lysis Labs   Results from last 7 days   Lab Units 07/20/23  2358 07/20/23  0106 07/19/23  1025 07/18/23  1906 07/18/23  1437 07/17/23  2254 07/17/23  0216 07/15/23  2133 07/15/23  1712   HEMOGLOBIN g/dL 8.5* 9.0* 9.0*  --   --  8.8* 9.5* 9.9* 10.0*   PLATELETS 10*3/mm3 444 484* 486*  --   --  395 445 415 404   CREATININE mg/dL  --  0.31* 0.39* 0.49* 0.40*  --  0.35* 0.59* 0.37*       Radiology(recent) No radiology results for the last day    Imaging Results (Last 24 Hours)       ** No results found for the last 24 hours. **            Results from last 7 days   Lab Units 07/15/23  2133   HSTROP T ng/L 30*         EKG          I personally viewed and interpreted the patient's EKG/Telemetry data:        ECHOCARDIOGRAM:     Results for orders placed during the hospital encounter of 06/16/23    Adult Transthoracic Echo Complete W/ Cont if Necessary Per Protocol    Interpretation Summary    Left ventricular systolic function is normal. Left ventricular ejection fraction appears to be 61 - 65%.    Left ventricular diastolic function is consistent with (grade I) impaired relaxation.    There is mild bileaflet mitral valve prolapse present.    Estimated right ventricular systolic pressure from tricuspid regurgitation is mildly elevated (35-45 mmHg). Calculated right ventricular systolic pressure from tricuspid regurgitation is 44 mmHg.        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 Johnson    Patient is seen and examined and findings are verified.  All data is reviewed by me personally.  Assessment and plan formulated by APC was done after discussion with attending.  I spent more than 50% of time in taking care of the patient.    Patient is sitting up in the chair.  Denies any complaints.  Patient reports that his pain in lower extremity is is better after wrapping of the legs.    Hemodynamics are stable    Normal S1 and S2.  No pericardial rub or murmur abdominal exam is benign    Patient heart rate is better controlled on Cardizem, beta-blocker and amiodarone.  However it appears to be more organized atrial fibrillation.  I have requested EP to see the patient unfortunately he is not a candidate for anticoagulation.  However I am not sure without anticoagulation patient can undergo any invasive management of atrial fibrillation we will discuss.    Electronically signed by Qamar Johnson MD, 07/23/23, 3:18 PM EDT.              I discussed the patients findings and my recommendations with patient and RN                  Electronically signed by NOREEN Peng, 07/17/23, 8:31 AM EDT.          NOREEN Peng  07/21/23  08:40 EDT

## 2023-07-21 NOTE — PROGRESS NOTES
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  DM II  S/p intramedullary nailing of the hip  S/p [id number]  S/p chemotherapy/plan for palliative radiation     PLAN:  Oxygen supplement and titration maintain saturation 90-95%: Currently requiring 3 L per nasal cannula  Encourage OOB daily   Antibiotics  Bronchodilators  Inhaled corticosteroids  IV steroids  Incentive spirometer  Diuresis and monitor [id number]  Oncology, Cardiology, Endocrinology following             LOS: 4 days     Subjective     Patient reports chronic shortness of breath and generalized weakness    Objective     Vital signs for last 24 hours:  Vitals:    07/21/23 0803 07/21/23 0805 07/21/23 0808 07/21/23 0900   BP:    103/58   BP Location:       Patient Position:       Pulse:    79   Resp: 20 18 21 19   Temp:    99 °F (37.2 °C)   TempSrc:    Oral   SpO2:  91% 91% 94%   Weight:       Height:           Intake/Output last 3 shifts:  I/O last 3 completed shifts:  In: 1680 [P.O.:1680]  Out: 1200 [Urine:1200]  Intake/Output this shift:  I/O this shift:  In: 480 [P.O.:480]  Out: -       Radiology  Imaging Results (Last 24 Hours)       ** No results found for the last 24 hours. **            Labs:  Results from last 7 days   Lab Units 07/20/23  2358   WBC 10*3/mm3 12.70*   HEMOGLOBIN g/dL 8.5*   HEMATOCRIT % 27.0*   PLATELETS 10*3/mm3 444     Results from last 7 days   Lab Units 07/20/23  0106 07/15/23  2133 07/15/23  1712   SODIUM mmol/L 138   < > 138   POTASSIUM mmol/L 4.3   < > 4.6   CHLORIDE mmol/L 94*   < > 96*   CO2 mmol/L 38.0*   < > 36.0*   BUN mg/dL 16   < > 21   CREATININE mg/dL 0.31*   < > 0.37*   CALCIUM mg/dL 9.3   < > 10.0   BILIRUBIN mg/dL  --   --  0.2   ALK PHOS U/L  --   --  178*   ALT (SGPT) U/L  --   --  28   AST (SGOT) U/L  --   --  11   GLUCOSE mg/dL 123*   < > 355*    < > = values in this interval not  displayed.         Results from last 7 days   Lab Units 07/15/23  1712   ALBUMIN g/dL 2.6*     Results from last 7 days   Lab Units 07/15/23  2133 07/15/23  1712   HSTROP T ng/L 30* 26*         Results from last 7 days   Lab Units 07/20/23  0106   MAGNESIUM mg/dL 1.7         Results from last 7 days   Lab Units 07/17/23  2254   TSH uIU/mL 0.145*           Meds:   SCHEDULE  amiodarone, 400 mg, Oral, Q12H  aspirin, 81 mg, Oral, Daily  budesonide, 0.5 mg, Nebulization, BID - RT  calcium 500 mg vitamin D 5 mcg (200 UT), 1 tablet, Oral, Daily  [START ON 7/22/2023] dexamethasone, 4 mg, Oral, Daily With Breakfast   And  [START ON 7/22/2023] insulin regular, 4 Units, Subcutaneous, Daily With Breakfast  dilTIAZem, 60 mg, Oral, Q6H  docusate sodium, 100 mg, Oral, BID  ferrous sulfate, 324 mg, Oral, Daily With Breakfast  furosemide, 20 mg, Intravenous, Daily  gabapentin, 200 mg, Oral, Nightly  guaiFENesin, 600 mg, Oral, Q12H  insulin glargine, 14 Units, Subcutaneous, Q12H  insulin lispro, 14 Units, Subcutaneous, TID With Meals  insulin lispro, 2-9 Units, Subcutaneous, 4x Daily AC & at Bedtime  ipratropium-albuterol, 3 mL, Nebulization, 4x Daily - RT  metoprolol tartrate, 75 mg, Oral, BID  midodrine, 5 mg, Oral, TID AC  nicotine, 1 patch, Transdermal, Q24H  nystatin, 5 mL, Oral, 4x Daily  pantoprazole, 40 mg, Oral, Q AM  senna-docusate sodium, 2 tablet, Oral, BID  sodium chloride, 10 mL, Intravenous, Q12H      Infusions  Pharmacy Consult - Steroid Insulin Protocol,       PRNs    senna-docusate sodium **AND** polyethylene glycol **AND** bisacodyl **AND** bisacodyl    Calcium Replacement - Follow Nurse / BPA Driven Protocol    dextrose    dextrose    glucagon (human recombinant)    ipratropium-albuterol    Magnesium Standard Dose Replacement - Follow Nurse / BPA Driven Protocol    Morphine    ondansetron    Pharmacy Consult - Steroid Insulin Protocol    Phosphorus Replacement - Follow Nurse / BPA Driven Protocol    Potassium  Replacement - Follow Nurse / BPA Driven Protocol    [COMPLETED] Insert Peripheral IV **AND** sodium chloride    sodium chloride    sodium chloride    traMADol    Physical Exam:  General Appearance:  Alert   HEENT:  Normocephalic, without obvious abnormality, Conjunctiva/corneas clear,.   Nares normal, no drainage     Neck:  Supple, symmetrical, trachea midline.   Lungs /Chest wall: Mild bilateral basal rhonchi, respirations unlabored, symmetrical wall movement.     Heart:  Regular rate and rhythm, S1 S2 normal  Abdomen: Soft, non-tender, no masses, no organomegaly.    Extremities: No edema, no clubbing or cyanosis     ROS  Constitutional: Negative for chills, fever and positive for malaise/fatigue.   HENT: Negative.    Eyes: Negative.    Cardiovascular: Negative.    Respiratory: Positive for chronic shortness of breath.    Skin: Negative.    Musculoskeletal: Negative.    Gastrointestinal: Negative.    Genitourinary: Negative.    Neurological: Negative.    Psychiatric/Behavioral: Negative.      I reviewed the recent clinical results    Part of this note may be an electronic transcription/translation of spoken language to printed text using the Dragon Dictation System.

## 2023-07-21 NOTE — NURSING NOTE
"68-year-old male admitted to the hospital on July 15 with complaints of swelling and pain to bilateral lower extremities.  Patient does have a history significant for new metastatic squamous cell carcinoma of the lung with mets to the brain and bone.    A consult was received to assess patient's bilateral lower extremities.  Patient expresses extreme pain and hurt with any touch or movement of his lower extremities.  States that the edema has now decreased and that his legs look better but that they \"split \"from the swelling.  At this time.  Patient does not have any open areas with the exception of a small denuded area that is middermal to the left foot however his skin is almost abrasive to touch.  Has a woody appearance to it.  I am able to easily palpate bilateral pedal pulses.  There is no weeping noted at this time and there is mild edema noted.    I cleansed bilateral lower extremities and applied lotion.  I applied a Mepilex to the left foot.  I then wrapped him in Curlex and I used two 4 inch Ace wrap's and wrapped him from the base the toes to below his knee.    Additionally patient presenting with pressure injuries to the sacral area as well as moisture associated dermatitis.  Patient has fairly significant excoriation bilaterally.  There are multiple open denuded areas.  Some of these areas are at least 2 of them to the sacrum.  I do believe are pressure injuries and are stage II pressure injuries.  I will recommend placing the patient on agility offloading surface.  I will recommend using Domeboro soaks to help soothe the denuded areas and will recommend Edith's Magic Butt cream.  Patient does complain of discomfort to his bottom with any type of movement.  Also will recommend implementing pressure injury prevention strategies such as utilization of a chair cushion when up and frequent position changes.  "

## 2023-07-21 NOTE — PROGRESS NOTES
"Subjective   Isra Davenport is a 68 y.o. male.   Seen for diabetes f/u.  Steroid dose decreased to once a day.        Objective     /67   Pulse 80   Temp 98.5 °F (36.9 °C) (Oral)   Resp 22   Ht 177.8 cm (70\")   Wt 70.8 kg (156 lb 1.4 oz) Comment: pts previous weight was on 7/17. no extra linen on the bed  SpO2 96%   BMI 22.40 kg/m²   Blood sugar  274 this am,  310 @ lunch, 273 @ supper    ASSESSMENT  Diabetes type 2, with hyperglycemia  Patient is stable    PLAN    Continue same insulin regimen.         Liudmila Levin MD  7/21/2023  16:51 EDT    "

## 2023-07-21 NOTE — PLAN OF CARE
Goal Outcome Evaluation:         Isra Davenport presents with functional mobility impairments which indicate the need for skilled intervention. Pt with increased confusion today vs previous tx session with this PTA. Pt required CGA/RW with cuing for proper hand placement to improve safety. Pt declined gait training on this date despite education on importance. Pt limited due to decreased activity tolerance and functional strength. Tolerating session today without incident. Will continue to follow and progress as tolerated.

## 2023-07-21 NOTE — PROGRESS NOTES
Nutrition Services    Patient Name: Isra Davenport  YOB: 1955  MRN: [id number]  Admission date: 7/15/2023    PPE Documentation        PPE Worn By Provider Did not enter room for this encounter   PPE Worn By Patient  N/A     PROGRESS NOTE      Encounter Information: Checking in on PO intake. ~8% wt gain since admission. <1L fluid loss since admission. 1 to 3+ fluid accumulation. Expect some wt fluctuation with diuretic use and edema.       PO Diet: Diet: Diabetic Diets; Consistent Carbohydrate; Texture: Regular Texture (IDDSI 7); Fluid Consistency: Thin (IDDSI 0)   PO Supplements: Boost Glucose Control BID (Provides 380 kcals, 32 g protein if consumed)      PO Intake:  75%       Current nutrition support: -   Nutrition support review: -       Labs (reviewed below): Reviewed, management per attending.  Hyperglycemia - on consistent CHO diet        GI Function:  Last documented BM on 7/20        Nutrition Intervention Updates: Continue current diet and encourage good PO intake.       Results from last 7 days   Lab Units 07/20/23  0106 07/19/23  1025 07/18/23  1906 07/15/23  2133 07/15/23  1712   SODIUM mmol/L 138 134* 131*   < > 138   POTASSIUM mmol/L 4.3 4.1 4.5   < > 4.6   CHLORIDE mmol/L 94* 89* 87*   < > 96*   CO2 mmol/L 38.0* 37.0* 37.0*   < > 36.0*   BUN mg/dL 16 15 21   < > 21   CREATININE mg/dL 0.31* 0.39* 0.49*   < > 0.37*   CALCIUM mg/dL 9.3 9.1 9.0   < > 10.0   BILIRUBIN mg/dL  --   --   --   --  0.2   ALK PHOS U/L  --   --   --   --  178*   ALT (SGPT) U/L  --   --   --   --  28   AST (SGOT) U/L  --   --   --   --  11   GLUCOSE mg/dL 123* 381* 335*   < > 355*    < > = values in this interval not displayed.     Results from last 7 days   Lab Units 07/20/23  2358 07/20/23  1543 07/20/23  0106 07/19/23  1025 07/18/23  1906   MAGNESIUM mg/dL  --   --  1.7 1.9 2.2   PHOSPHORUS mg/dL  --  2.3* 1.6* 2.7 2.0*   HEMOGLOBIN g/dL 8.5*  --  9.0* 9.0*  --    HEMATOCRIT % 27.0*  --  29.1* 28.3*  --       COVID19   Date Value Ref Range Status   06/19/2023 Not Detected Not Detected - Ref. Range Final     Lab Results   Component Value Date    HGBA1C 7.30 (H) 07/15/2023       RD to follow up per protocol.    Electronically signed by:  Marcie Montoya RD  07/21/23 08:05 EDT

## 2023-07-21 NOTE — PROGRESS NOTES
[facility] Medicine Services Daily Progress Note    Patient Name: Isar Davenport  : 1955  MRN: [id number]  Primary Care Physician:  Mayco Carreon MD  Date of admission: 7/15/2023      Subjective      Chief Complaint: Pain     History of Present Illness: Isra Davenport is a 68 y.o. 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  He is status post intramedullary nailing of the hip on 2023 and a Yicdjx-d-Wvni placed on 2023 he is currently undergoing chemotherapy and plan for palliative radiation who presented to [facility] on 7/15/2023 complaining of pain and lower extremity edema     Review of records show patient had presented to chemotherapy with complaint of lower extremity edema and what he describes as neuropathic pain.  He had ultrasound that was negative for DVT and was prescribed Lasix.  He was also prescribed Norco 5/325 for pain.  Patient reports when he went to  prescriptions from the pharmacy the pharmacy was closed on Monday and he was unable to fill prescription.  He presents to the ED today with uncontrolled pain.  It was noted from previous admission he was discharged on low pressure for diagnosis of sinus tachycardia     ED work-up included chest x-ray that showed a right internal jugular Port-A-Cath in place.  Heart size stable.  Airspace disease in the left lung compatible with pneumonia.  Chronic elevation of the left hemidiaphragm.  No pleural effusion.  Right lung was clear.  Bilateral foot x-ray was negative for fracture.  There is healed fracture of the right tibia and fibula.  Troponin 26, potassium 4.6, sodium 138, glucose 355, creatinine 0.3, BUN is 21.  Albumin 2.6.  White count 11.4, hemoglobin 10.  On admission heart rate 84 but while he was being treated in the ED heart rate jumped into the  150s and was found to be in acute A-fib with RVR.  O2 sats dropped to 88% was placed on 2 L nasal cannula.  He was given a Cardizem bolus and Lopressor bolus and resumed on a Cardizem drip.  Patient denies any chest pain or feeling of chest palpitations.  Denies any fever chills or increased shortness of breath.  Reports positive for productive cough.  Positive for swelling of bilateral lower extremities below the calf and extreme tenderness to touch bilateral feet.     7/16/23 patient seen and examined in bed no acute distress, vital signs stable, K-5.4, hr 81, dyspnea on high flow oxygen.  We will start Cardizem p.o.  7/17/2023 patient seen and examined in bed no acute distress, he is on 3 L of oxygen, blood pressure 93/52.  Discussed with RN.  7/18/2023 patient seen and examined in bed no acute distress, dyspnea on 3 L, heart rate 155.  Discussed with RN. Restarted Cardizem drip per cardiology and adding short acting Cardizem to wean IV and increasing BB   7/19/2023 patient seen and examined in bed no acute distress, blood pressure 92/54.  On amiodarone, Cardizem, metoprolol dyspnea on 2 L.  Discussed with RN.  7/20/2023 patient seen and examined in bed no acute distress, his heart rate still elevated.  Presently on amiodarone, Cardizem, metoprolol. discussed with RN.    ROS Review of Systems   Constitutional: Positive for malaise/fatigue.   Cardiovascular:  Positive for leg swelling.   Respiratory:  Positive for cough and sputum production.    Skin: Negative.    Musculoskeletal:  Positive for back pain.   Gastrointestinal: Negative.    Genitourinary: Negative.    Neurological:  Positive for paresthesias.   Objective      Vitals:   Temp:  [98.4 °F (36.9 °C)-99.5 °F (37.5 °C)] 98.6 °F (37 °C)  Heart Rate:  [] 82  Resp:  [17-22] 20  BP: (113-127)/(60-74) 116/73  Flow (L/min):  [2-3] 2.5    Physical Exam Constitutional:       Appearance: He is ill-appearing.   Eyes:      Pupils: Pupils are equal, round, and  reactive to light.   Cardiovascular:      Rate and Rhythm: Tachycardia present. Rhythm irregular.   Pulmonary:      Breath sounds: Rhonchi present.   Abdominal:      General: Abdomen is flat.      Palpations: Abdomen is soft.   Musculoskeletal:      Right lower leg: Edema present.      Left lower leg: Edema present.   Skin:     General: Skin is warm and dry.   Neurological:      Mental Status: He is alert and oriented to person, place, and time.   Psychiatric:         Mood and Affect: Mood normal.     Result Review    Result Review:  I have personally reviewed the results from the time of this admission to 7/21/2023 07:54 EDT and agree with these findings:  []  Laboratory  []  Microbiology  []  Radiology  []  EKG/Telemetry   []  Cardiology/Vascular   []  Pathology  []  Old records  []  Other:  Most notable findings include:     Wounds (last 24 hours)       LDA Wound       Row Name 07/21/23 0000 07/20/23 2000 07/20/23 1200       Wound 06/17/23 0915 Right anterior hip Incision    Wound - Properties Group Placement Date: 06/17/23  -HB Placement Time: 0915 -HB Side: Right  -HB Orientation: anterior  -HB Location: hip  -HB Primary Wound Type: Incision  -HB    Dressing Appearance -- open to air  -RP (r) AR (t) RP (c) --    Closure -- -- Other (Comment);Approximated  healing  -HM    Base -- -- clean;dry  -HM    Periwound -- intact  -RP (r) AR (t) RP (c) intact;blanchable  -HM    Periwound Temperature -- warm  -RP (r) AR (t) RP (c) --    Drainage Amount -- -- none  -HM    Retired Wound - Properties Group Placement Date: 06/17/23  -HB Placement Time: 0915 -HB Side: Right  -HB Orientation: anterior  -HB Location: hip  -HB Primary Wound Type: Incision  -HB    Retired Wound - Properties Group Date first assessed: 06/17/23  -HB Time first assessed: 0915 -HB Side: Right  -HB Location: hip  -HB Primary Wound Type: Incision  -HB       Wound 07/15/23 2100 Bilateral medial gluteal Pressure Injury    Wound - Properties Group  Placement Date: 07/15/23  -MF Placement Time: 2100  -MF Present on Hospital Admission: Y  -MF Side: Bilateral  -MF Orientation: medial  -MF Location: gluteal  -MF Primary Wound Type: Pressure inj  -MF    Dressing Appearance -- dry;intact  -RP (r) AR (t) RP (c) --    Base -- -- blanchable;pink  -HM    Periwound -- -- blanchable;redness  -HM    Drainage Amount -- -- none  -HM    Retired Wound - Properties Group Placement Date: 07/15/23  -MF Placement Time: 2100  -MF Present on Hospital Admission: Y  -MF Side: Bilateral  -MF Orientation: medial  -MF Location: gluteal  -MF Primary Wound Type: Pressure inj  -MF    Retired Wound - Properties Group Date first assessed: 07/15/23  -MF Time first assessed: 2100  -MF Present on Hospital Admission: Y  -MF Side: Bilateral  -MF Location: gluteal  -MF Primary Wound Type: Pressure inj  -MF       Wound 07/18/23 1900 Left posterior ankle    Wound - Properties Group Placement Date: 07/18/23  -AR Placement Time: 1900  -AR Present on Hospital Admission: Y  -AR Side: Left  -AR Orientation: posterior  -AR Location: ankle  -AR    Base --  -AR scab  -RP (r) AR (t) RP (c) --    Periwound --  -AR redness;swelling;warm  -RP (r) AR (t) RP (c) --    Care, Wound -- estefanía boot  -RP (r) AR (t) RP (c) --    Retired Wound - Properties Group Placement Date: 07/18/23  -AR Placement Time: 1900  -AR Present on Hospital Admission: Y  -AR Side: Left  -AR Orientation: posterior  -AR Location: ankle  -AR    Retired Wound - Properties Group Date first assessed: 07/18/23  -AR Time first assessed: 1900  -AR Present on Hospital Admission: Y  -AR Side: Left  -AR Location: ankle  -AR      Row Name 07/20/23 0805             Wound 06/17/23 0915 Right anterior hip Incision    Wound - Properties Group Placement Date: 06/17/23  -HB Placement Time: 0915  -HB Side: Right  -HB Orientation: anterior  -HB Location: hip  -HB Primary Wound Type: Incision  -HB    Closure Other (Comment);Approximated  healing  -TL      Base  clean;dry  -TL      Periwound intact;blanchable  -TL      Drainage Amount none  -TL      Retired Wound - Properties Group Placement Date: 06/17/23  -HB Placement Time: 0915 -HB Side: Right  -HB Orientation: anterior  -HB Location: hip  -HB Primary Wound Type: Incision  -HB    Retired Wound - Properties Group Date first assessed: 06/17/23  -HB Time first assessed: 0915  -HB Side: Right  -HB Location: hip  -HB Primary Wound Type: Incision  -HB       Wound 07/15/23 2100 Bilateral medial gluteal Pressure Injury    Wound - Properties Group Placement Date: 07/15/23  -MF Placement Time: 2100  -MF Present on Hospital Admission: Y  -MF Side: Bilateral  -MF Orientation: medial  -MF Location: gluteal  -MF Primary Wound Type: Pressure inj  -MF    Base blanchable;pink  -TL      Periwound blanchable;redness  -TL      Drainage Amount none  -TL      Dressing Care open to air  -TL      Retired Wound - Properties Group Placement Date: 07/15/23  -MF Placement Time: 2100  -MF Present on Hospital Admission: Y  -MF Side: Bilateral  -MF Orientation: medial  -MF Location: gluteal  -MF Primary Wound Type: Pressure inj  -MF    Retired Wound - Properties Group Date first assessed: 07/15/23  -MF Time first assessed: 2100  -MF Present on Hospital Admission: Y  -MF Side: Bilateral  -MF Location: gluteal  -MF Primary Wound Type: Pressure inj  -MF       Wound 07/18/23 1900 Left posterior ankle    Wound - Properties Group Placement Date: 07/18/23  -AR Placement Time: 1900  -AR Present on Hospital Admission: Y  -AR Side: Left  -AR Orientation: posterior  -AR Location: ankle  -AR    Retired Wound - Properties Group Placement Date: 07/18/23  -AR Placement Time: 1900  -AR Present on Hospital Admission: Y  -AR Side: Left  -AR Orientation: posterior  -AR Location: ankle  -AR    Retired Wound - Properties Group Date first assessed: 07/18/23  -AR Time first assessed: 1900  -AR Present on Hospital Admission: Y  -AR Side: Left  -AR Location: ankle  -AR               User Key  (r) = Recorded By, (t) = Taken By, (c) = Cosigned By      Initials Name Provider Type    HB Cleo Britton, RN Registered Nurse    [employer] RN Registered Nurse    Avril Leon, RN Registered Nurse    Bartolo Coombs, RN Registered Nurse    Laura Nava, RN Registered Nurse    Danyell Samuel, RN Registered Nurse                    CBC:      Lab 07/20/23  2358 07/20/23  0106 07/19/23  1025 07/17/23  2254 07/17/23  0216 07/15/23  2133 07/15/23  1712   WBC 12.70* 13.40* 12.90* 11.70* 12.30* 11.40* 11.40*   HEMOGLOBIN 8.5* 9.0* 9.0* 8.8* 9.5* 9.9* 10.0*   HEMATOCRIT 27.0* 29.1* 28.3* 28.7* 31.5* 32.5* 31.8*   PLATELETS 444 484* 486* 395 445 415 404   NEUTROS ABS 11.18* 12.19* 11.74* 11.12* 10.70* 10.30* 10.20*   LYMPHS ABS  --   --   --   --  0.90 0.60* 0.60*   MONOS ABS  --   --   --   --  0.80 0.50 0.60   EOS ABS  --   --   --   --  0.00 0.00 0.00   MCV 84.6 85.3 85.1 84.9 85.3 84.5 84.8       CMP:        Lab 07/20/23  1543 07/20/23  0106 07/19/23  1025 07/18/23  1906 07/18/23  1437 07/17/23  0216 07/15/23  2133 07/15/23  1712   SODIUM  --  138 134* 131* 130* 142 142 138   POTASSIUM  --  4.3 4.1 4.5 4.6 4.5 5.4* 4.6   CHLORIDE  --  94* 89* 87* 87* 94* 95* 96*   CO2  --  38.0* 37.0* 37.0* 35.0* 41.0* 38.0* 36.0*   ANION GAP  --  6.0 8.0 7.0 8.0 7.0 9.0 6.0   BUN  --  16 15 21 19 17 23 21   CREATININE  --  0.31* 0.39* 0.49* 0.40* 0.35* 0.59* 0.37*   EGFR  --  129.2 120.5 112.5 119.6 124.5 106.3 122.4   GLUCOSE  --  123* 381* 335* 372* 91 450* 355*   CALCIUM  --  9.3 9.1 9.0 9.1 9.7 9.2 10.0   IONIZED CALCIUM  --  1.22 1.19* 1.18*  --   --   --   --    MAGNESIUM  --  1.7 1.9 2.2 1.5*  --  1.7  --    PHOSPHORUS 2.3* 1.6* 2.7 2.0*  --   --   --   --    TOTAL PROTEIN  --   --   --   --   --   --   --  6.8   ALBUMIN  --   --   --   --   --   --   --  2.6*   GLOBULIN  --   --   --   --   --   --   --  4.2   ALT (SGPT)  --   --   --   --   --   --   --  28   AST (SGOT)  --   --    --   --   --   --   --  11   BILIRUBIN  --   --   --   --   --   --   --  0.2   ALK PHOS  --   --   --   --   --   --   --  178*       No results found for: ACANTHNAEG, AFBCX, BPERTUSSISCX, BLOODCX  No results found for: BCIDPCR, CXREFLEX, CSFCX, CULTURETIS  No results found for: CULTURES, HSVCX, URCX  No results found for: EYECULTURE, GCCX, HSVCULTURE, LABHSV  No results found for: LEGIONELLA, MRSACX, MUMPSCX, MYCOPLASCX  No results found for: NOCARDIACX, STOOLCX  No results found for: THROATCX, UNSTIMCULT, URINECX, CULTURE, VZVCULTUR  No results found for: VIRALCULTU, WOUNDCX      Assessment & Plan      Brief Patient Summary:  Isra Davenport is a 68 y.o. male who presented to chemotherapy with complaint of lower extremity edema and what he describes as neuropathic pain.  He had ultrasound that was negative for DVT and was prescribed Lasix.  He was also prescribed Norco 5/325 for pain.  Patient reports when he went to  prescriptions from the pharmacy, the pharmacy was closed on Monday and he was unable to fill prescription.  He presents to the ED today with uncontrolled pain.  It was noted from previous admission he was discharged on low pressure for diagnosis of sinus tachycardia         amiodarone, 400 mg, Oral, Q12H  aspirin, 81 mg, Oral, Daily  budesonide, 0.5 mg, Nebulization, BID - RT  calcium 500 mg vitamin D 5 mcg (200 UT), 1 tablet, Oral, Daily  dexamethasone, 4 mg, Oral, Q12H   And  insulin regular, 4 Units, Subcutaneous, Q12H  dilTIAZem, 60 mg, Oral, Q6H  docusate sodium, 100 mg, Oral, BID  furosemide, 20 mg, Intravenous, Daily  gabapentin, 200 mg, Oral, Nightly  guaiFENesin, 600 mg, Oral, Q12H  insulin glargine, 14 Units, Subcutaneous, Q12H  insulin lispro, 14 Units, Subcutaneous, TID With Meals  insulin lispro, 2-9 Units, Subcutaneous, 4x Daily AC & at Bedtime  ipratropium-albuterol, 3 mL, Nebulization, 4x Daily - RT  metoprolol tartrate, 75 mg, Oral, BID  midodrine, 5 mg, Oral, TID  AC  nystatin, 5 mL, Oral, 4x Daily  pantoprazole, 40 mg, Oral, Q AM  senna-docusate sodium, 2 tablet, Oral, BID  sodium chloride, 10 mL, Intravenous, Q12H       Pharmacy Consult - Steroid Insulin Protocol,          Active Hospital Problems:  Active Hospital Problems    Diagnosis     **Atrial fibrillation with RVR     Moderate malnutrition     Atrial fibrillation        New onset A-fib with RVR  - Started on Cardiezem drip in ED dc   -continue metoprolol po, Cardizem p.o. and amiodarone po  - consulted cardiology  Back in AF vs. MAT this am with RVR  Increase amiodarone  Reduce cardizem back to 60 q 8hrs  Recent non invasive ischemic eval negative for ischemia  Echo with preserved LV function  - Had considered pharmacologic AC but with known brain mets concerned with risk for bleed so will consult oncology for recommendation on AC  - continue VTE with SCD until seen by cardiology and oncology     Pain and Edema BLE  - appears Neuropathic  - pt also with known bone cancer and arthritis   - consider neuropathy 2/2 chemotherapy  - will continue Lasix for edema  -Was ruled out for DVT   - will add gabapentin 200mg QHS for sign of neuropathic pain  - check BNP  - will resume pt Norco that was previously ordered and add prn Dilaudid   -echo 6/22/23: Left ventricular systolic function is normal. Left ventricular ejection fraction appears to be 61 - 65%.   Left ventricular diastolic function is consistent with (grade I) impaired relaxation.     Hyperglycemia- likely steroid induced   -A1C 7.3  - add glucomander  - add steroid protocol      Hypoxia- consider 2/2 known lung cancer   - cxr with pneumonia and elevated WBC and known tobacco use  - will cover possible COPD vs pneumonia vs carcinoma with empiric Azithromycin  - wean O2 as tolerated   - duo nebs prn     Anemia  - continue ferrous sulfate  - hgb stable     Stage IV squamous cell lung cancer with mets to brain and bone  - currently treated with chemotherapy   -  continue decadron as previously ordered by oncology  - continue nexium   Per oncology   Check iron saturation.continue ferrous sulfate 325 mg by mouth daily.Consider IV iron if iron sat is low.  Radiation oncology consultation to resume radiation therapy when able  Await molecular testing results with plan for chemotherapy post completion of radiation.  Start Xgeva and daily Ca+VitD for bone metastases support as outpatient.  dexamethasone  4 mg by mouth daily.  Continue ASA 81 mg by mouth daily.  Would not recommend full dose anticoagulation for A-fib secondary to brain mets and known rectal bleeding.  Continue as needed hydrocodone, as needed Dilaudid, and scheduled gabapentin for pain control.  Continue Pepcid p.o. twice daily and as needed Zofran    DVT prophylaxis:  Mechanical DVT prophylaxis orders are present.    CODE STATUS:    Medical Intervention Limits: NO intubation (DNI)  Code Status (Patient has no pulse and is not breathing): No CPR (Do Not Attempt to Resuscitate)  Medical Interventions (Patient has pulse or is breathing): Limited Support      Disposition:  I expect patient to be discharged NH.    I have utilized all available, immediate resources to obtain, update, or review the patient's current medications including all prescriptions, over-the-counter products, herbals, cannabis/cannabidiol products, and vitamin.mineral/dietary (nutritional) supplements.      Medical Intervention Limits: NO intubation (DNI)  Code Status (Patient has no pulse and is not breathing): No CPR (Do Not Attempt to Resuscitate)  Medical Interventions (Patient has pulse or is breathing): Limited Support    Next of kin of Power of :       Admission Status:  I believe this patient meets ADMIT status.              This patient has been examined wearing appropriate Personal Protective Equipment and discussed with  RN . 07/21/23      Electronically signed by Raoul Castillo MD, 07/21/23, 07:54 EDT.  Billie Fajardo  Hospitalist Team

## 2023-07-21 NOTE — CONSULTS
HP      Name: Isra Davenport ADMIT: 7/15/2023   : 1955  PCP: Mayco Carreon MD    MRN: [id number] LOS: 4 days   AGE/SEX: 68 y.o. male  ROOM:      Chief Complaint   Patient presents with    Pain       Subjective        History of present illness  Isra Davenport is a 68-year-old male patient who was diagnosed with lung cancer with metastasis to the bone and to the brain, is admitted to the hospital initially for lower extremity swelling on 7/15/2023.  Upon presentation he was in sinus rhythm, however he developed A-fib with rapid ventricular rates.  He was managed with IV Cardizem, IV Lopressor, amiodarone, also he was given IV digoxin at times.  Briefly he converted to sinus rhythm, however A-fib recurred.  EP was consulted to help manage atrial fibrillation.    Past Medical History:   Diagnosis Date    Arthritis     Benign prostatic hyperplasia without urinary obstruction 2023    Chronic back pain 2023    Closed fracture of right hip 2023    Added automatically from request for surgery [id number]    Hernia of anterior abdominal wall 2020    Lung cancer     squamous cell carcimoma,left    Lung cancer metastatic to brain 2023    Mass of left lung 2023    Added automatically from request for surgery [id number]    Neuralgia 2023    Neuropathy 2023    Osteoarthritis 2023    Polyp of colon 2023    Primary malignant neoplasm of left lung metastatic to other site 2023    Added automatically from request for surgery [id number]    Prostatitis 2023    Spinal stenosis of lumbar region 2023     Past Surgical History:   Procedure Laterality Date    BACK SURGERY      BRONCHOSCOPY N/A 2023    Procedure: BRONCHOSCOPY WITH BIOPSY X1 AREA, FINE NEEDLE ASPIRATION, BRUSHING, AND BRONCHIAL WASHING;  Surgeon: Reggie Abarca MD;  Location: [facility] ENDOSCOPY;  Service: Pulmonary;  Laterality: N/A;  POST: MUCOUS PLUGS  AND LUNG MASS    HIP  INTERTROCHANTERIC NAILING Right 6/17/2023    Procedure: HIP INTERTROCHANTERIC NAILING;  Surgeon: Levi Blackmon II, MD;  Location: [facility] MAIN OR;  Service: Orthopedics;  Laterality: Right;    LEG SURGERY Bilateral 08/25/1985    PORTACATH PLACEMENT Right 6/29/2023    Procedure: INSERTION OF PORTACATH;  Surgeon: Jese Goss MD;  Location: [facility] MAIN OR;  Service: General;  Laterality: Right;     History reviewed. No pertinent family history.  Social History     Tobacco Use    Smoking status: Every Day     Packs/day: 1.00     Years: 15.00     Pack years: 15.00     Types: Cigarettes     Passive exposure: Current    Smokeless tobacco: Never   Vaping Use    Vaping Use: Never used   Substance Use Topics    Alcohol use: Yes     Alcohol/week: 2.0 standard drinks     Types: 2 Cans of beer per week    Drug use: Never     Medications Prior to Admission   Medication Sig Dispense Refill Last Dose    acetaminophen (TYLENOL) 325 MG tablet Take 2 tablets by mouth Every 6 (Six) Hours As Needed for Mild Pain. Indications: Pain       albuterol sulfate  (90 Base) MCG/ACT inhaler Inhale 2 puffs Every 4 (Four) Hours As Needed for Wheezing. 6.7 g 5     aspirin 81 MG EC tablet Take 1 tablet by mouth Daily. 30 tablet 2     denosumab (XGEVA) 120 MG/1.7ML solution injection Inject 1.7 mL under the skin into the appropriate area as directed See Admin Instructions. Every 4 weeks       dexamethasone (DECADRON) 4 MG tablet Take 1 tablet by mouth Daily With Breakfast.       ferrous sulfate 324 (65 Fe) MG tablet delayed-release EC tablet Take 1 tablet by mouth Daily With Breakfast. 30 tablet 2     furosemide (LASIX) 20 MG tablet Take 1 tablet by mouth Daily.       HYDROcodone-acetaminophen (NORCO) 5-325 MG per tablet Take 1 tablet by mouth Every 4 (Four) Hours As Needed for Moderate Pain.       metoprolol tartrate (LOPRESSOR) 25 MG tablet Take 1 tablet by mouth Every 8 (Eight) Hours. 90 tablet 1     nicotine (NICODERM  CQ) 21 MG/24HR patch Place 1 patch on the skin as directed by provider Daily.       omeprazole (priLOSEC) 20 MG capsule Take 1 capsule by mouth Daily.       ondansetron (ZOFRAN) 4 MG tablet Take 1 tablet by mouth Every 6 (Six) Hours As Needed for Nausea or Vomiting.       vitamin D (ERGOCALCIFEROL) 1.25 MG (58773 UT) capsule capsule Take 1 capsule by mouth 1 (One) Time Per Week.       vitamin D3 125 MCG (5000 UT) capsule capsule Take 1 capsule by mouth Daily.        Allergies:  Ibuprofen and Penicillins    Review of systems    Constitutional: Negative.    Respiratory and cardiovascular: As detailed in HPI section.  Gastrointestinal: Negative for constipation, nausea and vomiting negative for abdominal distention, abdominal pain and diarrhea.   Genitourinary: Negative for difficulty urinating and flank pain.   Musculoskeletal: Negative for arthralgias, joint swelling and myalgias.   Skin: Negative for color change, rash and wound.   Neurological: Negative for dizziness, syncope, weakness and headaches.   Hematological: Negative for adenopathy.   Psychiatric/Behavioral: Negative for confusion.   All other systems reviewed and are negative.       Physical Exam  VITALS REVIEWED    General:      well developed, in no acute distress.    Head:      normocephalic and atraumatic.    Eyes:      PERRL/EOM intact, conjunctiva and sclera clear with out nystagmus.    Neck:      no masses, thyromegaly,  trachea central with normal respiratory effort and PMI displaced laterally  Lungs:      Clear to auscultation bilaterally  Heart:       Irregular rhythm  Msk:      no deformity or scoliosis noted of thoracic or lumbar spine.    Pulses:      pulses normal in all 4 extremities.    Extremities:       No lower extremity edema  Neurologic:      no focal deficits.   alert oriented x3  Skin:      intact without lesions or rashes.    Psych:      alert and cooperative; normal mood and affect; normal attention span and concentration.       Result Review :               Pertinent cardiac workup    EKG 7/19/2023 atrial fibrillation  EKG 7/16/2023 sinus rhythm  EKG 7/15/2023 atrial fibrillation rate 151 bpm        Assessment and Plan         Atrial fibrillation with RVR    Atrial fibrillation    Moderate malnutrition      Isra Davenport is a 68-year-old male patient who has metastatic lung cancer to the brain and to the hip.  Patient was in sinus rhythm upon admission, however he developed atrial fibrillation with difficult to control heart rate.  Currently he is on p.o. amiodarone 400 every 12, diltiazem 60 mg every 6 hours and metoprolol 75 mg twice a day.  Heart rate currently at least is in the 80s, still in A-fib.  I think that in his case, we should address the issue in a more conservative manner given his metastatic cancer.  He already received radiation therapy and is getting ready to receive chemotherapy, but my understanding is that these are for palliative measures.  To that extent, I do not necessarily recommend cardioversion/ablation.  Furthermore due to brain mets, it is recommended not to give any anticoagulation.  We will direct our efforts towards rate control for now, will avoid procedures if possible.      No follow-ups on file.  Patient was given instructions and counseling regarding his condition or for health maintenance advice. Please see specific information pulled into the AVS if appropriate.

## 2023-07-21 NOTE — CASE MANAGEMENT/SOCIAL WORK
Continued Stay Note  [facility]     Patient Name: Isra Davenport  MRN: [id number]  Today's Date: 7/21/2023    Admit Date: 7/15/2023    Plan: [facility] pending acceptance (radiation txs at [facility], chemo on hold while in rehab per oncology). Will require precert. PASRR approved. From home alone.   Discharge Plan       Row Name 07/21/23 1532       Plan    Plan [facility] pending acceptance (radiation txs at [facility], chemo on hold while in rehab per oncology). Will require precert. PASRR approved. From home alone.    Plan Comments CM notified by [facility] liaison, Mirela, that they are continuing to review for acceptance. CM confirmed with Dr Dupree that chemo can be on hold while in rehab, but will need radiation treatments, CM updated [facility] liaison. Barrier to D/C: pending placement, cardiology following, monitoring HR, adjusting meds.                      Expected Discharge Date and Time       Expected Discharge Date Expected Discharge Time    Jul 23, 2023           Phone communication or documentation only - no physical contact with patient or family.     Clara Keenan, KRISTIN, RN    [address] [address]    Office: [phone]  Fax: [phone]

## 2023-07-21 NOTE — PROGRESS NOTES
"Subjective   Isra Davenport is a 68 y.o. male.   Seen for diabetes f/u.  Placement planned  Feeling better.      Objective     /66   Pulse 88   Temp 99.5 °F (37.5 °C) (Oral)   Resp 21   Ht 177.8 cm (70\")   Wt 68.6 kg (151 lb 3.8 oz) Comment: Nurse notified of gain  SpO2 92%   BMI 21.70 kg/m²   Blood sugar  205 this am,  235 @ lunch, 296 @ supper    ASSESSMENT  Diabetes type 2, with hyperglycemia  Patient is Improving    PLAN    Will increase scheduled pre meal lispro.         Liudmila Levin MD  7/20/2023  23:14 EDT    "

## 2023-07-22 ENCOUNTER — APPOINTMENT (OUTPATIENT)
Dept: CARDIOLOGY | Facility: HOSPITAL | Age: 68
DRG: 308 | End: 2023-07-22
Payer: MEDICARE

## 2023-07-22 LAB
ANISOCYTOSIS BLD QL: ABNORMAL
BH CV LOWER ARTERIAL LEFT ABI RATIO: 1.07
BH CV LOWER ARTERIAL LEFT DORSALIS PEDIS SYS MAX: 95
BH CV LOWER ARTERIAL LEFT GREAT TOE SYS MAX: 63
BH CV LOWER ARTERIAL LEFT POST TIBIAL SYS MAX: 112
BH CV LOWER ARTERIAL LEFT TBI RATIO: 0.6
BH CV LOWER ARTERIAL RIGHT ABI RATIO: 1.1
BH CV LOWER ARTERIAL RIGHT DORSALIS PEDIS SYS MAX: 109
BH CV LOWER ARTERIAL RIGHT GREAT TOE SYS MAX: 91
BH CV LOWER ARTERIAL RIGHT POST TIBIAL SYS MAX: 115
BH CV LOWER ARTERIAL RIGHT TBI RATIO: 0.87
DEPRECATED RDW RBC AUTO: 61.3 FL (ref 37–54)
ERYTHROCYTE [DISTWIDTH] IN BLOOD BY AUTOMATED COUNT: 19.7 % (ref 12.3–15.4)
GIANT PLATELETS: ABNORMAL
GLUCOSE BLDC GLUCOMTR-MCNC: 108 MG/DL (ref 70–105)
GLUCOSE BLDC GLUCOMTR-MCNC: 231 MG/DL (ref 70–105)
GLUCOSE BLDC GLUCOMTR-MCNC: 347 MG/DL (ref 70–105)
GLUCOSE BLDC GLUCOMTR-MCNC: 379 MG/DL (ref 70–105)
GLUCOSE BLDC GLUCOMTR-MCNC: 96 MG/DL (ref 70–105)
HCT VFR BLD AUTO: 28.4 % (ref 37.5–51)
HGB BLD-MCNC: 8.8 G/DL (ref 13–17.7)
HOLD SPECIMEN: NORMAL
LYMPHOCYTES # BLD MANUAL: 2.09 10*3/MM3 (ref 0.7–3.1)
LYMPHOCYTES NFR BLD MANUAL: 4 % (ref 5–12)
MCH RBC QN AUTO: 26.4 PG (ref 26.6–33)
MCHC RBC AUTO-ENTMCNC: 31 G/DL (ref 31.5–35.7)
MCV RBC AUTO: 85 FL (ref 79–97)
MONOCYTES # BLD: 0.49 10*3/MM3 (ref 0.1–0.9)
MYELOCYTES NFR BLD MANUAL: 5 % (ref 0–0)
NEUTROPHILS # BLD AUTO: 9.1 10*3/MM3 (ref 1.7–7)
NEUTROPHILS NFR BLD MANUAL: 71 % (ref 42.7–76)
NEUTS BAND NFR BLD MANUAL: 3 % (ref 0–5)
PLATELET # BLD AUTO: 441 10*3/MM3 (ref 140–450)
PMV BLD AUTO: 9 FL (ref 6–12)
RBC # BLD AUTO: 3.35 10*6/MM3 (ref 4.14–5.8)
SCAN SLIDE: NORMAL
TOXIC GRANULATION: ABNORMAL
UPPER ARTERIAL RIGHT ARM BRACHIAL SYS MAX: 105 MMHG
VARIANT LYMPHS NFR BLD MANUAL: 17 % (ref 19.6–45.3)
WBC NRBC COR # BLD: 12.3 10*3/MM3 (ref 3.4–10.8)

## 2023-07-22 PROCEDURE — 25010000002 FUROSEMIDE PER 20 MG: Performed by: NURSE PRACTITIONER

## 2023-07-22 PROCEDURE — 85007 BL SMEAR W/DIFF WBC COUNT: CPT | Performed by: NURSE PRACTITIONER

## 2023-07-22 PROCEDURE — 94799 UNLISTED PULMONARY SVC/PX: CPT

## 2023-07-22 PROCEDURE — 63710000001 INSULIN GLARGINE PER 5 UNITS: Performed by: INTERNAL MEDICINE

## 2023-07-22 PROCEDURE — 63710000001 INSULIN REGULAR HUMAN PER 5 UNITS: Performed by: INTERNAL MEDICINE

## 2023-07-22 PROCEDURE — 82948 REAGENT STRIP/BLOOD GLUCOSE: CPT

## 2023-07-22 PROCEDURE — 93922 UPR/L XTREMITY ART 2 LEVELS: CPT

## 2023-07-22 PROCEDURE — 99233 SBSQ HOSP IP/OBS HIGH 50: CPT | Performed by: INTERNAL MEDICINE

## 2023-07-22 PROCEDURE — 63710000001 INSULIN LISPRO (HUMAN) PER 5 UNITS: Performed by: INTERNAL MEDICINE

## 2023-07-22 PROCEDURE — 63710000001 DEXAMETHASONE PER 0.25 MG: Performed by: INTERNAL MEDICINE

## 2023-07-22 PROCEDURE — 85025 COMPLETE CBC W/AUTO DIFF WBC: CPT | Performed by: NURSE PRACTITIONER

## 2023-07-22 PROCEDURE — 99231 SBSQ HOSP IP/OBS SF/LOW 25: CPT | Performed by: INTERNAL MEDICINE

## 2023-07-22 RX ORDER — DILTIAZEM HYDROCHLORIDE 240 MG/1
240 CAPSULE, COATED, EXTENDED RELEASE ORAL
Status: DISCONTINUED | OUTPATIENT
Start: 2023-07-22 | End: 2023-07-27 | Stop reason: HOSPADM

## 2023-07-22 RX ORDER — DEXAMETHASONE 4 MG/1
4 TABLET ORAL
Status: DISCONTINUED | OUTPATIENT
Start: 2023-07-23 | End: 2023-07-23

## 2023-07-22 RX ORDER — GABAPENTIN 300 MG/1
300 CAPSULE ORAL 2 TIMES DAILY
Status: DISCONTINUED | OUTPATIENT
Start: 2023-07-22 | End: 2023-07-27 | Stop reason: HOSPADM

## 2023-07-22 RX ORDER — BUDESONIDE AND FORMOTEROL FUMARATE DIHYDRATE 160; 4.5 UG/1; UG/1
2 AEROSOL RESPIRATORY (INHALATION)
Status: DISCONTINUED | OUTPATIENT
Start: 2023-07-22 | End: 2023-07-27 | Stop reason: HOSPADM

## 2023-07-22 RX ADMIN — IPRATROPIUM BROMIDE AND ALBUTEROL SULFATE 3 ML: .5; 3 SOLUTION RESPIRATORY (INHALATION) at 06:53

## 2023-07-22 RX ADMIN — FUROSEMIDE 20 MG: 10 INJECTION, SOLUTION INTRAMUSCULAR; INTRAVENOUS at 09:16

## 2023-07-22 RX ADMIN — METOPROLOL TARTRATE 75 MG: 25 TABLET, FILM COATED ORAL at 09:15

## 2023-07-22 RX ADMIN — INSULIN LISPRO 4 UNITS: 100 INJECTION, SOLUTION INTRAVENOUS; SUBCUTANEOUS at 12:18

## 2023-07-22 RX ADMIN — NYSTATIN 500000 UNITS: 100000 SUSPENSION ORAL at 09:16

## 2023-07-22 RX ADMIN — IPRATROPIUM BROMIDE AND ALBUTEROL SULFATE 3 ML: .5; 3 SOLUTION RESPIRATORY (INHALATION) at 11:18

## 2023-07-22 RX ADMIN — INSULIN LISPRO 14 UNITS: 100 INJECTION, SOLUTION INTRAVENOUS; SUBCUTANEOUS at 12:19

## 2023-07-22 RX ADMIN — FERROUS SULFATE TAB EC 324 MG (65 MG FE EQUIVALENT) 324 MG: 324 (65 FE) TABLET DELAYED RESPONSE at 09:16

## 2023-07-22 RX ADMIN — Medication 10 ML: at 21:10

## 2023-07-22 RX ADMIN — IPRATROPIUM BROMIDE AND ALBUTEROL SULFATE 3 ML: .5; 3 SOLUTION RESPIRATORY (INHALATION) at 15:30

## 2023-07-22 RX ADMIN — GUAIFENESIN 600 MG: 600 TABLET, EXTENDED RELEASE ORAL at 21:09

## 2023-07-22 RX ADMIN — INSULIN HUMAN 6 UNITS: 100 INJECTION, SOLUTION PARENTERAL at 09:17

## 2023-07-22 RX ADMIN — MIDODRINE HYDROCHLORIDE 5 MG: 5 TABLET ORAL at 12:18

## 2023-07-22 RX ADMIN — INSULIN GLARGINE 14 UNITS: 100 INJECTION, SOLUTION SUBCUTANEOUS at 21:10

## 2023-07-22 RX ADMIN — GABAPENTIN 300 MG: 300 CAPSULE ORAL at 12:18

## 2023-07-22 RX ADMIN — BUDESONIDE 0.5 MG: 0.5 INHALANT RESPIRATORY (INHALATION) at 06:49

## 2023-07-22 RX ADMIN — AMIODARONE HYDROCHLORIDE 400 MG: 200 TABLET ORAL at 09:16

## 2023-07-22 RX ADMIN — HYDROCODONE BITARTRATE AND ACETAMINOPHEN 1 TABLET: 5; 325 TABLET ORAL at 12:22

## 2023-07-22 RX ADMIN — IPRATROPIUM BROMIDE AND ALBUTEROL SULFATE 3 ML: .5; 3 SOLUTION RESPIRATORY (INHALATION) at 19:09

## 2023-07-22 RX ADMIN — Medication 1000 ML: at 10:10

## 2023-07-22 RX ADMIN — HYDROCODONE BITARTRATE AND ACETAMINOPHEN 1 TABLET: 5; 325 TABLET ORAL at 01:13

## 2023-07-22 RX ADMIN — DOCUSATE SODIUM 100 MG: 100 CAPSULE, LIQUID FILLED ORAL at 09:16

## 2023-07-22 RX ADMIN — NYSTATIN 500000 UNITS: 100000 SUSPENSION ORAL at 12:18

## 2023-07-22 RX ADMIN — SENNOSIDES AND DOCUSATE SODIUM 2 TABLET: 8.6; 5 TABLET ORAL at 09:16

## 2023-07-22 RX ADMIN — DILTIAZEM HYDROCHLORIDE 60 MG: 60 TABLET, FILM COATED ORAL at 01:11

## 2023-07-22 RX ADMIN — DEXAMETHASONE 4 MG: 4 TABLET ORAL at 09:16

## 2023-07-22 RX ADMIN — GABAPENTIN 300 MG: 300 CAPSULE ORAL at 21:09

## 2023-07-22 RX ADMIN — BUDESONIDE AND FORMOTEROL FUMARATE DIHYDRATE 2 PUFF: 160; 4.5 AEROSOL RESPIRATORY (INHALATION) at 19:09

## 2023-07-22 RX ADMIN — NYSTATIN 500000 UNITS: 100000 SUSPENSION ORAL at 17:40

## 2023-07-22 RX ADMIN — NYSTATIN 500000 UNITS: 100000 SUSPENSION ORAL at 21:10

## 2023-07-22 RX ADMIN — PANTOPRAZOLE SODIUM 40 MG: 40 TABLET, DELAYED RELEASE ORAL at 06:19

## 2023-07-22 RX ADMIN — MIDODRINE HYDROCHLORIDE 5 MG: 5 TABLET ORAL at 17:40

## 2023-07-22 RX ADMIN — GUAIFENESIN 600 MG: 600 TABLET, EXTENDED RELEASE ORAL at 09:16

## 2023-07-22 RX ADMIN — INSULIN LISPRO 8 UNITS: 100 INJECTION, SOLUTION INTRAVENOUS; SUBCUTANEOUS at 21:23

## 2023-07-22 RX ADMIN — ASPIRIN 81 MG CHEWABLE TABLET 81 MG: 81 TABLET CHEWABLE at 09:16

## 2023-07-22 RX ADMIN — DOCUSATE SODIUM 100 MG: 100 CAPSULE, LIQUID FILLED ORAL at 21:09

## 2023-07-22 RX ADMIN — DILTIAZEM HYDROCHLORIDE 60 MG: 60 TABLET, FILM COATED ORAL at 06:19

## 2023-07-22 RX ADMIN — MIDODRINE HYDROCHLORIDE 5 MG: 5 TABLET ORAL at 09:16

## 2023-07-22 RX ADMIN — INSULIN GLARGINE 14 UNITS: 100 INJECTION, SOLUTION SUBCUTANEOUS at 09:17

## 2023-07-22 RX ADMIN — Medication 1000 ML: at 21:11

## 2023-07-22 RX ADMIN — NICOTINE 1 PATCH: 14 PATCH, EXTENDED RELEASE TRANSDERMAL at 09:20

## 2023-07-22 RX ADMIN — DILTIAZEM HYDROCHLORIDE 240 MG: 240 CAPSULE, EXTENDED RELEASE ORAL at 12:18

## 2023-07-22 RX ADMIN — Medication 10 ML: at 09:17

## 2023-07-22 RX ADMIN — Medication 1 TABLET: at 09:16

## 2023-07-22 RX ADMIN — HYDROCODONE BITARTRATE AND ACETAMINOPHEN 1 TABLET: 5; 325 TABLET ORAL at 21:09

## 2023-07-22 NOTE — PROGRESS NOTES
[facility] Medicine Services Daily Progress Note    Patient Name: Isra Davenport  : 1955  MRN: [id number]  Primary Care Physician:  Mayco Carreon MD  Date of admission: 7/15/2023      Subjective      Chief Complaint: Pain     History of Present Illness: Isra Davenport is a 68 y.o. 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  He is status post intramedullary nailing of the hip on 2023 and a Czduol-r-Bvnr placed on 2023 he is currently undergoing chemotherapy and plan for palliative radiation who presented to [facility] on 7/15/2023 complaining of pain and lower extremity edema     Review of records show patient had presented to chemotherapy with complaint of lower extremity edema and what he describes as neuropathic pain.  He had ultrasound that was negative for DVT and was prescribed Lasix.  He was also prescribed Norco 5/325 for pain.  Patient reports when he went to  prescriptions from the pharmacy the pharmacy was closed on Monday and he was unable to fill prescription.  He presents to the ED today with uncontrolled pain.  It was noted from previous admission he was discharged on low pressure for diagnosis of sinus tachycardia     ED work-up included chest x-ray that showed a right internal jugular Port-A-Cath in place.  Heart size stable.  Airspace disease in the left lung compatible with pneumonia.  Chronic elevation of the left hemidiaphragm.  No pleural effusion.  Right lung was clear.  Bilateral foot x-ray was negative for fracture.  There is healed fracture of the right tibia and fibula.  Troponin 26, potassium 4.6, sodium 138, glucose 355, creatinine 0.3, BUN is 21.  Albumin 2.6.  White count 11.4, hemoglobin 10.  On admission heart rate 84 but while he was being treated in the ED heart rate jumped into the  150s and was found to be in acute A-fib with RVR.  O2 sats dropped to 88% was placed on 2 L nasal cannula.  He was given a Cardizem bolus and Lopressor bolus and resumed on a Cardizem drip.  Patient denies any chest pain or feeling of chest palpitations.  Denies any fever chills or increased shortness of breath.  Reports positive for productive cough.  Positive for swelling of bilateral lower extremities below the calf and extreme tenderness to touch bilateral feet.     7/16/23 patient seen and examined in bed no acute distress, vital signs stable, K-5.4, hr 81, dyspnea on high flow oxygen.  We will start Cardizem p.o.  7/17/2023 patient seen and examined in bed no acute distress, he is on 3 L of oxygen, blood pressure 93/52.  Discussed with RN.  7/18/2023 patient seen and examined in bed no acute distress, dyspnea on 3 L, heart rate 155.  Discussed with RN. Restarted Cardizem drip per cardiology and adding short acting Cardizem to wean IV and increasing BB   7/19/2023 patient seen and examined in bed no acute distress, blood pressure 92/54.  On amiodarone, Cardizem, metoprolol dyspnea on 2 L.  Discussed with RN.  7/20/2023 patient seen and examined in bed no acute distress, his heart rate still elevated.  Presently on amiodarone, Cardizem, metoprolol. discussed with RN.  7/21/2023 patient seen and examined in bed no acute distress, very anxious to follow-up with home.  Discussed with RN, discussed with .  Awaiting placement.  Dyspnea on 3 L of oxygen, fatigue and weakness.  7/22/2023 patient seen and examined in bed no acute distress, vital signs stable, dyspnea on 6 L, fatigue weakness, discussed with RN.  Awaiting placement, pre-CERT pending.  Discussed with .    ROS Review of Systems   Constitutional: Positive for malaise/fatigue.   Cardiovascular:  Positive for leg swelling.   Respiratory:  Positive for cough and sputum production.    Skin: Negative.    Musculoskeletal:  Positive for  back pain.   Gastrointestinal: Negative.    Genitourinary: Negative.    Neurological:  Positive for paresthesias.   Objective      Vitals:   Temp:  [98.4 °F (36.9 °C)-98.9 °F (37.2 °C)] 98.9 °F (37.2 °C)  Heart Rate:  [71-98] 88  Resp:  [1-23] 23  BP: ()/(56-71) 104/56  Flow (L/min):  [3-6] 6    Physical Exam Constitutional:       Appearance: He is ill-appearing.   Eyes:      Pupils: Pupils are equal, round, and reactive to light.   Cardiovascular:      Rate and Rhythm irregular.   Pulmonary:      Breath sounds: Rhonchi present.   Abdominal:      General: Abdomen is flat.      Palpations: Abdomen is soft.   Musculoskeletal:      Right lower leg: Edema present.      Left lower leg: Edema present.   Skin:     General: Skin is warm and dry.   Neurological:      Mental Status: He is alert and oriented to person, place, and time.   Psychiatric:         Mood and Affect: Mood normal.     Result Review    Result Review:  I have personally reviewed the results from the time of this admission to 7/22/2023 12:55 EDT and agree with these findings:  []  Laboratory  []  Microbiology  []  Radiology  []  EKG/Telemetry   []  Cardiology/Vascular   []  Pathology  []  Old records  []  Other:  Most notable findings include:     Wounds (last 24 hours)       LDA Wound       Row Name 07/22/23 0840 07/22/23 0400 07/22/23 0000       Wound 06/17/23 0915 Right anterior hip Incision    Wound - Properties Group Placement Date: 06/17/23  -HB Placement Time: 0915  -HB Side: Right  -HB Orientation: anterior  -HB Location: hip  -HB Primary Wound Type: Incision  -HB    Dressing Appearance open to air  -NE -- --    Closure Approximated  -NE Approximated  -MF Approximated  -MF    Base clean;dry  -NE -- --    Periwound intact  -NE -- --    Periwound Temperature warm  -NE -- --    Drainage Amount none  -NE none  -MF none  -MF    Care, Wound cleansed with  -NE -- --    Dressing Care open to air  -NE -- --    Retired Wound - Properties Group  Placement Date: 06/17/23  -HB Placement Time: 0915 -HB Side: Right  -HB Orientation: anterior  -HB Location: hip  -HB Primary Wound Type: Incision  -HB    Retired Wound - Properties Group Date first assessed: 06/17/23  -HB Time first assessed: 0915 -HB Side: Right  -HB Location: hip  -HB Primary Wound Type: Incision  -HB       Wound 07/15/23 2100 Bilateral medial gluteal Pressure Injury    Wound - Properties Group Placement Date: 07/15/23  -MF Placement Time: 2100  -MF Present on Hospital Admission: Y  -MF Side: Bilateral  -MF Orientation: medial  -MF Location: gluteal  -MF Primary Wound Type: Pressure inj  -MF    Base dry  -NE dry  -MF dry  -MF    Periwound warm;dry  -NE warm;dry  -MF warm;dry  -MF    Periwound Temperature warm  -NE warm  -MF warm  -MF    Periwound Skin Turgor soft  -NE soft  -MF soft  -MF    Drainage Amount none  -NE none  -MF none  -MF    Care, Wound barrier applied  -NE -- --    Retired Wound - Properties Group Placement Date: 07/15/23  -MF Placement Time: 2100  -MF Present on Hospital Admission: Y  -MF Side: Bilateral  -MF Orientation: medial  -MF Location: gluteal  -MF Primary Wound Type: Pressure inj  -MF    Retired Wound - Properties Group Date first assessed: 07/15/23  -MF Time first assessed: 2100  -MF Present on Hospital Admission: Y  -MF Side: Bilateral  -MF Location: gluteal  -MF Primary Wound Type: Pressure inj  -MF       Wound 07/18/23 1900 Left posterior ankle    Wound - Properties Group Placement Date: 07/18/23  -AR Placement Time: 1900  -AR Present on Hospital Admission: Y  -AR Side: Left  -AR Orientation: posterior  -AR Location: ankle  -AR    Dressing Appearance dry;intact  -NE -- --    Closure VIMAL  -NE -- --    Retired Wound - Properties Group Placement Date: 07/18/23  -AR Placement Time: 1900  -AR Present on Hospital Admission: Y  -AR Side: Left  -AR Orientation: posterior  -AR Location: ankle  -AR    Retired Wound - Properties Group Date first assessed: 07/18/23  -AR Time  first assessed: 1900  -AR Present on Hospital Admission: Y  -AR Side: Left  -AR Location: ankle  -AR      Row Name 07/21/23 2000 07/21/23 1549          Wound 06/17/23 0915 Right anterior hip Incision    Wound - Properties Group Placement Date: 06/17/23  -HB Placement Time: 0915 -HB Side: Right  -HB Orientation: anterior  -HB Location: hip  -HB Primary Wound Type: Incision  -HB    Dressing Appearance open to air  -MF --     Closure Approximated  -MF Open to air  -LS     Periwound -- intact  -LS     Periwound Temperature -- warm  -LS     Drainage Amount none  -MF --     Dressing Care open to air  -MF --     Retired Wound - Properties Group Placement Date: 06/17/23  -HB Placement Time: 0915 -HB Side: Right  -HB Orientation: anterior  -HB Location: hip  -HB Primary Wound Type: Incision  -HB    Retired Wound - Properties Group Date first assessed: 06/17/23  -HB Time first assessed: 0915  -HB Side: Right  -HB Location: hip  -HB Primary Wound Type: Incision  -HB       Wound 07/15/23 2100 Bilateral medial gluteal Pressure Injury    Wound - Properties Group Placement Date: 07/15/23  -MF Placement Time: 2100  -MF Present on Hospital Admission: Y  -MF Side: Bilateral  -MF Orientation: medial  -MF Location: gluteal  -MF Primary Wound Type: Pressure inj  -MF    Pressure Injury Stage 2  -MF --     Dressing Appearance open to air  -MF --     Closure -- Open to air  -LS     Base dry  -MF --     Periwound warm;dry  -MF --     Periwound Temperature warm  -MF --     Periwound Skin Turgor soft  -MF --     Drainage Amount none  -MF none  -LS     Care, Wound other (see comments)  wound ordered liquid and paste applied  -MF --     Dressing Care open to air  -MF --     Periwound Care absorptive dressing applied;barrier ointment applied;cleansed with pH balanced cleanser  -MF --     Retired Wound - Properties Group Placement Date: 07/15/23  -MF Placement Time: 2100  -MF Present on Hospital Admission: Y  -MF Side: Bilateral  -MF  Orientation: medial  -MF Location: gluteal  -MF Primary Wound Type: Pressure inj  -MF    Retired Wound - Properties Group Date first assessed: 07/15/23  -MF Time first assessed: 2100  -MF Present on Hospital Admission: Y  -MF Side: Bilateral  -MF Location: gluteal  -MF Primary Wound Type: Pressure inj  -MF       Wound 07/18/23 1900 Left posterior ankle    Wound - Properties Group Placement Date: 07/18/23  -AR Placement Time: 1900  -AR Present on Hospital Admission: Y  -AR Side: Left  -AR Orientation: posterior  -AR Location: ankle  -AR    Closure -- VIMAL  -LS     Retired Wound - Properties Group Placement Date: 07/18/23  -AR Placement Time: 1900  -AR Present on Hospital Admission: Y  -AR Side: Left  -AR Orientation: posterior  -AR Location: ankle  -AR    Retired Wound - Properties Group Date first assessed: 07/18/23  -AR Time first assessed: 1900  -AR Present on Hospital Admission: Y  -AR Side: Left  -AR Location: ankle  -AR              User Key  (r) = Recorded By, (t) = Taken By, (c) = Cosigned By      Initials Name Provider Type    HB Cleo Britton RN Registered Nurse    Kami Mejia, RN Registered Nurse    Mani Moore, RN Registered Nurse    Laura Nava, RN Registered Nurse    Danyell Samuel, RN Registered Nurse                    CBC:      Lab 07/21/23  2309 07/20/23  2358 07/20/23  0106 07/19/23  1025 07/17/23  2254 07/17/23  0216 07/15/23  2133 07/15/23  1712   WBC 12.30* 12.70* 13.40* 12.90* 11.70* 12.30* 11.40* 11.40*   HEMOGLOBIN 8.8* 8.5* 9.0* 9.0* 8.8* 9.5* 9.9* 10.0*   HEMATOCRIT 28.4* 27.0* 29.1* 28.3* 28.7* 31.5* 32.5* 31.8*   PLATELETS 441 444 484* 486* 395 445 415 404   NEUTROS ABS 9.10* 11.18* 12.19* 11.74* 11.12* 10.70* 10.30* 10.20*   LYMPHS ABS  --   --   --   --   --  0.90 0.60* 0.60*   MONOS ABS  --   --   --   --   --  0.80 0.50 0.60   EOS ABS  --   --   --   --   --  0.00 0.00 0.00   MCV 85.0 84.6 85.3 85.1 84.9 85.3 84.5 84.8       CMP:        Lab 07/20/23  1547  07/20/23  0106 07/19/23  1025 07/18/23  1906 07/18/23  1437 07/17/23  0216 07/15/23  2133 07/15/23  1712   SODIUM  --  138 134* 131* 130* 142 142 138   POTASSIUM  --  4.3 4.1 4.5 4.6 4.5 5.4* 4.6   CHLORIDE  --  94* 89* 87* 87* 94* 95* 96*   CO2  --  38.0* 37.0* 37.0* 35.0* 41.0* 38.0* 36.0*   ANION GAP  --  6.0 8.0 7.0 8.0 7.0 9.0 6.0   BUN  --  16 15 21 19 17 23 21   CREATININE  --  0.31* 0.39* 0.49* 0.40* 0.35* 0.59* 0.37*   EGFR  --  129.2 120.5 112.5 119.6 124.5 106.3 122.4   GLUCOSE  --  123* 381* 335* 372* 91 450* 355*   CALCIUM  --  9.3 9.1 9.0 9.1 9.7 9.2 10.0   IONIZED CALCIUM  --  1.22 1.19* 1.18*  --   --   --   --    MAGNESIUM  --  1.7 1.9 2.2 1.5*  --  1.7  --    PHOSPHORUS 2.3* 1.6* 2.7 2.0*  --   --   --   --    TOTAL PROTEIN  --   --   --   --   --   --   --  6.8   ALBUMIN  --   --   --   --   --   --   --  2.6*   GLOBULIN  --   --   --   --   --   --   --  4.2   ALT (SGPT)  --   --   --   --   --   --   --  28   AST (SGOT)  --   --   --   --   --   --   --  11   BILIRUBIN  --   --   --   --   --   --   --  0.2   ALK PHOS  --   --   --   --   --   --   --  178*       No results found for: ACANTHNAEG, AFBCX, BPERTUSSISCX, BLOODCX  No results found for: BCIDPCR, CXREFLEX, CSFCX, CULTURETIS  No results found for: CULTURES, HSVCX, URCX  No results found for: EYECULTURE, GCCX, HSVCULTURE, LABHSV  No results found for: LEGIONELLA, MRSACX, MUMPSCX, MYCOPLASCX  No results found for: NOCARDIACX, STOOLCX  No results found for: THROATCX, UNSTIMCULT, URINECX, CULTURE, VZVCULTUR  No results found for: VIRALCULTU, WOUNDCX      Assessment & Plan      Brief Patient Summary:  Isra Davenport is a 68 y.o. male who presented to chemotherapy with complaint of lower extremity edema and what he describes as neuropathic pain.  He had ultrasound that was negative for DVT and was prescribed Lasix.  He was also prescribed Norco 5/325 for pain.  Patient reports when he went to  prescriptions from the pharmacy, the  pharmacy was closed on Monday and he was unable to fill prescription.  He presents to the ED today with uncontrolled pain.  It was noted from previous admission he was discharged on low pressure for diagnosis of sinus tachycardia         aluminum sulfate-calcium acetate 1:20, 1,000 mL, Topical, BID  aspirin, 81 mg, Oral, Daily  budesonide-formoterol, 2 puff, Inhalation, BID - RT  calcium 500 mg vitamin D 5 mcg (200 UT), 1 tablet, Oral, Daily  dexamethasone, 4 mg, Oral, Daily With Breakfast   And  insulin regular, 6 Units, Subcutaneous, Daily With Breakfast  dilTIAZem CD, 240 mg, Oral, Q24H  docusate sodium, 100 mg, Oral, BID  ferrous sulfate, 324 mg, Oral, Daily With Breakfast  furosemide, 20 mg, Intravenous, Daily  gabapentin, 300 mg, Oral, BID  guaiFENesin, 600 mg, Oral, Q12H  hydrocortisone-bacitracin-zinc oxide-nystatin, 1 application , Topical, TID  insulin glargine, 14 Units, Subcutaneous, Q12H  insulin lispro, 14 Units, Subcutaneous, TID With Meals  insulin lispro, 2-9 Units, Subcutaneous, 4x Daily AC & at Bedtime  ipratropium-albuterol, 3 mL, Nebulization, 4x Daily - RT  metoprolol tartrate, 75 mg, Oral, BID  midodrine, 5 mg, Oral, TID AC  nicotine, 1 patch, Transdermal, Q24H  nystatin, 5 mL, Oral, 4x Daily  pantoprazole, 40 mg, Oral, Q AM  senna-docusate sodium, 2 tablet, Oral, BID  sodium chloride, 10 mL, Intravenous, Q12H       Pharmacy Consult - Steroid Insulin Protocol,          Active Hospital Problems:  Active Hospital Problems    Diagnosis     **Atrial fibrillation with RVR     Moderate malnutrition     Atrial fibrillation        New onset A-fib with RVR  -Cardizem drip  dc   -continue metoprolol po, Cardizem p.o. and amiodarone po  -consulted cardiology  Reduce cardizem back to 60 q 6hrs  Recent non invasive ischemic eval negative for ischemia  Echo with preserved LV function  - Had considered pharmacologic AC but with known brain mets concerned with risk for bleed so will consult oncology for  recommendation on AC  - continue VTE with SCD until seen by cardiology and oncology     Pain and Edema BLE  - appears Neuropathic  - pt also with known bone cancer and arthritis   - consider neuropathy 2/2 chemotherapy  - will continue Lasix for edema  -Was ruled out for DVT   -added gabapentin 200mg QHS for sign of neuropathic pain  - check [id number]  - will resume pt Norco and add prn morphine  -echo 6/22/23: Left ventricular systolic function is normal. Left ventricular ejection fraction appears to be 61 - 65%.   Left ventricular diastolic function is consistent with (grade I) impaired relaxation.     Hyperglycemia- likely steroid induced   -A1C 7.3  - added RISS, lantus  - add steroid protocol      Hypoxia- consider 2/2 known lung cancer   - cxr with pneumonia and elevated WBC and known tobacco use  - covered possible COPD vs pneumonia vs carcinoma with empiric Azithromycin-dc  - wean O2 as tolerated   - duo nebs prn     Anemia  - continue ferrous sulfate  - hgb stable     Stage IV squamous cell lung cancer with mets to brain and bone  - currently treated with chemotherapy   - continue decadron as previously ordered by oncology  - continue PPI   Per oncology   Check iron saturation.continue ferrous sulfate 325 mg by mouth daily.Consider IV iron if iron sat is low.  Radiation oncology consultation to resume radiation therapy when able  Await molecular testing results with plan for chemotherapy post completion of radiation.  Start Xgeva and daily Ca+VitD for bone metastases support as outpatient.  dexamethasone  4 mg by mouth daily.  Continue ASA 81 mg by mouth daily.  Would not recommend full dose anticoagulation for A-fib secondary to brain mets and known rectal bleeding.  Continue as needed hydrocodone, as needed Dilaudid, and scheduled gabapentin for pain control.  Continue Pepcid p.o. twice daily and as needed Zofran    DVT prophylaxis:  Mechanical DVT prophylaxis orders are present.    CODE STATUS:    Medical  Intervention Limits: NO intubation (DNI)  Code Status (Patient has no pulse and is not breathing): No CPR (Do Not Attempt to Resuscitate)  Medical Interventions (Patient has pulse or is breathing): Limited Support      Disposition:  I expect patient to be discharged NH.    I have utilized all available, immediate resources to obtain, update, or review the patient's current medications including all prescriptions, over-the-counter products, herbals, cannabis/cannabidiol products, and vitamin.mineral/dietary (nutritional) supplements.      Medical Intervention Limits: NO intubation (DNI)  Code Status (Patient has no pulse and is not breathing): No CPR (Do Not Attempt to Resuscitate)  Medical Interventions (Patient has pulse or is breathing): Limited Support    Next of kin of Power of :       Admission Status:  I believe this patient meets ADMIT status.              This patient has been examined wearing appropriate Personal Protective Equipment and discussed with  RN . 07/22/23      Electronically signed by Raoul Castillo MD, 07/22/23, 12:55 EDT.  [facility] Hospitalist Team

## 2023-07-22 NOTE — PLAN OF CARE
Goal Outcome Evaluation:   Pts heart has been controled, eating well, vitals stable. Pt had a venous doppler of bilateral lower extremities toiday.

## 2023-07-22 NOTE — PROGRESS NOTES
"Subjective   Isra Davenport is a 68 y.o. male.   Seen for diabetes f/u.  Drinking boost.        Objective     /68   Pulse 75   Temp 97.6 °F (36.4 °C) (Oral)   Resp 20   Ht 177.8 cm (70\")   Wt 71.6 kg (157 lb 13.6 oz)   SpO2 90%   BMI 22.65 kg/m²   Blood sugar  108 this am,  231 @ lunch, 96 @ supper    ASSESSMENT  Diabetes type 2 with hyperglycemia  Patient is Improving    PLAN    Continue same insulin regimen.         Liudmila Levin MD  7/22/2023  17:29 EDT    "

## 2023-07-22 NOTE — PROGRESS NOTES
Reason for follow-up: Atrial fibrillation     Patient Care Team:  Mayco Carreon MD as PCP - General (Internal Medicine)  Mina Dupree MD as Consulting Physician (Hematology and Oncology)    Subjective Dilan Huiley is doing fair today     ROS    Ibuprofen and Penicillins    Scheduled Meds:aluminum sulfate-calcium acetate 1:20, 1,000 mL, Topical, BID  amiodarone, 400 mg, Oral, Q12H  aspirin, 81 mg, Oral, Daily  budesonide-formoterol, 2 puff, Inhalation, BID - RT  calcium 500 mg vitamin D 5 mcg (200 UT), 1 tablet, Oral, Daily  dexamethasone, 4 mg, Oral, Daily With Breakfast   And  insulin regular, 6 Units, Subcutaneous, Daily With Breakfast  dilTIAZem, 60 mg, Oral, Q6H  docusate sodium, 100 mg, Oral, BID  ferrous sulfate, 324 mg, Oral, Daily With Breakfast  furosemide, 20 mg, Intravenous, Daily  gabapentin, 200 mg, Oral, Nightly  guaiFENesin, 600 mg, Oral, Q12H  hydrocortisone-bacitracin-zinc oxide-nystatin, 1 application , Topical, TID  insulin glargine, 14 Units, Subcutaneous, Q12H  insulin lispro, 14 Units, Subcutaneous, TID With Meals  insulin lispro, 2-9 Units, Subcutaneous, 4x Daily AC & at Bedtime  ipratropium-albuterol, 3 mL, Nebulization, 4x Daily - RT  metoprolol tartrate, 75 mg, Oral, BID  midodrine, 5 mg, Oral, TID AC  nicotine, 1 patch, Transdermal, Q24H  nystatin, 5 mL, Oral, 4x Daily  pantoprazole, 40 mg, Oral, Q AM  senna-docusate sodium, 2 tablet, Oral, BID  sodium chloride, 10 mL, Intravenous, Q12H      Continuous Infusions:Pharmacy Consult - Steroid Insulin Protocol,       PRN Meds:.  senna-docusate sodium **AND** polyethylene glycol **AND** bisacodyl **AND** bisacodyl    Calcium Replacement - Follow Nurse / BPA Driven Protocol    dextrose    dextrose    glucagon (human recombinant)    HYDROcodone-acetaminophen    ipratropium-albuterol    Magnesium Standard Dose Replacement - Follow Nurse / BPA Driven Protocol    Morphine    ondansetron    Pharmacy Consult - Steroid Insulin  "Protocol    Phosphorus Replacement - Follow Nurse / BPA Driven Protocol    Potassium Replacement - Follow Nurse / BPA Driven Protocol    [COMPLETED] Insert Peripheral IV **AND** sodium chloride    sodium chloride    sodium chloride    traMADol      VITAL SIGNS  Vitals:    07/22/23 0652 07/22/23 0653 07/22/23 0656 07/22/23 1021   BP:    104/56   BP Location:       Patient Position:       Pulse: 82 74 78 96   Resp: 19 19 19 18   Temp:    98.9 °F (37.2 °C)   TempSrc:    Oral   SpO2: 93% 93% 94% 92%   Weight:       Height:           Flowsheet Rows      Flowsheet Row First Filed Value   Admission Height 177.8 cm (70\") Documented at 07/15/2023 1652   Admission Weight 72.6 kg (160 lb) Documented at 07/15/2023 1652               Physical Exam  VITALS REVIEWED    General:      well developed, in no acute distress.    Head:      normocephalic and atraumatic.    Eyes:      PERRL/EOM intact, conjunctiva and sclera clear with out nystagmus.    Neck:      no masses, thyromegaly,  trachea central with normal respiratory effort and PMI displaced laterally  Lungs:      Decreased air entry  Heart:       Irregular rhythm  Msk:      no deformity or scoliosis noted of thoracic or lumbar spine.    Pulses:      pulses normal in all 4 extremities.    Extremities:       No lower extremity edema  Neurologic:      no focal deficits.   alert oriented x3  Skin:      intact without lesions or rashes.    Psych:      alert and cooperative; normal mood and affect; normal attention span and concentration.          LAB RESULTS (LAST 7 DAYS)    CBC  Results from last 7 days   Lab Units 07/21/23  2309 07/20/23  2358 07/20/23  0106 07/19/23  1025 07/17/23  2254 07/17/23  0216 07/15/23  2133   WBC 10*3/mm3 12.30* 12.70* 13.40* 12.90* 11.70* 12.30* 11.40*   RBC 10*6/mm3 3.35* 3.19* 3.41* 3.33* 3.38* 3.69* 3.84*   HEMOGLOBIN g/dL 8.8* 8.5* 9.0* 9.0* 8.8* 9.5* 9.9*   HEMATOCRIT % 28.4* 27.0* 29.1* 28.3* 28.7* 31.5* 32.5*   MCV fL 85.0 84.6 85.3 85.1 84.9 " 85.3 84.5   PLATELETS 10*3/mm3 441 444 484* 486* 395 445 415       BMP  Results from last 7 days   Lab Units 07/20/23  1543 07/20/23  0106 07/19/23  1025 07/18/23  1906 07/18/23  1437 07/17/23  0216 07/15/23  2133 07/15/23  1712   SODIUM mmol/L  --  138 134* 131* 130* 142 142 138   POTASSIUM mmol/L  --  4.3 4.1 4.5 4.6 4.5 5.4* 4.6   CHLORIDE mmol/L  --  94* 89* 87* 87* 94* 95* 96*   CO2 mmol/L  --  38.0* 37.0* 37.0* 35.0* 41.0* 38.0* 36.0*   BUN mg/dL  --  16 15 21 19 17 23 21   CREATININE mg/dL  --  0.31* 0.39* 0.49* 0.40* 0.35* 0.59* 0.37*   GLUCOSE mg/dL  --  123* 381* 335* 372* 91 450* 355*   MAGNESIUM mg/dL  --  1.7 1.9 2.2 1.5*  --  1.7  --    PHOSPHORUS mg/dL 2.3* 1.6* 2.7 2.0*  --   --   --   --        CMP   Results from last 7 days   Lab Units 07/20/23  0106 07/19/23  1025 07/18/23  1906 07/18/23  1437 07/17/23  0216 07/15/23  2133 07/15/23  1712   SODIUM mmol/L 138 134* 131* 130* 142 142 138   POTASSIUM mmol/L 4.3 4.1 4.5 4.6 4.5 5.4* 4.6   CHLORIDE mmol/L 94* 89* 87* 87* 94* 95* 96*   CO2 mmol/L 38.0* 37.0* 37.0* 35.0* 41.0* 38.0* 36.0*   BUN mg/dL 16 15 21 19 17 23 21   CREATININE mg/dL 0.31* 0.39* 0.49* 0.40* 0.35* 0.59* 0.37*   GLUCOSE mg/dL 123* 381* 335* 372* 91 450* 355*   ALBUMIN g/dL  --   --   --   --   --   --  2.6*   BILIRUBIN mg/dL  --   --   --   --   --   --  0.2   ALK PHOS U/L  --   --   --   --   --   --  178*   AST (SGOT) U/L  --   --   --   --   --   --  11   ALT (SGPT) U/L  --   --   --   --   --   --  28         BNP        TROPONIN  Results from last 7 days   Lab Units 07/15/23  2133   HSTROP T ng/L 30*       CoAg        Creatinine Clearance  Estimated Creatinine Clearance: 234.2 mL/min (A) (by C-G formula based on SCr of 0.31 mg/dL (L)).    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  Patient was in sinus rhythm upon admission, however he developed atrial fibrillation with difficult to control heart rate.  Currently he is on p.o. amiodarone 400 every 12, diltiazem 60 mg every 6 hours and metoprolol 75 mg twice a day.  Heart rate currently at least is in the 80s, still in A-fib.  I think that in his case, we should address the issue in a more conservative manner given his metastatic cancer.  He already received radiation therapy and is getting ready to receive chemotherapy, but my understanding is that these are for palliative measures.  To that extent, I do not necessarily recommend cardioversion/ablation.  Furthermore due to brain mets, it is recommended not to give any anticoagulation.    Since we are focusing on the rate control, I will discontinue amiodarone specially that he has lung cancer, switch Cardizem to long-acting, continue metoprolol.  If need be we can also use digoxin.    I discussed the patients findings and my recommendations with patient and agrees with the plan.    Nemo Galan MD  07/22/23  10:28 EDT

## 2023-07-22 NOTE — PROGRESS NOTES
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 male admitted to [facility] through the ED on 7/15/2023 with complaints of uncontrolled pain and BLE edema.  In the ED, he developed tachycardia in the 150s and was found to be in A-fib with RVR.  He was started on supplemental oxygen for O2 sat drop to 88%.  He received Cardizem bolus followed by drip and Lopressor bolus.  In the ED, CBC revealed WBC 11.4, hemoglobin 10.0, MCV 84.8, and platelets 404,000.  Creatinine and LFTs were not elevated.  CXR revealed left basilar pneumonia.  He reported left lower extremity pain with x-ray of the left tip/fib and left foot negative for acute findings.    PMH was significant for recently diagnosed stage IV squamous cell carcinoma of the left upper lobe lung.  He was post radiation therapy to the brain () and currently receiving radiation therapy to the right hip and femur (started 2023 with 10 fractions planned).  He was last seen by our service as an outpatient on 2023 where he was prescribed Lasix and hydrocodone/APAP 5/325 mg as needed for his BLE edema and pain.  BLE venous Doppler performed as an outpatient on 2023 was negative for DVT/SVT and showed bilateral fluid retention.  At time of consultation 2023, hemoglobin had dropped to 9.9.     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  Our service had initially evaluated him during 2023 - 2023 hospitalization during which lung cancer diagnosis was made and initial work-up completed.  He had received radiation  therapy to the brain and is currently receiving radiation to the hip/femur.  He was also diagnosed with iron deficiency anemia during the hospital stay where he was treated with IV iron and discharged on oral ferrous sulfate with GERD prophylaxis.  Complete anemia work-up with the exception of bone marrow was performed during the hospital stay with findings negative for additional etiologies.  Incidentally his copper level was elevated without multivitamin supplementation.  CBC on 7/14/2023 revealed WBC 13, hemoglobin 10.76, and platelets 329,000.  He was continued on daily oral iron.  Molecular testing on his tumor pathology was pending with plans to start chemotherapy after radiation was completed.  He was also to start Xgeva for bone metastases support and was prescribed calcium with vitamin D supplementation.     PCP: Mayco Carreon MD    INTERVAL HISTORY:  7/17/2023 - white blood count 12.30, hemoglobin 9.5, iron 22 (), iron saturation 10 (), TIBC 224 (298-536). Calculated iron deficit is 662 mg. Started Ferrlecit 250 mg IV x 3 days.  Molecular testing on tumor specimen revealed low-level PD-L1 positivity.  7/18/2023-hemoglobin 8.8.  Patient developed SVT overnight and was treated with Cardizem.  Weight loss 33 pounds in 5 weeks.  Changed Pepcid to Protonix.  Palliative care and radiation oncology both consulted.  Physical therapy recommending skilled nursing facility at discharge.  7/19/2023-patient decided DNR/DNI but wants to continue aggressive treatment and try chemotherapy/immunotherapy.  7/20/2023-WBC 13.4, hemoglobin 9. Hospice was consulted by primary team for inpatient hospice admission.  7/21/2023-WBC 12.7, hemoglobin 8.5.  Dexamethasone decreased to 4 mg po daily.   7/22/2023-cardiac electrophysiology was consulted and they recommended, due to his metastatic cancer and comorbidities, to not proceed with ablation or cardioversion and to manage the patient with conservative  therapy.  Increased gabapentin to 300 mg twice daily.    History of present illness reviewed since last visit and changes noted on 07/22/23.    Subjective   patient quite frustrated with hospitalization and clinical status.  Pain in his feet continues to be excruciating for him.  He reports bedsores on his bottom, scrotum and his lower legs also have wounds.    ROS:  Review of Systems   Constitutional:  Negative for activity change, appetite change, chills, fatigue, fever and unexpected weight change.   HENT:  Negative for mouth sores, sore throat and tinnitus.    Eyes:  Negative for photophobia, pain, redness and visual disturbance.   Respiratory:  Negative for apnea, cough and shortness of breath.    Cardiovascular:  Negative for chest pain, palpitations and leg swelling.   Gastrointestinal:  Negative for abdominal pain, constipation, diarrhea, nausea and vomiting.   Endocrine: Negative for polydipsia.   Genitourinary:  Negative for dysuria, enuresis and hematuria.   Musculoskeletal:  Negative for arthralgias, back pain, myalgias (in his legs.) and neck pain.   Skin:  Positive for wound. Negative for rash.   Allergic/Immunologic: Negative for environmental allergies.   Neurological:  Negative for dizziness, seizures, speech difficulty, weakness, light-headedness, numbness and headaches.   Hematological:  Negative for adenopathy. Does not bruise/bleed easily.   Psychiatric/Behavioral:  Positive for agitation. Negative for dysphoric mood. The patient is not nervous/anxious.       MEDICATIONS:    Scheduled Meds:  aluminum sulfate-calcium acetate 1:20, 1,000 mL, Topical, BID  aspirin, 81 mg, Oral, Daily  budesonide-formoterol, 2 puff, Inhalation, BID - RT  calcium 500 mg vitamin D 5 mcg (200 UT), 1 tablet, Oral, Daily  dexamethasone, 4 mg, Oral, Daily With Breakfast   And  insulin regular, 6 Units, Subcutaneous, Daily With Breakfast  dilTIAZem CD, 240 mg, Oral, Q24H  docusate sodium, 100 mg, Oral, BID  ferrous  "sulfate, 324 mg, Oral, Daily With Breakfast  furosemide, 20 mg, Intravenous, Daily  gabapentin, 300 mg, Oral, BID  guaiFENesin, 600 mg, Oral, Q12H  hydrocortisone-bacitracin-zinc oxide-nystatin, 1 application , Topical, TID  insulin glargine, 14 Units, Subcutaneous, Q12H  insulin lispro, 14 Units, Subcutaneous, TID With Meals  insulin lispro, 2-9 Units, Subcutaneous, 4x Daily AC & at Bedtime  ipratropium-albuterol, 3 mL, Nebulization, 4x Daily - RT  metoprolol tartrate, 75 mg, Oral, BID  midodrine, 5 mg, Oral, TID AC  nicotine, 1 patch, Transdermal, Q24H  nystatin, 5 mL, Oral, 4x Daily  pantoprazole, 40 mg, Oral, Q AM  senna-docusate sodium, 2 tablet, Oral, BID  sodium chloride, 10 mL, Intravenous, Q12H    Continuous Infusions:  Pharmacy Consult - Steroid Insulin Protocol,        PRN Meds:    senna-docusate sodium **AND** polyethylene glycol **AND** bisacodyl **AND** bisacodyl    Calcium Replacement - Follow Nurse / BPA Driven Protocol    dextrose    dextrose    glucagon (human recombinant)    HYDROcodone-acetaminophen    ipratropium-albuterol    Magnesium Standard Dose Replacement - Follow Nurse / BPA Driven Protocol    Morphine    ondansetron    Pharmacy Consult - Steroid Insulin Protocol    Phosphorus Replacement - Follow Nurse / BPA Driven Protocol    Potassium Replacement - Follow Nurse / BPA Driven Protocol    [COMPLETED] Insert Peripheral IV **AND** sodium chloride    sodium chloride    sodium chloride     ALLERGIES:    Allergies   Allergen Reactions    Ibuprofen Itching    Penicillins Hives     Objective    VITALS:   /56   Pulse 96   Temp 98.9 °F (37.2 °C) (Oral)   Resp 18   Ht 177.8 cm (70\")   Wt 71.6 kg (157 lb 13.6 oz)   SpO2 92%   BMI 22.65 kg/m²     PHYSICAL EXAM:  Physical Exam  Vitals reviewed.   Constitutional:       General: He is not in acute distress.     Appearance: He is well-developed. He is not diaphoretic.   HENT:      Head: Normocephalic and atraumatic.      Nose: No rhinorrhea. "      Comments: O2 per nasal cannula.     Mouth/Throat:      Pharynx: No oropharyngeal exudate.      Comments: Minimal thrush on back of tongue.  Eyes:      Conjunctiva/sclera: Conjunctivae normal.   Neck:      Thyroid: No thyromegaly.      Vascular: No JVD.   Cardiovascular:      Rate and Rhythm: Normal rate and regular rhythm.      Heart sounds: Normal heart sounds. No murmur heard.     Comments: Cardiac monitor leads.   Pulmonary:      Effort: Pulmonary effort is normal. No respiratory distress.      Breath sounds: No stridor.      Comments: Right port not accessed.  Decreased breath sounds bilaterally.  Abdominal:      General: Bowel sounds are normal.      Palpations: Abdomen is soft.      Tenderness: There is no abdominal tenderness. There is no guarding.   Musculoskeletal:         General: Normal range of motion.      Cervical back: Normal range of motion and neck supple. No rigidity.      Right lower leg: No edema.      Left lower leg: No edema.      Comments: Left hand oxygen saturation monitor.   Skin:     General: Skin is warm and dry.      Findings: No erythema or rash.      Comments: Left chest wall tattoo.  Left upper extremity IV.  Bilateral lower extremities with bandage wraps.  Some areas of demarcated darkness on toes.   Neurological:      General: No focal deficit present.      Mental Status: He is alert and oriented to person, place, and time.      Sensory: No sensory deficit.   Psychiatric:         Mood and Affect: Mood normal.         Behavior: Behavior normal.         Thought Content: Thought content normal.         Judgment: Judgment normal.       RECENT LABS:  Lab Results (last 24 hours)       Procedure Component Value Units Date/Time    POC Glucose Once [[id number]]  (Abnormal) Collected: 07/22/23 0700    Specimen: Blood Updated: 07/22/23 0701     Glucose 108 mg/dL      Comment: Serial Number: [id number]:  [id number]       Manual Differential [[id number]]  (Abnormal) Collected: 07/21/23  2309    Specimen: Blood Updated: 07/22/23 0048     Neutrophil % 71.0 %      Lymphocyte % 17.0 %      Monocyte % 4.0 %      Bands %  3.0 %      Myelocyte % 5.0 %      Neutrophils Absolute 9.10 10*3/mm3      Lymphocytes Absolute 2.09 10*3/mm3      Monocytes Absolute 0.49 10*3/mm3      Anisocytosis Slight/1+     Toxic Granulation Mod/2+     Giant Platelets Slight/1+    CBC & Differential [[id number]]  (Abnormal) Collected: 07/21/23 2309    Specimen: Blood Updated: 07/22/23 0048    Narrative:      The following orders were created for panel order CBC & Differential.  Procedure                               Abnormality         Status                     ---------                               -----------         ------                     CBC Auto Differential[[id number]]        Abnormal            Final result               Scan Slide[[id number]]                                       Final result                 Please view results for these tests on the individual orders.    Scan Slide [[id number]] Collected: 07/21/23 2309    Specimen: Blood Updated: 07/22/23 0048     Scan Slide --     Comment: See Manual Differential Results       CBC Auto Differential [[id number]]  (Abnormal) Collected: 07/21/23 2309    Specimen: Blood Updated: 07/22/23 0048     WBC 12.30 10*3/mm3      RBC 3.35 10*6/mm3      Hemoglobin 8.8 g/dL      Hematocrit 28.4 %      MCV 85.0 fL      MCH 26.4 pg      MCHC 31.0 g/dL      RDW 19.7 %      RDW-SD 61.3 fl      MPV 9.0 fL      Platelets 441 10*3/mm3     Narrative:      The previously reported component NRBC is no longer being reported. Previous result was 1.4 /100 WBC (Reference Range: 0.0-0.2 /100 WBC) on 7/21/2023 at 2356 EDT.    Extra Tubes [[id number]] Collected: 07/21/23 2310    Specimen: Blood, Venous Line Updated: 07/22/23 0045    Narrative:      The following orders were created for panel order Extra Tubes.  Procedure                               Abnormality         Status                      ---------                               -----------         ------                     Green Top (Gel)[[id number]]                                  Final result                 Please view results for these tests on the individual orders.    Green Top (Gel) [[id number]] Collected: 07/21/23 2310    Specimen: Blood Updated: 07/22/23 0045     Extra Tube Hold for add-ons.     Comment: Auto resulted.       POC Glucose Once [[id number]]  (Abnormal) Collected: 07/21/23 1912    Specimen: Blood Updated: 07/21/23 1913     Glucose 303 mg/dL      Comment: Serial Number: [id number]:  [id number]       POC Glucose Once [[id number]]  (Abnormal) Collected: 07/21/23 1606    Specimen: Blood Updated: 07/21/23 1607     Glucose 273 mg/dL      Comment: Serial Number: [id number]:  [id number]       POC Glucose Once [[id number]]  (Abnormal) Collected: 07/21/23 1108    Specimen: Blood Updated: 07/21/23 1109     Glucose 310 mg/dL      Comment: Serial Number: [id number]:  [id number]             PENDING RESULTS: FREDY's.    IMAGING REVIEWED:  No radiology results for the last day    I have reviewed the patient's labs, imaging, reports, and other clinician documentation.    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  Unable to continue radiation during hospitalization due to intermittent A-fib.  Planned to restart on Monday(7/24).  Molecular testing on tumor pathology revealed low-level PD-L1 positivity.  Orders have been written for outpatient palliative chemotherapy and immunotherapy once radiation therapy has completed.  Plan to start outpatient Xgeva for bone metastases support. He has decided to pursue treatment as planned, but has chosen to be a DNR/DNI.  Dexamethasone tapered again, to 4 mg po daily(7/21). Continued calcium plus vitamin D..  DANN with malabsorption and poor tolerance of oral  iron/Elevated copper level-he has not been on multivitamin containing copper. He had reported bright red blood per rectum as outpatient with 2 colonoscopies in the past 6 years that were unremarkable per his report. Received IV iron last admit and again this admit.  On Protonix, aspirin and dexamethasone. Hemoglobin continues to slowly fall.  Iron studies showed continued DANN with a slight improvement from last admission. Tolerating oral Ferrous sulfate so far.     Left lung pneumonia-likely post-obstructive. On dexamethasone.  Per pulmonary.    BLE edema and pain-recent outpatient BLE Doppler was negative for DVT/SVT. On Lasix and Gabapentin.  Increased dose of gabapentin and will check ABIs for possible arterial occlusions.  A-fib with RVR-new onset noted in ED.  Cardiology consulted.  On ASA.  Cardiac EP recommending conservative management.  Chronic nausea, Uncontrolled DM2, Low TSH, and severe wt. Loss-on Protonix. Receiving Glucerna twice daily and Colace.  Endocrine managing.  Tapering Dexamethasone.   Smoking-cessation counseling continued.  On a nicotine patch.   Recent pathological right hip fracture-physical therapy working with patient.  On calcium plus vitamin D. Plannning rehab placement.   Oral thrush-on nystatin. Improved.   Agitation-due to prolonged hospitalization and clinical status. Hopefully will improve if his foot/leg pain can be better controlled.       PLAN  Will follow CBC.   Continue Nystatin 100,000 units/mL 5 mL p.o. 4 times daily.  Continue dexamethasone 4 mg to daily.   Continue ASA 81 mg by mouth daily.  Would not recommend full dose anticoagulation for A-fib secondary to brain metastasis and known rectal bleeding unless benefits exceed risks.  Continue PRN Norco and Morphine.  Increase gabapentin for pain control.  Continue Protonix.      Continue Glucerna twice daily and Colace.    Radiation therapy to resume when heart rate controlled-hopefully on 7/24.   Plan for combination of  chemotherapy and immunotherapy as an outpatient post radiation.  Start Xgeva for bone metastases support as outpatient.  Continue calcium plus vitamin D daily.  Recommend outpatient GI evaluation.  Brain MRI as outpatient to f/u on metastasis, post XRT.   Continue Ferrous sulfate 325mg po daily.   Continue Nicotine patch.   FREDY's.     Patient seen and evaluated by Dr. Dupree.  Electronically signed by NOREEN Phillips, 07/22/23, 10:52 AM EDT.      I have personally performed a face-to-face diagnostic evaluation on this patient via telehealth. I have performed a complete history and physical examination through videoconferencing, reviewed laboratory studies, and radiographic examinations.  I have completed the majority and substantive portion of the medical decision making.  I have formulated the assessment on this patient and the plan of action as noted above. I have discussed the case with Aminah Houser NP, have edited/reviewed the note, and agree with the care plan.  He is complaining of continued leg pains.  On examination, no sig leg swelling is present.  He is anemic and undergoing treatment for A. Fib.  Will check FREDY's.            I discussed the patient's findings and my recommendations with patient.    Part of this note may be an electronic transcription/translation of spoken language to printed text using the Dragon Dictation System.     Electronically signed by Mina Dupree MD, 07/22/23, 2:19 PM EDT.

## 2023-07-22 NOTE — PROGRESS NOTES
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  DM II  S/p intramedullary nailing of the hip  S/p [id number]  S/p chemotherapy/plan for palliative radiation     PLAN:  Oxygen supplement and titration maintain saturation 90-95%: Currently requiring 3 L per nasal cannula  Encourage OOB daily   Antibiotics  Bronchodilators  Inhaled corticosteroids  Systemic steroids  Smoking cessation counseling, nicotine patch  Incentive spirometer  Diuresis and monitor [id number]  Oncology, Cardiology, Endocrinology following             LOS: 5 days     Subjective     Patient reports chronic shortness of breath and generalized weakness    Objective     Vital signs for last 24 hours:  Vitals:    07/22/23 0649 07/22/23 0652 07/22/23 0653 07/22/23 0656   BP:       BP Location:       Patient Position:       Pulse: 74 82 74 78   Resp: 19 19 19 19   Temp:       TempSrc:       SpO2: 93% 93% 93% 94%   Weight:       Height:           Intake/Output last 3 shifts:  I/O last 3 completed shifts:  In: 2360 [P.O.:2360]  Out: 1650 [Urine:1650]  Intake/Output this shift:  No intake/output data recorded.      Radiology  Imaging Results (Last 24 Hours)       ** No results found for the last 24 hours. **            Labs:  Results from last 7 days   Lab Units 07/21/23  2309   WBC 10*3/mm3 12.30*   HEMOGLOBIN g/dL 8.8*   HEMATOCRIT % 28.4*   PLATELETS 10*3/mm3 441       Results from last 7 days   Lab Units 07/20/23  0106 07/15/23  2133 07/15/23  1712   SODIUM mmol/L 138   < > 138   POTASSIUM mmol/L 4.3   < > 4.6   CHLORIDE mmol/L 94*   < > 96*   CO2 mmol/L 38.0*   < > 36.0*   BUN mg/dL 16   < > 21   CREATININE mg/dL 0.31*   < > 0.37*   CALCIUM mg/dL 9.3   < > 10.0   BILIRUBIN mg/dL  --   --  0.2   ALK PHOS U/L  --   --  178*   ALT (SGPT) U/L  --   --  28   AST (SGOT) U/L  --   --  11   GLUCOSE mg/dL 123*   < > 355*    < > = values  in this interval not displayed.           Results from last 7 days   Lab Units 07/15/23  1712   ALBUMIN g/dL 2.6*       Results from last 7 days   Lab Units 07/15/23  2133 07/15/23  1712   HSTROP T ng/L 30* 26*           Results from last 7 days   Lab Units 07/20/23  0106   MAGNESIUM mg/dL 1.7           Results from last 7 days   Lab Units 07/17/23  2254   TSH uIU/mL 0.145*             Meds:   SCHEDULE  aluminum sulfate-calcium acetate 1:20, 1,000 mL, Topical, BID  amiodarone, 400 mg, Oral, Q12H  aspirin, 81 mg, Oral, Daily  budesonide, 0.5 mg, Nebulization, BID - RT  calcium 500 mg vitamin D 5 mcg (200 UT), 1 tablet, Oral, Daily  dexamethasone, 4 mg, Oral, Daily With Breakfast   And  insulin regular, 6 Units, Subcutaneous, Daily With Breakfast  dilTIAZem, 60 mg, Oral, Q6H  docusate sodium, 100 mg, Oral, BID  ferrous sulfate, 324 mg, Oral, Daily With Breakfast  furosemide, 20 mg, Intravenous, Daily  gabapentin, 200 mg, Oral, Nightly  guaiFENesin, 600 mg, Oral, Q12H  hydrocortisone-bacitracin-zinc oxide-nystatin, 1 application , Topical, TID  insulin glargine, 14 Units, Subcutaneous, Q12H  insulin lispro, 14 Units, Subcutaneous, TID With Meals  insulin lispro, 2-9 Units, Subcutaneous, 4x Daily AC & at Bedtime  ipratropium-albuterol, 3 mL, Nebulization, 4x Daily - RT  metoprolol tartrate, 75 mg, Oral, BID  midodrine, 5 mg, Oral, TID AC  nicotine, 1 patch, Transdermal, Q24H  nystatin, 5 mL, Oral, 4x Daily  pantoprazole, 40 mg, Oral, Q AM  senna-docusate sodium, 2 tablet, Oral, BID  sodium chloride, 10 mL, Intravenous, Q12H      Infusions  Pharmacy Consult - Steroid Insulin Protocol,       PRNs    senna-docusate sodium **AND** polyethylene glycol **AND** bisacodyl **AND** bisacodyl    Calcium Replacement - Follow Nurse / BPA Driven Protocol    dextrose    dextrose    glucagon (human recombinant)    HYDROcodone-acetaminophen    ipratropium-albuterol    Magnesium Standard Dose Replacement - Follow Nurse / BPA Driven  Protocol    Morphine    ondansetron    Pharmacy Consult - Steroid Insulin Protocol    Phosphorus Replacement - Follow Nurse / BPA Driven Protocol    Potassium Replacement - Follow Nurse / BPA Driven Protocol    [COMPLETED] Insert Peripheral IV **AND** sodium chloride    sodium chloride    sodium chloride    traMADol    Physical Exam:  General Appearance:  Alert   HEENT:  Normocephalic, without obvious abnormality, Conjunctiva/corneas clear,.   Nares normal, no drainage     Neck:  Supple, symmetrical, trachea midline.   Lungs /Chest wall: Mild bilateral basal rhonchi, respirations unlabored, symmetrical wall movement.     Heart:  Regular rate and rhythm, S1 S2 normal  Abdomen: Soft, non-tender, no masses, no organomegaly.    Extremities: No edema, no clubbing or cyanosis     ROS  Constitutional: Negative for chills, fever and positive for malaise/fatigue.   HENT: Negative.    Eyes: Negative.    Cardiovascular: Negative.    Respiratory: Positive for chronic shortness of breath.    Skin: Negative.    Musculoskeletal: Negative.    Gastrointestinal: Negative.    Genitourinary: Negative.    Neurological: Generalized weakness      I reviewed the recent clinical results    Part of this note may be an electronic transcription/translation of spoken language to printed text using the Dragon Dictation System.

## 2023-07-23 ENCOUNTER — APPOINTMENT (OUTPATIENT)
Dept: GENERAL RADIOLOGY | Facility: HOSPITAL | Age: 68
DRG: 308 | End: 2023-07-23
Payer: MEDICARE

## 2023-07-23 LAB
ANISOCYTOSIS BLD QL: ABNORMAL
ARTERIAL PATENCY WRIST A: POSITIVE
ARTERIAL PATENCY WRIST A: POSITIVE
ATMOSPHERIC PRESS: ABNORMAL MM[HG]
ATMOSPHERIC PRESS: ABNORMAL MM[HG]
BASE EXCESS BLDA CALC-SCNC: 10.7 MMOL/L (ref 0–3)
BASE EXCESS BLDA CALC-SCNC: 19.1 MMOL/L (ref 0–3)
BDY SITE: ABNORMAL
BDY SITE: ABNORMAL
CA-I BLDA-SCNC: 1.28 MMOL/L (ref 1.15–1.33)
CO2 BLDA-SCNC: 48.3 MMOL/L (ref 22–29)
D-LACTATE SERPL-SCNC: 2 MMOL/L (ref 0.5–2)
D-LACTATE SERPL-SCNC: 5.9 MMOL/L (ref 0.2–2)
DEPRECATED RDW RBC AUTO: 62.6 FL (ref 37–54)
ERYTHROCYTE [DISTWIDTH] IN BLOOD BY AUTOMATED COUNT: 19.9 % (ref 12.3–15.4)
GLUCOSE BLDC GLUCOMTR-MCNC: 151 MG/DL (ref 70–105)
GLUCOSE BLDC GLUCOMTR-MCNC: 182 MG/DL (ref 70–105)
GLUCOSE BLDC GLUCOMTR-MCNC: 191 MG/DL (ref 70–105)
GLUCOSE BLDC GLUCOMTR-MCNC: 216 MG/DL (ref 74–100)
GLUCOSE BLDC GLUCOMTR-MCNC: 216 MG/DL (ref 74–100)
GLUCOSE BLDC GLUCOMTR-MCNC: 284 MG/DL (ref 70–105)
GLUCOSE BLDC GLUCOMTR-MCNC: 333 MG/DL (ref 70–105)
GLUCOSE BLDC GLUCOMTR-MCNC: 471 MG/DL (ref 70–105)
HCO3 BLDA-SCNC: 43.2 MMOL/L (ref 21–28)
HCO3 BLDA-SCNC: 46.3 MMOL/L (ref 21–28)
HCT VFR BLD AUTO: 28.9 % (ref 37.5–51)
HCT VFR BLDA CALC: 33 % (ref 38–51)
HEMODILUTION: NO
HEMODILUTION: NO
HGB BLD-MCNC: 8.7 G/DL (ref 13–17.7)
HGB BLDA-MCNC: 11.3 G/DL (ref 12–17)
HOLD SPECIMEN: NORMAL
HOLD SPECIMEN: NORMAL
INHALED O2 CONCENTRATION: 100 %
INHALED O2 CONCENTRATION: 100 %
LARGE PLATELETS: ABNORMAL
LYMPHOCYTES # BLD MANUAL: 0.99 10*3/MM3 (ref 0.7–3.1)
LYMPHOCYTES NFR BLD MANUAL: 6 % (ref 5–12)
MCH RBC QN AUTO: 25.9 PG (ref 26.6–33)
MCHC RBC AUTO-ENTMCNC: 30.1 G/DL (ref 31.5–35.7)
MCV RBC AUTO: 86.1 FL (ref 79–97)
METAMYELOCYTES NFR BLD MANUAL: 5 % (ref 0–0)
MODALITY: ABNORMAL
MODALITY: ABNORMAL
MONOCYTES # BLD: 0.85 10*3/MM3 (ref 0.1–0.9)
MYELOCYTES NFR BLD MANUAL: 1 % (ref 0–0)
NEUTROPHILS # BLD AUTO: 11.5 10*3/MM3 (ref 1.7–7)
NEUTROPHILS NFR BLD MANUAL: 70 % (ref 42.7–76)
NEUTS BAND NFR BLD MANUAL: 11 % (ref 0–5)
PCO2 BLDA: 121.2 MM HG (ref 35–48)
PCO2 BLDA: 67.7 MM HG (ref 35–48)
PH BLDA: 7.16 PH UNITS (ref 7.35–7.45)
PH BLDA: 7.44 PH UNITS (ref 7.35–7.45)
PLATELET # BLD AUTO: 406 10*3/MM3 (ref 140–450)
PMV BLD AUTO: 9.3 FL (ref 6–12)
PO2 BLDA: 115.9 MM HG (ref 83–108)
PO2 BLDA: 65.6 MM HG (ref 83–108)
POTASSIUM BLDA-SCNC: 4.9 MMOL/L (ref 3.5–4.5)
RBC # BLD AUTO: 3.35 10*6/MM3 (ref 4.14–5.8)
RESPIRATORY RATE: 20
SAO2 % BLDCOA: 83.1 % (ref 94–98)
SAO2 % BLDCOA: 98.5 % (ref 94–98)
SCAN SLIDE: NORMAL
SODIUM BLD-SCNC: 132 MMOL/L (ref 138–146)
TOXIC GRANULATION: ABNORMAL
VARIANT LYMPHS NFR BLD MANUAL: 7 % (ref 19.6–45.3)
VENTILATOR MODE: ABNORMAL
WBC NRBC COR # BLD: 14.2 10*3/MM3 (ref 3.4–10.8)

## 2023-07-23 PROCEDURE — 80051 ELECTROLYTE PANEL: CPT

## 2023-07-23 PROCEDURE — 63710000001 INSULIN REGULAR HUMAN PER 5 UNITS: Performed by: INTERNAL MEDICINE

## 2023-07-23 PROCEDURE — 99233 SBSQ HOSP IP/OBS HIGH 50: CPT | Performed by: INTERNAL MEDICINE

## 2023-07-23 PROCEDURE — 94799 UNLISTED PULMONARY SVC/PX: CPT

## 2023-07-23 PROCEDURE — 71045 X-RAY EXAM CHEST 1 VIEW: CPT

## 2023-07-23 PROCEDURE — 99231 SBSQ HOSP IP/OBS SF/LOW 25: CPT | Performed by: INTERNAL MEDICINE

## 2023-07-23 PROCEDURE — 36600 WITHDRAWAL OF ARTERIAL BLOOD: CPT

## 2023-07-23 PROCEDURE — 63710000001 DEXAMETHASONE PER 0.25 MG: Performed by: INTERNAL MEDICINE

## 2023-07-23 PROCEDURE — 25010000002 FUROSEMIDE PER 20 MG: Performed by: NURSE PRACTITIONER

## 2023-07-23 PROCEDURE — 83605 ASSAY OF LACTIC ACID: CPT

## 2023-07-23 PROCEDURE — 25010000002 FUROSEMIDE PER 20 MG: Performed by: FAMILY MEDICINE

## 2023-07-23 PROCEDURE — 83605 ASSAY OF LACTIC ACID: CPT | Performed by: INTERNAL MEDICINE

## 2023-07-23 PROCEDURE — 87040 BLOOD CULTURE FOR BACTERIA: CPT | Performed by: NURSE PRACTITIONER

## 2023-07-23 PROCEDURE — 82803 BLOOD GASES ANY COMBINATION: CPT

## 2023-07-23 PROCEDURE — 82948 REAGENT STRIP/BLOOD GLUCOSE: CPT

## 2023-07-23 PROCEDURE — 25010000002 MORPHINE PER 10 MG: Performed by: INTERNAL MEDICINE

## 2023-07-23 PROCEDURE — 63710000001 INSULIN LISPRO (HUMAN) PER 5 UNITS: Performed by: INTERNAL MEDICINE

## 2023-07-23 PROCEDURE — 85018 HEMOGLOBIN: CPT

## 2023-07-23 PROCEDURE — 94660 CPAP INITIATION&MGMT: CPT

## 2023-07-23 PROCEDURE — 63710000001 INSULIN GLARGINE PER 5 UNITS: Performed by: INTERNAL MEDICINE

## 2023-07-23 PROCEDURE — 82330 ASSAY OF CALCIUM: CPT

## 2023-07-23 RX ORDER — ESCITALOPRAM OXALATE 10 MG/1
10 TABLET ORAL DAILY
Status: DISCONTINUED | OUTPATIENT
Start: 2023-07-23 | End: 2023-07-27 | Stop reason: HOSPADM

## 2023-07-23 RX ORDER — FUROSEMIDE 10 MG/ML
40 INJECTION INTRAMUSCULAR; INTRAVENOUS ONCE
Status: COMPLETED | OUTPATIENT
Start: 2023-07-23 | End: 2023-07-23

## 2023-07-23 RX ORDER — DEXAMETHASONE 4 MG/1
4 TABLET ORAL
Status: DISCONTINUED | OUTPATIENT
Start: 2023-07-24 | End: 2023-07-27 | Stop reason: HOSPADM

## 2023-07-23 RX ORDER — MIDODRINE HYDROCHLORIDE 5 MG/1
10 TABLET ORAL
Status: DISCONTINUED | OUTPATIENT
Start: 2023-07-23 | End: 2023-07-27 | Stop reason: HOSPADM

## 2023-07-23 RX ORDER — HYDROXYZINE HYDROCHLORIDE 25 MG/1
25 TABLET, FILM COATED ORAL 3 TIMES DAILY PRN
Status: DISCONTINUED | OUTPATIENT
Start: 2023-07-23 | End: 2023-07-27 | Stop reason: HOSPADM

## 2023-07-23 RX ADMIN — GABAPENTIN 300 MG: 300 CAPSULE ORAL at 22:05

## 2023-07-23 RX ADMIN — INSULIN LISPRO 14 UNITS: 100 INJECTION, SOLUTION INTRAVENOUS; SUBCUTANEOUS at 17:04

## 2023-07-23 RX ADMIN — Medication 10 ML: at 10:05

## 2023-07-23 RX ADMIN — HYDROXYZINE HYDROCHLORIDE 25 MG: 25 TABLET, FILM COATED ORAL at 22:05

## 2023-07-23 RX ADMIN — MIDODRINE HYDROCHLORIDE 10 MG: 5 TABLET ORAL at 17:04

## 2023-07-23 RX ADMIN — BUDESONIDE AND FORMOTEROL FUMARATE DIHYDRATE 2 PUFF: 160; 4.5 AEROSOL RESPIRATORY (INHALATION) at 20:56

## 2023-07-23 RX ADMIN — IPRATROPIUM BROMIDE AND ALBUTEROL SULFATE 3 ML: .5; 3 SOLUTION RESPIRATORY (INHALATION) at 11:39

## 2023-07-23 RX ADMIN — INSULIN GLARGINE 14 UNITS: 100 INJECTION, SOLUTION SUBCUTANEOUS at 22:05

## 2023-07-23 RX ADMIN — NICOTINE 1 PATCH: 14 PATCH, EXTENDED RELEASE TRANSDERMAL at 09:38

## 2023-07-23 RX ADMIN — BUDESONIDE AND FORMOTEROL FUMARATE DIHYDRATE 2 PUFF: 160; 4.5 AEROSOL RESPIRATORY (INHALATION) at 08:08

## 2023-07-23 RX ADMIN — DOCUSATE SODIUM 100 MG: 100 CAPSULE, LIQUID FILLED ORAL at 22:05

## 2023-07-23 RX ADMIN — NYSTATIN 500000 UNITS: 100000 SUSPENSION ORAL at 09:36

## 2023-07-23 RX ADMIN — DEXAMETHASONE 4 MG: 4 TABLET ORAL at 09:36

## 2023-07-23 RX ADMIN — SENNOSIDES AND DOCUSATE SODIUM 2 TABLET: 8.6; 5 TABLET ORAL at 22:05

## 2023-07-23 RX ADMIN — GUAIFENESIN 600 MG: 600 TABLET, EXTENDED RELEASE ORAL at 22:05

## 2023-07-23 RX ADMIN — INSULIN LISPRO 6 UNITS: 100 INJECTION, SOLUTION INTRAVENOUS; SUBCUTANEOUS at 09:34

## 2023-07-23 RX ADMIN — Medication 10 ML: at 22:09

## 2023-07-23 RX ADMIN — INSULIN LISPRO 14 UNITS: 100 INJECTION, SOLUTION INTRAVENOUS; SUBCUTANEOUS at 09:39

## 2023-07-23 RX ADMIN — NYSTATIN 500000 UNITS: 100000 SUSPENSION ORAL at 17:04

## 2023-07-23 RX ADMIN — FERROUS SULFATE TAB EC 324 MG (65 MG FE EQUIVALENT) 324 MG: 324 (65 FE) TABLET DELAYED RESPONSE at 09:36

## 2023-07-23 RX ADMIN — MIDODRINE HYDROCHLORIDE 5 MG: 5 TABLET ORAL at 09:46

## 2023-07-23 RX ADMIN — GABAPENTIN 300 MG: 300 CAPSULE ORAL at 09:36

## 2023-07-23 RX ADMIN — MORPHINE SULFATE 2 MG: 2 INJECTION, SOLUTION INTRAMUSCULAR; INTRAVENOUS at 19:37

## 2023-07-23 RX ADMIN — HYDROCODONE BITARTRATE AND ACETAMINOPHEN 1 TABLET: 5; 325 TABLET ORAL at 23:53

## 2023-07-23 RX ADMIN — IPRATROPIUM BROMIDE AND ALBUTEROL SULFATE 3 ML: .5; 3 SOLUTION RESPIRATORY (INHALATION) at 08:04

## 2023-07-23 RX ADMIN — DOCUSATE SODIUM 100 MG: 100 CAPSULE, LIQUID FILLED ORAL at 09:36

## 2023-07-23 RX ADMIN — IPRATROPIUM BROMIDE AND ALBUTEROL SULFATE 3 ML: .5; 3 SOLUTION RESPIRATORY (INHALATION) at 21:00

## 2023-07-23 RX ADMIN — NYSTATIN 500000 UNITS: 100000 SUSPENSION ORAL at 12:17

## 2023-07-23 RX ADMIN — DILTIAZEM HYDROCHLORIDE 240 MG: 240 CAPSULE, EXTENDED RELEASE ORAL at 09:36

## 2023-07-23 RX ADMIN — INSULIN GLARGINE 14 UNITS: 100 INJECTION, SOLUTION SUBCUTANEOUS at 09:34

## 2023-07-23 RX ADMIN — Medication 1000 ML: at 09:46

## 2023-07-23 RX ADMIN — INSULIN LISPRO 2 UNITS: 100 INJECTION, SOLUTION INTRAVENOUS; SUBCUTANEOUS at 22:04

## 2023-07-23 RX ADMIN — Medication 1000 ML: at 22:08

## 2023-07-23 RX ADMIN — SENNOSIDES AND DOCUSATE SODIUM 2 TABLET: 8.6; 5 TABLET ORAL at 09:36

## 2023-07-23 RX ADMIN — ASPIRIN 81 MG CHEWABLE TABLET 81 MG: 81 TABLET CHEWABLE at 09:36

## 2023-07-23 RX ADMIN — PANTOPRAZOLE SODIUM 40 MG: 40 TABLET, DELAYED RELEASE ORAL at 05:38

## 2023-07-23 RX ADMIN — FUROSEMIDE 20 MG: 10 INJECTION, SOLUTION INTRAMUSCULAR; INTRAVENOUS at 09:36

## 2023-07-23 RX ADMIN — FUROSEMIDE 40 MG: 10 INJECTION, SOLUTION INTRAMUSCULAR; INTRAVENOUS at 19:51

## 2023-07-23 RX ADMIN — GUAIFENESIN 600 MG: 600 TABLET, EXTENDED RELEASE ORAL at 09:36

## 2023-07-23 RX ADMIN — METOPROLOL TARTRATE 75 MG: 25 TABLET, FILM COATED ORAL at 09:36

## 2023-07-23 RX ADMIN — INSULIN LISPRO 8 UNITS: 100 INJECTION, SOLUTION INTRAVENOUS; SUBCUTANEOUS at 12:17

## 2023-07-23 RX ADMIN — ESCITALOPRAM OXALATE 10 MG: 10 TABLET ORAL at 12:17

## 2023-07-23 RX ADMIN — INSULIN LISPRO 9 UNITS: 100 INJECTION, SOLUTION INTRAVENOUS; SUBCUTANEOUS at 17:05

## 2023-07-23 RX ADMIN — Medication 1 TABLET: at 09:36

## 2023-07-23 RX ADMIN — INSULIN HUMAN 8 UNITS: 100 INJECTION, SOLUTION PARENTERAL at 09:34

## 2023-07-23 RX ADMIN — INSULIN LISPRO 14 UNITS: 100 INJECTION, SOLUTION INTRAVENOUS; SUBCUTANEOUS at 12:18

## 2023-07-23 NOTE — CASE MANAGEMENT/SOCIAL WORK
Continued Stay Note  [facility]     Patient Name: Isra Davenport  MRN: [id number]  Today's Date: 7/23/2023    Admit Date: 7/15/2023    Plan: [facility] pending acceptance (radiation txs at [facility], chemo on hold while in rehab per oncology). Will require precert. PASRR approved. From home alone.   Discharge Plan       Row Name 07/23/23 0921       Plan    Plan Comments 0896- Sent message to Mirela with [facility], to follow up on acceptance; awaiting response.

## 2023-07-23 NOTE — PROGRESS NOTES
CARDIOLOGY FOLLOW-UP PROGRESS NOTE      Reason for follow-up:    Atrial Fibrillation  Edema     Attending: Raoul Castillo MD      Subjective .     Denies chest pain or dyspnea at present  Still with periods of RVR despite titrated medical therapy        Review of Systems   Constitutional: Negative for chills and fever.   HENT:  Negative for ear discharge and nosebleeds.    Eyes:  Negative for discharge and redness.   Cardiovascular:  Negative for chest pain, orthopnea, palpitations, paroxysmal nocturnal dyspnea and syncope.   Respiratory:  Positive for shortness of breath. Negative for cough and wheezing.    Endocrine: Negative for heat intolerance.   Skin:  Negative for rash.   Musculoskeletal:  Negative for arthritis and myalgias.   Gastrointestinal:  Negative for abdominal pain, melena, nausea and vomiting.   Genitourinary:  Negative for dysuria and hematuria.   Neurological:  Negative for dizziness, light-headedness, numbness and tremors.   Psychiatric/Behavioral:  Negative for depression. The patient is not nervous/anxious.    Pertinent items are noted in HPI, all other systems reviewed and negative  Allergies: Ibuprofen and Penicillins    Scheduled Meds:aluminum sulfate-calcium acetate 1:20, 1,000 mL, Topical, BID  aspirin, 81 mg, Oral, Daily  budesonide-formoterol, 2 puff, Inhalation, BID - RT  calcium 500 mg vitamin D 5 mcg (200 UT), 1 tablet, Oral, Daily  [START ON 7/24/2023] dexamethasone, 4 mg, Oral, Daily With Breakfast   And  [START ON 7/24/2023] insulin regular, 12 Units, Subcutaneous, Daily With Breakfast  dilTIAZem CD, 240 mg, Oral, Q24H  docusate sodium, 100 mg, Oral, BID  escitalopram, 10 mg, Oral, Daily  ferrous sulfate, 324 mg, Oral, Daily With Breakfast  furosemide, 20 mg, Intravenous, Daily  gabapentin, 300 mg, Oral, BID  guaiFENesin, 600 mg, Oral, Q12H  hydrocortisone-bacitracin-zinc oxide-nystatin, 1 application , Topical, TID  insulin glargine, 14 Units, Subcutaneous, Q12H  insulin  lispro, 14 Units, Subcutaneous, TID With Meals  insulin lispro, 2-9 Units, Subcutaneous, 4x Daily AC & at Bedtime  ipratropium-albuterol, 3 mL, Nebulization, 4x Daily - RT  metoprolol tartrate, 75 mg, Oral, BID  midodrine, 10 mg, Oral, TID AC  nicotine, 1 patch, Transdermal, Q24H  nystatin, 5 mL, Oral, 4x Daily  pantoprazole, 40 mg, Oral, Q AM  senna-docusate sodium, 2 tablet, Oral, BID  sodium chloride, 10 mL, Intravenous, Q12H        Continuous Infusions:Pharmacy Consult - Steroid Insulin Protocol,       PRN Meds:.  senna-docusate sodium **AND** polyethylene glycol **AND** bisacodyl **AND** bisacodyl    Calcium Replacement - Follow Nurse / BPA Driven Protocol    dextrose    dextrose    glucagon (human recombinant)    HYDROcodone-acetaminophen    ipratropium-albuterol    Magnesium Standard Dose Replacement - Follow Nurse / BPA Driven Protocol    Morphine    ondansetron    Pharmacy Consult - Steroid Insulin Protocol    Phosphorus Replacement - Follow Nurse / BPA Driven Protocol    Potassium Replacement - Follow Nurse / BPA Driven Protocol    [COMPLETED] Insert Peripheral IV **AND** sodium chloride    sodium chloride    sodium chloride    Objective     VITAL SIGNS  Patient Vitals for the past 24 hrs:   BP Temp Temp src Pulse Resp SpO2   07/23/23 1340 93/65 97.8 °F (36.6 °C) Oral 79 20 91 %   07/23/23 1142 -- -- -- 105 21 96 %   07/23/23 1139 -- -- -- 93 21 97 %   07/23/23 1055 120/65 97.8 °F (36.6 °C) Oral 111 20 97 %   07/23/23 0811 -- -- -- 94 24 92 %   07/23/23 0808 -- -- -- 95 24 91 %   07/23/23 0807 -- -- -- 98 24 92 %   07/23/23 0804 -- -- -- 95 24 91 %   07/23/23 0500 112/72 97.8 °F (36.6 °C) Oral 79 15 98 %   07/23/23 0300 -- -- -- 80 15 96 %   07/23/23 0100 99/56 98 °F (36.7 °C) Oral 71 12 100 %   07/22/23 2100 104/63 97.6 °F (36.4 °C) Oral 92 25 92 %   07/22/23 1917 -- -- -- 81 20 99 %   07/22/23 1916 -- -- -- 80 20 98 %   07/22/23 1915 -- -- -- 83 20 94 %   07/22/23 1913 -- -- -- 78 20 90 %   07/22/23 1713  "124/68 97.6 °F (36.4 °C) Oral 75 20 90 %   07/22/23 1533 -- -- -- 79 18 91 %   07/22/23 1530 -- -- -- 77 18 91 %          Flowsheet Rows      Flowsheet Row First Filed Value   Admission Height 177.8 cm (70\") Documented at 07/15/2023 1652   Admission Weight 72.6 kg (160 lb) Documented at 07/15/2023 1652            Body mass index is 22.65 kg/m².      Intake/Output Summary (Last 24 hours) at 7/23/2023 1409  Last data filed at 7/23/2023 1340  Gross per 24 hour   Intake 480 ml   Output 1150 ml   Net -670 ml          TELEMETRY:     SR with PACS    Physical Exam:  Constitutional:       Appearance: Well-developed.   Eyes:      General: No scleral icterus.        Right eye: No discharge.   HENT:      Head: Normocephalic and atraumatic.   Neck:      Thyroid: No thyromegaly.      Lymphadenopathy: No cervical adenopathy.   Pulmonary:      Effort: Pulmonary effort is normal. No respiratory distress.      Breath sounds: Normal breath sounds. No wheezing. No rales.   Cardiovascular:      Normal rate. Regular rhythm.      No gallop.    Edema:     Peripheral edema absent.   Abdominal:      Tenderness: There is no abdominal tenderness.   Skin:     Findings: No erythema or rash.   Neurological:      Mental Status: Alert and oriented to person, place, and time.          Results Review:   I reviewed the patient's new clinical results.    CBC    Results from last 7 days   Lab Units 07/22/23  2252 07/21/23  2309 07/20/23  2358 07/20/23  0106 07/19/23  1025 07/17/23  2254 07/17/23  0216   WBC 10*3/mm3 14.20* 12.30* 12.70* 13.40* 12.90* 11.70* 12.30*   HEMOGLOBIN g/dL 8.7* 8.8* 8.5* 9.0* 9.0* 8.8* 9.5*   PLATELETS 10*3/mm3 406 441 444 484* 486* 395 445       BMP   Results from last 7 days   Lab Units 07/20/23  1543 07/20/23  0106 07/19/23  1025 07/18/23  1906 07/18/23  1437 07/17/23  0216   SODIUM mmol/L  --  138 134* 131* 130* 142   POTASSIUM mmol/L  --  4.3 4.1 4.5 4.6 4.5   CHLORIDE mmol/L  --  94* 89* 87* 87* 94*   CO2 mmol/L  --  " 38.0* 37.0* 37.0* 35.0* 41.0*   BUN mg/dL  --  16 15 21 19 17   CREATININE mg/dL  --  0.31* 0.39* 0.49* 0.40* 0.35*   GLUCOSE mg/dL  --  123* 381* 335* 372* 91   MAGNESIUM mg/dL  --  1.7 1.9 2.2 1.5*  --    PHOSPHORUS mg/dL 2.3* 1.6* 2.7 2.0*  --   --        Cr Clearance Estimated Creatinine Clearance: 234.2 mL/min (A) (by C-G formula based on SCr of 0.31 mg/dL (L)).  Coag     HbA1C   Lab Results   Component Value Date    HGBA1C 7.30 (H) 07/15/2023     Blood Glucose   Glucose   Date/Time Value Ref Range Status   07/23/2023 1053 333 (H) 70 - 105 mg/dL Final     Comment:     Serial Number: [id number]:  [id number]   07/23/2023 0746 284 (H) 70 - 105 mg/dL Final     Comment:     Serial Number: [id number]:  [id number]   07/22/2023 2119 379 (H) 70 - 105 mg/dL Final     Comment:     Serial Number: [id number]:  [id number]   07/22/2023 1948 347 (H) 70 - 105 mg/dL Final     Comment:     Serial Number: [id number]:  [id number]   07/22/2023 1711 96 70 - 105 mg/dL Final     Comment:     Serial Number: [id number]:  [id number]   07/22/2023 1129 231 (H) 70 - 105 mg/dL Final     Comment:     Serial Number: [id number]:  [id number]   07/22/2023 0700 108 (H) 70 - 105 mg/dL Final     Comment:     Serial Number: [id number]:  [id number]   07/21/2023 1912 303 (H) 70 - 105 mg/dL Final     Comment:     Serial Number: [id number]:  [id number]     Infection         CMP   Results from last 7 days   Lab Units 07/20/23  0106 07/19/23  1025 07/18/23  1906 07/18/23  1437 07/17/23  0216   SODIUM mmol/L 138 134* 131* 130* 142   POTASSIUM mmol/L 4.3 4.1 4.5 4.6 4.5   CHLORIDE mmol/L 94* 89* 87* 87* 94*   CO2 mmol/L 38.0* 37.0* 37.0* 35.0* 41.0*   BUN mg/dL 16 15 21 19 17   CREATININE mg/dL 0.31* 0.39* 0.49* 0.40* 0.35*   GLUCOSE mg/dL 123* 381* 335* 372* 91       ABG      UA      OLIVIA  No results found for: POCMETH, POCAMPHET, POCBARBITUR, POCBENZO, POCCOCAINE, POCOPIATES, POCOXYCODO, POCPHENCYC, POCPROPOXY,  POCTHC, POCTRICYC  Lysis Labs   Results from last 7 days   Lab Units 07/22/23  2252 07/21/23  2309 07/20/23  2358 07/20/23  0106 07/19/23  1025 07/18/23  1906 07/18/23  1437 07/17/23  2254 07/17/23  0216   HEMOGLOBIN g/dL 8.7* 8.8* 8.5* 9.0* 9.0*  --   --  8.8* 9.5*   PLATELETS 10*3/mm3 406 441 444 484* 486*  --   --  395 445   CREATININE mg/dL  --   --   --  0.31* 0.39* 0.49* 0.40*  --  0.35*       Radiology(recent) No radiology results for the last day    Imaging Results (Last 24 Hours)       ** No results found for the last 24 hours. **                    EKG          I personally viewed and interpreted the patient's EKG/Telemetry data:        ECHOCARDIOGRAM:     Results for orders placed during the hospital encounter of 06/16/23    Adult Transthoracic Echo Complete W/ Cont if Necessary Per Protocol    Interpretation Summary    Left ventricular systolic function is normal. Left ventricular ejection fraction appears to be 61 - 65%.    Left ventricular diastolic function is consistent with (grade I) impaired relaxation.    There is mild bileaflet mitral valve prolapse present.    Estimated right ventricular systolic pressure from tricuspid regurgitation is mildly elevated (35-45 mmHg). Calculated right ventricular systolic pressure from tricuspid regurgitation is 44 mmHg.        STRESS MYOVIEW:      CARDIAC CATHETERIZATION:      OTHER:         Assessment & Plan            Atrial fibrillation with RVR    Atrial fibrillation    Moderate malnutrition        ASSESSMENT:    PAF/Flutter: now SR with PACs  LE pain  Stage IV squamous cell lung cancer with mets to brain and bone  Currently on amio, bb, CCB  Recent non invasive ischemic eval negative for ischemia  Echo with preserved LV function  Per oncology not recommended long term a/c due to brain mets and known rectal bleeding  Denies any new complaint  Amiodarone was discontinued patient is currently on no Cardizem and metoprolol  Tmax is 97.8 pulse is 79 respirations  are 20 blood pressure is 93/65 to 120/65 sats are 91%  White cell count is 14,000 hemoglobin is 8.7  Check BMP  Current medications include Cardizem CD2 40 mg p.o. once a day metoprolol 75 mg p.o. twice daily Lasix 20 mg IV daily patient is on midodrine 10 mg p.o. 3 times a day patient is clinically feeling somewhat better  Continue current medical therapy continue supportive care  Further recommendations based on patient course          Shruthi Williamson MD  07/23/23  14:09 EDT

## 2023-07-23 NOTE — PROGRESS NOTES
"Subjective   Isra Davenport is a 68 y.o. male.   Seen for diabetes f/u.  Likes to have legs wrapped. Says it is soothing        Objective     BP 94/56   Pulse 76   Temp 98 °F (36.7 °C) (Oral)   Resp 20   Ht 177.8 cm (70\")   Wt 71.6 kg (157 lb 13.6 oz)   SpO2 92%   BMI 22.65 kg/m²   Blood sugar  274 this am,  310 @ lunch, 471 @ supper ( right after drinking a boost )    ASSESSMENT  Diabetes type 2, with hyperglycemia  Patient is stable    PLAN    R insulin dose increased per steroid protocol.         Liudmila Levin MD  7/23/2023  17:55 EDT    "

## 2023-07-23 NOTE — PLAN OF CARE
Goal Outcome Evaluation:  Plan of Care Reviewed With: patient        Progress: no change  Outcome Evaluation: Pt rested well through the shift with no complaints. Vitals are stable at  this time, will continue to monitor

## 2023-07-23 NOTE — PROGRESS NOTES
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 male admitted to [facility] through the ED on 7/15/2023 with complaints of uncontrolled pain and BLE edema.  In the ED, he developed tachycardia in the 150s and was found to be in A-fib with RVR.  He was started on supplemental oxygen for O2 sat drop to 88%.  He received Cardizem bolus followed by drip and Lopressor bolus.  In the ED, CBC revealed WBC 11.4, hemoglobin 10.0, MCV 84.8, and platelets 404,000.  Creatinine and LFTs were not elevated.  CXR revealed left basilar pneumonia.  He reported left lower extremity pain with x-ray of the left tip/fib and left foot negative for acute findings.    PMH was significant for recently diagnosed stage IV squamous cell carcinoma of the left upper lobe lung.  He was post radiation therapy to the brain () and currently receiving radiation therapy to the right hip and femur (started 2023 with 10 fractions planned).  He was last seen by our service as an outpatient on 2023 where he was prescribed Lasix and hydrocodone/APAP 5/325 mg as needed for his BLE edema and pain.  BLE venous Doppler performed as an outpatient on 2023 was negative for DVT/SVT and showed bilateral fluid retention.  At time of consultation 2023, hemoglobin had dropped to 9.9.     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  Our service had initially evaluated him during 2023 - 2023 hospitalization during which lung cancer diagnosis was made and initial work-up completed.  He had received radiation  therapy to the brain and is currently receiving radiation to the hip/femur.  He was also diagnosed with iron deficiency anemia during the hospital stay where he was treated with IV iron and discharged on oral ferrous sulfate with GERD prophylaxis.  Complete anemia work-up with the exception of bone marrow was performed during the hospital stay with findings negative for additional etiologies.  Incidentally his copper level was elevated without multivitamin supplementation.  CBC on 7/14/2023 revealed WBC 13, hemoglobin 10.76, and platelets 329,000.  He was continued on daily oral iron.  Molecular testing on his tumor pathology was pending with plans to start chemotherapy after radiation was completed.  He was also to start Xgeva for bone metastases support and was prescribed calcium with vitamin D supplementation.     PCP: Mayco Carreon MD    INTERVAL HISTORY:  7/17/2023 - white blood count 12.30, hemoglobin 9.5, iron 22 (), iron saturation 10 (), TIBC 224 (298-536). Calculated iron deficit is 662 mg. Started Ferrlecit 250 mg IV x 3 days.  Molecular testing on tumor specimen revealed low-level PD-L1 positivity.  7/18/2023-hemoglobin 8.8.  Patient developed SVT overnight and was treated with Cardizem.  Weight loss 33 pounds in 5 weeks.  Changed Pepcid to Protonix.  Palliative care and radiation oncology both consulted.  Physical therapy recommending skilled nursing facility at discharge.  7/19/2023-patient decided DNR/DNI but wants to continue aggressive treatment and try chemotherapy/immunotherapy.  7/20/2023-WBC 13.4, hemoglobin 9. Hospice was consulted by primary team for inpatient hospice admission.  7/21/2023-WBC 12.7, hemoglobin 8.5.  Dexamethasone decreased to 4 mg po daily.   7/22/2023-cardiac electrophysiology was consulted and they recommended, due to his metastatic cancer and comorbidities, to not proceed with ablation or cardioversion and to manage the patient with conservative  therapy.  Increased gabapentin to 300 mg twice daily.  ABIs normal.  Mild digital ischemia on the left.  7/23/2023-WBC 14.2, hemoglobin 8.7.  Lexapro started for depression.    History of present illness reviewed since last visit and changes noted on 07/23/23.    Subjective    feet feel better today..    ROS:  Review of Systems   Constitutional:  Negative for activity change, appetite change, chills, fatigue, fever and unexpected weight change.   HENT:  Negative for mouth sores, sore throat and tinnitus.    Eyes:  Negative for photophobia, pain, redness and visual disturbance.   Respiratory:  Negative for apnea, cough and shortness of breath.    Cardiovascular:  Negative for chest pain, palpitations and leg swelling.   Gastrointestinal:  Negative for abdominal pain, constipation, diarrhea, nausea and vomiting.   Endocrine: Negative for polydipsia.   Genitourinary:  Negative for dysuria, enuresis and hematuria.   Musculoskeletal:  Negative for arthralgias, back pain, myalgias (in his legs.) and neck pain.   Skin:  Positive for wound. Negative for rash.   Allergic/Immunologic: Negative for environmental allergies.   Neurological:  Negative for dizziness, seizures, speech difficulty, weakness, light-headedness, numbness and headaches.   Hematological:  Negative for adenopathy. Does not bruise/bleed easily.   Psychiatric/Behavioral:  Positive for agitation and dysphoric mood. The patient is not nervous/anxious.       MEDICATIONS:    Scheduled Meds:  aluminum sulfate-calcium acetate 1:20, 1,000 mL, Topical, BID  aspirin, 81 mg, Oral, Daily  budesonide-formoterol, 2 puff, Inhalation, BID - RT  calcium 500 mg vitamin D 5 mcg (200 UT), 1 tablet, Oral, Daily  dexamethasone, 4 mg, Oral, Daily With Breakfast   And  insulin regular, 8 Units, Subcutaneous, Daily With Breakfast  dilTIAZem CD, 240 mg, Oral, Q24H  docusate sodium, 100 mg, Oral, BID  ferrous sulfate, 324 mg, Oral, Daily With Breakfast  furosemide, 20 mg,  "Intravenous, Daily  gabapentin, 300 mg, Oral, BID  guaiFENesin, 600 mg, Oral, Q12H  hydrocortisone-bacitracin-zinc oxide-nystatin, 1 application , Topical, TID  insulin glargine, 14 Units, Subcutaneous, Q12H  insulin lispro, 14 Units, Subcutaneous, TID With Meals  insulin lispro, 2-9 Units, Subcutaneous, 4x Daily AC & at Bedtime  ipratropium-albuterol, 3 mL, Nebulization, 4x Daily - RT  metoprolol tartrate, 75 mg, Oral, BID  midodrine, 5 mg, Oral, TID AC  nicotine, 1 patch, Transdermal, Q24H  nystatin, 5 mL, Oral, 4x Daily  pantoprazole, 40 mg, Oral, Q AM  senna-docusate sodium, 2 tablet, Oral, BID  sodium chloride, 10 mL, Intravenous, Q12H    Continuous Infusions:  Pharmacy Consult - Steroid Insulin Protocol,        PRN Meds:    senna-docusate sodium **AND** polyethylene glycol **AND** bisacodyl **AND** bisacodyl    Calcium Replacement - Follow Nurse / BPA Driven Protocol    dextrose    dextrose    glucagon (human recombinant)    HYDROcodone-acetaminophen    ipratropium-albuterol    Magnesium Standard Dose Replacement - Follow Nurse / BPA Driven Protocol    Morphine    ondansetron    Pharmacy Consult - Steroid Insulin Protocol    Phosphorus Replacement - Follow Nurse / BPA Driven Protocol    Potassium Replacement - Follow Nurse / BPA Driven Protocol    [COMPLETED] Insert Peripheral IV **AND** sodium chloride    sodium chloride    sodium chloride     ALLERGIES:    Allergies   Allergen Reactions    Ibuprofen Itching    Penicillins Hives     Objective    VITALS:   /72 (BP Location: Right arm, Patient Position: Lying)   Pulse 94   Temp 97.8 °F (36.6 °C) (Oral)   Resp 24   Ht 177.8 cm (70\")   Wt 71.6 kg (157 lb 13.6 oz)   SpO2 92%   BMI 22.65 kg/m²     PHYSICAL EXAM:  Physical Exam  Vitals reviewed.   Constitutional:       General: He is not in acute distress.     Appearance: He is well-developed. He is not diaphoretic.   HENT:      Head: Normocephalic and atraumatic.      Nose: No rhinorrhea.      " Comments: O2 per nasal cannula.     Mouth/Throat:      Pharynx: No oropharyngeal exudate.      Comments: Minimal thrush on back of tongue.  Eyes:      Conjunctiva/sclera: Conjunctivae normal.   Neck:      Thyroid: No thyromegaly.      Vascular: No JVD.   Cardiovascular:      Rate and Rhythm: Normal rate and regular rhythm.      Heart sounds: Normal heart sounds. No murmur heard.     Comments: Cardiac monitor leads.   Pulmonary:      Effort: Pulmonary effort is normal. No respiratory distress.      Breath sounds: No stridor.      Comments: Right port not accessed.  Decreased breath sounds bilaterally.  Abdominal:      General: Bowel sounds are normal.      Palpations: Abdomen is soft.      Tenderness: There is no abdominal tenderness. There is no guarding.   Musculoskeletal:         General: Normal range of motion.      Cervical back: Normal range of motion and neck supple. No rigidity.      Right lower leg: No edema.      Left lower leg: No edema.      Comments: Left hand oxygen saturation monitor.   Skin:     General: Skin is warm and dry.      Findings: No erythema or rash.      Comments: Left chest wall tattoo.  Left upper extremity IV.  Bilateral lower extremities with bandage wraps.     Neurological:      General: No focal deficit present.      Mental Status: He is alert and oriented to person, place, and time.      Sensory: No sensory deficit.   Psychiatric:         Mood and Affect: Mood normal.         Behavior: Behavior normal.         Thought Content: Thought content normal.         Judgment: Judgment normal.       RECENT LABS:  Lab Results (last 24 hours)       Procedure Component Value Units Date/Time    POC Glucose Once [[id number]]  (Abnormal) Collected: 07/23/23 0746    Specimen: Blood Updated: 07/23/23 0749     Glucose 284 mg/dL      Comment: Serial Number: [id number]:  [id number]       Manual Differential [[id number]]  (Abnormal) Collected: 07/22/23 2252    Specimen: Blood Updated: 07/23/23 0054      Neutrophil % 70.0 %      Lymphocyte % 7.0 %      Monocyte % 6.0 %      Bands %  11.0 %      Metamyelocyte % 5.0 %      Myelocyte % 1.0 %      Neutrophils Absolute 11.50 10*3/mm3      Lymphocytes Absolute 0.99 10*3/mm3      Monocytes Absolute 0.85 10*3/mm3      Anisocytosis Slight/1+     Toxic Granulation Slight/1+     Large Platelets Slight/1+    CBC & Differential [[id number]]  (Abnormal) Collected: 07/22/23 2252    Specimen: Blood Updated: 07/23/23 0054    Narrative:      The following orders were created for panel order CBC & Differential.  Procedure                               Abnormality         Status                     ---------                               -----------         ------                     CBC Auto Differential[[id number]]        Abnormal            Final result               Scan Slide[[id number]]                                       Final result                 Please view results for these tests on the individual orders.    Scan Slide [[id number]] Collected: 07/22/23 2252    Specimen: Blood Updated: 07/23/23 0054     Scan Slide --     Comment: See Manual Differential Results       CBC Auto Differential [[id number]]  (Abnormal) Collected: 07/22/23 2252    Specimen: Blood Updated: 07/23/23 0054     WBC 14.20 10*3/mm3      RBC 3.35 10*6/mm3      Hemoglobin 8.7 g/dL      Hematocrit 28.9 %      MCV 86.1 fL      MCH 25.9 pg      MCHC 30.1 g/dL      RDW 19.9 %      RDW-SD 62.6 fl      MPV 9.3 fL      Platelets 406 10*3/mm3     Narrative:      The previously reported component NRBC is no longer being reported. Previous result was 0.4 /100 WBC (Reference Range: 0.0-0.2 /100 WBC) on 7/22/2023 at 2353 EDT.    Extra Tubes [[id number]] Collected: 07/22/23 2252    Specimen: Blood, Venous Line Updated: 07/23/23 0000    Narrative:      The following orders were created for panel order Extra Tubes.  Procedure                               Abnormality         Status                     ---------                                -----------         ------                     Green Top (Gel)[[id number]]                                  Final result                 Please view results for these tests on the individual orders.    Green Top (Gel) [[id number]] Collected: 07/22/23 2252    Specimen: Blood Updated: 07/23/23 0000     Extra Tube Hold for add-ons.     Comment: Auto resulted.       POC Glucose Once [[id number]]  (Abnormal) Collected: 07/22/23 2119    Specimen: Blood Updated: 07/22/23 2121     Glucose 379 mg/dL      Comment: Serial Number: [id number]:  [id number]       POC Glucose Once [[id number]]  (Abnormal) Collected: 07/22/23 1948    Specimen: Blood Updated: 07/22/23 1950     Glucose 347 mg/dL      Comment: Serial Number: [id number]:  [id number]       POC Glucose Once [[id number]]  (Normal) Collected: 07/22/23 1711    Specimen: Blood Updated: 07/22/23 1713     Glucose 96 mg/dL      Comment: Serial Number: [id number]:  [id number]       POC Glucose Once [[id number]]  (Abnormal) Collected: 07/22/23 1129    Specimen: Blood Updated: 07/22/23 1130     Glucose 231 mg/dL      Comment: Serial Number: [id number]:  [id number]             PENDING RESULTS: FREDY's.    IMAGING REVIEWED:  No radiology results for the last day    I have reviewed the patient's labs, imaging, reports, and other clinician documentation.    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  Unable to continue radiation during hospitalization due to intermittent A-fib.  Planned to restart on Monday(7/24).  Molecular testing on tumor pathology revealed low-level PD-L1 positivity.  Orders have been written for outpatient palliative chemotherapy and immunotherapy once radiation therapy has completed.  Plan to start outpatient Xgeva for bone metastases support. He has decided to pursue treatment as planned, but has chosen  to be a DNR/DNI.  Dexamethasone tapered again, to 4 mg po daily(7/21). Continued calcium plus vitamin D.  DANN with malabsorption and poor tolerance of oral iron/Elevated copper level-he has not been on multivitamin containing copper. He had reported bright red blood per rectum as outpatient with 2 colonoscopies in the past 6 years that were unremarkable per his report. Received IV iron last admit and again this admit.  On Protonix, aspirin and dexamethasone. Hemoglobin continues to slowly fall.  Iron studies showed continued DANN with a slight improvement from last admission. Tolerating oral Ferrous sulfate so far.     Left lung pneumonia-likely post-obstructive. On dexamethasone.  Per pulmonary.    BLE edema and pain-recent outpatient BLE Doppler was negative for DVT/SVT. On Lasix and Gabapentin.  Increased dose of gabapentin and ABIs normal.  A-fib with RVR-new onset noted in ED.  Cardiology consulted.  On ASA.  Cardiac EP recommending conservative management.  Chronic nausea, Uncontrolled DM2, Low TSH, and severe wt. Loss-on Protonix. Receiving Glucerna twice daily and Colace.  Endocrine managing.  Tapering Dexamethasone.   Smoking-cessation counseling continued.  On a nicotine patch.   Recent pathological right hip fracture-physical therapy working with patient.  On calcium plus vitamin D. Plannning rehab placement.   Oral thrush-on nystatin. Improved.   Agitation/depression-due to prolonged hospitalization and clinical status.  Wants antidepressants.     PLAN  Will follow CBC.   Continue Nystatin 100,000 units/mL 5 mL p.o. 4 times daily.  Continue dexamethasone 4 mg to daily.   Continue ASA 81 mg by mouth daily.  Would not recommend full dose anticoagulation for A-fib secondary to brain metastasis and known rectal bleeding unless benefits exceed risks.  Continue PRN Norco and Morphine.  Increase gabapentin for pain control.  Continue Protonix.      Continue Glucerna twice daily and Colace.    Radiation therapy  to resume when heart rate controlled-hopefully  tomorrow.   Plan for combination of chemotherapy and immunotherapy as an outpatient post radiation.  Start Xgeva for bone metastases support as outpatient.  Continue calcium plus vitamin D daily.  Recommend outpatient GI evaluation.  Brain MRI as outpatient to f/u on metastasis, post XRT.   Continue Ferrous sulfate 325mg po daily.   Continue Nicotine patch.   Lexapro 10 mg p.o daily.         I have personally performed a face-to-face diagnostic evaluation on this patient via telehealth. I have performed a complete history and physical examination through videoconferencing, reviewed laboratory studies, and radiographic examinations.  I have completed the majority and substantive portion of the medical decision making.  I have formulated the assessment on this patient and the plan of action as noted above. I have discussed the case with Aminah Houser NP, have edited/reviewed the note, and agree with the care plan.  He claims to be feeling better today but is feet are  to touch.  He complains of depression and is wanting something for it.  Hemoglobin is low but stable.  We will start him on Lexapro.            I discussed the patient's findings and my recommendations with patient.    Part of this note may be an electronic transcription/translation of spoken language to printed text using the Dragon Dictation System.     Electronically signed by Mina Dupree MD, 07/23/23, 10:01 AM EDT.

## 2023-07-23 NOTE — PROGRESS NOTES
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  DM II  S/p intramedullary nailing of the hip  S/p [id number]  S/p chemotherapy/plan for palliative radiation     PLAN:  Oxygen supplement and titration maintain saturation 90-95%: Currently requiring 5 L per nasal cannula  Encourage OOB daily   Antibiotics  Bronchodilators  Inhaled corticosteroids  Systemic steroids  Smoking cessation counseling, nicotine patch  Incentive spirometer  Diuresis and monitor [id number]  Oncology, Cardiology, Endocrinology following             LOS: 6 days     Subjective     Patient reports chronic shortness of breath and generalized weakness    Objective     Vital signs for last 24 hours:  Vitals:    07/23/23 1139 07/23/23 1142 07/23/23 1340 07/23/23 1630   BP:   93/65 94/56   BP Location:       Patient Position:       Pulse: 93 105 79 76   Resp: 21 21 20 20   Temp:   97.8 °F (36.6 °C) 98 °F (36.7 °C)   TempSrc:   Oral Oral   SpO2: 97% 96% 91% 92%   Weight:       Height:           Intake/Output last 3 shifts:  I/O last 3 completed shifts:  In: 920 [P.O.:920]  Out: 1725 [Urine:1725]  Intake/Output this shift:  I/O this shift:  In: 480 [P.O.:480]  Out: 850 [Urine:850]      Radiology  Imaging Results (Last 24 Hours)       ** No results found for the last 24 hours. **            Labs:  Results from last 7 days   Lab Units 07/22/23  2252   WBC 10*3/mm3 14.20*   HEMOGLOBIN g/dL 8.7*   HEMATOCRIT % 28.9*   PLATELETS 10*3/mm3 406       Results from last 7 days   Lab Units 07/20/23  0106   SODIUM mmol/L 138   POTASSIUM mmol/L 4.3   CHLORIDE mmol/L 94*   CO2 mmol/L 38.0*   BUN mg/dL 16   CREATININE mg/dL 0.31*   CALCIUM mg/dL 9.3   GLUCOSE mg/dL 123*                           Results from last 7 days   Lab Units 07/20/23  0106   MAGNESIUM mg/dL 1.7           Results from last 7 days   Lab Units 07/17/23  2254   TSH uIU/mL  0.145*             Meds:   SCHEDULE  aluminum sulfate-calcium acetate 1:20, 1,000 mL, Topical, BID  aspirin, 81 mg, Oral, Daily  budesonide-formoterol, 2 puff, Inhalation, BID - RT  calcium 500 mg vitamin D 5 mcg (200 UT), 1 tablet, Oral, Daily  [START ON 7/24/2023] dexamethasone, 4 mg, Oral, Daily With Breakfast   And  [START ON 7/24/2023] insulin regular, 12 Units, Subcutaneous, Daily With Breakfast  dilTIAZem CD, 240 mg, Oral, Q24H  docusate sodium, 100 mg, Oral, BID  escitalopram, 10 mg, Oral, Daily  ferrous sulfate, 324 mg, Oral, Daily With Breakfast  furosemide, 20 mg, Intravenous, Daily  gabapentin, 300 mg, Oral, BID  guaiFENesin, 600 mg, Oral, Q12H  hydrocortisone-bacitracin-zinc oxide-nystatin, 1 application , Topical, TID  insulin glargine, 14 Units, Subcutaneous, Q12H  insulin lispro, 14 Units, Subcutaneous, TID With Meals  insulin lispro, 2-9 Units, Subcutaneous, 4x Daily AC & at Bedtime  ipratropium-albuterol, 3 mL, Nebulization, 4x Daily - RT  metoprolol tartrate, 75 mg, Oral, BID  midodrine, 10 mg, Oral, TID AC  nicotine, 1 patch, Transdermal, Q24H  nystatin, 5 mL, Oral, 4x Daily  pantoprazole, 40 mg, Oral, Q AM  senna-docusate sodium, 2 tablet, Oral, BID  sodium chloride, 10 mL, Intravenous, Q12H      Infusions  Pharmacy Consult - Steroid Insulin Protocol,       PRNs    senna-docusate sodium **AND** polyethylene glycol **AND** bisacodyl **AND** bisacodyl    Calcium Replacement - Follow Nurse / BPA Driven Protocol    dextrose    dextrose    glucagon (human recombinant)    HYDROcodone-acetaminophen    ipratropium-albuterol    Magnesium Standard Dose Replacement - Follow Nurse / BPA Driven Protocol    Morphine    ondansetron    Pharmacy Consult - Steroid Insulin Protocol    Phosphorus Replacement - Follow Nurse / BPA Driven Protocol    Potassium Replacement - Follow Nurse / BPA Driven Protocol    [COMPLETED] Insert Peripheral IV **AND** sodium chloride    sodium chloride    sodium chloride    Physical  Exam:  General Appearance:  Alert   HEENT:  Normocephalic, without obvious abnormality, Conjunctiva/corneas clear,.   Nares normal, no drainage     Neck:  Supple, symmetrical, trachea midline.   Lungs /Chest wall: Mild bilateral basal rhonchi, respirations unlabored, symmetrical wall movement.     Heart:  Regular rate and rhythm, S1 S2 normal  Abdomen: Soft, non-tender, no masses, no organomegaly.    Extremities: No edema, no clubbing or cyanosis     ROS  Constitutional: Negative for chills, fever and positive for malaise/fatigue.   HENT: Negative.    Eyes: Negative.    Cardiovascular: Negative.    Respiratory: Positive for chronic shortness of breath.    Skin: Negative.    Musculoskeletal: Negative.    Gastrointestinal: Negative.    Genitourinary: Negative.    Neurological: Generalized weakness      I reviewed the recent clinical results    Part of this note may be an electronic transcription/translation of spoken language to printed text using the Dragon Dictation System.

## 2023-07-23 NOTE — PLAN OF CARE
Goal Outcome Evaluation:   Pt eating well, drinking multiple boosts a day. Vitals stable

## 2023-07-23 NOTE — PROGRESS NOTES
[facility] Medicine Services Daily Progress Note    Patient Name: Isra Davenport  : 1955  MRN: [id number]  Primary Care Physician:  Mayco Carreon MD  Date of admission: 7/15/2023      Subjective      Chief Complaint: Pain     History of Present Illness: Isra Davenport is a 68 y.o. 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  He is status post intramedullary nailing of the hip on 2023 and a [id number] placed on 2023 he is currently undergoing chemotherapy and plan for palliative radiation who presented to [facility] on 7/15/2023 complaining of pain and lower extremity edema     Review of records show patient had presented to chemotherapy with complaint of lower extremity edema and what he describes as neuropathic pain.  He had ultrasound that was negative for DVT and was prescribed Lasix.  He was also prescribed Norco 5/325 for pain.  Patient reports when he went to  prescriptions from the pharmacy the pharmacy was closed on Monday and he was unable to fill prescription.  He presents to the ED today with uncontrolled pain.  It was noted from previous admission he was discharged on low pressure for diagnosis of sinus tachycardia     ED work-up included chest x-ray that showed a right internal jugular Port-A-Cath in place.  Heart size stable.  Airspace disease in the left lung compatible with pneumonia.  Chronic elevation of the left hemidiaphragm.  No pleural effusion.  Right lung was clear.  Bilateral foot x-ray was negative for fracture.  There is healed fracture of the right tibia and fibula.  Troponin 26, potassium 4.6, sodium 138, glucose 355, creatinine 0.3, BUN is 21.  Albumin 2.6.  White count 11.4, hemoglobin 10.  On admission heart rate 84 but while he was being treated in the ED heart rate jumped into the  150s and was found to be in acute A-fib with RVR.  O2 sats dropped to 88% was placed on 2 L nasal cannula.  He was given a Cardizem bolus and Lopressor bolus and resumed on a Cardizem drip.  Patient denies any chest pain or feeling of chest palpitations.  Denies any fever chills or increased shortness of breath.  Reports positive for productive cough.  Positive for swelling of bilateral lower extremities below the calf and extreme tenderness to touch bilateral feet.     7/16/23 patient seen and examined in bed no acute distress, vital signs stable, K-5.4, hr 81, dyspnea on high flow oxygen.  We will start Cardizem p.o.  7/17/2023 patient seen and examined in bed no acute distress, he is on 3 L of oxygen, blood pressure 93/52.  Discussed with RN.  7/18/2023 patient seen and examined in bed no acute distress, dyspnea on 3 L, heart rate 155.  Discussed with RN. Restarted Cardizem drip per cardiology and adding short acting Cardizem to wean IV and increasing BB   7/19/2023 patient seen and examined in bed no acute distress, blood pressure 92/54.  On amiodarone, Cardizem, metoprolol dyspnea on 2 L.  Discussed with RN.  7/20/2023 patient seen and examined in bed no acute distress, his heart rate still elevated.  Presently on amiodarone, Cardizem, metoprolol. discussed with RN.  7/21/2023 patient seen and examined in bed no acute distress, very anxious to follow-up with home.  Discussed with RN, discussed with .  Awaiting placement.  Dyspnea on 3 L of oxygen, fatigue and weakness.  7/22/2023 patient seen and examined in bed no acute distress, vital signs stable, dyspnea on 6 L, fatigue weakness, discussed with RN.  Awaiting placement, pre-CERT pending.  Discussed with .  7/23/2023 patient seen and examined in bed no acute distress, is in better spirits today.  Dyspnea on 5 L.  Blood pressure is 93/65.  Heart rate 79.  Discussed with RN, discussed with , pre-CERT pending.    ROS Review of  Systems   Constitutional: Positive for malaise/fatigue.   Cardiovascular:  Positive for leg swelling.   Respiratory:  Positive for cough and sputum production.    Skin: Negative.    Musculoskeletal:  Positive for back pain.   Gastrointestinal: Negative.    Genitourinary: Negative.    Neurological:  Positive for paresthesias.   Objective      Vitals:   Temp:  [97.6 °F (36.4 °C)-98 °F (36.7 °C)] 97.8 °F (36.6 °C)  Heart Rate:  [] 79  Resp:  [12-25] 20  BP: ()/(56-72) 93/65  Flow (L/min):  [5-7.5] 5    Physical Exam Constitutional:       Appearance: He is ill-appearing.   Eyes:      Pupils: Pupils are equal, round, and reactive to light.   Cardiovascular:      Rate and Rhythm irregular.   Pulmonary:      Breath sounds: Rhonchi present.   Abdominal:      General: Abdomen is flat.      Palpations: Abdomen is soft.   Musculoskeletal:      Right lower leg: Edema present.      Left lower leg: Edema present.   Skin:     General: Skin is warm and dry.   Neurological:      Mental Status: He is alert and oriented to person, place, and time.   Psychiatric:         Mood and Affect: Mood normal.     Result Review    Result Review:  I have personally reviewed the results from the time of this admission to 7/23/2023 13:49 EDT and agree with these findings:  []  Laboratory  []  Microbiology  []  Radiology  []  EKG/Telemetry   []  Cardiology/Vascular   []  Pathology  []  Old records  []  Other:  Most notable findings include:     Wounds (last 24 hours)       LDA Wound       Row Name 07/23/23 0823 07/22/23 2001 07/22/23 1600       Wound 06/17/23 0915 Right anterior hip Incision    Wound - Properties Group Placement Date: 06/17/23  -HB Placement Time: 0915  -HB Side: Right  -HB Orientation: anterior  -HB Location: hip  -HB Primary Wound Type: Incision  -HB    Closure -- --  -AM Approximated  -NE    Base clean;dry  -NE clean;dry  -AM clean;dry  -NE    Periwound intact  -NE intact  -AM intact  -NE    Periwound Temperature warm   -NE warm  -AM warm  -NE    Drainage Amount none  -NE none  -AM none  -NE    Retired Wound - Properties Group Placement Date: 06/17/23  -HB Placement Time: 0915 -HB Side: Right  -HB Orientation: anterior  -HB Location: hip  -HB Primary Wound Type: Incision  -HB    Retired Wound - Properties Group Date first assessed: 06/17/23  -HB Time first assessed: 0915  -HB Side: Right  -HB Location: hip  -HB Primary Wound Type: Incision  -HB       Wound 07/15/23 2100 Bilateral medial gluteal Pressure Injury    Wound - Properties Group Placement Date: 07/15/23  -MF Placement Time: 2100 -MF Present on Hospital Admission: Y  -MF Side: Bilateral  -MF Orientation: medial  -MF Location: gluteal  -MF Primary Wound Type: Pressure inj  -MF    Base dry  -NE dry  -AM dry  -NE    Periwound warm;dry  -NE warm;dry  -AM warm;dry  -NE    Periwound Temperature -- --  -AM warm  -NE    Periwound Skin Turgor -- --  -AM soft  -NE    Drainage Amount none  -NE none  -AM none  -NE    Retired Wound - Properties Group Placement Date: 07/15/23  -MF Placement Time: 2100 -MF Present on Hospital Admission: Y  -MF Side: Bilateral  -MF Orientation: medial  -MF Location: gluteal  -MF Primary Wound Type: Pressure inj  -MF    Retired Wound - Properties Group Date first assessed: 07/15/23  -MF Time first assessed: 2100  -MF Present on Hospital Admission: Y  -MF Side: Bilateral  -MF Location: gluteal  -MF Primary Wound Type: Pressure inj  -MF       Wound 07/18/23 1900 Left posterior ankle    Wound - Properties Group Placement Date: 07/18/23  -AR Placement Time: 1900 -AR Present on Hospital Admission: Y  -AR Side: Left  -AR Orientation: posterior  -AR Location: ankle  -AR    Closure -- --  -AM VIMAL  -NE    Periwound swelling;redness;warm  -NE swelling;redness;warm  -AM --    Retired Wound - Properties Group Placement Date: 07/18/23  -AR Placement Time: 1900  -AR Present on Hospital Admission: Y  -AR Side: Left  -AR Orientation: posterior  -AR Location: ankle   -AR    Retired Wound - Properties Group Date first assessed: 07/18/23  -AR Time first assessed: 1900  -AR Present on Hospital Admission: Y  -AR Side: Left  -AR Location: ankle  -AR              User Key  (r) = Recorded By, (t) = Taken By, (c) = Cosigned By      Initials Name Provider Type     Cleo Britton RN Registered Nurse    Chandrika Samuels, RN Registered Nurse    Mani Moore, RN Registered Nurse    Laura Nava, RN Registered Nurse    Danyell Samuel, RN Registered Nurse                    CBC:      Lab 07/22/23  2252 07/21/23  2309 07/20/23  2358 07/20/23  0106 07/19/23  1025 07/17/23  2254 07/17/23  0216   WBC 14.20* 12.30* 12.70* 13.40* 12.90*   < > 12.30*   HEMOGLOBIN 8.7* 8.8* 8.5* 9.0* 9.0*   < > 9.5*   HEMATOCRIT 28.9* 28.4* 27.0* 29.1* 28.3*   < > 31.5*   PLATELETS 406 441 444 484* 486*   < > 445   NEUTROS ABS 11.50* 9.10* 11.18* 12.19* 11.74*   < > 10.70*   LYMPHS ABS  --   --   --   --   --   --  0.90   MONOS ABS  --   --   --   --   --   --  0.80   EOS ABS  --   --   --   --   --   --  0.00   MCV 86.1 85.0 84.6 85.3 85.1   < > 85.3    < > = values in this interval not displayed.       CMP:        Lab 07/20/23  1543 07/20/23  0106 07/19/23  1025 07/18/23  1906 07/18/23  1437 07/17/23  0216   SODIUM  --  138 134* 131* 130* 142   POTASSIUM  --  4.3 4.1 4.5 4.6 4.5   CHLORIDE  --  94* 89* 87* 87* 94*   CO2  --  38.0* 37.0* 37.0* 35.0* 41.0*   ANION GAP  --  6.0 8.0 7.0 8.0 7.0   BUN  --  16 15 21 19 17   CREATININE  --  0.31* 0.39* 0.49* 0.40* 0.35*   EGFR  --  129.2 120.5 112.5 119.6 124.5   GLUCOSE  --  123* 381* 335* 372* 91   CALCIUM  --  9.3 9.1 9.0 9.1 9.7   IONIZED CALCIUM  --  1.22 1.19* 1.18*  --   --    MAGNESIUM  --  1.7 1.9 2.2 1.5*  --    PHOSPHORUS 2.3* 1.6* 2.7 2.0*  --   --        No results found for: ACANTHNAEG, AFBCX, BPERTUSSISCX, BLOODCX  No results found for: BCIDPCR, CXREFLEX, CSFCX, CULTURETIS  No results found for: CULTURES, HSVCX, URCX  No results found  for: EYECULTURE, GCCX, HSVCULTURE, LABHSV  No results found for: LEGIONELLA, MRSACX, MUMPSCX, MYCOPLASCX  No results found for: NOCARDIACX, STOOLCX  No results found for: THROATCX, UNSTIMCULT, URINECX, CULTURE, VZVCULTUR  No results found for: VIRALCULTU, WOUNDCX    Assessment & Plan      Brief Patient Summary:  Isra Davenport is a 68 y.o. male who presented to chemotherapy with complaint of lower extremity edema and what he describes as neuropathic pain.  He had ultrasound that was negative for DVT and was prescribed Lasix.  He was also prescribed Norco 5/325 for pain.  Patient reports when he went to  prescriptions from the pharmacy, the pharmacy was closed on Monday and he was unable to fill prescription.  He presents to the ED today with uncontrolled pain.  It was noted from previous admission he was discharged on low pressure for diagnosis of sinus tachycardia     aluminum sulfate-calcium acetate 1:20, 1,000 mL, Topical, BID  aspirin, 81 mg, Oral, Daily  budesonide-formoterol, 2 puff, Inhalation, BID - RT  calcium 500 mg vitamin D 5 mcg (200 UT), 1 tablet, Oral, Daily  dexamethasone, 4 mg, Oral, Daily With Breakfast   And  insulin regular, 8 Units, Subcutaneous, Daily With Breakfast  dilTIAZem CD, 240 mg, Oral, Q24H  docusate sodium, 100 mg, Oral, BID  escitalopram, 10 mg, Oral, Daily  ferrous sulfate, 324 mg, Oral, Daily With Breakfast  furosemide, 20 mg, Intravenous, Daily  gabapentin, 300 mg, Oral, BID  guaiFENesin, 600 mg, Oral, Q12H  hydrocortisone-bacitracin-zinc oxide-nystatin, 1 application , Topical, TID  insulin glargine, 14 Units, Subcutaneous, Q12H  insulin lispro, 14 Units, Subcutaneous, TID With Meals  insulin lispro, 2-9 Units, Subcutaneous, 4x Daily AC & at Bedtime  ipratropium-albuterol, 3 mL, Nebulization, 4x Daily - RT  metoprolol tartrate, 75 mg, Oral, BID  midodrine, 5 mg, Oral, TID AC  nicotine, 1 patch, Transdermal, Q24H  nystatin, 5 mL, Oral, 4x Daily  pantoprazole, 40 mg, Oral, Q  AM  senna-docusate sodium, 2 tablet, Oral, BID  sodium chloride, 10 mL, Intravenous, Q12H       Pharmacy Consult - Steroid Insulin Protocol,          Active Hospital Problems:  Active Hospital Problems    Diagnosis     **Atrial fibrillation with RVR     Moderate malnutrition     Atrial fibrillation      New onset A-fib with RVR  -Cardizem drip  dc   -continue metoprolol po, Cardizem p.o. and amiodarone po  -consulted cardiology  Recent non invasive ischemic eval negative for ischemia  Echo with preserved LV function  - Had considered pharmacologic AC but with known brain mets concerned with risk for bleed so will consult oncology for recommendation on AC  - continue VTE with SCD   Per hem/onc>Continue ASA 81 mg by mouth daily.  Would not recommend full dose anticoagulation for A-fib secondary to brain metastasis and known rectal bleeding unless benefits exceed risks.      Pain and Edema BLE  - appears Neuropathic  - pt also with known bone cancer and arthritis   - consider neuropathy 2/2 chemotherapy  - will continue Lasix for edema  -Was ruled out for DVT   -added gabapentin 200mg QHS for sign of neuropathic pain  - check [id number]  - will resume pt Norco and add prn morphine  -echo 6/22/23: Left ventricular systolic function is normal. Left ventricular ejection fraction appears to be 61 - 65%.   Left ventricular diastolic function is consistent with (grade I) impaired relaxation.     Hyperglycemia- likely steroid induced   -A1C 7.3  - added RISS, lantus  - add steroid protocol      Hypoxia- consider 2/2 known lung cancer   - cxr with pneumonia and elevated WBC and known tobacco use  - covered possible COPD vs pneumonia vs carcinoma with empiric Azithromycin-dc  - wean O2 as tolerated   - duo nebs prn     Anemia  - continue ferrous sulfate  - hgb stable     Stage IV squamous cell lung cancer with mets to brain and bone  - currently treated with chemotherapy   - continue decadron as previously ordered by oncology  -  continue PPI   Per oncology   Check iron saturation.continue ferrous sulfate 325 mg by mouth daily.Consider IV iron if iron sat is low.  Radiation oncology consultation to resume radiation therapy when able  Await molecular testing results with plan for chemotherapy post completion of radiation.  Start Xgeva and daily Ca+VitD for bone metastases support as outpatient.  dexamethasone  4 mg by mouth daily.  Continue ASA 81 mg by mouth daily.  Would not recommend full dose anticoagulation for A-fib secondary to brain mets and known rectal bleeding.  Continue as needed hydrocodone, as needed Dilaudid, and scheduled gabapentin for pain control.  Continue Pepcid p.o. twice daily and as needed Zofran  Plan for combination of chemotherapy and immunotherapy as an outpatient post radiation.     DVT prophylaxis:  Mechanical DVT prophylaxis orders are present.    CODE STATUS:    Medical Intervention Limits: NO intubation (DNI)  Code Status (Patient has no pulse and is not breathing): No CPR (Do Not Attempt to Resuscitate)  Medical Interventions (Patient has pulse or is breathing): Limited Support      Disposition:  I expect patient to be discharged NH. In am    I have utilized all available, immediate resources to obtain, update, or review the patient's current medications including all prescriptions, over-the-counter products, herbals, cannabis/cannabidiol products, and vitamin.mineral/dietary (nutritional) supplements.      Medical Intervention Limits: NO intubation (DNI)  Code Status (Patient has no pulse and is not breathing): No CPR (Do Not Attempt to Resuscitate)  Medical Interventions (Patient has pulse or is breathing): Limited Support    Next of kin of Power of :       Admission Status:  I believe this patient meets ADMIT status.      This patient has been examined wearing appropriate Personal Protective Equipment and discussed with  RN . 07/23/23      Electronically signed by Raoul Castillo MD, 07/23/23,  13:49 EDT.  [facility]

## 2023-07-23 NOTE — CASE MANAGEMENT/SOCIAL WORK
Continued Stay Note  [facility]     Patient Name: Isra Davenport  MRN: [id number]  Today's Date: 7/23/2023    Admit Date: 7/15/2023    Plan: [facility] accepted; pending precert. Awaiting updated PT notes to start precert. PASRR approved. Radiation treatments at [facility]. Chemo on hold while in rehab, per Oncology.   Discharge Plan       Row Name 07/23/23 1527       Plan    Plan [facility] accepted; pending precert. Awaiting updated PT notes to start precert. PASRR approved. Radiation treatments at [facility]. Chemo on hold while in rehab, per Oncology.    Plan Comments Mirela with [facility] states they can accept pending precert. Updated PT notes needed in order to start precert.

## 2023-07-24 ENCOUNTER — RADIATION ONCOLOGY WEEKLY ASSESSMENT (OUTPATIENT)
Dept: RADIATION ONCOLOGY | Facility: HOSPITAL | Age: 68
End: 2023-07-24
Payer: MEDICARE

## 2023-07-24 DIAGNOSIS — C79.51 METASTASIS TO BONE: Primary | ICD-10-CM

## 2023-07-24 LAB
ANISOCYTOSIS BLD QL: ABNORMAL
BILIRUB UR QL STRIP: NEGATIVE
CLARITY UR: CLEAR
CLUMPED PLATELETS: PRESENT
COLOR UR: YELLOW
DEPRECATED RDW RBC AUTO: 60.4 FL (ref 37–54)
ERYTHROCYTE [DISTWIDTH] IN BLOOD BY AUTOMATED COUNT: 20.3 % (ref 12.3–15.4)
GIANT PLATELETS: ABNORMAL
GLUCOSE BLDC GLUCOMTR-MCNC: 110 MG/DL (ref 70–105)
GLUCOSE BLDC GLUCOMTR-MCNC: 114 MG/DL (ref 70–105)
GLUCOSE BLDC GLUCOMTR-MCNC: 117 MG/DL (ref 70–105)
GLUCOSE BLDC GLUCOMTR-MCNC: 120 MG/DL (ref 70–105)
GLUCOSE BLDC GLUCOMTR-MCNC: 85 MG/DL (ref 70–105)
GLUCOSE UR STRIP-MCNC: NEGATIVE MG/DL
HCT VFR BLD AUTO: 27 % (ref 37.5–51)
HGB BLD-MCNC: 8.3 G/DL (ref 13–17.7)
HGB UR QL STRIP.AUTO: NEGATIVE
HOLD SPECIMEN: NORMAL
KETONES UR QL STRIP: NEGATIVE
LARGE PLATELETS: ABNORMAL
LEUKOCYTE ESTERASE UR QL STRIP.AUTO: NEGATIVE
LYMPHOCYTES # BLD MANUAL: 0.62 10*3/MM3 (ref 0.7–3.1)
LYMPHOCYTES NFR BLD MANUAL: 2 % (ref 5–12)
MCH RBC QN AUTO: 26.4 PG (ref 26.6–33)
MCHC RBC AUTO-ENTMCNC: 30.5 G/DL (ref 31.5–35.7)
MCV RBC AUTO: 86.5 FL (ref 79–97)
METAMYELOCYTES NFR BLD MANUAL: 2 % (ref 0–0)
MICROCYTES BLD QL: ABNORMAL
MONOCYTES # BLD: 0.31 10*3/MM3 (ref 0.1–0.9)
MRSA DNA SPEC QL NAA+PROBE: ABNORMAL
MYELOCYTES NFR BLD MANUAL: 1 % (ref 0–0)
NEUTROPHILS # BLD AUTO: 14.11 10*3/MM3 (ref 1.7–7)
NEUTROPHILS NFR BLD MANUAL: 78 % (ref 42.7–76)
NEUTS BAND NFR BLD MANUAL: 13 % (ref 0–5)
NITRITE UR QL STRIP: NEGATIVE
PH UR STRIP.AUTO: 7 [PH] (ref 5–8)
PLATELET # BLD AUTO: 375 10*3/MM3 (ref 140–450)
PMV BLD AUTO: 9.2 FL (ref 6–12)
POIKILOCYTOSIS BLD QL SMEAR: ABNORMAL
PROT UR QL STRIP: NEGATIVE
RAD ONC ARIA COURSE ID: NORMAL
RAD ONC ARIA COURSE INTENT: NORMAL
RAD ONC ARIA COURSE LAST TREATMENT DATE: NORMAL
RAD ONC ARIA COURSE START DATE: NORMAL
RAD ONC ARIA COURSE TREATMENT ELAPSED DAYS: 12
RAD ONC ARIA FIRST TREATMENT DATE: NORMAL
RAD ONC ARIA PLAN FRACTIONS TREATED TO DATE: 4
RAD ONC ARIA PLAN FRACTIONS TREATED TO DATE: 4
RAD ONC ARIA PLAN ID: NORMAL
RAD ONC ARIA PLAN ID: NORMAL
RAD ONC ARIA PLAN NAME: NORMAL
RAD ONC ARIA PLAN NAME: NORMAL
RAD ONC ARIA PLAN PRESCRIBED DOSE PER FRACTION: 1.5 GY
RAD ONC ARIA PLAN PRESCRIBED DOSE PER FRACTION: 1.5 GY
RAD ONC ARIA PLAN PRIMARY REFERENCE POINT: NORMAL
RAD ONC ARIA PLAN PRIMARY REFERENCE POINT: NORMAL
RAD ONC ARIA PLAN TOTAL FRACTIONS PRESCRIBED: 10
RAD ONC ARIA PLAN TOTAL FRACTIONS PRESCRIBED: 10
RAD ONC ARIA PLAN TOTAL PRESCRIBED DOSE: 1500 CGY
RAD ONC ARIA PLAN TOTAL PRESCRIBED DOSE: 1500 CGY
RAD ONC ARIA REFERENCE POINT DOSAGE GIVEN TO DATE: 12 GY
RAD ONC ARIA REFERENCE POINT ID: NORMAL
RAD ONC ARIA REFERENCE POINT SESSION DOSAGE GIVEN: 3 GY
RBC # BLD AUTO: 3.12 10*6/MM3 (ref 4.14–5.8)
SCAN SLIDE: NORMAL
SMALL PLATELETS BLD QL SMEAR: ADEQUATE
SP GR UR STRIP: 1.01 (ref 1–1.03)
UROBILINOGEN UR QL STRIP: NORMAL
VARIANT LYMPHS NFR BLD MANUAL: 4 % (ref 19.6–45.3)
WBC MORPH BLD: NORMAL
WBC NRBC COR # BLD: 15.5 10*3/MM3 (ref 3.4–10.8)

## 2023-07-24 PROCEDURE — 94799 UNLISTED PULMONARY SVC/PX: CPT

## 2023-07-24 PROCEDURE — 63710000001 INSULIN GLARGINE PER 5 UNITS: Performed by: INTERNAL MEDICINE

## 2023-07-24 PROCEDURE — 81003 URINALYSIS AUTO W/O SCOPE: CPT | Performed by: INTERNAL MEDICINE

## 2023-07-24 PROCEDURE — 63710000001 INSULIN REGULAR HUMAN PER 5 UNITS: Performed by: INTERNAL MEDICINE

## 2023-07-24 PROCEDURE — 97535 SELF CARE MNGMENT TRAINING: CPT

## 2023-07-24 PROCEDURE — 25010000002 FUROSEMIDE PER 20 MG: Performed by: NURSE PRACTITIONER

## 2023-07-24 PROCEDURE — 82948 REAGENT STRIP/BLOOD GLUCOSE: CPT

## 2023-07-24 PROCEDURE — 99232 SBSQ HOSP IP/OBS MODERATE 35: CPT | Performed by: INTERNAL MEDICINE

## 2023-07-24 PROCEDURE — 97116 GAIT TRAINING THERAPY: CPT

## 2023-07-24 PROCEDURE — 94761 N-INVAS EAR/PLS OXIMETRY MLT: CPT

## 2023-07-24 PROCEDURE — 85025 COMPLETE CBC W/AUTO DIFF WBC: CPT | Performed by: NURSE PRACTITIONER

## 2023-07-24 PROCEDURE — 87641 MR-STAPH DNA AMP PROBE: CPT | Performed by: NURSE PRACTITIONER

## 2023-07-24 PROCEDURE — 97530 THERAPEUTIC ACTIVITIES: CPT

## 2023-07-24 PROCEDURE — 77412 RADIATION TX DELIVERY LVL 3: CPT | Performed by: RADIOLOGY

## 2023-07-24 PROCEDURE — 85007 BL SMEAR W/DIFF WBC COUNT: CPT | Performed by: NURSE PRACTITIONER

## 2023-07-24 PROCEDURE — FACE2FACE: Performed by: RADIOLOGY

## 2023-07-24 PROCEDURE — 97110 THERAPEUTIC EXERCISES: CPT

## 2023-07-24 PROCEDURE — 63710000001 DEXAMETHASONE PER 0.25 MG: Performed by: INTERNAL MEDICINE

## 2023-07-24 PROCEDURE — 94664 DEMO&/EVAL PT USE INHALER: CPT

## 2023-07-24 PROCEDURE — 94660 CPAP INITIATION&MGMT: CPT

## 2023-07-24 PROCEDURE — 63710000001 INSULIN LISPRO (HUMAN) PER 5 UNITS: Performed by: INTERNAL MEDICINE

## 2023-07-24 PROCEDURE — 99232 SBSQ HOSP IP/OBS MODERATE 35: CPT | Performed by: NURSE PRACTITIONER

## 2023-07-24 RX ORDER — INSULIN LISPRO 100 [IU]/ML
12 INJECTION, SOLUTION INTRAVENOUS; SUBCUTANEOUS
Status: DISCONTINUED | OUTPATIENT
Start: 2023-07-25 | End: 2023-07-27 | Stop reason: HOSPADM

## 2023-07-24 RX ADMIN — BUDESONIDE AND FORMOTEROL FUMARATE DIHYDRATE 2 PUFF: 160; 4.5 AEROSOL RESPIRATORY (INHALATION) at 07:10

## 2023-07-24 RX ADMIN — NICOTINE 1 PATCH: 14 PATCH, EXTENDED RELEASE TRANSDERMAL at 08:22

## 2023-07-24 RX ADMIN — Medication 1 TABLET: at 08:24

## 2023-07-24 RX ADMIN — INSULIN LISPRO 14 UNITS: 100 INJECTION, SOLUTION INTRAVENOUS; SUBCUTANEOUS at 13:01

## 2023-07-24 RX ADMIN — BUDESONIDE AND FORMOTEROL FUMARATE DIHYDRATE 2 PUFF: 160; 4.5 AEROSOL RESPIRATORY (INHALATION) at 19:08

## 2023-07-24 RX ADMIN — GUAIFENESIN 600 MG: 600 TABLET, EXTENDED RELEASE ORAL at 22:17

## 2023-07-24 RX ADMIN — INSULIN GLARGINE 14 UNITS: 100 INJECTION, SOLUTION SUBCUTANEOUS at 08:25

## 2023-07-24 RX ADMIN — PANTOPRAZOLE SODIUM 40 MG: 40 TABLET, DELAYED RELEASE ORAL at 08:24

## 2023-07-24 RX ADMIN — DEXAMETHASONE 4 MG: 4 TABLET ORAL at 08:23

## 2023-07-24 RX ADMIN — NYSTATIN 500000 UNITS: 100000 SUSPENSION ORAL at 12:17

## 2023-07-24 RX ADMIN — Medication 1000 ML: at 22:16

## 2023-07-24 RX ADMIN — NYSTATIN 500000 UNITS: 100000 SUSPENSION ORAL at 08:24

## 2023-07-24 RX ADMIN — FERROUS SULFATE TAB EC 324 MG (65 MG FE EQUIVALENT) 324 MG: 324 (65 FE) TABLET DELAYED RESPONSE at 08:24

## 2023-07-24 RX ADMIN — DILTIAZEM HYDROCHLORIDE 240 MG: 240 CAPSULE, EXTENDED RELEASE ORAL at 08:24

## 2023-07-24 RX ADMIN — NYSTATIN 500000 UNITS: 100000 SUSPENSION ORAL at 22:17

## 2023-07-24 RX ADMIN — MIDODRINE HYDROCHLORIDE 10 MG: 5 TABLET ORAL at 08:24

## 2023-07-24 RX ADMIN — ASPIRIN 81 MG CHEWABLE TABLET 81 MG: 81 TABLET CHEWABLE at 08:23

## 2023-07-24 RX ADMIN — MIDODRINE HYDROCHLORIDE 10 MG: 5 TABLET ORAL at 12:17

## 2023-07-24 RX ADMIN — METOPROLOL TARTRATE 75 MG: 25 TABLET, FILM COATED ORAL at 22:17

## 2023-07-24 RX ADMIN — NYSTATIN 500000 UNITS: 100000 SUSPENSION ORAL at 17:48

## 2023-07-24 RX ADMIN — FUROSEMIDE 20 MG: 10 INJECTION, SOLUTION INTRAMUSCULAR; INTRAVENOUS at 08:24

## 2023-07-24 RX ADMIN — MIDODRINE HYDROCHLORIDE 10 MG: 5 TABLET ORAL at 17:48

## 2023-07-24 RX ADMIN — Medication 10 ML: at 22:19

## 2023-07-24 RX ADMIN — SENNOSIDES AND DOCUSATE SODIUM 2 TABLET: 8.6; 5 TABLET ORAL at 08:23

## 2023-07-24 RX ADMIN — GABAPENTIN 300 MG: 300 CAPSULE ORAL at 08:24

## 2023-07-24 RX ADMIN — METOPROLOL TARTRATE 75 MG: 25 TABLET, FILM COATED ORAL at 08:24

## 2023-07-24 RX ADMIN — Medication 1000 ML: at 08:25

## 2023-07-24 RX ADMIN — SENNOSIDES AND DOCUSATE SODIUM 2 TABLET: 8.6; 5 TABLET ORAL at 22:17

## 2023-07-24 RX ADMIN — GABAPENTIN 300 MG: 300 CAPSULE ORAL at 22:17

## 2023-07-24 RX ADMIN — GUAIFENESIN 600 MG: 600 TABLET, EXTENDED RELEASE ORAL at 08:23

## 2023-07-24 RX ADMIN — INSULIN LISPRO 14 UNITS: 100 INJECTION, SOLUTION INTRAVENOUS; SUBCUTANEOUS at 17:48

## 2023-07-24 RX ADMIN — INSULIN GLARGINE 14 UNITS: 100 INJECTION, SOLUTION SUBCUTANEOUS at 22:20

## 2023-07-24 RX ADMIN — INSULIN LISPRO 14 UNITS: 100 INJECTION, SOLUTION INTRAVENOUS; SUBCUTANEOUS at 08:25

## 2023-07-24 RX ADMIN — INSULIN HUMAN 12 UNITS: 100 INJECTION, SOLUTION PARENTERAL at 08:25

## 2023-07-24 RX ADMIN — DOCUSATE SODIUM 100 MG: 100 CAPSULE, LIQUID FILLED ORAL at 08:23

## 2023-07-24 RX ADMIN — DOCUSATE SODIUM 100 MG: 100 CAPSULE, LIQUID FILLED ORAL at 22:17

## 2023-07-24 RX ADMIN — ESCITALOPRAM OXALATE 10 MG: 10 TABLET ORAL at 08:24

## 2023-07-24 NOTE — DISCHARGE PLACEMENT REQUEST
"Catalina Davenport (68 y.o. Male)       Date of Birth   1955    Social Security Number       Address   [address] [address]    Home Phone   [phone]    MRN   [id number]       [employer]   None    Marital Status                               Admission Date   7/15/23    Admission Type   Emergency    Admitting Provider   Jese Ontiveros MD    Attending Provider   Merle Pereyra DO    Department, Room/Bed   [facility], 2112/1       Discharge Date       Discharge Disposition       Discharge Destination                                 Attending Provider: Merle Pereyra DO    Allergies: Ibuprofen, Penicillins    Isolation: None   Infection: MRSA (07/24/23)   Code Status: No CPR    Ht: 177.8 cm (70\")   Wt: 73 kg (160 lb 15 oz)    Admission Cmt: None   Principal Problem: Atrial fibrillation with RVR [I48.91]                   Active Insurance as of 7/15/2023       Primary Coverage       Payor Plan Insurance Group Employer/Plan Group    ANTHEM MEDICARE REPLACEMENT ANTHEM MEDICARE ADVANTAGE [id number]       Payor Plan Address Payor Plan Phone Number Payor Plan Fax Number Effective Dates    [address] [phone]  6/1/2021 - None Entered    [facility] [address]         Subscriber Name Subscriber Birth Date Member ID       CATALINA DAVENPORT 1955 [id number]               Secondary Coverage       Payor Plan Insurance Group Employer/Plan Group    [place]        Payor Plan Address Payor Plan Phone Number Payor Plan Fax Number Effective Dates    [address]   4/30/2023 - None Entered    [place] [address]         Subscriber Name Subscriber Birth Date Member ID       CATALINA DAVENPORT 1955 [id number]                     Emergency Contacts        (Rel.) Home Phone Work Phone Mobile Phone    faiza davenport (Brother) [phone] -- --    martin davenport (Relative) [phone] --    roddy davenport [phone] -- " [phone]                 History & Physical        Yi Amin APRN at 07/15/23 2006       Attestation signed by Raoul Castillo MD at 23 0850    I have reviewed this documentation and agree.                      [facility] Medicine Services  History & Physical    Patient Name: Isra Davenport  : 1955  MRN: [id number]  Primary Care Physician:  Mayco Carreon MD  Date of admission: 7/15/2023  Date and Time of Service: 7/15/23 at 2015    Subjective      Chief Complaint: Pain    History of Present Illness: Isra Davenport is a 68 y.o. 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  He is status post intramedullary nailing of the hip on 2023 and a Kdoujs-n-Lyhw placed on 2023 he is currently undergoing chemotherapy and plan for palliative radiation who presented to [facility] on 7/15/2023 complaining of pain and lower extremity edema    Review of records show patient had presented to chemotherapy with complaint of lower extremity edema and what he describes as neuropathic pain.  He had ultrasound that was negative for DVT and was prescribed Lasix.  He was also prescribed Norco 5/325 for pain.  Patient reports when he went to  prescriptions from the pharmacy the pharmacy was closed on Monday and he was unable to fill prescription.  He presents to the ED today with uncontrolled pain.  It was noted from previous admission he was discharged on low pressure for diagnosis of sinus tachycardia    ED work-up included chest x-ray that showed a right internal jugular Port-A-Cath in place.  Heart size stable.  Airspace disease in the left lung compatible with pneumonia.  Chronic elevation of the left hemidiaphragm.  No pleural effusion.  Right lung was clear.  Bilateral foot x-ray was negative for fracture.  There is healed  fracture of the right tibia and fibula.  Troponin 26, potassium 4.6, sodium 138, glucose 355, creatinine 0.3, BUN is 21.  Albumin 2.6.  White count 11.4, hemoglobin 10.  On admission heart rate 84 but while he was being treated in the ED heart rate jumped into the 150s and was found to be in acute A-fib with RVR.  O2 sats dropped to 88% was placed on 2 L nasal cannula.  He was given a Cardizem bolus and Lopressor bolus and resumed on a Cardizem drip.  Patient denies any chest pain or feeling of chest palpitations.  Denies any fever chills or increased shortness of breath.  Reports positive for productive cough.  Positive for swelling of bilateral lower extremities below the calf and extreme tenderness to touch bilateral feet.          Review of Systems   Constitutional: Positive for malaise/fatigue.   Cardiovascular:  Positive for leg swelling.   Respiratory:  Positive for cough and sputum production.    Skin: Negative.    Musculoskeletal:  Positive for back pain.   Gastrointestinal: Negative.    Genitourinary: Negative.    Neurological:  Positive for paresthesias.   Psychiatric/Behavioral: Negative.        Personal History     Past Medical History:   Diagnosis Date    Arthritis     Benign prostatic hyperplasia without urinary obstruction 07/06/2023    Chronic back pain 07/06/2023    Closed fracture of right hip 06/16/2023    Added automatically from request for surgery [id number]    Hernia of anterior abdominal wall 09/22/2020    Lung cancer     squamous cell carcimoma,left    Lung cancer metastatic to brain 06/29/2023    Mass of left lung 06/16/2023    Added automatically from request for surgery [id number]    Neuralgia 07/06/2023    Neuropathy 07/06/2023    Osteoarthritis 07/06/2023    Polyp of colon 07/06/2023    Primary malignant neoplasm of left lung metastatic to other site 06/16/2023    Added automatically from request for surgery [id number]    Prostatitis 07/06/2023    Spinal stenosis of lumbar region 07/06/2023        Past Surgical History:   Procedure Laterality Date    BACK SURGERY  2006    BRONCHOSCOPY N/A 6/19/2023    Procedure: BRONCHOSCOPY WITH BIOPSY X1 AREA, FINE NEEDLE ASPIRATION, BRUSHING, AND BRONCHIAL WASHING;  Surgeon: Reggie Abarca MD;  Location: [facility] ENDOSCOPY;  Service: Pulmonary;  Laterality: N/A;  POST: MUCOUS PLUGS  AND LUNG MASS    HIP INTERTROCHANTERIC NAILING Right 6/17/2023    Procedure: HIP INTERTROCHANTERIC NAILING;  Surgeon: Levi Blackmon II, MD;  Location: [facility] MAIN OR;  Service: Orthopedics;  Laterality: Right;    LEG SURGERY Bilateral 08/25/1985    PORTACATH PLACEMENT Right 6/29/2023    Procedure: INSERTION OF PORTACATH;  Surgeon: Jese Goss MD;  Location: [facility] MAIN OR;  Service: General;  Laterality: Right;       Family History: family history is not on file. Otherwise pertinent FHx was reviewed and not pertinent to current issue.    Social History:  reports that he has been smoking cigarettes. He has a 15.00 pack-year smoking history. He has never used smokeless tobacco. He reports current alcohol use of about 2.0 standard drinks per week. He reports that he does not use drugs.    Home Medications:  Prior to Admission Medications       Prescriptions Last Dose Informant Patient Reported? Taking?    acetaminophen (TYLENOL) 325 MG tablet   Yes No    Take 2 tablets by mouth Every 6 (Six) Hours As Needed for Mild Pain. Indications: Pain    albuterol sulfate  (90 Base) MCG/ACT inhaler   No No    Inhale 2 puffs Every 4 (Four) Hours As Needed for Wheezing.    aspirin 81 MG EC tablet   No No    Take 1 tablet by mouth Daily.    ferrous sulfate 324 (65 Fe) MG tablet delayed-release EC tablet   No No    Take 1 tablet by mouth Daily With Breakfast.    metoprolol tartrate (LOPRESSOR) 25 MG tablet   No No    Take 1 tablet by mouth Every 8 (Eight) Hours.              Allergies:  Allergies   Allergen Reactions    Ibuprofen Itching    Penicillins Hives       Objective       Vitals:   Temp:  [98.2 °F (36.8 °C)] 98.2 °F (36.8 °C)  Heart Rate:  [0-156] 108  Resp:  [19-20] 19  BP: (105-129)/(54-82) 114/74  Flow (L/min):  [2-6] 2    Physical Exam  Constitutional:       Appearance: He is ill-appearing.   Eyes:      Pupils: Pupils are equal, round, and reactive to light.   Cardiovascular:      Rate and Rhythm: Tachycardia present. Rhythm irregular.   Pulmonary:      Breath sounds: Rhonchi present.   Abdominal:      General: Abdomen is flat.      Palpations: Abdomen is soft.   Musculoskeletal:      Right lower leg: Edema present.      Left lower leg: Edema present.   Skin:     General: Skin is warm and dry.   Neurological:      Mental Status: He is alert and oriented to person, place, and time.   Psychiatric:         Mood and Affect: Mood normal.        Result Review    Result Review:  I have personally reviewed the results from the time of this admission to 7/15/2023 21:19 EDT and agree with these findings:  [x]  Laboratory  [x]  Microbiology  [x]  Radiology  []  EKG/Telemetry   []  Cardiology/Vascular   []  Pathology  [x]  Old records  []  Other:  Most notable findings include: see H&P         Assessment & Plan        Active Hospital Problems:  Active Hospital Problems    Diagnosis     **Atrial fibrillation with RVR      Plan:   New onset A-fib with RVR  - Started on Cardiezem drip in ED and given lopressor  - will consult cardiology  - Had considered pharmacologic AC but with known brain mets concerned with risk for bleed so will consult oncology for recommendation on AC  - continue VTE with SCD until seen by cardiology and oncology    Pain and Edema BLE  - appears Neuropathic  - pt also with known bone cancer and arthritis   - consider neuropathy 2/2 chemotherapy  - will continue Lasix for edema  -Was ruled out for DVT   - will add gabapentin 200mg QHS for sign of neuropathic pain  - check BNP  - will resume pt Norco that was previously ordered and add prn Dilaudid   -echo 6/22/23:  Left ventricular systolic function is normal. Left ventricular ejection fraction appears to be 61 - 65%.   Left ventricular diastolic function is consistent with (grade I) impaired relaxation.    Hyperglycemia  - likely steroid induced   -A1C 7.3  - add glucomander  - add steroid protocol   -endocrinology consult    Hypoxia  - consider 2/2 known lung cancer   - cxr with pneumonia and elevated WBC and known tobacco use  - will cover possible COPD vs pneumonia vs carcinoma with empiric Azithromycin  - wean O2 as tolerated   - duo nebs prn    Anemia  - continue ferrous sulfate  - hgb stable    Stage IV squamous cell lung cancer with mets to brain and bone  - currently treated with chemotherapy   - continue decadron as previously ordered by oncology  - continue nexium   - consult oncology for continued management    DVT prophylaxis:  Mechanical DVT prophylaxis orders are present.    CODE STATUS:    Medical Intervention Limits: NO intubation (DNI)  Code Status (Patient has no pulse and is not breathing): No CPR (Do Not Attempt to Resuscitate)  Medical Interventions (Patient has pulse or is breathing): Limited Support    Admission Status:  I believe this patient meets observation status.    I discussed the patient's findings and my recommendations with patient.    This patient has been examined wearing appropriate Personal Protective Equipment a    Signature: Electronically signed by NOREEN Agosto, 07/15/23, 21:19 EDT.  [facility] Hospitalist Team     Electronically signed by Raoul Castillo MD at 07/16/23 0856       Operative/Procedure Notes (all)    No notes of this type exist for this encounter.          Physician Progress Notes (last 48 hours)        Reggie Abarca MD at 07/24/23 1410          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  DM II  S/p intramedullary nailing  of the hip  S/p [id number]  S/p chemotherapy/plan for palliative radiation     PLAN:  Oxygen supplement and titration maintain saturation 90-95%: Currently requiring 4 L per nasal cannula  Encourage OOB daily   Antibiotics  Bronchodilators  Inhaled corticosteroids  Systemic steroids  Smoking cessation counseling, nicotine patch  Incentive spirometer  Diuresis and monitor [id number]  Oncology, Cardiology, Endocrinology following             LOS: 7 days     Subjective     Patient reports chronic shortness of breath and generalized weakness    Objective     Vital signs for last 24 hours:  Vitals:    07/24/23 0711 07/24/23 0821 07/24/23 0911 07/24/23 1328   BP:  101/63 110/68 96/59   BP Location:       Patient Position:       Pulse: 73 77 90 78   Resp: 18  20 20   Temp:   97.8 °F (36.6 °C) 98 °F (36.7 °C)   TempSrc:   Oral Oral   SpO2: 90% 91% 90% 90%   Weight:       Height:           Intake/Output last 3 shifts:  I/O last 3 completed shifts:  In: 1120 [P.O.:1120]  Out: 2750 [Urine:2750]  Intake/Output this shift:  I/O this shift:  In: 720 [P.O.:720]  Out: 450 [Urine:450]      Radiology  Imaging Results (Last 24 Hours)       Procedure Component Value Units Date/Time    XR Chest 1 View [[id number]] Collected: 07/23/23 1959     Updated: 07/23/23 2004    Narrative:      XR CHEST 1 VW    Date of Exam: 7/23/2023 7:52 PM EDT    Indication: Hypoxia, left lung cancer.    Comparison: 7/15/2023.    Findings:  The heart is enlarged. There is worsening consolidation in the left perihilar region. This is secondary to a known left lung mass secondary to left lung cancer. The consolidation probably represents worsening postobstructive pneumonitis and atelectasis.   There may be a small left pleural effusion. Within the right lung, the pulmonary vascular markings appear increased. This suggests that the patient has pulmonary vascular congestion/low-grade pulmonary edema. The right lung and pleural space are   otherwise clear. There is a  right-sided port in place. There is partially imaged fusion hardware in the lower thoracic spine.      Impression:      Impression:    1. Worsening consolidation in the left perihilar region. This is secondary to a known lung mass consistent with lung cancer. There is probably worsening postobstructive pneumonitis/atelectasis and there may be a small left pleural effusion.  2. Cardiomegaly with pulmonary vascular congestion/low-grade pulmonary edema.      Electronically Signed: Zeyad Contreras    7/23/2023 8:02 PM EDT    Workstation ID: [id number]            Labs:  Results from last 7 days   Lab Units 07/24/23  0007   WBC 10*3/mm3 15.50*   HEMOGLOBIN g/dL 8.3*   HEMATOCRIT % 27.0*   PLATELETS 10*3/mm3 375       Results from last 7 days   Lab Units 07/20/23  0106   SODIUM mmol/L 138   POTASSIUM mmol/L 4.3   CHLORIDE mmol/L 94*   CO2 mmol/L 38.0*   BUN mg/dL 16   CREATININE mg/dL 0.31*   CALCIUM mg/dL 9.3   GLUCOSE mg/dL 123*       Results from last 7 days   Lab Units 07/23/23  2053   PH, ARTERIAL pH units 7.443   PO2 ART mm Hg 115.9*   PCO2, ARTERIAL mm Hg 67.7*   HCO3 ART mmol/L 46.3*                     Results from last 7 days   Lab Units 07/20/23  0106   MAGNESIUM mg/dL 1.7           Results from last 7 days   Lab Units 07/17/23  2254   TSH uIU/mL 0.145*             Meds:   SCHEDULE  aluminum sulfate-calcium acetate 1:20, 1,000 mL, Topical, BID  aspirin, 81 mg, Oral, Daily  budesonide-formoterol, 2 puff, Inhalation, BID - RT  calcium 500 mg vitamin D 5 mcg (200 UT), 1 tablet, Oral, Daily  dexamethasone, 4 mg, Oral, Daily With Breakfast   And  insulin regular, 12 Units, Subcutaneous, Daily With Breakfast  dilTIAZem CD, 240 mg, Oral, Q24H  docusate sodium, 100 mg, Oral, BID  escitalopram, 10 mg, Oral, Daily  ferrous sulfate, 324 mg, Oral, Daily With Breakfast  furosemide, 20 mg, Intravenous, Daily  gabapentin, 300 mg, Oral, BID  guaiFENesin, 600 mg, Oral, Q12H  hydrocortisone-bacitracin-zinc oxide-nystatin, 1 application  , Topical, TID  insulin glargine, 14 Units, Subcutaneous, Q12H  insulin lispro, 14 Units, Subcutaneous, TID With Meals  insulin lispro, 2-9 Units, Subcutaneous, 4x Daily AC & at Bedtime  metoprolol tartrate, 75 mg, Oral, BID  midodrine, 10 mg, Oral, TID AC  nicotine, 1 patch, Transdermal, Q24H  nystatin, 5 mL, Oral, 4x Daily  pantoprazole, 40 mg, Oral, Q AM  senna-docusate sodium, 2 tablet, Oral, BID  sodium chloride, 10 mL, Intravenous, Q12H      Infusions  Pharmacy Consult - Steroid Insulin Protocol,       PRNs    senna-docusate sodium **AND** polyethylene glycol **AND** bisacodyl **AND** bisacodyl    Calcium Replacement - Follow Nurse / BPA Driven Protocol    dextrose    dextrose    glucagon (human recombinant)    HYDROcodone-acetaminophen    hydrOXYzine    ipratropium-albuterol    Magnesium Standard Dose Replacement - Follow Nurse / BPA Driven Protocol    Morphine    ondansetron    Pharmacy Consult - Steroid Insulin Protocol    Phosphorus Replacement - Follow Nurse / BPA Driven Protocol    Potassium Replacement - Follow Nurse / BPA Driven Protocol    [COMPLETED] Insert Peripheral IV **AND** sodium chloride    sodium chloride    sodium chloride    Physical Exam:  General Appearance:  Alert   HEENT:  Normocephalic, without obvious abnormality, Conjunctiva/corneas clear,.   Nares normal, no drainage     Neck:  Supple, symmetrical, trachea midline.   Lungs /Chest wall: Mild bilateral basal rhonchi, respirations unlabored, symmetrical wall movement.     Heart:  Regular rate and rhythm, S1 S2 normal  Abdomen: Soft, non-tender, no masses, no organomegaly.    Extremities: No edema, no clubbing or cyanosis     ROS  Constitutional: Negative for chills, fever and positive for malaise/fatigue.   HENT: Negative.    Eyes: Negative.    Cardiovascular: Negative.    Respiratory: Positive for chronic shortness of breath.    Skin: Negative.    Musculoskeletal: Negative.    Gastrointestinal: Negative.    Genitourinary: Negative.     Neurological: Generalized weakness      I reviewed the recent clinical results    Part of this note may be an electronic transcription/translation of spoken language to printed text using the Dragon Dictation System.      Electronically signed by Reggie Abarca MD at 23 1410       Lizet Dang APRN at 23 1402          Cardiology [place]        LOS:  LOS: 7 days   Patient Name: Isra Davneport  Age/Sex: 68 y.o. male  : 1955  MRN: [id number]    Day of Service: 23   Length of Stay: 7  Encounter Provider: NOREEN Gomes  Place of Service: [facility] CARDIOLOGY  Patient Care Team:  Mayco Carreon MD as PCP - General (Internal Medicine)  Mina Dupree MD as Consulting Physician (Hematology and Oncology)    Subjective:     Chief Complaint: f/u Afib    Subjective: stable from CV standpoint B/p stable. He is planned for radiation today at the [facility]    Current Medications:   Scheduled Meds:aluminum sulfate-calcium acetate 1:20, 1,000 mL, Topical, BID  aspirin, 81 mg, Oral, Daily  budesonide-formoterol, 2 puff, Inhalation, BID - RT  calcium 500 mg vitamin D 5 mcg (200 UT), 1 tablet, Oral, Daily  dexamethasone, 4 mg, Oral, Daily With Breakfast   And  insulin regular, 12 Units, Subcutaneous, Daily With Breakfast  dilTIAZem CD, 240 mg, Oral, Q24H  docusate sodium, 100 mg, Oral, BID  escitalopram, 10 mg, Oral, Daily  ferrous sulfate, 324 mg, Oral, Daily With Breakfast  furosemide, 20 mg, Intravenous, Daily  gabapentin, 300 mg, Oral, BID  guaiFENesin, 600 mg, Oral, Q12H  hydrocortisone-bacitracin-zinc oxide-nystatin, 1 application , Topical, TID  insulin glargine, 14 Units, Subcutaneous, Q12H  insulin lispro, 14 Units, Subcutaneous, TID With Meals  insulin lispro, 2-9 Units, Subcutaneous, 4x Daily AC & at Bedtime  metoprolol tartrate, 75 mg, Oral, BID  midodrine, 10 mg, Oral, TID AC  nicotine, 1 patch, Transdermal, Q24H  nystatin, 5 mL, Oral,  4x Daily  pantoprazole, 40 mg, Oral, Q AM  senna-docusate sodium, 2 tablet, Oral, BID  sodium chloride, 10 mL, Intravenous, Q12H      Continuous Infusions:Pharmacy Consult - Steroid Insulin Protocol,         Allergies:  Allergies   Allergen Reactions    Ibuprofen Itching    Penicillins Hives       Review of Systems   Constitutional: Negative for chills, diaphoresis and malaise/fatigue.   Cardiovascular:  Negative for chest pain, dyspnea on exertion, irregular heartbeat, leg swelling, near-syncope, orthopnea, palpitations, paroxysmal nocturnal dyspnea and syncope.   Respiratory:  Negative for cough, shortness of breath, sleep disturbances due to breathing and sputum production.    Musculoskeletal:  Positive for back pain and joint pain.   Gastrointestinal:  Negative for change in bowel habit.   Genitourinary:  Negative for urgency.   Neurological:  Negative for dizziness and headaches.   Psychiatric/Behavioral:  Negative for altered mental status.        Objective:     Temp:  [97.8 °F (36.6 °C)-98.1 °F (36.7 °C)] 98 °F (36.7 °C)  Heart Rate:  [73-99] 78  Resp:  [17-27] 20  BP: ()/(56-91) 96/59     Intake/Output Summary (Last 24 hours) at 7/24/2023 1403  Last data filed at 7/24/2023 1328  Gross per 24 hour   Intake 1360 ml   Output 1600 ml   Net -240 ml     Body mass index is 23.09 kg/m².      07/21/23  0504 07/22/23  0518 07/24/23  0507   Weight: 70.8 kg (156 lb 1.4 oz) (pts previous weight was on 7/17. no extra linen on the bed) 71.6 kg (157 lb 13.6 oz) 73 kg (160 lb 15 oz)         General Appearance:    Alert, cooperative, in no acute distress                                Head: Atraumatic, normocephalic, PERRLA               Neck:   supple, trachea midline, no thyromegaly, no carotid bruit, no JVD   Lungs:     Clear to auscultation, respirations regular, even and               unlabored    Heart:    regular rhythm and normal rate, normal S1 and S2   Abdomen:     Normal bowel sounds, no masses, no  organomegaly, soft  nontender, nondistended, no guarding, no rebound  tenderness   Extremities:   Moves all extremities well, no edema, no cyanosis, no  redness   Pulses:   Pulses palpable and equal bilaterally   Skin:   No bleeding, bruising or rash   Neurologic:   Awake, alert, oriented x3         Lab Review:   Results from last 7 days   Lab Units 07/20/23  0106 07/19/23  1025   SODIUM mmol/L 138 134*   POTASSIUM mmol/L 4.3 4.1   CHLORIDE mmol/L 94* 89*   CO2 mmol/L 38.0* 37.0*   BUN mg/dL 16 15   CREATININE mg/dL 0.31* 0.39*   GLUCOSE mg/dL 123* 381*   CALCIUM mg/dL 9.3 9.1         Results from last 7 days   Lab Units 07/24/23  0007 07/23/23  1940 07/22/23  2252   WBC 10*3/mm3 15.50*  --  14.20*   HEMOGLOBIN g/dL 8.3*  --  8.7*   HEMOGLOBIN, POC g/dL  --  11.3*  --    HEMATOCRIT % 27.0*  --  28.9*   HEMATOCRIT POC %  --  33*  --    PLATELETS 10*3/mm3 375  --  406         Results from last 7 days   Lab Units 07/20/23  0106 07/19/23  1025   MAGNESIUM mg/dL 1.7 1.9           Invalid input(s): LDLCALC      Results from last 7 days   Lab Units 07/17/23  2254   TSH uIU/mL 0.145*       Recent Radiology:  Imaging Results (Most Recent)       Procedure Component Value Units Date/Time    XR Chest 1 View [[id number]] Collected: 07/23/23 1959     Updated: 07/23/23 2004    Narrative:      XR CHEST 1 VW    Date of Exam: 7/23/2023 7:52 PM EDT    Indication: Hypoxia, left lung cancer.    Comparison: 7/15/2023.    Findings:  The heart is enlarged. There is worsening consolidation in the left perihilar region. This is secondary to a known left lung mass secondary to left lung cancer. The consolidation probably represents worsening postobstructive pneumonitis and atelectasis.   There may be a small left pleural effusion. Within the right lung, the pulmonary vascular markings appear increased. This suggests that the patient has pulmonary vascular congestion/low-grade pulmonary edema. The right lung and pleural space are   otherwise  clear. There is a right-sided port in place. There is partially imaged fusion hardware in the lower thoracic spine.      Impression:      Impression:    1. Worsening consolidation in the left perihilar region. This is secondary to a known lung mass consistent with lung cancer. There is probably worsening postobstructive pneumonitis/atelectasis and there may be a small left pleural effusion.  2. Cardiomegaly with pulmonary vascular congestion/low-grade pulmonary edema.      Electronically Signed: Zeyad Contreras    7/23/2023 8:02 PM EDT    Workstation ID: [id number]    XR Chest 1 View [[id number]] Collected: 07/15/23 1808     Updated: 07/15/23 1938    Narrative:      XR CHEST 1 VW    Date of Exam: 7/15/2023 5:58 PM EDT    Indication: edema    Comparison: 6/29/2023    Findings:  Right internal jugular Port-A-Cath remains in place and unchanged in position. Heart size is stable. There is chronic elevation of left ami diaphragm. There is were airspace disease compatible with pneumonia. Right lung is clear. No pleural effusion. No   evidence pneumothorax.      Impression:      Impression:    1. Worsening left basilar airspace disease compatible with pneumonia.  2. Chronic elevation of the left hemidiaphragm.      Electronically Signed: Jeet Panda    7/15/2023 6:09 PM EDT    Workstation ID: [id number]    XR Foot 3+ View Bilateral [[id number]] Collected: 07/15/23 1832     Updated: 07/15/23 1836    Narrative:      XR FOOT 3+ VW BILATERAL    Date of Exam: 7/15/2023 6:04 PM EDT    Indication: pain    Comparison: None available.    Findings:  Right foot: There is no acute fracture or dislocation. No bony erosion or abnormal periosteal reaction. Soft tissues are unremarkable.    Left foot: There is no acute fracture or dislocation. No bony erosion or abnormal periosteal reaction. There is mild diffuse soft tissue swelling.        Impression:      Impression:    1. No acute bony abnormality of the feet.      Electronically Signed: Jeet  Farzad    7/15/2023 6:34 PM EDT    Workstation ID: [id number]    XR Tibia Fibula 2 View Bilateral [[id number]] Collected: 07/15/23 1829     Updated: 07/15/23 1833    Narrative:      XR TIBIA FIBULA 2 VW BILATERAL    Date of Exam: 7/15/2023 6:04 PM EDT    Indication: pain    Comparison: None available.    Findings:  Left tibia and fibula: There is no acute fracture or dislocation. Soft tissues are unremarkable.    Right tibia and fibula: There is smooth deformity of the midshaft of both the tibia and fibula related to old healed fractures. There is single surgical screw in place through the mid tibia. There is no evidence of acute fracture or dislocation. No   abnormal periosteal reaction identified. Soft tissues are unremarkable.      Impression:      Impression:    1. No acute bony abnormality of the left tibia or fibula.  2. Old healed fractures of the right tibia and fibula. No acute bony abnormality.    Electronically Signed: Jeet Panda    7/15/2023 6:31 PM EDT    Workstation ID: [id number]            ECHOCARDIOGRAM:    Results for orders placed during the hospital encounter of 06/16/23    Adult Transthoracic Echo Complete W/ Cont if Necessary Per Protocol    Interpretation Summary    Left ventricular systolic function is normal. Left ventricular ejection fraction appears to be 61 - 65%.    Left ventricular diastolic function is consistent with (grade I) impaired relaxation.    There is mild bileaflet mitral valve prolapse present.    Estimated right ventricular systolic pressure from tricuspid regurgitation is mildly elevated (35-45 mmHg). Calculated right ventricular systolic pressure from tricuspid regurgitation is 44 mmHg.        I reviewed the patient's new clinical results.    EKG:      Assessment:       Atrial fibrillation with RVR    Atrial fibrillation    Moderate malnutrition    1. Hip fracture, pathologic, s/p surgery  2. Lung mass with concern for metastasis to brain /Squamous cell carcinoma of lung,  stage IV left upper lobe perihilar mass  3. Tachycardia, pAfib/flutter  4. Acute hypoxic respiratory failur    Plan:   Patient is in SR.   Recent non invasive ischemic eval negative for ischemia  Echo with preserved LV function  Per oncology not recommended long term a/c due to brain mets and known rectal bleeding   okay to travel for radiation today    NOREEN Gomes  23  14:03 EDT    Electronically signed by Lizet Dang APRN at 23 1408       Merle Pereyra DO at 23 1147          [facility] Medicine Services   Daily Progress Note      Patient Name: Isra Davenport  : 1955  MRN: [id number]  Primary Care Physician:  Mayco Carreon MD  Date of admission: 7/15/2023      Subjective      Chief Complaint: Shortness of breath    Patient seen and examined this morning.  Taking over care today.  States he feels depressed, no suicidal ideations.  Has been started on Lexapro this admission.  Wants to go for brain radiation today if possible, checking with cardiology.  Denies any other complaints.    Pertinent positives as noted in HPI/subjective.  All other systems were reviewed and are negative.      Objective      Vitals:   Temp:  [97.8 °F (36.6 °C)-98.1 °F (36.7 °C)] 97.8 °F (36.6 °C)  Heart Rate:  [73-99] 90  Resp:  [17-27] 20  BP: ()/(56-91) 110/68  Flow (L/min):  [3.5-15] 4    Physical Exam:    General: Awake, alert, elderly male, lying in bed, NAD  Eyes: PERRL, EOMI, conjunctivae are clear  Cardiovascular: Regular rate and rhythm, no murmurs  Respiratory: Clear to auscultation bilaterally, no wheezing or rales, unlabored breathing  Abdomen: Soft, nontender, positive bowel sounds, no guarding  Neurologic: A&O, CN grossly intact, moves all extremities spontaneously  Musculoskeletal: Generalized weak, no other gross deformities  Skin: Warm, dry         Result Review    Result Review:  I have personally reviewed the results from the time of this admission to 2023  11:47 EDT and agree with these findings:  [x]  Laboratory  [x]  Microbiology  [x]  Radiology  [x]  EKG/Telemetry   [x]  Cardiology/Vascular   []  Pathology  [x]  Old records  []  Other:    Wounds (last 24 hours)       LDA Wound       Row Name 07/24/23 0800 07/24/23 0400 07/24/23 0000       Wound 06/17/23 0915 Right anterior hip Incision    Wound - Properties Group Placement Date: 06/17/23  -HB Placement Time: 0915 -HB Side: Right  -HB Orientation: anterior  -HB Location: hip  -HB Primary Wound Type: Incision  -HB    Closure Approximated  -EMRE Approximated  -MF Approximated  -MF    Drainage Amount none  -EMRE none  -MF none  -MF    Retired Wound - Properties Group Placement Date: 06/17/23  -HB Placement Time: 0915 -HB Side: Right  -HB Orientation: anterior  -HB Location: hip  -HB Primary Wound Type: Incision  -HB    Retired Wound - Properties Group Date first assessed: 06/17/23  -HB Time first assessed: 0915  -HB Side: Right  -HB Location: hip  -HB Primary Wound Type: Incision  -HB       Wound 07/15/23 2100 Bilateral medial gluteal Pressure Injury    Wound - Properties Group Placement Date: 07/15/23  -MF Placement Time: 2100  -MF Present on Hospital Admission: Y  -MF Side: Bilateral  -MF Orientation: medial  -MF Location: gluteal  -MF Primary Wound Type: Pressure inj  -MF    Base dry  -EMRE dry  -MF dry  -MF    Periwound warm;dry  -EMRE warm;dry  -MF warm;dry  -MF    Periwound Temperature warm  -EMRE warm  -MF warm  -MF    Periwound Skin Turgor soft  -EMRE soft  -MF soft  -MF    Drainage Amount none  -EMRE none  -MF none  -MF    Retired Wound - Properties Group Placement Date: 07/15/23  -MF Placement Time: 2100  -MF Present on Hospital Admission: Y  -MF Side: Bilateral  -MF Orientation: medial  -MF Location: gluteal  -MF Primary Wound Type: Pressure inj  -MF    Retired Wound - Properties Group Date first assessed: 07/15/23  -MF Time first assessed: 2100  -MF Present on Hospital Admission: Y  -MF Side: Bilateral  -MF Location:  gluteal  -MF Primary Wound Type: Pressure inj  -MF       Wound 07/18/23 1900 Left posterior ankle    Wound - Properties Group Placement Date: 07/18/23  -AR Placement Time: 1900  -AR Present on Hospital Admission: Y  -AR Side: Left  -AR Orientation: posterior  -AR Location: ankle  -AR    Retired Wound - Properties Group Placement Date: 07/18/23  -AR Placement Time: 1900  -AR Present on Hospital Admission: Y  -AR Side: Left  -AR Orientation: posterior  -AR Location: ankle  -AR    Retired Wound - Properties Group Date first assessed: 07/18/23  -AR Time first assessed: 1900  -AR Present on Hospital Admission: Y  -AR Side: Left  -AR Location: ankle  -AR      Row Name 07/23/23 2000 07/23/23 1600 07/23/23 1200       Wound 06/17/23 0915 Right anterior hip Incision    Wound - Properties Group Placement Date: 06/17/23  -HB Placement Time: 0915  -HB Side: Right  -HB Orientation: anterior  -HB Location: hip  -HB Primary Wound Type: Incision  -HB    Closure Approximated  -MF -- --    Base -- clean;dry  -NE clean;dry  -NE    Periwound -- intact  -NE intact  -NE    Periwound Temperature -- warm  -NE warm  -NE    Drainage Amount none  -MF none  -NE none  -NE    Dressing Care open to air  -MF -- --    Retired Wound - Properties Group Placement Date: 06/17/23  -HB Placement Time: 0915  -HB Side: Right  -HB Orientation: anterior  -HB Location: hip  -HB Primary Wound Type: Incision  -HB    Retired Wound - Properties Group Date first assessed: 06/17/23  -HB Time first assessed: 0915  -HB Side: Right  -HB Location: hip  -HB Primary Wound Type: Incision  -HB       Wound 07/15/23 2100 Bilateral medial gluteal Pressure Injury    Wound - Properties Group Placement Date: 07/15/23  -MF Placement Time: 2100  -MF Present on Hospital Admission: Y  -MF Side: Bilateral  -MF Orientation: medial  -MF Location: gluteal  -MF Primary Wound Type: Pressure inj  -MF    Pressure Injury Stage 2  -MF -- --    Dressing Appearance open to air  -MF -- --    Base  dry  -MF dry  -NE dry  -NE    Periwound warm;dry  -MF warm;dry  -NE warm;dry  -NE    Periwound Temperature warm  -MF cool  -NE cool  -NE    Periwound Skin Turgor soft  -MF soft  -NE soft  -NE    Drainage Amount none  -MF none  -NE none  -NE    Dressing Care open to air  -MF -- --    Retired Wound - Properties Group Placement Date: 07/15/23  -MF Placement Time: 2100  -MF Present on Hospital Admission: Y  -MF Side: Bilateral  -MF Orientation: medial  -MF Location: gluteal  -MF Primary Wound Type: Pressure inj  -MF    Retired Wound - Properties Group Date first assessed: 07/15/23  -MF Time first assessed: 2100  -MF Present on Hospital Admission: Y  -MF Side: Bilateral  -MF Location: gluteal  -MF Primary Wound Type: Pressure inj  -MF       Wound 07/18/23 1900 Left posterior ankle    Wound - Properties Group Placement Date: 07/18/23  -AR Placement Time: 1900  -AR Present on Hospital Admission: Y  -AR Side: Left  -AR Orientation: posterior  -AR Location: ankle  -AR    Periwound -- swelling;redness;warm  -NE swelling;redness;warm  -NE    Retired Wound - Properties Group Placement Date: 07/18/23  -AR Placement Time: 1900  -AR Present on Hospital Admission: Y  -AR Side: Left  -AR Orientation: posterior  -AR Location: ankle  -AR    Retired Wound - Properties Group Date first assessed: 07/18/23  -AR Time first assessed: 1900  -AR Present on Hospital Admission: Y  -AR Side: Left  -AR Location: ankle  -AR              User Key  (r) = Recorded By, (t) = Taken By, (c) = Cosigned By      Initials Name Provider Type     Cleo Britton RN Registered Nurse    Rachel George RN Registered Nurse    Mani Moore, RN Registered Nurse    Laura Nava, RN Registered Nurse    Danyell Samuel, RN Registered Nurse                      Assessment & Plan      Brief Patient Summary:  Isra Davenport is a 68 y.o. male       aluminum sulfate-calcium acetate 1:20, 1,000 mL, Topical, BID  aspirin, 81 mg, Oral,  Daily  budesonide-formoterol, 2 puff, Inhalation, BID - RT  calcium 500 mg vitamin D 5 mcg (200 UT), 1 tablet, Oral, Daily  dexamethasone, 4 mg, Oral, Daily With Breakfast   And  insulin regular, 12 Units, Subcutaneous, Daily With Breakfast  dilTIAZem CD, 240 mg, Oral, Q24H  docusate sodium, 100 mg, Oral, BID  escitalopram, 10 mg, Oral, Daily  ferrous sulfate, 324 mg, Oral, Daily With Breakfast  furosemide, 20 mg, Intravenous, Daily  gabapentin, 300 mg, Oral, BID  guaiFENesin, 600 mg, Oral, Q12H  hydrocortisone-bacitracin-zinc oxide-nystatin, 1 application , Topical, TID  insulin glargine, 14 Units, Subcutaneous, Q12H  insulin lispro, 14 Units, Subcutaneous, TID With Meals  insulin lispro, 2-9 Units, Subcutaneous, 4x Daily AC & at Bedtime  metoprolol tartrate, 75 mg, Oral, BID  midodrine, 10 mg, Oral, TID AC  nicotine, 1 patch, Transdermal, Q24H  nystatin, 5 mL, Oral, 4x Daily  pantoprazole, 40 mg, Oral, Q AM  senna-docusate sodium, 2 tablet, Oral, BID  sodium chloride, 10 mL, Intravenous, Q12H       Pharmacy Consult - Steroid Insulin Protocol,          I have utilized all available, immediate resources to obtain, update, or review the patient's current medications including all prescriptions, over-the-counter products, herbals, cannabis/cannabidiol products, and vitamin.mineral/dietary (nutritional) supplements.    Active Hospital Problems:  Active Hospital Problems    Diagnosis     **Atrial fibrillation with RVR     Moderate malnutrition     Atrial fibrillation      Plan:     Acute hypoxic respiratory failure  Left-sided pneumonia  COPD  -Imaging reports noted  -Oxygen requirement improving  -Continue bronchodilators and steroids  -Finished antibiotics  -Pulmonology following    PAF  -Rate controlled, monitor on telemetry  -Echo with preserved EF noted  -Recent noninvasive ischemic eval negative for ischemia per cardiology  -Continue p.o. 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  -Currently on chemotherapy and radiation  -Continue Decadron as per oncology  -Oncology following and managing    DM 2  -Continue basal insulin with sliding scale  -Monitor blood glucose  -Endocrinology following and managing    DANN  -Continue iron supplement  -Monitor hemoglobin, transfuse as needed    Depression  -Patient feeling more depressed during this admission  -Denies any SI  -Lexapro started during this admission  -Continue outpatient follow-up    Leukocytosis  -Likely reactive secondary to steroids  -Patient previously received antibiotics  -Monitor off antibiotics for now    DVT prophylaxis  -SCDs for now      CODE STATUS:    Medical Intervention Limits: NO intubation (DNI)  Code Status (Patient has no pulse and is not breathing): No CPR (Do Not Attempt to Resuscitate)  Medical Interventions (Patient has pulse or is breathing): Limited Support      Disposition: Pending improvement    Electronically signed by Merle Pereyra DO, 23, 11:47 EDT.  [facility] Hospitalist Team      Part of this note may be an electronic transcription/translation of spoken language to printed text using the Dragon Dictation System.      Electronically signed by Merle Pereyra DO at 23 1153       Josseline Girard APRN at 23 0717          Hematology/Oncology Inpatient Progress Note    PATIENT NAME: Isra Davenport  : 1955  MRN: [id number]    CHIEF COMPLAINT:  Stage IV squamous cell carcinoma right upper lobe lung; DANN with malabsorption of oral iron, and elevated copper level     HISTORY OF PRESENT ILLNESS:    A 68 y.o. male admitted to [facility] through the ED on 7/15/2023 with complaints of uncontrolled pain and BLE edema.  In the ED, he developed tachycardia in the 150s and was found to be in A-fib with RVR.  He was started on supplemental oxygen for O2 sat drop to 88%.  He received Cardizem  bolus followed by drip and Lopressor bolus.  In the ED, CBC revealed WBC 11.4, hemoglobin 10.0, MCV 84.8, and platelets 404,000.  Creatinine and LFTs were not elevated.  CXR revealed left basilar pneumonia.  He reported left lower extremity pain with x-ray of the left tip/fib and left foot negative for acute findings.    PMH was significant for recently diagnosed stage IV squamous cell carcinoma of the left upper lobe lung.  He was post radiation therapy to the brain (6/29) and currently receiving radiation therapy to the right hip and femur (started 7/12/2023 with 10 fractions planned).  He was last seen by our service as an outpatient on 7/14/2023 where he was prescribed Lasix and hydrocodone/APAP 5/325 mg as needed for his BLE edema and pain.  BLE venous Doppler performed as an outpatient on 7/14/2023 was negative for DVT/SVT and showed bilateral fluid retention.  At time of consultation 7/16/2023, hemoglobin had dropped to 9.9.     07/16/23  Hematology/Oncology was consulted by the hospitalist group as the patient is known to our service and followed for recently diagnosed stage IV squamous cell carcinoma of the left upper lobe lung diagnosed June 2023.  Our service had initially evaluated him during 6/16/2023 - 6/29/2023 hospitalization during which lung cancer diagnosis was made and initial work-up completed.  He had received radiation therapy to the brain and is currently receiving radiation to the hip/femur.  He was also diagnosed with iron deficiency anemia during the hospital stay where he was treated with IV iron and discharged on oral ferrous sulfate with GERD prophylaxis.  Complete anemia work-up with the exception of bone marrow was performed during the hospital stay with findings negative for additional etiologies.  Incidentally his copper level was elevated without multivitamin supplementation.  CBC on 7/14/2023 revealed WBC 13, hemoglobin 10.76, and platelets 329,000.  He was continued on daily oral  iron.  Molecular testing on his tumor pathology was pending with plans to start chemotherapy after radiation was completed.  He was also to start Xgeva for bone metastases support and was prescribed calcium with vitamin D supplementation.     PCP: Mayco Carreon MD    INTERVAL HISTORY:  7/17/2023 - white blood count 12.30, hemoglobin 9.5, iron 22 (), iron saturation 10 (), TIBC 224 (298-536). Calculated iron deficit is 662 mg. Started Ferrlecit 250 mg IV x 3 days.  Molecular testing on tumor specimen revealed low-level PD-L1 positivity.  7/18/2023-hemoglobin 8.8.  Patient developed SVT overnight and was treated with Cardizem.  Weight loss 33 pounds in 5 weeks.  Changed Pepcid to Protonix.  Palliative care and radiation oncology both consulted.  Physical therapy recommending skilled nursing facility at discharge.  7/19/2023-patient decided DNR/DNI but wants to continue aggressive treatment and try chemotherapy/immunotherapy.  7/20/2023-WBC 13.4, hemoglobin 9. Hospice was consulted by primary team for inpatient hospice admission.  7/21/2023-WBC 12.7, hemoglobin 8.5.  Dexamethasone decreased to 4 mg po daily.   7/22/2023-cardiac electrophysiology was consulted and they recommended, due to his metastatic cancer and comorbidities, to not proceed with ablation or cardioversion and to manage the patient with conservative therapy.  Increased gabapentin to 300 mg twice daily.  Bilateral FREDY normal.  Mild digital ischemia on the left.  7/23/2023-WBC 14.2, hemoglobin 8.7.  Lexapro started for depression.  7/27/2023-WBC 15.5, hemoglobin 8.3.  Bilateral FREDY The right FREDY was normal with normal digital pressures. The left FREDY was normal with mild digital ischemia.    Subjective   Nicotine patch is helping. He has a dry mouth and a cough.     ROS:  Review of Systems   Constitutional:  Negative for activity change, appetite change, chills, fatigue, fever and unexpected weight change.   HENT:  Negative for mouth  sores, sore throat and tinnitus.         Dry mouth.    Eyes:  Negative for photophobia, pain, redness and visual disturbance.   Respiratory:  Positive for cough. Negative for shortness of breath.    Cardiovascular:  Negative for chest pain, palpitations and leg swelling.   Gastrointestinal:  Negative for abdominal pain, constipation, diarrhea, nausea and vomiting.   Genitourinary:  Negative for dysuria and hematuria.   Musculoskeletal:  Negative for arthralgias, back pain, myalgias and neck pain.   Skin:  Negative for rash and wound.   Allergic/Immunologic: Negative for environmental allergies.   Neurological:  Negative for dizziness, weakness, light-headedness, numbness and headaches.   Hematological:  Negative for adenopathy. Does not bruise/bleed easily.   Psychiatric/Behavioral:  Negative for dysphoric mood. The patient is not nervous/anxious.       MEDICATIONS:    Scheduled Meds:  aluminum sulfate-calcium acetate 1:20, 1,000 mL, Topical, BID  aspirin, 81 mg, Oral, Daily  budesonide-formoterol, 2 puff, Inhalation, BID - RT  calcium 500 mg vitamin D 5 mcg (200 UT), 1 tablet, Oral, Daily  dexamethasone, 4 mg, Oral, Daily With Breakfast   And  insulin regular, 12 Units, Subcutaneous, Daily With Breakfast  dilTIAZem CD, 240 mg, Oral, Q24H  docusate sodium, 100 mg, Oral, BID  escitalopram, 10 mg, Oral, Daily  ferrous sulfate, 324 mg, Oral, Daily With Breakfast  furosemide, 20 mg, Intravenous, Daily  gabapentin, 300 mg, Oral, BID  guaiFENesin, 600 mg, Oral, Q12H  hydrocortisone-bacitracin-zinc oxide-nystatin, 1 application , Topical, TID  insulin glargine, 14 Units, Subcutaneous, Q12H  insulin lispro, 14 Units, Subcutaneous, TID With Meals  insulin lispro, 2-9 Units, Subcutaneous, 4x Daily AC & at Bedtime  metoprolol tartrate, 75 mg, Oral, BID  midodrine, 10 mg, Oral, TID AC  nicotine, 1 patch, Transdermal, Q24H  nystatin, 5 mL, Oral, 4x Daily  pantoprazole, 40 mg, Oral, Q AM  senna-docusate sodium, 2 tablet, Oral,  "BID  sodium chloride, 10 mL, Intravenous, Q12H    Continuous Infusions:  Pharmacy Consult - Steroid Insulin Protocol,     PRN Meds:    senna-docusate sodium **AND** polyethylene glycol **AND** bisacodyl **AND** bisacodyl    Calcium Replacement - Follow Nurse / BPA Driven Protocol    dextrose    dextrose    glucagon (human recombinant)    HYDROcodone-acetaminophen    hydrOXYzine    ipratropium-albuterol    Magnesium Standard Dose Replacement - Follow Nurse / BPA Driven Protocol    Morphine    ondansetron    Pharmacy Consult - Steroid Insulin Protocol    Phosphorus Replacement - Follow Nurse / BPA Driven Protocol    Potassium Replacement - Follow Nurse / BPA Driven Protocol    [COMPLETED] Insert Peripheral IV **AND** sodium chloride    sodium chloride    sodium chloride     ALLERGIES:    Allergies   Allergen Reactions    Ibuprofen Itching    Penicillins Hives     Objective    VITALS:   /68   Pulse 90   Temp 97.8 °F (36.6 °C) (Oral)   Resp 20   Ht 177.8 cm (70\")   Wt 73 kg (160 lb 15 oz)   SpO2 90%   BMI 23.09 kg/m²     PHYSICAL EXAM:  Physical Exam  Vitals reviewed.   Constitutional:       General: He is not in acute distress.     Appearance: He is well-developed. He is not diaphoretic.   HENT:      Head: Normocephalic and atraumatic.      Comments: Oxygen via nasal cannula.      Mouth/Throat:      Pharynx: No oropharyngeal exudate.      Comments: Hyperemic mucous membranes.   Eyes:      Conjunctiva/sclera: Conjunctivae normal.   Neck:      Thyroid: No thyromegaly.      Vascular: No JVD.   Cardiovascular:      Rate and Rhythm: Normal rate and regular rhythm.      Heart sounds: Normal heart sounds. No murmur heard.     Comments: Cardiac monitor leads.   Pulmonary:      Effort: Pulmonary effort is normal. No respiratory distress.      Breath sounds: Normal breath sounds.   Chest:      Comments: Left chest wall tattoo.   Right chest wall [id number] - not accessed.   Abdominal:      General: Bowel sounds " are normal.      Palpations: Abdomen is soft.      Tenderness: There is no abdominal tenderness. There is no guarding.   Musculoskeletal:         General: Normal range of motion.      Cervical back: Normal range of motion and neck supple.      Right lower le+ Edema present.      Left lower le+ Edema present.      Comments: LUE peripheral IV.   Left hand oxygen saturation monitor.   Skin:     General: Skin is warm and dry.      Findings: No erythema or rash.   Neurological:      Mental Status: He is alert and oriented to person, place, and time.      Sensory: No sensory deficit.   Psychiatric:         Behavior: Behavior normal.       RECENT LABS:  Lab Results (last 24 hours)       Procedure Component Value Units Date/Time    POC Glucose Once [[id number]]  (Abnormal) Collected: 23    Specimen: Blood Updated: 23     Glucose 110 mg/dL      Comment: Serial Number: [id number]:  [id number]       Manual Differential [[id number]]  (Abnormal) Collected: 23    Specimen: Blood Updated: 23     Neutrophil % 78.0 %      Lymphocyte % 4.0 %      Monocyte % 2.0 %      Bands %  13.0 %      Metamyelocyte % 2.0 %      Myelocyte % 1.0 %      Neutrophils Absolute 14.11 10*3/mm3      Lymphocytes Absolute 0.62 10*3/mm3      Monocytes Absolute 0.31 10*3/mm3      Anisocytosis Slight/1+     Microcytes Slight/1+     Poikilocytes Slight/1+     WBC Morphology Normal     Platelet Estimate Adequate     Clumped Platelets Present     Large Platelets Slight/1+     Giant Platelets Slight/1+    CBC & Differential [[id number]]  (Abnormal) Collected: 23    Specimen: Blood Updated: 23    Narrative:      The following orders were created for panel order CBC & Differential.  Procedure                               Abnormality         Status                     ---------                               -----------         ------                     CBC Auto Differential[[id number]]         Abnormal            Edited Result - FINAL      Scan Slide[[id number]]                                       Edited Result - FINAL        Please view results for these tests on the individual orders.    Scan Slide [[id number]] Collected: 07/24/23 0007    Specimen: Blood Updated: 07/24/23 0242     Scan Slide --     Comment: See Manual Differential Results       CBC Auto Differential [[id number]]  (Abnormal) Collected: 07/24/23 0007    Specimen: Blood Updated: 07/24/23 0242     WBC 15.50 10*3/mm3      RBC 3.12 10*6/mm3      Hemoglobin 8.3 g/dL      Hematocrit 27.0 %      MCV 86.5 fL      MCH 26.4 pg      MCHC 30.5 g/dL      RDW 20.3 %      RDW-SD 60.4 fl      MPV 9.2 fL      Platelets 375 10*3/mm3      Comment: Platelet estimate performed due to platelet clumping.   Corrected result. Previous result was 393 10*3/mm3 on 7/24/2023 at 0231 EDT.       Narrative:      The previously reported component NRBC is no longer being reported. Previous result was 0.5 /100 WBC (Reference Range: 0.0-0.2 /100 WBC) on 7/24/2023 at 0126 EDT.    Extra Tubes [[id number]] Collected: 07/24/23 0007    Specimen: Blood, Venous Line Updated: 07/24/23 0115    Narrative:      The following orders were created for panel order Extra Tubes.  Procedure                               Abnormality         Status                     ---------                               -----------         ------                     Green Top (Gel)[[id number]]                                  Final result                 Please view results for these tests on the individual orders.    Green Top (Gel) [[id number]] Collected: 07/24/23 0007    Specimen: Blood Updated: 07/24/23 0115     Extra Tube Hold for add-ons.     Comment: Auto resulted.       Extra Tubes [[id number]] Collected: 07/23/23 2231    Specimen: Blood, Venous Line Updated: 07/23/23 2345    Narrative:      The following orders were created for panel order Extra Tubes.  Procedure                                Abnormality         Status                     ---------                               -----------         ------                     Green Top (Gel)[[id number]]                                  Final result                 Please view results for these tests on the individual orders.    Green Top (Gel) [[id number]] Collected: 07/23/23 2231    Specimen: Blood Updated: 07/23/23 2345     Extra Tube Hold for add-ons.     Comment: Auto resulted.       POC Glucose Once [[id number]]  (Abnormal) Collected: 07/23/23 2203    Specimen: Blood Updated: 07/23/23 2322     Glucose 182 mg/dL      Comment: Serial Number: [id number]:  [id number]       STAT Lactic Acid, Reflex [[id number]]  (Normal) Collected: 07/23/23 2231    Specimen: Blood Updated: 07/23/23 2310     Lactate 2.0 mmol/L     Blood Culture - Blood, Hand, Left [[id number]] Collected: 07/23/23 2237    Specimen: Blood from Hand, Left Updated: 07/23/23 2244    Blood Culture - Blood, Arm, Right [[id number]] Collected: 07/23/23 2231    Specimen: Blood from Arm, Right Updated: 07/23/23 2244    Blood Gas, Arterial - [[id number]]  (Abnormal) Collected: 07/23/23 2053    Specimen: Arterial Blood Updated: 07/23/23 2058     Site Right Radial     Alvin's Test Positive     pH, Arterial 7.443 pH units      pCO2, Arterial 67.7 mm Hg      pO2, Arterial 115.9 mm Hg      HCO3, Arterial 46.3 mmol/L      Base Excess, Arterial 19.1 mmol/L      Comment: Serial Number: [id number]:  [id number]        O2 Saturation, Arterial 98.5 %      CO2 Content 48.3 mmol/L      Barometric Pressure for Blood Gas --     Comment: N/A        Modality BiPap     FIO2 100 %      Ventilator Mode ;BiLevel     Hemodilution No     Respiratory Rate 20    Blood Gas, Arterial - [[id number]]  (Abnormal) Collected: 07/23/23 1940    Specimen: Arterial Blood Updated: 07/23/23 2010     Site Right Radial     Alvin's Test Positive     pH, Arterial 7.160 pH units      pCO2, Arterial 121.2 mm Hg      pO2, Arterial 65.6 mm Hg       HCO3, Arterial 43.2 mmol/L      Base Excess, Arterial 10.7 mmol/L      Comment: Serial Number: [id number]:  [id number]        O2 Saturation, Arterial 83.1 %      Barometric Pressure for Blood Gas --     Comment: N/A        Modality NRB     FIO2 100 %      Hemodilution No    POC Lactate [[id number]]  (Abnormal) Collected: 07/23/23 1940    Specimen: Blood Updated: 07/23/23 2010     Lactate 5.9 mmol/L      Comment: Serial Number: [id number]:  [id number]       POC Glucose Once [[id number]]  (Abnormal) Collected: 07/23/23 2004    Specimen: Blood Updated: 07/23/23 2005     Glucose 191 mg/dL      Comment: Serial Number: [id number]:  [id number]       POC Glucose Once [[id number]]  (Abnormal) Collected: 07/23/23 1940    Specimen: Blood Updated: 07/23/23 1947     Glucose 216 mg/dL      Comment: Serial Number: [id number]:  [id number]       POCT Electrolytes +HGB +HCT [[id number]]  (Abnormal) Collected: 07/23/23 1940    Specimen: Blood Updated: 07/23/23 1947     Sodium 132 mmol/L      POC Potassium 4.9 mmol/L      Ionized Calcium 1.28 mmol/L      Comment: Serial Number: [id number]:  [id number]        Glucose 216 mg/dL      Hematocrit 33 %      Hemoglobin 11.3 g/dL     POC Glucose Once [[id number]]  (Abnormal) Collected: 07/23/23 1929    Specimen: Blood Updated: 07/23/23 1931     Glucose 151 mg/dL      Comment: Serial Number: [id number]:  [id number]       POC Glucose Once [[id number]]  (Abnormal) Collected: 07/23/23 1628    Specimen: Blood Updated: 07/23/23 1629     Glucose 471 mg/dL      Comment: Serial Number: [id number]:  [id number]       POC Glucose Once [[id number]]  (Abnormal) Collected: 07/23/23 1053    Specimen: Blood Updated: 07/23/23 1054     Glucose 333 mg/dL      Comment: Serial Number: [id number]:  [id number]             PENDING RESULTS:  N/A    IMAGING REVIEWED:   XR Chest 1 View    Result Date: 7/23/2023  Impression: 1. Worsening consolidation in the left perihilar region. This is secondary to a known  lung mass consistent with lung cancer. There is probably worsening postobstructive pneumonitis/atelectasis and there may be a small left pleural effusion. 2. Cardiomegaly with pulmonary vascular congestion/low-grade pulmonary edema. Electronically Signed: Zeyad Contreras  7/23/2023 8:02 PM EDT  Workstation ID: [id number]     I have reviewed the patient's labs, imaging, reports, and other clinician documentation.    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  Unable to continue radiation during hospitalization due to intermittent A-fib.  Planned to restart on Monday(7/24).  Molecular testing on tumor pathology revealed low-level PD-L1 positivity.  Orders have been written for outpatient palliative chemotherapy and immunotherapy once radiation therapy has completed.  Plan to start outpatient Xgeva for bone metastases support. He has decided to pursue treatment as planned, but has chosen to be a DNR/DNI.  Dexamethasone tapered again to 4 mg po daily (7/21/2023). Continued calcium plus vitamin D.  DANN with malabsorption and poor tolerance of oral iron/Elevated copper level-he has not been on multivitamin containing copper. He had reported bright red blood per rectum as outpatient with 2 colonoscopies in the past 6 years that were unremarkable per his report. Received IV iron last admit and again this admit.  On Protonix, aspirin and dexamethasone. Hemoglobin continues to slowly fall.  Iron studies showed continued DANN with a slight improvement from last admission. Tolerating oral ferrous sulfate so far.     Left lung pneumonia-likely post-obstructive. On dexamethasone.  Per pulmonary.    BLE edema and pain-recent outpatient BLE Doppler was negative for DVT/SVT. On Lasix and Gabapentin.  Increased dose of gabapentin and ABIs normal.  A-fib with RVR-new onset noted in ED.  Cardiology consulted.  On ASA.   Cardiac EP recommending conservative management.  Chronic nausea, Uncontrolled DM2, Low TSH, and severe wt. Loss-on Protonix. Receiving Glucerna twice daily and Colace.  Endocrine managing.  Tapering Dexamethasone.   Smoking-cessation counseling continued.  On a nicotine patch.   Recent pathological right hip fracture-physical therapy working with patient.  On calcium plus vitamin D. Plannning rehab placement.   Oral thrush-on nystatin. Improved.   Agitation/depression-due to prolonged hospitalization and clinical status.  Wants antidepressants.  T2DM with hyperglycemia-Endocrinology is following.      PLAN  Follow CBC.   Continue nystatin 100,000 units/mL 5 mL p.o. 4 times daily.  Continue dexamethasone 4 mg to daily.   Continue ASA 81 mg by mouth daily.  Would not recommend full dose anticoagulation for A-fib secondary to brain metastasis and known rectal bleeding unless benefits exceed risks.  Continue PRN Norco and Morphine.  Increase gabapentin for pain control.  Continue Protonix.      Continue ferrous sulfate 325mg po daily.   Continue nicotine patch.   Continue calcium plus vitamin D daily.  Continue Lexapro 10 mg p.o daily.   Continue Glucerna twice daily and Colace.    Radiation therapy to resume when heart rate controlled-hopefully  today.   Plan for combination of chemotherapy and immunotherapy as an outpatient post radiation.  Start Xgeva for bone metastases support as outpatient.  Recommend outpatient GI evaluation.  Brain MRI as outpatient to f/u on metastasis, post XRT.     I have personally performed a face-to-face diagnostic evaluation on this patient via telehealth. I have performed a complete history and physical examination through videoconferencing, reviewed laboratory studies, and radiographic examinations.  I have completed the majority and substantive portion of the medical decision making.  I have formulated the assessment on this patient and the plan of action as noted above. I have  "discussed the case with Josseline Girard, SAURABH, have edited/reviewed the note, and agree with the care plan.     I discussed the patient's findings and my recommendations with patient.    Part of this note may be an electronic transcription/translation of spoken language to printed text using the Dragon Dictation System.                 Electronically signed by Josseline Girard APRN at 07/24/23 0936       Babatunde Boyle MD at 07/23/23 2206          Rapid response for patient having hypoxia.  Placed on BiPAP.  ABG ordered showing hypercapnic hypoxic respiratory failure.  Chest x-ray ordered showing pulmonary edema, IV Lasix has been ordered.  Lactic acid is elevated we will continue to monitor and blood cultures have been ordered.    Electronically signed by Babatunde Boyle MD at 07/23/23 2206       Liudmila Levin MD at 07/23/23 3285          Subjective   Isra Davenport is a 68 y.o. male.   Seen for diabetes f/u.  Likes to have legs wrapped. Says it is soothing        Objective     BP 94/56   Pulse 76   Temp 98 °F (36.7 °C) (Oral)   Resp 20   Ht 177.8 cm (70\")   Wt 71.6 kg (157 lb 13.6 oz)   SpO2 92%   BMI 22.65 kg/m²   Blood sugar  274 this am,  310 @ lunch, 471 @ supper ( right after drinking a boost )    ASSESSMENT  Diabetes type 2, with hyperglycemia  Patient is stable    PLAN    R insulin dose increased per steroid protocol.        Liudmila Levin MD  7/23/2023  17:55 EDT      Electronically signed by Liudmila Levin MD at 07/23/23 0829       Shruthi Williamson MD at 07/23/23 1409          CARDIOLOGY FOLLOW-UP PROGRESS NOTE      Reason for follow-up:    Atrial Fibrillation  Edema     Attending: Raoul Castillo MD      Subjective .     Denies chest pain or dyspnea at present  Still with periods of RVR despite titrated medical therapy        Review of Systems   Constitutional: Negative for chills and fever.   HENT:  Negative for ear discharge and nosebleeds.    Eyes:  Negative for " discharge and redness.   Cardiovascular:  Negative for chest pain, orthopnea, palpitations, paroxysmal nocturnal dyspnea and syncope.   Respiratory:  Positive for shortness of breath. Negative for cough and wheezing.    Endocrine: Negative for heat intolerance.   Skin:  Negative for rash.   Musculoskeletal:  Negative for arthritis and myalgias.   Gastrointestinal:  Negative for abdominal pain, melena, nausea and vomiting.   Genitourinary:  Negative for dysuria and hematuria.   Neurological:  Negative for dizziness, light-headedness, numbness and tremors.   Psychiatric/Behavioral:  Negative for depression. The patient is not nervous/anxious.    Pertinent items are noted in HPI, all other systems reviewed and negative  Allergies: Ibuprofen and Penicillins    Scheduled Meds:aluminum sulfate-calcium acetate 1:20, 1,000 mL, Topical, BID  aspirin, 81 mg, Oral, Daily  budesonide-formoterol, 2 puff, Inhalation, BID - RT  calcium 500 mg vitamin D 5 mcg (200 UT), 1 tablet, Oral, Daily  [START ON 7/24/2023] dexamethasone, 4 mg, Oral, Daily With Breakfast   And  [START ON 7/24/2023] insulin regular, 12 Units, Subcutaneous, Daily With Breakfast  dilTIAZem CD, 240 mg, Oral, Q24H  docusate sodium, 100 mg, Oral, BID  escitalopram, 10 mg, Oral, Daily  ferrous sulfate, 324 mg, Oral, Daily With Breakfast  furosemide, 20 mg, Intravenous, Daily  gabapentin, 300 mg, Oral, BID  guaiFENesin, 600 mg, Oral, Q12H  hydrocortisone-bacitracin-zinc oxide-nystatin, 1 application , Topical, TID  insulin glargine, 14 Units, Subcutaneous, Q12H  insulin lispro, 14 Units, Subcutaneous, TID With Meals  insulin lispro, 2-9 Units, Subcutaneous, 4x Daily AC & at Bedtime  ipratropium-albuterol, 3 mL, Nebulization, 4x Daily - RT  metoprolol tartrate, 75 mg, Oral, BID  midodrine, 10 mg, Oral, TID AC  nicotine, 1 patch, Transdermal, Q24H  nystatin, 5 mL, Oral, 4x Daily  pantoprazole, 40 mg, Oral, Q AM  senna-docusate sodium, 2 tablet, Oral, BID  sodium  "chloride, 10 mL, Intravenous, Q12H        Continuous Infusions:Pharmacy Consult - Steroid Insulin Protocol,       PRN Meds:.  senna-docusate sodium **AND** polyethylene glycol **AND** bisacodyl **AND** bisacodyl    Calcium Replacement - Follow Nurse / BPA Driven Protocol    dextrose    dextrose    glucagon (human recombinant)    HYDROcodone-acetaminophen    ipratropium-albuterol    Magnesium Standard Dose Replacement - Follow Nurse / BPA Driven Protocol    Morphine    ondansetron    Pharmacy Consult - Steroid Insulin Protocol    Phosphorus Replacement - Follow Nurse / BPA Driven Protocol    Potassium Replacement - Follow Nurse / BPA Driven Protocol    [COMPLETED] Insert Peripheral IV **AND** sodium chloride    sodium chloride    sodium chloride    Objective     VITAL SIGNS  Patient Vitals for the past 24 hrs:   BP Temp Temp src Pulse Resp SpO2   07/23/23 1340 93/65 97.8 °F (36.6 °C) Oral 79 20 91 %   07/23/23 1142 -- -- -- 105 21 96 %   07/23/23 1139 -- -- -- 93 21 97 %   07/23/23 1055 120/65 97.8 °F (36.6 °C) Oral 111 20 97 %   07/23/23 0811 -- -- -- 94 24 92 %   07/23/23 0808 -- -- -- 95 24 91 %   07/23/23 0807 -- -- -- 98 24 92 %   07/23/23 0804 -- -- -- 95 24 91 %   07/23/23 0500 112/72 97.8 °F (36.6 °C) Oral 79 15 98 %   07/23/23 0300 -- -- -- 80 15 96 %   07/23/23 0100 99/56 98 °F (36.7 °C) Oral 71 12 100 %   07/22/23 2100 104/63 97.6 °F (36.4 °C) Oral 92 25 92 %   07/22/23 1917 -- -- -- 81 20 99 %   07/22/23 1916 -- -- -- 80 20 98 %   07/22/23 1915 -- -- -- 83 20 94 %   07/22/23 1913 -- -- -- 78 20 90 %   07/22/23 1713 124/68 97.6 °F (36.4 °C) Oral 75 20 90 %   07/22/23 1533 -- -- -- 79 18 91 %   07/22/23 1530 -- -- -- 77 18 91 %          Flowsheet Rows      Flowsheet Row First Filed Value   Admission Height 177.8 cm (70\") Documented at 07/15/2023 1652   Admission Weight 72.6 kg (160 lb) Documented at 07/15/2023 1652            Body mass index is 22.65 kg/m².      Intake/Output Summary (Last 24 hours) at " 7/23/2023 1409  Last data filed at 7/23/2023 1340  Gross per 24 hour   Intake 480 ml   Output 1150 ml   Net -670 ml          TELEMETRY:     SR with PACS    Physical Exam:  Constitutional:       Appearance: Well-developed.   Eyes:      General: No scleral icterus.        Right eye: No discharge.   HENT:      Head: Normocephalic and atraumatic.   Neck:      Thyroid: No thyromegaly.      Lymphadenopathy: No cervical adenopathy.   Pulmonary:      Effort: Pulmonary effort is normal. No respiratory distress.      Breath sounds: Normal breath sounds. No wheezing. No rales.   Cardiovascular:      Normal rate. Regular rhythm.      No gallop.    Edema:     Peripheral edema absent.   Abdominal:      Tenderness: There is no abdominal tenderness.   Skin:     Findings: No erythema or rash.   Neurological:      Mental Status: Alert and oriented to person, place, and time.          Results Review:   I reviewed the patient's new clinical results.    CBC    Results from last 7 days   Lab Units 07/22/23  2252 07/21/23  2309 07/20/23  2358 07/20/23  0106 07/19/23  1025 07/17/23  2254 07/17/23  0216   WBC 10*3/mm3 14.20* 12.30* 12.70* 13.40* 12.90* 11.70* 12.30*   HEMOGLOBIN g/dL 8.7* 8.8* 8.5* 9.0* 9.0* 8.8* 9.5*   PLATELETS 10*3/mm3 406 441 444 484* 486* 395 445       BMP   Results from last 7 days   Lab Units 07/20/23  1543 07/20/23  0106 07/19/23  1025 07/18/23  1906 07/18/23  1437 07/17/23  0216   SODIUM mmol/L  --  138 134* 131* 130* 142   POTASSIUM mmol/L  --  4.3 4.1 4.5 4.6 4.5   CHLORIDE mmol/L  --  94* 89* 87* 87* 94*   CO2 mmol/L  --  38.0* 37.0* 37.0* 35.0* 41.0*   BUN mg/dL  --  16 15 21 19 17   CREATININE mg/dL  --  0.31* 0.39* 0.49* 0.40* 0.35*   GLUCOSE mg/dL  --  123* 381* 335* 372* 91   MAGNESIUM mg/dL  --  1.7 1.9 2.2 1.5*  --    PHOSPHORUS mg/dL 2.3* 1.6* 2.7 2.0*  --   --        Cr Clearance Estimated Creatinine Clearance: 234.2 mL/min (A) (by C-G formula based on SCr of 0.31 mg/dL (L)).  Coag     HbA1C   Lab  Results   Component Value Date    HGBA1C 7.30 (H) 07/15/2023     Blood Glucose   Glucose   Date/Time Value Ref Range Status   07/23/2023 1053 333 (H) 70 - 105 mg/dL Final     Comment:     Serial Number: [id number]:  [id number]   07/23/2023 0746 284 (H) 70 - 105 mg/dL Final     Comment:     Serial Number: [id number]:  [id number]   07/22/2023 2119 379 (H) 70 - 105 mg/dL Final     Comment:     Serial Number: [id number]:  [id number]   07/22/2023 1948 347 (H) 70 - 105 mg/dL Final     Comment:     Serial Number: [id number]:  [id number]   07/22/2023 1711 96 70 - 105 mg/dL Final     Comment:     Serial Number: [id number]:  [id number]   07/22/2023 1129 231 (H) 70 - 105 mg/dL Final     Comment:     Serial Number: [id number]:  [id number]   07/22/2023 0700 108 (H) 70 - 105 mg/dL Final     Comment:     Serial Number: [id number]:  [id number]   07/21/2023 1912 303 (H) 70 - 105 mg/dL Final     Comment:     Serial Number: [id number]:  [id number]     Infection         CMP   Results from last 7 days   Lab Units 07/20/23  0106 07/19/23  1025 07/18/23  1906 07/18/23  1437 07/17/23  0216   SODIUM mmol/L 138 134* 131* 130* 142   POTASSIUM mmol/L 4.3 4.1 4.5 4.6 4.5   CHLORIDE mmol/L 94* 89* 87* 87* 94*   CO2 mmol/L 38.0* 37.0* 37.0* 35.0* 41.0*   BUN mg/dL 16 15 21 19 17   CREATININE mg/dL 0.31* 0.39* 0.49* 0.40* 0.35*   GLUCOSE mg/dL 123* 381* 335* 372* 91       ABG      UA      OLIVIA  No results found for: POCMETH, POCAMPHET, POCBARBITUR, POCBENZO, POCCOCAINE, POCOPIATES, POCOXYCODO, POCPHENCYC, POCPROPOXY, POCTHC, POCTRICYC  Lysis Labs   Results from last 7 days   Lab Units 07/22/23  2252 07/21/23  2309 07/20/23  2358 07/20/23  0106 07/19/23  1025 07/18/23  1906 07/18/23  1437 07/17/23  2254 07/17/23  0216   HEMOGLOBIN g/dL 8.7* 8.8* 8.5* 9.0* 9.0*  --   --  8.8* 9.5*   PLATELETS 10*3/mm3 406 441 444 484* 486*  --   --  395 445   CREATININE mg/dL  --   --   --  0.31* 0.39* 0.49* 0.40*  --   0.35*       Radiology(recent) No radiology results for the last day    Imaging Results (Last 24 Hours)       ** No results found for the last 24 hours. **                    EKG          I personally viewed and interpreted the patient's EKG/Telemetry data:        ECHOCARDIOGRAM:     Results for orders placed during the hospital encounter of 06/16/23    Adult Transthoracic Echo Complete W/ Cont if Necessary Per Protocol    Interpretation Summary    Left ventricular systolic function is normal. Left ventricular ejection fraction appears to be 61 - 65%.    Left ventricular diastolic function is consistent with (grade I) impaired relaxation.    There is mild bileaflet mitral valve prolapse present.    Estimated right ventricular systolic pressure from tricuspid regurgitation is mildly elevated (35-45 mmHg). Calculated right ventricular systolic pressure from tricuspid regurgitation is 44 mmHg.        STRESS MYOVIEW:      CARDIAC CATHETERIZATION:      OTHER:         Assessment & Plan            Atrial fibrillation with RVR    Atrial fibrillation    Moderate malnutrition        ASSESSMENT:    PAF/Flutter: now SR with PACs  LE pain  Stage IV squamous cell lung cancer with mets to brain and bone  Currently on amio, bb, CCB  Recent non invasive ischemic eval negative for ischemia  Echo with preserved LV function  Per oncology not recommended long term a/c due to brain mets and known rectal bleeding  Denies any new complaint  Amiodarone was discontinued patient is currently on no Cardizem and metoprolol  Tmax is 97.8 pulse is 79 respirations are 20 blood pressure is 93/65 to 120/65 sats are 91%  White cell count is 14,000 hemoglobin is 8.7  Check BMP  Current medications include Cardizem CD2 40 mg p.o. once a day metoprolol 75 mg p.o. twice daily Lasix 20 mg IV daily patient is on midodrine 10 mg p.o. 3 times a day patient is clinically feeling somewhat better  Continue current medical therapy continue supportive  care  Further recommendations based on patient course          Shruthi Williamson MD  23  14:09 EDT                Electronically signed by Shruthi Williamson MD at 23 1412       Raoul Castillo MD at 23 0461              [facility] Medicine Services Daily Progress Note    Patient Name: Isra Davenport  : 1955  MRN: [id number]  Primary Care Physician:  Mayco Carreon MD  Date of admission: 7/15/2023      Subjective      Chief Complaint: Pain     History of Present Illness: Isra Davenport is a 68 y.o. 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  He is status post intramedullary nailing of the hip on 2023 and a Duqwpf-j-Nlgx placed on 2023 he is currently undergoing chemotherapy and plan for palliative radiation who presented to [facility] on 7/15/2023 complaining of pain and lower extremity edema     Review of records show patient had presented to chemotherapy with complaint of lower extremity edema and what he describes as neuropathic pain.  He had ultrasound that was negative for DVT and was prescribed Lasix.  He was also prescribed Norco 5/325 for pain.  Patient reports when he went to  prescriptions from the pharmacy the pharmacy was closed on Monday and he was unable to fill prescription.  He presents to the ED today with uncontrolled pain.  It was noted from previous admission he was discharged on low pressure for diagnosis of sinus tachycardia     ED work-up included chest x-ray that showed a right internal jugular Port-A-Cath in place.  Heart size stable.  Airspace disease in the left lung compatible with pneumonia.  Chronic elevation of the left hemidiaphragm.  No pleural effusion.  Right lung was clear.  Bilateral foot x-ray was negative for fracture.  There is healed fracture of the  right tibia and fibula.  Troponin 26, potassium 4.6, sodium 138, glucose 355, creatinine 0.3, BUN is 21.  Albumin 2.6.  White count 11.4, hemoglobin 10.  On admission heart rate 84 but while he was being treated in the ED heart rate jumped into the 150s and was found to be in acute A-fib with RVR.  O2 sats dropped to 88% was placed on 2 L nasal cannula.  He was given a Cardizem bolus and Lopressor bolus and resumed on a Cardizem drip.  Patient denies any chest pain or feeling of chest palpitations.  Denies any fever chills or increased shortness of breath.  Reports positive for productive cough.  Positive for swelling of bilateral lower extremities below the calf and extreme tenderness to touch bilateral feet.     7/16/23 patient seen and examined in bed no acute distress, vital signs stable, K-5.4, hr 81, dyspnea on high flow oxygen.  We will start Cardizem p.o.  7/17/2023 patient seen and examined in bed no acute distress, he is on 3 L of oxygen, blood pressure 93/52.  Discussed with RN.  7/18/2023 patient seen and examined in bed no acute distress, dyspnea on 3 L, heart rate 155.  Discussed with RN. Restarted Cardizem drip per cardiology and adding short acting Cardizem to wean IV and increasing BB   7/19/2023 patient seen and examined in bed no acute distress, blood pressure 92/54.  On amiodarone, Cardizem, metoprolol dyspnea on 2 L.  Discussed with RN.  7/20/2023 patient seen and examined in bed no acute distress, his heart rate still elevated.  Presently on amiodarone, Cardizem, metoprolol. discussed with RN.  7/21/2023 patient seen and examined in bed no acute distress, very anxious to follow-up with home.  Discussed with RN, discussed with .  Awaiting placement.  Dyspnea on 3 L of oxygen, fatigue and weakness.  7/22/2023 patient seen and examined in bed no acute distress, vital signs stable, dyspnea on 6 L, fatigue weakness, discussed with RN.  Awaiting placement, pre-CERT pending.  Discussed  with .  7/23/2023 patient seen and examined in bed no acute distress, is in better spirits today.  Dyspnea on 5 L.  Blood pressure is 93/65.  Heart rate 79.  Discussed with RN, discussed with , pre-CERT pending.    ROS Review of Systems   Constitutional: Positive for malaise/fatigue.   Cardiovascular:  Positive for leg swelling.   Respiratory:  Positive for cough and sputum production.    Skin: Negative.    Musculoskeletal:  Positive for back pain.   Gastrointestinal: Negative.    Genitourinary: Negative.    Neurological:  Positive for paresthesias.   Objective      Vitals:   Temp:  [97.6 °F (36.4 °C)-98 °F (36.7 °C)] 97.8 °F (36.6 °C)  Heart Rate:  [] 79  Resp:  [12-25] 20  BP: ()/(56-72) 93/65  Flow (L/min):  [5-7.5] 5    Physical Exam Constitutional:       Appearance: He is ill-appearing.   Eyes:      Pupils: Pupils are equal, round, and reactive to light.   Cardiovascular:      Rate and Rhythm irregular.   Pulmonary:      Breath sounds: Rhonchi present.   Abdominal:      General: Abdomen is flat.      Palpations: Abdomen is soft.   Musculoskeletal:      Right lower leg: Edema present.      Left lower leg: Edema present.   Skin:     General: Skin is warm and dry.   Neurological:      Mental Status: He is alert and oriented to person, place, and time.   Psychiatric:         Mood and Affect: Mood normal.     Result Review    Result Review:  I have personally reviewed the results from the time of this admission to 7/23/2023 13:49 EDT and agree with these findings:  []  Laboratory  []  Microbiology  []  Radiology  []  EKG/Telemetry   []  Cardiology/Vascular   []  Pathology  []  Old records  []  Other:  Most notable findings include:     Wounds (last 24 hours)       LDA Wound       Row Name 07/23/23 0823 07/22/23 2001 07/22/23 1600       Wound 06/17/23 0915 Right anterior hip Incision    Wound - Properties Group Placement Date: 06/17/23  -HB Placement Time: 0915  -HB Side: Right   -HB Orientation: anterior  -HB Location: hip  -HB Primary Wound Type: Incision  -HB    Closure -- --  -AM Approximated  -NE    Base clean;dry  -NE clean;dry  -AM clean;dry  -NE    Periwound intact  -NE intact  -AM intact  -NE    Periwound Temperature warm  -NE warm  -AM warm  -NE    Drainage Amount none  -NE none  -AM none  -NE    Retired Wound - Properties Group Placement Date: 06/17/23  -HB Placement Time: 0915 -HB Side: Right  -HB Orientation: anterior  -HB Location: hip  -HB Primary Wound Type: Incision  -HB    Retired Wound - Properties Group Date first assessed: 06/17/23  -HB Time first assessed: 0915 -HB Side: Right  -HB Location: hip  -HB Primary Wound Type: Incision  -HB       Wound 07/15/23 2100 Bilateral medial gluteal Pressure Injury    Wound - Properties Group Placement Date: 07/15/23  -MF Placement Time: 2100  -MF Present on Hospital Admission: Y  -MF Side: Bilateral  -MF Orientation: medial  -MF Location: gluteal  -MF Primary Wound Type: Pressure inj  -MF    Base dry  -NE dry  -AM dry  -NE    Periwound warm;dry  -NE warm;dry  -AM warm;dry  -NE    Periwound Temperature -- --  -AM warm  -NE    Periwound Skin Turgor -- --  -AM soft  -NE    Drainage Amount none  -NE none  -AM none  -NE    Retired Wound - Properties Group Placement Date: 07/15/23  -MF Placement Time: 2100  -MF Present on Hospital Admission: Y  -MF Side: Bilateral  -MF Orientation: medial  -MF Location: gluteal  -MF Primary Wound Type: Pressure inj  -MF    Retired Wound - Properties Group Date first assessed: 07/15/23  -MF Time first assessed: 2100  -MF Present on Hospital Admission: Y  -MF Side: Bilateral  -MF Location: gluteal  -MF Primary Wound Type: Pressure inj  -MF       Wound 07/18/23 1900 Left posterior ankle    Wound - Properties Group Placement Date: 07/18/23  -AR Placement Time: 1900  -AR Present on Hospital Admission: Y  -AR Side: Left  -AR Orientation: posterior  -AR Location: ankle  -AR    Closure -- --  -AM VIMAL  -NE     Periwound swelling;redness;warm  -NE swelling;redness;warm  -AM --    Retired Wound - Properties Group Placement Date: 07/18/23  -AR Placement Time: 1900  -AR Present on Hospital Admission: Y  -AR Side: Left  -AR Orientation: posterior  -AR Location: ankle  -AR    Retired Wound - Properties Group Date first assessed: 07/18/23  -AR Time first assessed: 1900  -AR Present on Hospital Admission: Y  -AR Side: Left  -AR Location: ankle  -AR              User Key  (r) = Recorded By, (t) = Taken By, (c) = Cosigned By      Initials Name Provider Type    HB Cleo Britton RN Registered Nurse    Chandrika Samuels, RN Registered Nurse    Mani Moore, RN Registered Nurse    Laura Nava, RN Registered Nurse    Danyell Samuel, RN Registered Nurse                    CBC:      Lab 07/22/23  2252 07/21/23  2309 07/20/23  2358 07/20/23  0106 07/19/23  1025 07/17/23  2254 07/17/23  0216   WBC 14.20* 12.30* 12.70* 13.40* 12.90*   < > 12.30*   HEMOGLOBIN 8.7* 8.8* 8.5* 9.0* 9.0*   < > 9.5*   HEMATOCRIT 28.9* 28.4* 27.0* 29.1* 28.3*   < > 31.5*   PLATELETS 406 441 444 484* 486*   < > 445   NEUTROS ABS 11.50* 9.10* 11.18* 12.19* 11.74*   < > 10.70*   LYMPHS ABS  --   --   --   --   --   --  0.90   MONOS ABS  --   --   --   --   --   --  0.80   EOS ABS  --   --   --   --   --   --  0.00   MCV 86.1 85.0 84.6 85.3 85.1   < > 85.3    < > = values in this interval not displayed.       CMP:        Lab 07/20/23  1543 07/20/23  0106 07/19/23  1025 07/18/23  1906 07/18/23  1437 07/17/23  0216   SODIUM  --  138 134* 131* 130* 142   POTASSIUM  --  4.3 4.1 4.5 4.6 4.5   CHLORIDE  --  94* 89* 87* 87* 94*   CO2  --  38.0* 37.0* 37.0* 35.0* 41.0*   ANION GAP  --  6.0 8.0 7.0 8.0 7.0   BUN  --  16 15 21 19 17   CREATININE  --  0.31* 0.39* 0.49* 0.40* 0.35*   EGFR  --  129.2 120.5 112.5 119.6 124.5   GLUCOSE  --  123* 381* 335* 372* 91   CALCIUM  --  9.3 9.1 9.0 9.1 9.7   IONIZED CALCIUM  --  1.22 1.19* 1.18*  --   --    MAGNESIUM  --   1.7 1.9 2.2 1.5*  --    PHOSPHORUS 2.3* 1.6* 2.7 2.0*  --   --        No results found for: ACANTHNAEG, AFBCX, BPERTUSSISCX, BLOODCX  No results found for: BCIDPCR, CXREFLEX, CSFCX, CULTURETIS  No results found for: CULTURES, HSVCX, URCX  No results found for: EYECULTURE, GCCX, HSVCULTURE, LABHSV  No results found for: LEGIONELLA, MRSACX, MUMPSCX, MYCOPLASCX  No results found for: NOCARDIACX, STOOLCX  No results found for: THROATCX, UNSTIMCULT, URINECX, CULTURE, VZVCULTUR  No results found for: VIRALCULTU, WOUNDCX    Assessment & Plan      Brief Patient Summary:  Isra Davenport is a 68 y.o. male who presented to chemotherapy with complaint of lower extremity edema and what he describes as neuropathic pain.  He had ultrasound that was negative for DVT and was prescribed Lasix.  He was also prescribed Norco 5/325 for pain.  Patient reports when he went to  prescriptions from the pharmacy, the pharmacy was closed on Monday and he was unable to fill prescription.  He presents to the ED today with uncontrolled pain.  It was noted from previous admission he was discharged on low pressure for diagnosis of sinus tachycardia     aluminum sulfate-calcium acetate 1:20, 1,000 mL, Topical, BID  aspirin, 81 mg, Oral, Daily  budesonide-formoterol, 2 puff, Inhalation, BID - RT  calcium 500 mg vitamin D 5 mcg (200 UT), 1 tablet, Oral, Daily  dexamethasone, 4 mg, Oral, Daily With Breakfast   And  insulin regular, 8 Units, Subcutaneous, Daily With Breakfast  dilTIAZem CD, 240 mg, Oral, Q24H  docusate sodium, 100 mg, Oral, BID  escitalopram, 10 mg, Oral, Daily  ferrous sulfate, 324 mg, Oral, Daily With Breakfast  furosemide, 20 mg, Intravenous, Daily  gabapentin, 300 mg, Oral, BID  guaiFENesin, 600 mg, Oral, Q12H  hydrocortisone-bacitracin-zinc oxide-nystatin, 1 application , Topical, TID  insulin glargine, 14 Units, Subcutaneous, Q12H  insulin lispro, 14 Units, Subcutaneous, TID With Meals  insulin lispro, 2-9 Units,  Subcutaneous, 4x Daily AC & at Bedtime  ipratropium-albuterol, 3 mL, Nebulization, 4x Daily - RT  metoprolol tartrate, 75 mg, Oral, BID  midodrine, 5 mg, Oral, TID AC  nicotine, 1 patch, Transdermal, Q24H  nystatin, 5 mL, Oral, 4x Daily  pantoprazole, 40 mg, Oral, Q AM  senna-docusate sodium, 2 tablet, Oral, BID  sodium chloride, 10 mL, Intravenous, Q12H       Pharmacy Consult - Steroid Insulin Protocol,          Active Hospital Problems:  Active Hospital Problems    Diagnosis     **Atrial fibrillation with RVR     Moderate malnutrition     Atrial fibrillation      New onset A-fib with RVR  -Cardizem drip  dc   -continue metoprolol po, Cardizem p.o. and amiodarone po  -consulted cardiology  Recent non invasive ischemic eval negative for ischemia  Echo with preserved LV function  - Had considered pharmacologic AC but with known brain mets concerned with risk for bleed so will consult oncology for recommendation on AC  - continue VTE with SCD   Per hem/onc>Continue ASA 81 mg by mouth daily.  Would not recommend full dose anticoagulation for A-fib secondary to brain metastasis and known rectal bleeding unless benefits exceed risks.      Pain and Edema BLE  - appears Neuropathic  - pt also with known bone cancer and arthritis   - consider neuropathy 2/2 chemotherapy  - will continue Lasix for edema  -Was ruled out for DVT   -added gabapentin 200mg QHS for sign of neuropathic pain  - check [id number]  - will resume pt Norco and add prn morphine  -echo 6/22/23: Left ventricular systolic function is normal. Left ventricular ejection fraction appears to be 61 - 65%.   Left ventricular diastolic function is consistent with (grade I) impaired relaxation.     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  Radiation oncology consultation to resume radiation therapy when able  Await molecular testing results with plan for chemotherapy post completion of radiation.  Start Xgeva and daily Ca+VitD for bone metastases support as outpatient.  dexamethasone  4 mg by mouth daily.  Continue ASA 81 mg by mouth daily.  Would not recommend full dose anticoagulation for A-fib secondary to brain mets and known rectal bleeding.  Continue as needed hydrocodone, as needed Dilaudid, and scheduled gabapentin for pain control.  Continue Pepcid p.o. twice daily and as needed Zofran  Plan for combination of chemotherapy and immunotherapy as an outpatient post radiation.     DVT prophylaxis:  Mechanical DVT prophylaxis orders are present.    CODE STATUS:    Medical Intervention Limits: NO intubation (DNI)  Code Status (Patient has no pulse and is not breathing): No CPR (Do Not Attempt to Resuscitate)  Medical Interventions (Patient has pulse or is breathing): Limited Support      Disposition:  I expect patient to be discharged NH. In am    I have utilized all available, immediate resources to obtain, update, or review the patient's current medications including all prescriptions, over-the-counter products, herbals, cannabis/cannabidiol products, and vitamin.mineral/dietary (nutritional) supplements.      Medical Intervention Limits: NO intubation (DNI)  Code Status (Patient has no pulse and is not breathing): No CPR (Do Not Attempt to Resuscitate)  Medical Interventions (Patient has pulse or is breathing): Limited Support    Next of kin  of Power of :       Admission Status:  I believe this patient meets ADMIT status.      This patient has been examined wearing appropriate Personal Protective Equipment and discussed with  RN . 07/23/23      Electronically signed by Raoul Castillo MD, 07/23/23, 13:49 EDT.  [facility] Hospitalist Team             Electronically signed by Raoul Castillo MD at 07/23/23 1354       Reggie Abarca MD at 07/23/23 1136          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  DM II  S/p intramedullary nailing of the hip  S/p Rxojfm-[id number]-Dath  S/p chemotherapy/plan for palliative radiation     PLAN:  Oxygen supplement and titration maintain saturation 90-95%: Currently requiring 5 L per nasal cannula  Encourage OOB daily   Antibiotics  Bronchodilators  Inhaled corticosteroids  Systemic steroids  Smoking cessation counseling, nicotine patch  Incentive spirometer  Diuresis and monitor [id number]  Oncology, Cardiology, Endocrinology following             LOS: 6 days     Subjective     Patient reports chronic shortness of breath and generalized weakness    Objective     Vital signs for last 24 hours:  Vitals:    07/23/23 1139 07/23/23 1142 07/23/23 1340 07/23/23 1630   BP:   93/65 94/56   BP Location:       Patient Position:       Pulse: 93 105 79 76   Resp: 21 21 20 20   Temp:   97.8 °F (36.6 °C) 98 °F (36.7 °C)   TempSrc:   Oral Oral   SpO2: 97% 96% 91% 92%   Weight:       Height:           Intake/Output last 3 shifts:  I/O last 3 completed shifts:  In: 920 [P.O.:920]  Out: 1725 [Urine:1725]  Intake/Output this shift:  I/O this shift:  In: 480 [P.O.:480]  Out: 850 [Urine:850]      Radiology  Imaging Results (Last 24 Hours)       ** No results found for the last 24 hours. **            Labs:  Results from last 7 days   Lab Units 07/22/23  2252   WBC 10*3/mm3 14.20*   HEMOGLOBIN g/dL 8.7*    HEMATOCRIT % 28.9*   PLATELETS 10*3/mm3 406       Results from last 7 days   Lab Units 07/20/23  0106   SODIUM mmol/L 138   POTASSIUM mmol/L 4.3   CHLORIDE mmol/L 94*   CO2 mmol/L 38.0*   BUN mg/dL 16   CREATININE mg/dL 0.31*   CALCIUM mg/dL 9.3   GLUCOSE mg/dL 123*                           Results from last 7 days   Lab Units 07/20/23  0106   MAGNESIUM mg/dL 1.7           Results from last 7 days   Lab Units 07/17/23  2254   TSH uIU/mL 0.145*             Meds:   SCHEDULE  aluminum sulfate-calcium acetate 1:20, 1,000 mL, Topical, BID  aspirin, 81 mg, Oral, Daily  budesonide-formoterol, 2 puff, Inhalation, BID - RT  calcium 500 mg vitamin D 5 mcg (200 UT), 1 tablet, Oral, Daily  [START ON 7/24/2023] dexamethasone, 4 mg, Oral, Daily With Breakfast   And  [START ON 7/24/2023] insulin regular, 12 Units, Subcutaneous, Daily With Breakfast  dilTIAZem CD, 240 mg, Oral, Q24H  docusate sodium, 100 mg, Oral, BID  escitalopram, 10 mg, Oral, Daily  ferrous sulfate, 324 mg, Oral, Daily With Breakfast  furosemide, 20 mg, Intravenous, Daily  gabapentin, 300 mg, Oral, BID  guaiFENesin, 600 mg, Oral, Q12H  hydrocortisone-bacitracin-zinc oxide-nystatin, 1 application , Topical, TID  insulin glargine, 14 Units, Subcutaneous, Q12H  insulin lispro, 14 Units, Subcutaneous, TID With Meals  insulin lispro, 2-9 Units, Subcutaneous, 4x Daily AC & at Bedtime  ipratropium-albuterol, 3 mL, Nebulization, 4x Daily - RT  metoprolol tartrate, 75 mg, Oral, BID  midodrine, 10 mg, Oral, TID AC  nicotine, 1 patch, Transdermal, Q24H  nystatin, 5 mL, Oral, 4x Daily  pantoprazole, 40 mg, Oral, Q AM  senna-docusate sodium, 2 tablet, Oral, BID  sodium chloride, 10 mL, Intravenous, Q12H      Infusions  Pharmacy Consult - Steroid Insulin Protocol,       PRNs    senna-docusate sodium **AND** polyethylene glycol **AND** bisacodyl **AND** bisacodyl    Calcium Replacement - Follow Nurse / BPA Driven Protocol    dextrose    dextrose    glucagon (human  recombinant)    HYDROcodone-acetaminophen    ipratropium-albuterol    Magnesium Standard Dose Replacement - Follow Nurse / BPA Driven Protocol    Morphine    ondansetron    Pharmacy Consult - Steroid Insulin Protocol    Phosphorus Replacement - Follow Nurse / BPA Driven Protocol    Potassium Replacement - Follow Nurse / BPA Driven Protocol    [COMPLETED] Insert Peripheral IV **AND** sodium chloride    sodium chloride    sodium chloride    Physical Exam:  General Appearance:  Alert   HEENT:  Normocephalic, without obvious abnormality, Conjunctiva/corneas clear,.   Nares normal, no drainage     Neck:  Supple, symmetrical, trachea midline.   Lungs /Chest wall: Mild bilateral basal rhonchi, respirations unlabored, symmetrical wall movement.     Heart:  Regular rate and rhythm, S1 S2 normal  Abdomen: Soft, non-tender, no masses, no organomegaly.    Extremities: No edema, no clubbing or cyanosis     ROS  Constitutional: Negative for chills, fever and positive for malaise/fatigue.   HENT: Negative.    Eyes: Negative.    Cardiovascular: Negative.    Respiratory: Positive for chronic shortness of breath.    Skin: Negative.    Musculoskeletal: Negative.    Gastrointestinal: Negative.    Genitourinary: Negative.    Neurological: Generalized weakness      I reviewed the recent clinical results    Part of this note may be an electronic transcription/translation of spoken language to printed text using the Dragon Dictation System.      Electronically signed by Reggie Abarca MD at 23 1711       Mina Dupree MD at 23 0984          Hematology/Oncology Inpatient Progress Note    PATIENT NAME: Isra Davenport  : 1955  MRN: [id number]    CHIEF COMPLAINT:  Stage IV squamous cell carcinoma right upper lobe lung; DANN with malabsorption of oral iron, and elevated copper level     HISTORY OF PRESENT ILLNESS:    8 y.o. male admitted to [facility] through the ED on 7/15/2023 with complaints of uncontrolled  pain and BLE edema.  In the ED, he developed tachycardia in the 150s and was found to be in A-fib with RVR.  He was started on supplemental oxygen for O2 sat drop to 88%.  He received Cardizem bolus followed by drip and Lopressor bolus.  In the ED, CBC revealed WBC 11.4, hemoglobin 10.0, MCV 84.8, and platelets 404,000.  Creatinine and LFTs were not elevated.  CXR revealed left basilar pneumonia.  He reported left lower extremity pain with x-ray of the left tip/fib and left foot negative for acute findings.    PMH was significant for recently diagnosed stage IV squamous cell carcinoma of the left upper lobe lung.  He was post radiation therapy to the brain (6/29) and currently receiving radiation therapy to the right hip and femur (started 7/12/2023 with 10 fractions planned).  He was last seen by our service as an outpatient on 7/14/2023 where he was prescribed Lasix and hydrocodone/APAP 5/325 mg as needed for his BLE edema and pain.  BLE venous Doppler performed as an outpatient on 7/14/2023 was negative for DVT/SVT and showed bilateral fluid retention.  At time of consultation 7/16/2023, hemoglobin had dropped to 9.9.     07/16/23  Hematology/Oncology was consulted by the hospitalist group as the patient is known to our service and followed for recently diagnosed stage IV squamous cell carcinoma of the left upper lobe lung diagnosed June 2023.  Our service had initially evaluated him during 6/16/2023 - 6/29/2023 hospitalization during which lung cancer diagnosis was made and initial work-up completed.  He had received radiation therapy to the brain and is currently receiving radiation to the hip/femur.  He was also diagnosed with iron deficiency anemia during the hospital stay where he was treated with IV iron and discharged on oral ferrous sulfate with GERD prophylaxis.  Complete anemia work-up with the exception of bone marrow was performed during the hospital stay with findings negative for additional  etiologies.  Incidentally his copper level was elevated without multivitamin supplementation.  CBC on 7/14/2023 revealed WBC 13, hemoglobin 10.76, and platelets 329,000.  He was continued on daily oral iron.  Molecular testing on his tumor pathology was pending with plans to start chemotherapy after radiation was completed.  He was also to start Xgeva for bone metastases support and was prescribed calcium with vitamin D supplementation.     PCP: Mayco Carreon MD    INTERVAL HISTORY:  7/17/2023 - white blood count 12.30, hemoglobin 9.5, iron 22 (), iron saturation 10 (), TIBC 224 (298-536). Calculated iron deficit is 662 mg. Started Ferrlecit 250 mg IV x 3 days.  Molecular testing on tumor specimen revealed low-level PD-L1 positivity.  7/18/2023-hemoglobin 8.8.  Patient developed SVT overnight and was treated with Cardizem.  Weight loss 33 pounds in 5 weeks.  Changed Pepcid to Protonix.  Palliative care and radiation oncology both consulted.  Physical therapy recommending skilled nursing facility at discharge.  7/19/2023-patient decided DNR/DNI but wants to continue aggressive treatment and try chemotherapy/immunotherapy.  7/20/2023-WBC 13.4, hemoglobin 9. Hospice was consulted by primary team for inpatient hospice admission.  7/21/2023-WBC 12.7, hemoglobin 8.5.  Dexamethasone decreased to 4 mg po daily.   7/22/2023-cardiac electrophysiology was consulted and they recommended, due to his metastatic cancer and comorbidities, to not proceed with ablation or cardioversion and to manage the patient with conservative therapy.  Increased gabapentin to 300 mg twice daily.  ABIs normal.  Mild digital ischemia on the left.  7/23/2023-WBC 14.2, hemoglobin 8.7.  Lexapro started for depression.    History of present illness reviewed since last visit and changes noted on 07/23/23.    Subjective    feet feel better today..    ROS:  Review of Systems   Constitutional:  Negative for activity change, appetite  change, chills, fatigue, fever and unexpected weight change.   HENT:  Negative for mouth sores, sore throat and tinnitus.    Eyes:  Negative for photophobia, pain, redness and visual disturbance.   Respiratory:  Negative for apnea, cough and shortness of breath.    Cardiovascular:  Negative for chest pain, palpitations and leg swelling.   Gastrointestinal:  Negative for abdominal pain, constipation, diarrhea, nausea and vomiting.   Endocrine: Negative for polydipsia.   Genitourinary:  Negative for dysuria, enuresis and hematuria.   Musculoskeletal:  Negative for arthralgias, back pain, myalgias (in his legs.) and neck pain.   Skin:  Positive for wound. Negative for rash.   Allergic/Immunologic: Negative for environmental allergies.   Neurological:  Negative for dizziness, seizures, speech difficulty, weakness, light-headedness, numbness and headaches.   Hematological:  Negative for adenopathy. Does not bruise/bleed easily.   Psychiatric/Behavioral:  Positive for agitation and dysphoric mood. The patient is not nervous/anxious.       MEDICATIONS:    Scheduled Meds:  aluminum sulfate-calcium acetate 1:20, 1,000 mL, Topical, BID  aspirin, 81 mg, Oral, Daily  budesonide-formoterol, 2 puff, Inhalation, BID - RT  calcium 500 mg vitamin D 5 mcg (200 UT), 1 tablet, Oral, Daily  dexamethasone, 4 mg, Oral, Daily With Breakfast   And  insulin regular, 8 Units, Subcutaneous, Daily With Breakfast  dilTIAZem CD, 240 mg, Oral, Q24H  docusate sodium, 100 mg, Oral, BID  ferrous sulfate, 324 mg, Oral, Daily With Breakfast  furosemide, 20 mg, Intravenous, Daily  gabapentin, 300 mg, Oral, BID  guaiFENesin, 600 mg, Oral, Q12H  hydrocortisone-bacitracin-zinc oxide-nystatin, 1 application , Topical, TID  insulin glargine, 14 Units, Subcutaneous, Q12H  insulin lispro, 14 Units, Subcutaneous, TID With Meals  insulin lispro, 2-9 Units, Subcutaneous, 4x Daily AC & at Bedtime  ipratropium-albuterol, 3 mL, Nebulization, 4x Daily -  "RT  metoprolol tartrate, 75 mg, Oral, BID  midodrine, 5 mg, Oral, TID AC  nicotine, 1 patch, Transdermal, Q24H  nystatin, 5 mL, Oral, 4x Daily  pantoprazole, 40 mg, Oral, Q AM  senna-docusate sodium, 2 tablet, Oral, BID  sodium chloride, 10 mL, Intravenous, Q12H    Continuous Infusions:  Pharmacy Consult - Steroid Insulin Protocol,        PRN Meds:    senna-docusate sodium **AND** polyethylene glycol **AND** bisacodyl **AND** bisacodyl    Calcium Replacement - Follow Nurse / BPA Driven Protocol    dextrose    dextrose    glucagon (human recombinant)    HYDROcodone-acetaminophen    ipratropium-albuterol    Magnesium Standard Dose Replacement - Follow Nurse / BPA Driven Protocol    Morphine    ondansetron    Pharmacy Consult - Steroid Insulin Protocol    Phosphorus Replacement - Follow Nurse / BPA Driven Protocol    Potassium Replacement - Follow Nurse / BPA Driven Protocol    [COMPLETED] Insert Peripheral IV **AND** sodium chloride    sodium chloride    sodium chloride     ALLERGIES:    Allergies   Allergen Reactions    Ibuprofen Itching    Penicillins Hives     Objective    VITALS:   /72 (BP Location: Right arm, Patient Position: Lying)   Pulse 94   Temp 97.8 °F (36.6 °C) (Oral)   Resp 24   Ht 177.8 cm (70\")   Wt 71.6 kg (157 lb 13.6 oz)   SpO2 92%   BMI 22.65 kg/m²     PHYSICAL EXAM:  Physical Exam  Vitals reviewed.   Constitutional:       General: He is not in acute distress.     Appearance: He is well-developed. He is not diaphoretic.   HENT:      Head: Normocephalic and atraumatic.      Nose: No rhinorrhea.      Comments: O2 per nasal cannula.     Mouth/Throat:      Pharynx: No oropharyngeal exudate.      Comments: Minimal thrush on back of tongue.  Eyes:      Conjunctiva/sclera: Conjunctivae normal.   Neck:      Thyroid: No thyromegaly.      Vascular: No JVD.   Cardiovascular:      Rate and Rhythm: Normal rate and regular rhythm.      Heart sounds: Normal heart sounds. No murmur heard.     " Comments: Cardiac monitor leads.   Pulmonary:      Effort: Pulmonary effort is normal. No respiratory distress.      Breath sounds: No stridor.      Comments: Right port not accessed.  Decreased breath sounds bilaterally.  Abdominal:      General: Bowel sounds are normal.      Palpations: Abdomen is soft.      Tenderness: There is no abdominal tenderness. There is no guarding.   Musculoskeletal:         General: Normal range of motion.      Cervical back: Normal range of motion and neck supple. No rigidity.      Right lower leg: No edema.      Left lower leg: No edema.      Comments: Left hand oxygen saturation monitor.   Skin:     General: Skin is warm and dry.      Findings: No erythema or rash.      Comments: Left chest wall tattoo.  Left upper extremity IV.  Bilateral lower extremities with bandage wraps.     Neurological:      General: No focal deficit present.      Mental Status: He is alert and oriented to person, place, and time.      Sensory: No sensory deficit.   Psychiatric:         Mood and Affect: Mood normal.         Behavior: Behavior normal.         Thought Content: Thought content normal.         Judgment: Judgment normal.       RECENT LABS:  Lab Results (last 24 hours)       Procedure Component Value Units Date/Time    POC Glucose Once [[id number]]  (Abnormal) Collected: 07/23/23 0746    Specimen: Blood Updated: 07/23/23 0749     Glucose 284 mg/dL      Comment: Serial Number: [id number]:  [id number]       Manual Differential [[id number]]  (Abnormal) Collected: 07/22/23 2252    Specimen: Blood Updated: 07/23/23 0054     Neutrophil % 70.0 %      Lymphocyte % 7.0 %      Monocyte % 6.0 %      Bands %  11.0 %      Metamyelocyte % 5.0 %      Myelocyte % 1.0 %      Neutrophils Absolute 11.50 10*3/mm3      Lymphocytes Absolute 0.99 10*3/mm3      Monocytes Absolute 0.85 10*3/mm3      Anisocytosis Slight/1+     Toxic Granulation Slight/1+     Large Platelets Slight/1+    CBC & Differential [[id number]]   (Abnormal) Collected: 07/22/23 2252    Specimen: Blood Updated: 07/23/23 0054    Narrative:      The following orders were created for panel order CBC & Differential.  Procedure                               Abnormality         Status                     ---------                               -----------         ------                     CBC Auto Differential[[id number]]        Abnormal            Final result               Scan Slide[[id number]]                                       Final result                 Please view results for these tests on the individual orders.    Scan Slide [[id number]] Collected: 07/22/23 2252    Specimen: Blood Updated: 07/23/23 0054     Scan Slide --     Comment: See Manual Differential Results       CBC Auto Differential [[id number]]  (Abnormal) Collected: 07/22/23 2252    Specimen: Blood Updated: 07/23/23 0054     WBC 14.20 10*3/mm3      RBC 3.35 10*6/mm3      Hemoglobin 8.7 g/dL      Hematocrit 28.9 %      MCV 86.1 fL      MCH 25.9 pg      MCHC 30.1 g/dL      RDW 19.9 %      RDW-SD 62.6 fl      MPV 9.3 fL      Platelets 406 10*3/mm3     Narrative:      The previously reported component NRBC is no longer being reported. Previous result was 0.4 /100 WBC (Reference Range: 0.0-0.2 /100 WBC) on 7/22/2023 at 2353 EDT.    Extra Tubes [[id number]] Collected: 07/22/23 2252    Specimen: Blood, Venous Line Updated: 07/23/23 0000    Narrative:      The following orders were created for panel order Extra Tubes.  Procedure                               Abnormality         Status                     ---------                               -----------         ------                     Green Top (Gel)[[id number]]                                  Final result                 Please view results for these tests on the individual orders.    Green Top (Gel) [[id number]] Collected: 07/22/23 2252    Specimen: Blood Updated: 07/23/23 0000     Extra Tube Hold for add-ons.     Comment: Auto resulted.       POC  Glucose Once [[id number]]  (Abnormal) Collected: 07/22/23 2119    Specimen: Blood Updated: 07/22/23 2121     Glucose 379 mg/dL      Comment: Serial Number: [id number]:  [id number]       POC Glucose Once [[id number]]  (Abnormal) Collected: 07/22/23 1948    Specimen: Blood Updated: 07/22/23 1950     Glucose 347 mg/dL      Comment: Serial Number: [id number]:  [id number]       POC Glucose Once [[id number]]  (Normal) Collected: 07/22/23 1711    Specimen: Blood Updated: 07/22/23 1713     Glucose 96 mg/dL      Comment: Serial Number: [id number]:  [id number]       POC Glucose Once [[id number]]  (Abnormal) Collected: 07/22/23 1129    Specimen: Blood Updated: 07/22/23 1130     Glucose 231 mg/dL      Comment: Serial Number: [id number]:  [id number]             PENDING RESULTS: FREDY's.    IMAGING REVIEWED:  No radiology results for the last day    I have reviewed the patient's labs, imaging, reports, and other clinician documentation.    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  Unable to continue radiation during hospitalization due to intermittent A-fib.  Planned to restart on Monday(7/24).  Molecular testing on tumor pathology revealed low-level PD-L1 positivity.  Orders have been written for outpatient palliative chemotherapy and immunotherapy once radiation therapy has completed.  Plan to start outpatient Xgeva for bone metastases support. He has decided to pursue treatment as planned, but has chosen to be a DNR/DNI.  Dexamethasone tapered again, to 4 mg po daily(7/21). Continued calcium plus vitamin D.  DANN with malabsorption and poor tolerance of oral iron/Elevated copper level-he has not been on multivitamin containing copper. He had reported bright red blood per rectum as outpatient with 2 colonoscopies in the past 6 years that were unremarkable per his report. Received IV  iron last admit and again this admit.  On Protonix, aspirin and dexamethasone. Hemoglobin continues to slowly fall.  Iron studies showed continued DANN with a slight improvement from last admission. Tolerating oral Ferrous sulfate so far.     Left lung pneumonia-likely post-obstructive. On dexamethasone.  Per pulmonary.    BLE edema and pain-recent outpatient BLE Doppler was negative for DVT/SVT. On Lasix and Gabapentin.  Increased dose of gabapentin and ABIs normal.  A-fib with RVR-new onset noted in ED.  Cardiology consulted.  On ASA.  Cardiac EP recommending conservative management.  Chronic nausea, Uncontrolled DM2, Low TSH, and severe wt. Loss-on Protonix. Receiving Glucerna twice daily and Colace.  Endocrine managing.  Tapering Dexamethasone.   Smoking-cessation counseling continued.  On a nicotine patch.   Recent pathological right hip fracture-physical therapy working with patient.  On calcium plus vitamin D. Plannning rehab placement.   Oral thrush-on nystatin. Improved.   Agitation/depression-due to prolonged hospitalization and clinical status.  Wants antidepressants.     PLAN  Will follow CBC.   Continue Nystatin 100,000 units/mL 5 mL p.o. 4 times daily.  Continue dexamethasone 4 mg to daily.   Continue ASA 81 mg by mouth daily.  Would not recommend full dose anticoagulation for A-fib secondary to brain metastasis and known rectal bleeding unless benefits exceed risks.  Continue PRN Norco and Morphine.  Increase gabapentin for pain control.  Continue Protonix.      Continue Glucerna twice daily and Colace.    Radiation therapy to resume when heart rate controlled-hopefully  tomorrow.   Plan for combination of chemotherapy and immunotherapy as an outpatient post radiation.  Start Xgeva for bone metastases support as outpatient.  Continue calcium plus vitamin D daily.  Recommend outpatient GI evaluation.  Brain MRI as outpatient to f/u on metastasis, post XRT.   Continue Ferrous sulfate 325mg po daily.  "  Continue Nicotine patch.   Lexapro 10 mg p.o daily.         I have personally performed a face-to-face diagnostic evaluation on this patient via telehealth. I have performed a complete history and physical examination through videoconferencing, reviewed laboratory studies, and radiographic examinations.  I have completed the majority and substantive portion of the medical decision making.  I have formulated the assessment on this patient and the plan of action as noted above. I have discussed the case with Aminah Houser NP, have edited/reviewed the note, and agree with the care plan.  He claims to be feeling better today but is feet are  to touch.  He complains of depression and is wanting something for it.  Hemoglobin is low but stable.  We will start him on Lexapro.           I discussed the patient's findings and my recommendations with patient.    Part of this note may be an electronic transcription/translation of spoken language to printed text using the Dragon Dictation System.     Electronically signed by Mina Dupree MD, 07/23/23, 10:01 AM EDT.              Electronically signed by Mina Dupree MD at 07/23/23 1001       Liudmila Levin MD at 07/22/23 1729          Subjective   Isra Davenport is a 68 y.o. male.   Seen for diabetes f/u.  Drinking boost.        Objective     /68   Pulse 75   Temp 97.6 °F (36.4 °C) (Oral)   Resp 20   Ht 177.8 cm (70\")   Wt 71.6 kg (157 lb 13.6 oz)   SpO2 90%   BMI 22.65 kg/m²   Blood sugar  108 this am,  231 @ lunch, 96 @ supper    ASSESSMENT  Diabetes type 2 with hyperglycemia  Patient is Improving    PLAN    Continue same insulin regimen.        Liudmila Levin MD  7/22/2023  17:29 EDT      Electronically signed by Liudmila Levin MD at 07/22/23 1731       Consult Notes (last 48 hours)  Notes from 07/22/23 1411 through 07/24/23 1411   No notes of this type exist for this encounter.          Physical Therapy Notes (last 48 hours) " "       Ladi Adebayo, PTA at 23 1125  Version 1 of 1         Subjective: Pt agreeable to therapeutic plan of care. Pt expressed his bottom is hurting and feels like he is getting weaker.    Objective:     Bed mobility - SBA and CGA  Transfers - CGA, Assist x 2, and with rolling walker  Ambulation - 4 feet CGA, Assist x 2, and with rolling walker    Therapeutic Exercise - 10 Reps B LE AROM unsupported sitting / EOB    Vitals: Pt's O2 sats read >87% during tx session on 4LO2    Pain: Pt did not rate but expressed pain in bottom and BLE  Intervention for pain: Repositioned, Increased Activity, and Therapeutic Presence    Education: Provided education on the importance of mobility in the acute care setting, Verbal/Tactile Cues, Transfer Training, Gait Training, and Energy conservation strategies    Assessment: Isra Davenport presents with functional mobility impairments which indicate the need for skilled intervention. Pt able to ambulate brief distance from bed to bedside chair with RW/CGA x2 prior to expressing needing to sit down. Pt performed seated ther ex sitting eob with SBA to maintain sitting balance. Pt continues to be limited by decreased activity tolerance and functional strength. Tolerating session today without incident. Will continue to follow and progress as tolerated.     Plan/Recommendations:   Moderate Intensity Therapy recommended post-acute care. This is recommended as therapy feels the patient would require 3-4 days per week and wouldn't tolerate \"3 hour daily\" rehab intensity. SNF would be the preferred choice. If the patient does not agree to SNF, arrange HH or OP depending on home bound status. If patient is medically complex, consider LTACH.. Pt requires no DME at discharge.     Pt desires Skilled Rehab placement at discharge. Pt cooperative; agreeable to therapeutic recommendations and plan of care.         Basic Mobility 6-click:  Rollin = Total, A lot = 2, A little = 3; 4 = " None  Supine>Sit:   1 = Total, A lot = 2, A little = 3; 4 = None   Sit>Stand with arms:  1 = Total, A lot = 2, A little = 3; 4 = None  Bed>Chair:   1 = Total, A lot = 2, A little = 3; 4 = None  Ambulate in room:  1 = Total, A lot = 2, A little = 3; 4 = None  3-5 Steps with railin = Total, A lot = 2, A little = 3; 4 = None  Score: 17    Modified Jyothi: N/A = No pre-op stroke/TIA    Post-Tx Position: Up in Chair, Staff Present, Alarms activated, and Call light and personal items within reach  PPE: gloves        Electronically signed by Adebayo Bledsoe PTA at 23 131       Adebayo Bledsoe PTA at 23 131  Version 1 of 1         Goal Outcome Evaluation:         Isra Davenport presents with functional mobility impairments which indicate the need for skilled intervention. Pt able to ambulate brief distance from bed to bedside chair with RW/CGA x2 prior to expressing needing to sit down. Pt performed seated ther ex sitting eob with SBA to maintain sitting balance. Pt continues to be limited by decreased activity tolerance and functional strength. Tolerating session today without incident. Will continue to follow and progress as tolerated.                  Electronically signed by Adebayo Bledsoe PTA at 23 1310       Occupational Therapy Notes (last 48 hours)  Notes from 23 1411 through 23 1411   No notes exist for this encounter.       Speech Language Pathology Notes (last 48 hours)  Notes from 23 1411 through 23 1411   No notes exist for this encounter.       Samantha Frankel, PT   Physical Therapist  Specialty:  Physical Therapy  Therapy Evaluation      Attested  Date of Service:  23  Creation Time:  23     Attested            Attestation signed by Sabine Vasquez PT at 23 3563     Reviewed and agreed.            Expand All Collapse All                                                                                                                                                                                                                                                                 Patient Name: Isra Davenport                     : 1955                        MRN: [id number]                              Today's Date: 2023                                   Admit Date: 7/15/2023                        Visit Dx:   Visit Diagnosis       ICD-10-CM ICD-9-CM   1. Primary malignant neoplasm of left lung metastatic to other site  C34.92 162.9   2. Pedal edema  R60.0 782.3   3. Pain in both lower extremities  M79.604 729.5     M79.605     4. Atrial fibrillation with rapid ventricular response  I48.91 427.31         Problem List       Patient Active Problem List   Diagnosis    Closed fracture of right hip    Closed fracture of right hip, initial encounter    Mass of left lung    Primary malignant neoplasm of left lung metastatic to other site    Lung cancer metastatic to brain    Hernia of anterior abdominal wall    Spinal stenosis of lumbar region    Prostatitis    Polyp of colon    Osteoarthritis    Neuropathy    Neuralgia    Chronic back pain    Benign prostatic hyperplasia without urinary obstruction    Metastasis to bone    Atrial fibrillation with RVR    Atrial fibrillation         Medical History        Past Medical History:   Diagnosis Date    Arthritis      Benign prostatic hyperplasia without urinary obstruction 2023    Chronic back pain 2023    Closed fracture of right hip 2023     Added automatically from request for surgery [id number]    Hernia of anterior abdominal wall 2020    Lung cancer       squamous cell carcimoma,left    Lung cancer metastatic to brain 2023    Mass of left lung 2023     Added automatically from request for surgery [id number]    Neuralgia 2023    Neuropathy 2023    Osteoarthritis 2023    Polyp of colon 2023    Primary malignant neoplasm of left lung metastatic to other site  06/16/2023     Added automatically from request for surgery [id number]    Prostatitis 07/06/2023    Spinal stenosis of lumbar region 07/06/2023         Surgical History         Past Surgical History:   Procedure Laterality Date    BACK SURGERY   2006    BRONCHOSCOPY N/A 6/19/2023     Procedure: BRONCHOSCOPY WITH BIOPSY X1 AREA, FINE NEEDLE ASPIRATION, BRUSHING, AND BRONCHIAL WASHING;  Surgeon: Reggie Abarca MD;  Location: [facility] ENDOSCOPY;  Service: Pulmonary;  Laterality: N/A;  POST: MUCOUS PLUGS  AND LUNG MASS    HIP INTERTROCHANTERIC NAILING Right 6/17/2023     Procedure: HIP INTERTROCHANTERIC NAILING;  Surgeon: Levi Blackmon II, MD;  Location: [facility] MAIN OR;  Service: Orthopedics;  Laterality: Right;    LEG SURGERY Bilateral 08/25/1985    PORTACATH PLACEMENT Right 6/29/2023     Procedure: INSERTION OF PORTACATH;  Surgeon: Jese Goss MD;  Location: [facility] MAIN OR;  Service: General;  Laterality: Right;           General Information         Row Name 07/17/23 1555                 Physical Therapy Time and Intention     Document Type evaluation  -OD       Mode of Treatment physical therapy;co-treatment  -OD          Row Name 07/17/23 1554                 General Information     Patient Profile Reviewed yes  -OD       Prior Level of Function independent:;ADL's  RW, family provides transportation, independent ADLs, spongebath  -OD       Existing Precautions/Restrictions fall;oxygen therapy device and L/min  3L O2  -OD       Barriers to Rehab previous functional deficit;impaired sensation  -OD          Row Name 07/17/23 7923                 Living Environment     People in Home alone;other (see comments)  Recently has been staying with brother and TOMER  -OD          Row Name 07/17/23 1554                 Home Main Entrance     Number of Stairs, Main Entrance one  -OD          Row Name 07/17/23 1554                 Stairs Within Home, Primary     Number of Stairs, Within Home, Primary none  -OD           Row Name 07/17/23 1555                 Cognition     Orientation Status (Cognition) oriented x 4  -OD          Row Name 07/17/23 1555                 Safety Issues, Functional Mobility     Impairments Affecting Function (Mobility) balance;endurance/activity tolerance;pain;sensation/sensory awareness  -OD                       User Key  (r) = Recorded By, (t) = Taken By, (c) = Cosigned By        Initials Name Provider Type     OD Samantha Frankel, LUIZ Physical Therapist                            Mobility         Row Name 07/17/23 1557                 Bed Mobility     Bed Mobility bed mobility (all) activities  -OD       All Activities, [place] (Bed Mobility) moderate assist (50% patient effort)  -OD       Comment, (Bed Mobility) for assist BLE due to pain  -OD          Row Name 07/17/23 1557                 Bed-Chair Transfer     Bed-Chair [place] (Transfers) minimum assist (75% patient effort);moderate assist (50% patient effort);2 person assist  -OD       Comment, (Bed-Chair Transfer) B HHA  -OD          Row Name 07/17/23 1557                 Sit-Stand Transfer     Sit-Stand [place] (Transfers) minimum assist (75% patient effort);2 person assist  -OD       Comment, (Sit-Stand Transfer) B HHA  -OD          Row Name 07/17/23 1557                 Gait/Stairs (Locomotion)     [place] Level (Gait) moderate assist (50% patient effort);2 person assist;other (see comments)  Side steps for stand-pivot to bedside chair  -OD       Distance in Feet (Gait) 1  -OD                       User Key  (r) = Recorded By, (t) = Taken By, (c) = Cosigned By        Initials Name Provider Type     OD Samantha Frankel PT Physical Therapist                            Obj/Interventions         Row Name 07/17/23 1558                 Range of Motion Comprehensive     General Range of Motion lower extremity range of motion deficits identified  -OD       Comment, General Range of Motion Secondary to pain BLE.  -OD           Row Name 07/17/23 1558                 Strength Comprehensive (MMT)     General Manual Muscle Testing (MMT) Assessment lower extremity strength deficits identified  -OD       Comment, General Manual Muscle Testing (MMT) Assessment BLE grossly 4/5  -OD          Row Name 07/17/23 1558                 Balance     Balance Interventions sitting;standing;sit to stand;moderate challenge;static;dynamic  -OD          Row Name 07/17/23 1558                 Sensory Assessment (Somatosensory)     Sensory Assessment (Somatosensory) sensation intact;other (see comments)  Impaired due to swelling  -OD                       User Key  (r) = Recorded By, (t) = Taken By, (c) = Cosigned By        Initials Name Provider Type     OD Samantha Frankel, PT Physical Therapist                            Goals/Plan         Row Name 07/17/23 1611                 Bed Mobility Goal 1 (PT)     Activity/Assistive Device (Bed Mobility Goal 1, PT) bed mobility activities, all  -OD       [place] Level/Cues Needed (Bed Mobility Goal 1, PT) independent  -OD       Time Frame (Bed Mobility Goal 1, PT) long term goal (LTG);2 weeks  -OD          Row Name 07/17/23 1611                 Transfer Goal 1 (PT)     Activity/Assistive Device (Transfer Goal 1, PT) transfers, all;walker, rolling  -OD       [place] Level/Cues Needed (Transfer Goal 1, PT) modified independence  -OD       Time Frame (Transfer Goal 1, PT) long term goal (LTG);2 weeks  -OD          Row Name 07/17/23 1611                 Gait Training Goal 1 (PT)     Activity/Assistive Device (Gait Training Goal 1, PT) gait (walking locomotion);assistive device use;decrease fall risk;diminish gait deviation;walker, rolling  -OD       [place] Level (Gait Training Goal 1, PT) modified independence  -OD       Distance (Gait Training Goal 1, PT) 50 feet  -OD       Time Frame (Gait Training Goal 1, PT) long term goal (LTG);2 weeks  -OD          Row Name 07/17/23 1611                 Therapy  Assessment/Plan (PT)     Planned Therapy Interventions (PT) balance training;bed mobility training;gait training;home exercise program;neuromuscular re-education;strengthening;patient/family education;transfer training;postural re-education  -OD                    User Key  (r) = Recorded By, (t) = Taken By, (c) = Cosigned By        Initials Name Provider Type     OD Samantha Frankel, PT Physical Therapist                         Clinical Impression         Row Name 07/17/23 1600                 Pain Scale: FACES Pre/Post-Treatment     Pain: FACES Scale, Pretreatment 4-->hurts little more  -OD       Posttreatment Pain Rating 4-->hurts little more  -OD       Pain Location - Side/Orientation Bilateral  -OD       Pain Location - foot  -OD          Row Name 07/17/23 1600                 Plan of Care Review     Plan of Care Reviewed With patient  -OD       Progress no change  -OD       Outcome Evaluation Pt is a 67 y/o M who presents to [facility] for pain and LE edema and is dx with Afib w/ RVR. Pt recently d/c 6/29/23 s/p R IM nailing and recent dx of metastatic lung CA with mets to bone/brain. Imaging/testing (-) for DVT or acute fractures. Pt is indep living alone at baseline, but since recent d/c, has been living with brother and sister-in-law, who both work in their [employer] with 1 [employer]. Pt indep with ADL's and uses RW for mobility. Pt currently on 3L O2 via NC but does not require home O2. Pt required min-modAx2 for mobility this date secondary to BLE pain and swelling and weakness. Pt would benefit from skilled PT through length of stay, and recommend SNF upon d/c due to decline in baseline function and unsafe mobility.  -OD          Row Name 07/17/23 1600                 Therapy Assessment/Plan (PT)     Rehab Potential (PT) good, to achieve stated therapy goals  -OD       Criteria for Skilled Interventions Met (PT) yes;meets criteria  -OD       Therapy Frequency (PT) 5 times/wk  -OD       Predicted Duration of Therapy  Intervention (PT) until d/c  -OD          Row Name 07/17/23 1600                 Vital Signs     Pre Systolic BP Rehab 107  -OD       Pre Treatment Diastolic BP 72  -OD       Intratreatment Resp Rate (breaths/min) 34  -OD       O2 Delivery Pre Treatment nasal cannula  -OD       Intra SpO2 (%) 91  -OD       O2 Delivery Intra Treatment nasal cannula  -OD       O2 Delivery Post Treatment nasal cannula  -OD       Pre Patient Position Supine  -OD       Intra Patient Position Standing  -OD       Post Patient Position Sitting  -OD          Row Name 07/17/23 1600                 Positioning and Restraints     Pre-Treatment Position in bed  -OD       Post Treatment Position chair  -OD       In Chair notified nsg;reclined;call light within reach;encouraged to call for assist;exit alarm on  -OD                       User Key  (r) = Recorded By, (t) = Taken By, (c) = Cosigned By        Initials Name Provider Type     OD Samantha Frankel, PT Physical Therapist                            Outcome Measures         Row Name 07/17/23 1612                 How much help from another person do you currently need...     Turning from your back to your side while in flat bed without using bedrails? 3  -OD       Moving from lying on back to sitting on the side of a flat bed without bedrails? 3  -OD       Moving to and from a bed to a chair (including a wheelchair)? 2  -OD       Standing up from a chair using your arms (e.g., wheelchair, bedside chair)? 2  -OD       Climbing 3-5 steps with a railing? 1  -OD       To walk in hospital room? 3  -OD       AM-PAC 6 Clicks Score (PT) 14  -OD       Highest level of mobility 4 --> Transferred to chair/commode  -OD          Row Name 07/17/23 1612 07/17/23 1313            Functional Assessment     Outcome Measure Options AM-PAC 6 Clicks Basic Mobility (PT)  -OD AM-PAC 6 Clicks Daily Activity (OT)  -MS                     User Key  (r) = Recorded By, (t) = Taken By, (c) = Cosigned By        Initials Name  Provider Type     Jadyn Almanza, OT Occupational Therapist     Samantha Gomez, PT Physical Therapist                          Physical Therapy Education            Title: PT OT SLP Therapies (Done)         Topic: Physical Therapy (Done)         Point: Mobility training (Done)         Learning Progress Summary               Patient Acceptance, E, VU by OD at 7/17/2023 1612                               Point: Home exercise program (Done)         Learning Progress Summary               Patient Acceptance, E, VU by OD at 7/17/2023 1612                               Point: Body mechanics (Done)         Learning Progress Summary               Patient Acceptance, E, VU by OD at 7/17/2023 1612                               Point: Precautions (Done)         Learning Progress Summary               Patient Acceptance, E, VU by OD at 7/17/2023 1612                                                   User Key         Initials Effective Dates Name Provider Type Discipline     OD 05/11/23 -  Samantha Frankel, LUIZ Physical Therapist PT                          PT Recommendation and Plan  Planned Therapy Interventions (PT): balance training, bed mobility training, gait training, home exercise program, neuromuscular re-education, strengthening, patient/family education, transfer training, postural re-education  Plan of Care Reviewed With: patient  Progress: no change  Outcome Evaluation: Pt is a 69 y/o M who presents to [facility] for pain and LE edema and is dx with Afib w/ RVR. Pt recently d/c 6/29/23 s/p R IM nailing and recent dx of metastatic lung CA with mets to bone/brain. Imaging/testing (-) for DVT or acute fractures. Pt is indep living alone at baseline, but since recent d/c, has been living with brother and sister-in-law, who both work in their [employer] with 1 [employer]. Pt indep with ADL's and uses RW for mobility. Pt currently on 3L O2 via NC but does not require home O2. Pt required min-modAx2 for mobility this date secondary to BLE  pain and swelling and weakness. Pt would benefit from skilled PT through length of stay, and recommend SNF upon d/c due to decline in baseline function and unsafe mobility.      Time Calculation:     PT Charges         Row Name 07/17/23 1613                       Time Calculation     Start Time 1043  -OD         Stop Time 1110  -OD         Time Calculation (min) 27 min  -OD         PT Received On 07/17/23  -OD         PT - Next Appointment 07/18/23  -OD         PT Goal Re-Cert Due Date 07/31/23  -OD                 Time Calculation- PT     Total Timed Code Minutes- PT 0 minute(s)  -OD                      User Key  (r) = Recorded By, (t) = Taken By, (c) = Cosigned By        Initials Name Provider Type     OD Samantha Frankel, LUIZ Physical Therapist                       Therapy Charges for Today         Code Description Service Date Service Provider Modifiers Qty     [id number] HC PT EVAL MOD COMPLEXITY 4 7/17/2023 Samantha Frankel, PT GP 1                PT G-Codes  Outcome Measure Options: AM-PAC 6 Clicks Basic Mobility (PT)  AM-PAC 6 Clicks Score (PT): 14  AM-PAC 6 Clicks Score (OT): 16  PT Discharge Summary  Anticipated Discharge Disposition (PT): skilled nursing facility     Samantha Frankel PT                   7/17/2023                                Cosigned by: Sabine Vasquez, PT at 07/18/23 0511

## 2023-07-24 NOTE — PROGRESS NOTES
[facility] [place]        LOS:  LOS: 7 days   Patient Name: Isra Davenport  Age/Sex: 68 y.o. male  : 1955  MRN: [id number]    Day of Service: 23   Length of Stay: 7  Encounter Provider: Qamar Johnson MD  Place of Service: [facility] CARDIOLOGY  Patient Care Team:  Mayco Carreon MD as PCP - General (Internal Medicine)  Mina Dupree MD as Consulting Physician (Hematology and Oncology)    Subjective:     Chief Complaint: f/u Afib    Subjective: stable from CV standpoint B/p stable. He is planned for radiation today at the [facility]    Current Medications:   Scheduled Meds:aluminum sulfate-calcium acetate 1:20, 1,000 mL, Topical, BID  aspirin, 81 mg, Oral, Daily  budesonide-formoterol, 2 puff, Inhalation, BID - RT  calcium 500 mg vitamin D 5 mcg (200 UT), 1 tablet, Oral, Daily  dexamethasone, 4 mg, Oral, Daily With Breakfast   And  insulin regular, 12 Units, Subcutaneous, Daily With Breakfast  dilTIAZem CD, 240 mg, Oral, Q24H  docusate sodium, 100 mg, Oral, BID  escitalopram, 10 mg, Oral, Daily  ferrous sulfate, 324 mg, Oral, Daily With Breakfast  furosemide, 20 mg, Intravenous, Daily  gabapentin, 300 mg, Oral, BID  guaiFENesin, 600 mg, Oral, Q12H  hydrocortisone-bacitracin-zinc oxide-nystatin, 1 application , Topical, TID  insulin glargine, 14 Units, Subcutaneous, Q12H  [START ON 2023] insulin lispro, 12 Units, Subcutaneous, TID With Meals  insulin lispro, 2-9 Units, Subcutaneous, 4x Daily AC & at Bedtime  metoprolol tartrate, 75 mg, Oral, BID  midodrine, 10 mg, Oral, TID AC  nicotine, 1 patch, Transdermal, Q24H  nystatin, 5 mL, Oral, 4x Daily  pantoprazole, 40 mg, Oral, Q AM  senna-docusate sodium, 2 tablet, Oral, BID  sodium chloride, 10 mL, Intravenous, Q12H      Continuous Infusions:Pharmacy Consult - Steroid Insulin Protocol,         Allergies:  Allergies   Allergen Reactions    Ibuprofen Itching    Penicillins Hives       Review of Systems    Constitutional: Negative for chills, diaphoresis and malaise/fatigue.   Cardiovascular:  Negative for chest pain, dyspnea on exertion, irregular heartbeat, leg swelling, near-syncope, orthopnea, palpitations, paroxysmal nocturnal dyspnea and syncope.   Respiratory:  Negative for cough, shortness of breath, sleep disturbances due to breathing and sputum production.    Musculoskeletal:  Positive for back pain and joint pain.   Gastrointestinal:  Negative for change in bowel habit.   Genitourinary:  Negative for urgency.   Neurological:  Negative for dizziness and headaches.   Psychiatric/Behavioral:  Negative for altered mental status.        Objective:     Temp:  [97.8 °F (36.6 °C)-98.1 °F (36.7 °C)] 97.8 °F (36.6 °C)  Heart Rate:  [73-99] 80  Resp:  [17-27] 19  BP: ()/(59-91) 101/64     Intake/Output Summary (Last 24 hours) at 7/24/2023 1923  Last data filed at 7/24/2023 1742  Gross per 24 hour   Intake 1600 ml   Output 2650 ml   Net -1050 ml     Body mass index is 23.09 kg/m².      07/21/23  0504 07/22/23  0518 07/24/23  0507   Weight: 70.8 kg (156 lb 1.4 oz) (pts previous weight was on 7/17. no extra linen on the bed) 71.6 kg (157 lb 13.6 oz) 73 kg (160 lb 15 oz)         General Appearance:    Alert, cooperative, in no acute distress                                Head: Atraumatic, normocephalic, PERRLA               Neck:   supple, trachea midline, no thyromegaly, no carotid bruit, no JVD   Lungs:     Clear to auscultation, respirations regular, even and               unlabored    Heart:    regular rhythm and normal rate, normal S1 and S2   Abdomen:     Normal bowel sounds, no masses, no organomegaly, soft  nontender, nondistended, no guarding, no rebound  tenderness   Extremities:   Moves all extremities well, no edema, no cyanosis, no  redness   Pulses:   Pulses palpable and equal bilaterally   Skin:   No bleeding, bruising or rash   Neurologic:   Awake, alert, oriented x3         Lab Review:   Results  from last 7 days   Lab Units 07/20/23  0106 07/19/23  1025   SODIUM mmol/L 138 134*   POTASSIUM mmol/L 4.3 4.1   CHLORIDE mmol/L 94* 89*   CO2 mmol/L 38.0* 37.0*   BUN mg/dL 16 15   CREATININE mg/dL 0.31* 0.39*   GLUCOSE mg/dL 123* 381*   CALCIUM mg/dL 9.3 9.1         Results from last 7 days   Lab Units 07/24/23  0007 07/23/23  1940 07/22/23  2252   WBC 10*3/mm3 15.50*  --  14.20*   HEMOGLOBIN g/dL 8.3*  --  8.7*   HEMOGLOBIN, POC g/dL  --  11.3*  --    HEMATOCRIT % 27.0*  --  28.9*   HEMATOCRIT POC %  --  33*  --    PLATELETS 10*3/mm3 375  --  406         Results from last 7 days   Lab Units 07/20/23  0106 07/19/23  1025   MAGNESIUM mg/dL 1.7 1.9           Invalid input(s): LDLCALC      Results from last 7 days   Lab Units 07/17/23  2254   TSH uIU/mL 0.145*       Recent Radiology:  Imaging Results (Most Recent)       Procedure Component Value Units Date/Time    XR Chest 1 View [[id number]] Collected: 07/23/23 1959     Updated: 07/23/23 2004    Narrative:      XR CHEST 1 VW    Date of Exam: 7/23/2023 7:52 PM EDT    Indication: Hypoxia, left lung cancer.    Comparison: 7/15/2023.    Findings:  The heart is enlarged. There is worsening consolidation in the left perihilar region. This is secondary to a known left lung mass secondary to left lung cancer. The consolidation probably represents worsening postobstructive pneumonitis and atelectasis.   There may be a small left pleural effusion. Within the right lung, the pulmonary vascular markings appear increased. This suggests that the patient has pulmonary vascular congestion/low-grade pulmonary edema. The right lung and pleural space are   otherwise clear. There is a right-sided port in place. There is partially imaged fusion hardware in the lower thoracic spine.      Impression:      Impression:    1. Worsening consolidation in the left perihilar region. This is secondary to a known lung mass consistent with lung cancer. There is probably worsening postobstructive  pneumonitis/atelectasis and there may be a small left pleural effusion.  2. Cardiomegaly with pulmonary vascular congestion/low-grade pulmonary edema.      Electronically Signed: Zeyad Contreras    7/23/2023 8:02 PM EDT    Workstation ID: [id number]    XR Chest 1 View [[id number]] Collected: 07/15/23 1808     Updated: 07/15/23 1938    Narrative:      XR CHEST 1 VW    Date of Exam: 7/15/2023 5:58 PM EDT    Indication: edema    Comparison: 6/29/2023    Findings:  Right internal jugular Port-A-Cath remains in place and unchanged in position. Heart size is stable. There is chronic elevation of left ami diaphragm. There is were airspace disease compatible with pneumonia. Right lung is clear. No pleural effusion. No   evidence pneumothorax.      Impression:      Impression:    1. Worsening left basilar airspace disease compatible with pneumonia.  2. Chronic elevation of the left hemidiaphragm.      Electronically Signed: Jeet Panda    7/15/2023 6:09 PM EDT    Workstation ID: [id number]    XR Foot 3+ View Bilateral [[id number]] Collected: 07/15/23 1832     Updated: 07/15/23 1836    Narrative:      XR FOOT 3+ VW BILATERAL    Date of Exam: 7/15/2023 6:04 PM EDT    Indication: pain    Comparison: None available.    Findings:  Right foot: There is no acute fracture or dislocation. No bony erosion or abnormal periosteal reaction. Soft tissues are unremarkable.    Left foot: There is no acute fracture or dislocation. No bony erosion or abnormal periosteal reaction. There is mild diffuse soft tissue swelling.        Impression:      Impression:    1. No acute bony abnormality of the feet.      Electronically Signed: Jeet Panda    7/15/2023 6:34 PM EDT    Workstation ID: [id number]    XR Tibia Fibula 2 View Bilateral [[id number]] Collected: 07/15/23 1829     Updated: 07/15/23 1833    Narrative:      XR TIBIA FIBULA 2 VW BILATERAL    Date of Exam: 7/15/2023 6:04 PM EDT    Indication: pain    Comparison: None available.    Findings:  Left  tibia and fibula: There is no acute fracture or dislocation. Soft tissues are unremarkable.    Right tibia and fibula: There is smooth deformity of the midshaft of both the tibia and fibula related to old healed fractures. There is single surgical screw in place through the mid tibia. There is no evidence of acute fracture or dislocation. No   abnormal periosteal reaction identified. Soft tissues are unremarkable.      Impression:      Impression:    1. No acute bony abnormality of the left tibia or fibula.  2. Old healed fractures of the right tibia and fibula. No acute bony abnormality.    Electronically Signed: Jeet Panda    7/15/2023 6:31 PM EDT    Workstation ID: [id number]            ECHOCARDIOGRAM:    Results for orders placed during the hospital encounter of 06/16/23    Adult Transthoracic Echo Complete W/ Cont if Necessary Per Protocol    Interpretation Summary    Left ventricular systolic function is normal. Left ventricular ejection fraction appears to be 61 - 65%.    Left ventricular diastolic function is consistent with (grade I) impaired relaxation.    There is mild bileaflet mitral valve prolapse present.    Estimated right ventricular systolic pressure from tricuspid regurgitation is mildly elevated (35-45 mmHg). Calculated right ventricular systolic pressure from tricuspid regurgitation is 44 mmHg.        I reviewed the patient's new clinical results.    EKG:      Assessment:       Atrial fibrillation with RVR    Atrial fibrillation    Moderate malnutrition    1. Hip fracture, pathologic, s/p surgery  2. 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 Tachycardia, pAfib/flutter  4. Acute hypoxic respiratory failur    Plan:   Patient is in SR.   Recent non invasive ischemic eval negative for ischemia  Echo with preserved LV function  Per oncology not recommended long term a/c due to brain mets and known rectal bleeding    okay to travel for radiation today    Patient is seen and examined and findings are verified.  All data is reviewed by me personally.  Assessment and plan formulated by APC was done after discussion with attending.  I spent more than 50% of time in taking care of the patient.    Patient is overall doing well.  Patient underwent radiation of the head today.  Patient tolerated well    Patient is in atrial flutter but heart rate is controlled blood pressure is stable    Normal S1 and S2.  No pericardial rub or murmur abdominal exam is benign    Patient is overall stable.  Heart rate is now better controlled.  Electrophysiology recommend medical management    Electronically signed by Qamar Johnson MD, 07/24/23, 7:23 PM EDT.      Qamar Johnson MD  07/24/23  19:23 EDT

## 2023-07-24 NOTE — PLAN OF CARE
Isra Davenport presents with ADL impairments affecting function including balance, cognition, coordination, endurance / activity tolerance, pain, and strength. Patient demos mild short-term memory deficits this date, but otherwise alert and oriented to situation. Patient completes bed mobility with Min A for LB due to edema. Requests LE's be wrapped for edema, but also states MD has removed them. Sit<>stand with CGA x2 with RW. Transfers to chair with CGA x2. Fatigues quickly, and SpO2 hovers 88-90% throughout session on HF O2. Demonstrated functioning below baseline abilities indicate the need for continued skilled intervention while inpatient. Tolerating session today without incident. Will continue to follow and progress as tolerated.

## 2023-07-24 NOTE — PROGRESS NOTES
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  DM II  S/p intramedullary nailing of the hip  S/p [id number]  S/p chemotherapy/plan for palliative radiation     PLAN:  Oxygen supplement and titration maintain saturation 90-95%: Currently requiring 4 L per nasal cannula  Encourage OOB daily   Antibiotics  Bronchodilators  Inhaled corticosteroids  Systemic steroids  Smoking cessation counseling, nicotine patch  Incentive spirometer  Diuresis and monitor [id number]  Oncology, Cardiology, Endocrinology following             LOS: 7 days     Subjective     Patient reports chronic shortness of breath and generalized weakness    Objective     Vital signs for last 24 hours:  Vitals:    07/24/23 0711 07/24/23 0821 07/24/23 0911 07/24/23 1328   BP:  101/63 110/68 96/59   BP Location:       Patient Position:       Pulse: 73 77 90 78   Resp: 18  20 20   Temp:   97.8 °F (36.6 °C) 98 °F (36.7 °C)   TempSrc:   Oral Oral   SpO2: 90% 91% 90% 90%   Weight:       Height:           Intake/Output last 3 shifts:  I/O last 3 completed shifts:  In: 1120 [P.O.:1120]  Out: 2750 [Urine:2750]  Intake/Output this shift:  I/O this shift:  In: 720 [P.O.:720]  Out: 450 [Urine:450]      Radiology  Imaging Results (Last 24 Hours)       Procedure Component Value Units Date/Time    XR Chest 1 View [[id number]] Collected: 07/23/23 1959     Updated: 07/23/23 2004    Narrative:      XR CHEST 1 VW    Date of Exam: 7/23/2023 7:52 PM EDT    Indication: Hypoxia, left lung cancer.    Comparison: 7/15/2023.    Findings:  The heart is enlarged. There is worsening consolidation in the left perihilar region. This is secondary to a known left lung mass secondary to left lung cancer. The consolidation probably represents worsening postobstructive pneumonitis and atelectasis.   There may be a small left pleural effusion. Within the right lung,  the pulmonary vascular markings appear increased. This suggests that the patient has pulmonary vascular congestion/low-grade pulmonary edema. The right lung and pleural space are   otherwise clear. There is a right-sided port in place. There is partially imaged fusion hardware in the lower thoracic spine.      Impression:      Impression:    1. Worsening consolidation in the left perihilar region. This is secondary to a known lung mass consistent with lung cancer. There is probably worsening postobstructive pneumonitis/atelectasis and there may be a small left pleural effusion.  2. Cardiomegaly with pulmonary vascular congestion/low-grade pulmonary edema.      Electronically Signed: Zeyad Contreras    7/23/2023 8:02 PM EDT    Workstation ID: [id number]            Labs:  Results from last 7 days   Lab Units 07/24/23  0007   WBC 10*3/mm3 15.50*   HEMOGLOBIN g/dL 8.3*   HEMATOCRIT % 27.0*   PLATELETS 10*3/mm3 375       Results from last 7 days   Lab Units 07/20/23  0106   SODIUM mmol/L 138   POTASSIUM mmol/L 4.3   CHLORIDE mmol/L 94*   CO2 mmol/L 38.0*   BUN mg/dL 16   CREATININE mg/dL 0.31*   CALCIUM mg/dL 9.3   GLUCOSE mg/dL 123*       Results from last 7 days   Lab Units 07/23/23  2053   PH, ARTERIAL pH units 7.443   PO2 ART mm Hg 115.9*   PCO2, ARTERIAL mm Hg 67.7*   HCO3 ART mmol/L 46.3*                     Results from last 7 days   Lab Units 07/20/23  0106   MAGNESIUM mg/dL 1.7           Results from last 7 days   Lab Units 07/17/23  2254   TSH uIU/mL 0.145*             Meds:   SCHEDULE  aluminum sulfate-calcium acetate 1:20, 1,000 mL, Topical, BID  aspirin, 81 mg, Oral, Daily  budesonide-formoterol, 2 puff, Inhalation, BID - RT  calcium 500 mg vitamin D 5 mcg (200 UT), 1 tablet, Oral, Daily  dexamethasone, 4 mg, Oral, Daily With Breakfast   And  insulin regular, 12 Units, Subcutaneous, Daily With Breakfast  dilTIAZem CD, 240 mg, Oral, Q24H  docusate sodium, 100 mg, Oral, BID  escitalopram, 10 mg, Oral, Daily  ferrous  sulfate, 324 mg, Oral, Daily With Breakfast  furosemide, 20 mg, Intravenous, Daily  gabapentin, 300 mg, Oral, BID  guaiFENesin, 600 mg, Oral, Q12H  hydrocortisone-bacitracin-zinc oxide-nystatin, 1 application , Topical, TID  insulin glargine, 14 Units, Subcutaneous, Q12H  insulin lispro, 14 Units, Subcutaneous, TID With Meals  insulin lispro, 2-9 Units, Subcutaneous, 4x Daily AC & at Bedtime  metoprolol tartrate, 75 mg, Oral, BID  midodrine, 10 mg, Oral, TID AC  nicotine, 1 patch, Transdermal, Q24H  nystatin, 5 mL, Oral, 4x Daily  pantoprazole, 40 mg, Oral, Q AM  senna-docusate sodium, 2 tablet, Oral, BID  sodium chloride, 10 mL, Intravenous, Q12H      Infusions  Pharmacy Consult - Steroid Insulin Protocol,       PRNs    senna-docusate sodium **AND** polyethylene glycol **AND** bisacodyl **AND** bisacodyl    Calcium Replacement - Follow Nurse / BPA Driven Protocol    dextrose    dextrose    glucagon (human recombinant)    HYDROcodone-acetaminophen    hydrOXYzine    ipratropium-albuterol    Magnesium Standard Dose Replacement - Follow Nurse / BPA Driven Protocol    Morphine    ondansetron    Pharmacy Consult - Steroid Insulin Protocol    Phosphorus Replacement - Follow Nurse / BPA Driven Protocol    Potassium Replacement - Follow Nurse / BPA Driven Protocol    [COMPLETED] Insert Peripheral IV **AND** sodium chloride    sodium chloride    sodium chloride    Physical Exam:  General Appearance:  Alert   HEENT:  Normocephalic, without obvious abnormality, Conjunctiva/corneas clear,.   Nares normal, no drainage     Neck:  Supple, symmetrical, trachea midline.   Lungs /Chest wall: Mild bilateral basal rhonchi, respirations unlabored, symmetrical wall movement.     Heart:  Regular rate and rhythm, S1 S2 normal  Abdomen: Soft, non-tender, no masses, no organomegaly.    Extremities: No edema, no clubbing or cyanosis     ROS  Constitutional: Negative for chills, fever and positive for malaise/fatigue.   HENT: Negative.     Eyes: Negative.    Cardiovascular: Negative.    Respiratory: Positive for chronic shortness of breath.    Skin: Negative.    Musculoskeletal: Negative.    Gastrointestinal: Negative.    Genitourinary: Negative.    Neurological: Generalized weakness      I reviewed the recent clinical results    Part of this note may be an electronic transcription/translation of spoken language to printed text using the Dragon Dictation System.

## 2023-07-24 NOTE — PROGRESS NOTES
Patient Name: Isra Davenport Date: 2023       : 1955 Physician: No primary care provider on file.       MRN #: [id number] Diagnosis:   Encounter Diagnosis   Name Primary?    Metastasis to bone Yes                     RADIATION ON TREATMENT VISIT NOTE - PELVIS    Treatment Summary       Treatment Site Ref. ID Energy Dose/Fx (cGy) #Fx Dose Correction (cGy) Total Dose (cGy) Start Date End Date Elapsed Days   Plan_RtFemur_PTV RtFemur RtFemur 10X 150 4 / 10 0 600 2023   Plan_RtFemur_PTV RtFemur RtFemur 10X 150 4 / 10 0 600 2023     Patient has received 4 of 10 planned treatments; 12 Gy of 30 Gy delivered to date to the Right femur  Admitted; reviewed interval notes; cleared by Cardiology to travel and have today's treatment.    General:           Review of Systems    [x] No new complaints [] Nocturia [] Slow urinary stream  [] Difficulty initiating urination [] Dysuria   [] Increased frequency of urination [] Soft/loose stool [] Diarrhea  [] Rectal burning  [] Skin soreness, location:   [x] Fatigue,  severity: 2  [x] Pain,  severity: Right leg/femur pain better; pain still with activity but improving; 2/10  Bladder medication regimen: NONE   Pain medication regimen: NONE   Bowel regimen: NONE   Skin regimen: NONE     Comments/Notes:  patient wanting something to help with depression--will alert hospital team.    KPS: 70       Vital Signs: pain 2/10    Weight:   Wt Readings from Last 3 Encounters:   23 73 kg (160 lb 15 oz)   23 72.6 kg (160 lb)   23 72.6 kg (160 lb)       Medication: No current facility-administered medications for this visit.    Current Outpatient Medications:     Accu-Chek Softclix Lancets lancets, 1 each by Other route 3 (Three) Times a Day Before Meals. Use as instructed Dx code: E11.65, Disp: 100 each, Rfl: 2    amiodarone (PACERONE) 400 MG tablet, Take 1 tablet by mouth Every 12 (Twelve) Hours., Disp: 60 tablet, Rfl: 0    budesonide  (PULMICORT) 0.5 MG/2ML nebulizer solution, Take 2 mL by nebulization 2 (Two) Times a Day., Disp: 60 each, Rfl: 0    dilTIAZem (CARDIZEM) 60 MG tablet, Take 1 tablet by mouth Every 6 (Six) Hours., Disp: 90 tablet, Rfl: 0    glucose blood (Accu-Chek Guide) test strip, 1 each by Other route 3 (Three) Times a Day Before Meals. Use as instructed Dx code: E11.65, Disp: 100 each, Rfl: 2    guaiFENesin (MUCINEX) 600 MG 12 hr tablet, Take 1 tablet by mouth Every 12 (Twelve) Hours., Disp: 60 tablet, Rfl: 0    insulin aspart (NovoLOG FlexPen) 100 UNIT/ML solution pen-injector sc pen, Inject 6 Units under the skin into the appropriate area as directed 3 (Three) Times a Day With Meals. Dx code: E11.65, Disp: 15 mL, Rfl: 2    Insulin Glargine (LANTUS SOLOSTAR) 100 UNIT/ML injection pen, Inject 14 Units under the skin into the appropriate area as directed Every Night. Dx code: E11.65, Disp: 15 mL, Rfl: 2    insulin glargine (LANTUS, SEMGLEE) 100 UNIT/ML injection, Inject 14 Units under the skin into the appropriate area as directed Every 12 (Twelve) Hours., Disp: 10 mL, Rfl: 12    insulin lispro (HUMALOG/ADMELOG) 100 UNIT/ML injection, Inject 2-9 Units under the skin into the appropriate area as directed 4 (Four) Times a Day Before Meals & at Bedtime., Disp: 10 mL, Rfl: 12    Insulin Pen Needle (Pen Needles) 32G X 4 MM misc, 1 each 4 (Four) Times a Day Before Meals & at Bedtime. Dx code: E11.65, Disp: 200 each, Rfl: 2    ipratropium-albuterol (DUO-NEB) 0.5-2.5 mg/3 ml nebulizer, Take 3 mL by nebulization Every 4 (Four) Hours As Needed for Shortness of Air., Disp: 360 mL, Rfl: 0    Metoprolol Tartrate 75 MG tablet, Take 75 mg by mouth 2 (Two) Times a Day. Indications: High Blood Pressure Disorder, Disp: 90 tablet, Rfl: 0    midodrine (PROAMATINE) 5 MG tablet, Take 1 tablet by mouth 3 (Three) Times a Day Before Meals., Disp: 60 tablet, Rfl: 0    naloxone (NARCAN) 4 MG/0.1ML nasal spray, Call 911. Don't prime. Spray in 1 nostril for  overdose. Repeat in 2-3 minutes in other nostril if no or minimal breathing/responsiveness., Disp: 2 each, Rfl: 0    pantoprazole (PROTONIX) 40 MG EC tablet, Take 1 tablet by mouth Every Morning., Disp: 60 tablet, Rfl: 0    polyethylene glycol (MIRALAX) 17 g packet, Take 17 g by mouth Daily As Needed (Use if senna-docusate is ineffective)., Disp: 30 each, Rfl: 0    sennosides-docusate (PERICOLACE) 8.6-50 MG per tablet, Take 2 tablets by mouth 2 (Two) Times a Day., Disp: 30 tablet, Rfl: 0    traMADol (ULTRAM) 50 MG tablet, Take 1 tablet by mouth Every 6 (Six) Hours As Needed for Moderate Pain for up to 4 days., Disp: 12 tablet, Rfl: 0    Facility-Administered Medications Ordered in Other Visits:     aluminum sulfate-calcium acetate 1:20 (DOMEBORO) topical astringent solution 1,000 mL, 1,000 mL, Topical, BID, Yessi Roche APRN, 1,000 mL at 07/24/23 0825    aspirin chewable tablet 81 mg, 81 mg, Oral, Daily, Yi Amin, APRN, 81 mg at 07/24/23 0823    sennosides-docusate (PERICOLACE) 8.6-50 MG per tablet 2 tablet, 2 tablet, Oral, BID, 2 tablet at 07/24/23 0823 **AND** polyethylene glycol (MIRALAX) packet 17 g, 17 g, Oral, Daily PRN, 17 g at 07/17/23 2213 **AND** bisacodyl (DULCOLAX) EC tablet 5 mg, 5 mg, Oral, Daily PRN, 5 mg at 07/17/23 2213 **AND** bisacodyl (DULCOLAX) suppository 10 mg, 10 mg, Rectal, Daily PRN, Yi Amin APRN    budesonide-formoterol (SYMBICORT) 160-4.5 MCG/ACT inhaler 2 puff, 2 puff, Inhalation, BID - RT, Reggie Abarca MD, 2 puff at 07/24/23 0710    calcium 500 mg vitamin D 5 mcg (200 UT) per tablet 1 tablet, 1 tablet, Oral, Daily, Aminah Houser, APRN, 1 tablet at 07/24/23 0824    Calcium Replacement - Follow Nurse / BPA Driven Protocol, , Does not apply, PRN, Raoul Castillo MD    dexamethasone (DECADRON) tablet 4 mg, 4 mg, Oral, Daily With Breakfast, 4 mg at 07/24/23 0823 **AND** insulin regular (humuLIN R,novoLIN R) injection 12 Units, 12 Units, Subcutaneous, Daily With  Breakfast, Liudmila Levin MD, 12 Units at 07/24/23 0825    dextrose (D50W) (25 g/50 mL) IV injection 25 g, 25 g, Intravenous, Q15 Min PRN, Liudmila Levin MD    dextrose (GLUTOSE) oral gel 15 g, 15 g, Oral, Q15 Min PRN, Liudmila Levin MD    dilTIAZem CD (CARDIZEM CD) 24 hr capsule 240 mg, 240 mg, Oral, Q24H, Nemo Galan MD, 240 mg at 07/24/23 0824    docusate sodium (COLACE) capsule 100 mg, 100 mg, Oral, BID, MikIramcy E, APRN, 100 mg at 07/24/23 0823    escitalopram (LEXAPRO) tablet 10 mg, 10 mg, Oral, Daily, Raoul Castillo MD, 10 mg at 07/24/23 0824    ferrous sulfate EC tablet 324 mg, 324 mg, Oral, Daily With Breakfast, Iram Housercy E, APRN, 324 mg at 07/24/23 0824    furosemide (LASIX) injection 20 mg, 20 mg, Intravenous, Daily, AminDelroya, APRN, 20 mg at 07/24/23 0824    gabapentin (NEURONTIN) capsule 300 mg, 300 mg, Oral, BID, MikIram richardscy E, APRN, 300 mg at 07/24/23 0824    glucagon (GLUCAGEN) injection 1 mg, 1 mg, Intramuscular, Q15 Min PRN, Liudmila Levin MD    guaiFENesin (MUCINEX) 12 hr tablet 600 mg, 600 mg, Oral, Q12H, Raoul Castillo MD, 600 mg at 07/24/23 0823    HYDROcodone-acetaminophen (NORCO) 5-325 MG per tablet 1 tablet, 1 tablet, Oral, Q6H PRN, Raoul Castillo MD, 1 tablet at 07/23/23 2353    hydrocortisone-bacitracin-zinc oxide-nystatin (MAGIC BARRIER) ointment 1 application , 1 application , Topical, TID, Yessi Roche, APRN, 1 application  at 07/24/23 0825    hydrOXYzine (ATARAX) tablet 25 mg, 25 mg, Oral, TID PRN, Miroslava López, APRN, 25 mg at 07/23/23 2205    insulin glargine (LANTUS, SEMGLEE) injection 14 Units, 14 Units, Subcutaneous, Q12H, Liudmila Levin MD, 14 Units at 07/24/23 0825    insulin lispro (HUMALOG/ADMELOG) injection 14 Units, 14 Units, Subcutaneous, TID With Meals, Liudmila Levin MD, 14 Units at 07/24/23 1301    insulin lispro (HUMALOG/ADMELOG) injection 2-9 Units, 2-9 Units, Subcutaneous, 4x Daily AC & at  Bedtime, Liudmila Levin MD, 2 Units at 07/23/23 2204    ipratropium-albuterol (DUO-NEB) nebulizer solution 3 mL, 3 mL, Nebulization, Q4H PRN, Yi Amin APRN, 3 mL at 07/23/23 2100    Magnesium Standard Dose Replacement - Follow Nurse / BPA Driven Protocol, , Does not apply, PRN, Raoul Castillo MD    metoprolol tartrate (LOPRESSOR) tablet 75 mg, 75 mg, Oral, BID, Qamar Johnson MD, 75 mg at 07/24/23 0824    midodrine (PROAMATINE) tablet 10 mg, 10 mg, Oral, TID AC, Raoul Castillo MD, 10 mg at 07/24/23 1217    morphine injection 2 mg, 2 mg, Intravenous, Q4H PRN, Raoul Castillo MD, 2 mg at 07/23/23 1937    nicotine (NICODERM CQ) 14 MG/24HR patch 1 patch, 1 patch, Transdermal, Q24H, Aminah Houser APRN, 1 patch at 07/24/23 0822    nystatin (MYCOSTATIN) 100,000 unit/mL suspension 500,000 Units, 5 mL, Oral, 4x Daily, Mina Dupree MD, 500,000 Units at 07/24/23 1217    ondansetron (ZOFRAN) tablet 4 mg, 4 mg, Oral, Q6H PRN, Yi Amin APRN    pantoprazole (PROTONIX) EC tablet 40 mg, 40 mg, Oral, Q AM, Aminah Houser APRN, 40 mg at 07/24/23 0824    Pharmacy Consult - Steroid Insulin Protocol, , Does not apply, Continuous PRN, Liudmila Levin MD    Phosphorus Replacement - Follow Nurse / BPA Driven Protocol, , Does not apply, Anna ZELAYA Federico N, MD    Potassium Replacement - Follow Nurse / BPA Driven Protocol, , Does not apply, Anna ZELAYA Federico N, MD    [COMPLETED] Insert Peripheral IV, , , Once **AND** sodium chloride 0.9 % flush 10 mL, 10 mL, Intravenous, PRN, Salvatore Dougherty MD    sodium chloride 0.9 % flush 10 mL, 10 mL, Intravenous, Q12H, Yi Amin APRN, 10 mL at 07/23/23 2209    sodium chloride 0.9 % flush 10 mL, 10 mL, Intravenous, PRN, Yi Amin APRN    sodium chloride 0.9 % infusion 40 mL, 40 mL, Intravenous, PRN, Yi Amin APRN       LABS (Reviewed):  Hematology WBC   Date Value Ref Range Status   07/24/2023 15.50 (H) 3.40 - 10.80  10*3/mm3 Final     RBC   Date Value Ref Range Status   07/24/2023 3.12 (L) 4.14 - 5.80 10*6/mm3 Final     Hemoglobin   Date Value Ref Range Status   07/24/2023 8.3 (L) 13.0 - 17.7 g/dL Final     Hematocrit   Date Value Ref Range Status   07/24/2023 27.0 (L) 37.5 - 51.0 % Final     Platelets   Date Value Ref Range Status   07/24/2023 375 140 - 450 10*3/mm3 Corrected     Comment:     Platelet estimate performed due to platelet clumping.   Corrected result. Previous result was 393 10*3/mm3 on 7/24/2023 at 0231 EDT.      Chemistry   Lab Results   Component Value Date    GLUCOSE 123 (H) 07/20/2023    BUN 16 07/20/2023    CREATININE 0.31 (L) 07/20/2023    BCR 51.6 (H) 07/20/2023    K 4.3 07/20/2023    CO2 38.0 (H) 07/20/2023    CALCIUM 9.3 07/20/2023    PROTENTOTREF 7.1 06/17/2023    ALBUMIN 2.6 (L) 07/15/2023    LABIL2 0.5 (L) 06/17/2023    AST 11 07/15/2023    ALT 28 07/15/2023         Physical Exam:         Abdomen:  [x] Soft   [x] Bowel sounds  Skin:   [x] Grade: No skin issues with treatment  Comments/Notes:     [x] Review of labs, images, dosimetry, dose delivered, & treatment parameters.    Comments:     [x] Patient treatment setup reviewed.    Comments:     Recommendations: restarted palliative XRT course today  Cleared by Cardiology to travel; will hopefully get 5 treatments this week; He has had 4 of 10 planned treatments to date  Coordinating care with primary hospital team.    [x] Continue RT  [] Change RT Plan [] Hold RT, length:        Approved Electronically By:  Jese Lucas MD, 7/24/2023, 16:22 EDT          Confidentiality of this medical record shall be maintained except when use or disclosure is required or permitted by law, regulation or written authorization by the patient.

## 2023-07-24 NOTE — CASE MANAGEMENT/SOCIAL WORK
Social Work Assessment  [facility]     Patient Name: Isra Davenport  MRN: [id number]  Today's Date: 7/24/2023    Admit Date: 7/15/2023     Discharge Plan       Row Name 07/24/23 1117       Plan    Plan Comments Completed Out of Hospital DNR form. Met with patient, form reviewed, and signed. Patient A&Ox4 at time of encounter. Form signed by LSW and witness signature obtained as well as MD signature. Original provided to patient, copy faxed to HIM, and hard copy placed on hard chart. No further needs.             BOSSMAN Olivas, MSSW, [facility]    Phone: [phone]  Cell: [phone]  Fax: [phone]  [email or web address]

## 2023-07-24 NOTE — SIGNIFICANT NOTE
1925 after turning while bathing patient noted to be in respiratory distress with tachypnic labored breathing while sitting up in bed. Patient stated he could not breath and he was unable to catch his breath. O2 sat at 55% on 6L high flow nasal cannula. RR called. NR and ambubag attempted with patients O2 sat still unable to recover at 65%. Morphine 2 mg given for air hunger. Patient was placed on airvo which also failed so bipap was introduced. Stat xray done with lasix 40mg iv push stat administered. Stat abg done revealed compensating respiratory acidosis. Additional abg ordered for 2100.

## 2023-07-24 NOTE — PLAN OF CARE
Goal Outcome Evaluation:      Pt was on bipap over night. Pt transitioned to 4L high flow n/c this am. Pt alert and oriented. HR has been aflutter in 70-80s.     Pt went to cancer care center this afternoon and completed radiation. Patient back in room with no complaints.       Problem: Skin Injury Risk Increased  Goal: Skin Health and Integrity  Outcome: Ongoing, Progressing  Intervention: Optimize Skin Protection  Recent Flowsheet Documentation  Taken 7/24/2023 1600 by Rachel Vásquez RN  Pressure Reduction Techniques:   frequent weight shift encouraged   pressure points protected   weight shift assistance provided  Pressure Reduction Devices:   positioning supports utilized   chair cushion utilized   feet on footrest/footstool  Skin Protection: incontinence pads utilized  Taken 7/24/2023 1200 by Rachel Vásquez RN  Pressure Reduction Techniques:   frequent weight shift encouraged   pressure points protected   weight shift assistance provided  Pressure Reduction Devices:   alternating pressure pump (ADD)   positioning supports utilized   pressure-redistributing mattress utilized  Taken 7/24/2023 0800 by Rachel Vásquez RN  Pressure Reduction Techniques:   frequent weight shift encouraged   pressure points protected   weight shift assistance provided  Pressure Reduction Devices:   chair cushion utilized   alternating pressure pump (ADD)   positioning supports utilized   pressure-redistributing mattress utilized  Skin Protection:   incontinence pads utilized   skin-to-skin areas padded

## 2023-07-24 NOTE — PROGRESS NOTES
[facility] Medicine Services   Daily Progress Note      Patient Name: Isra Davenport  : 1955  MRN: [id number]  Primary Care Physician:  Mayco Carreon MD  Date of admission: 7/15/2023      Subjective      Chief Complaint: Shortness of breath    Patient seen and examined this morning.  Taking over care today.  States he feels depressed, no suicidal ideations.  Has been started on Lexapro this admission.  Wants to go for brain radiation today if possible, checking with cardiology.  Denies any other complaints.    Pertinent positives as noted in HPI/subjective.  All other systems were reviewed and are negative.      Objective      Vitals:   Temp:  [97.8 °F (36.6 °C)-98.1 °F (36.7 °C)] 97.8 °F (36.6 °C)  Heart Rate:  [73-99] 90  Resp:  [17-27] 20  BP: ()/(56-91) 110/68  Flow (L/min):  [3.5-15] 4    Physical Exam:    General: Awake, alert, elderly male, lying in bed, NAD  Eyes: PERRL, EOMI, conjunctivae are clear  Cardiovascular: Regular rate and rhythm, no murmurs  Respiratory: Clear to auscultation bilaterally, no wheezing or rales, unlabored breathing  Abdomen: Soft, nontender, positive bowel sounds, no guarding  Neurologic: A&O, CN grossly intact, moves all extremities spontaneously  Musculoskeletal: Generalized weak, no other gross deformities  Skin: Warm, dry         Result Review    Result Review:  I have personally reviewed the results from the time of this admission to 2023 11:47 EDT and agree with these findings:  [x]  Laboratory  [x]  Microbiology  [x]  Radiology  [x]  EKG/Telemetry   [x]  Cardiology/Vascular   []  Pathology  [x]  Old records  []  Other:    Wounds (last 24 hours)       LDA Wound       Row Name 23 0800 23 0400 23 0000       Wound 23 0915 Right anterior hip Incision    Wound - Properties Group Placement Date: 23  -HB Placement Time: 915  -HB Side: Right  -HB Orientation: anterior  -HB Location: hip  -HB Primary Wound Type:  Incision  -HB    Closure Approximated  -EMRE Approximated  -MF Approximated  -MF    Drainage Amount none  -EMRE none  -MF none  -MF    Retired Wound - Properties Group Placement Date: 06/17/23  -HB Placement Time: 0915 -HB Side: Right  -HB Orientation: anterior  -HB Location: hip  -HB Primary Wound Type: Incision  -HB    Retired Wound - Properties Group Date first assessed: 06/17/23  -HB Time first assessed: 0915  -HB Side: Right  -HB Location: hip  -HB Primary Wound Type: Incision  -HB       Wound 07/15/23 2100 Bilateral medial gluteal Pressure Injury    Wound - Properties Group Placement Date: 07/15/23  -MF Placement Time: 2100  -MF Present on Hospital Admission: Y  -MF Side: Bilateral  -MF Orientation: medial  -MF Location: gluteal  -MF Primary Wound Type: Pressure inj  -MF    Base dry  -EMRE dry  -MF dry  -MF    Periwound warm;dry  -EMRE warm;dry  -MF warm;dry  -MF    Periwound Temperature warm  -EMRE warm  -MF warm  -MF    Periwound Skin Turgor soft  -EMRE soft  -MF soft  -MF    Drainage Amount none  -EMRE none  -MF none  -MF    Retired Wound - Properties Group Placement Date: 07/15/23  -MF Placement Time: 2100  -MF Present on Hospital Admission: Y  -MF Side: Bilateral  -MF Orientation: medial  -MF Location: gluteal  -MF Primary Wound Type: Pressure inj  -MF    Retired Wound - Properties Group Date first assessed: 07/15/23  -MF Time first assessed: 2100  -MF Present on Hospital Admission: Y  -MF Side: Bilateral  -MF Location: gluteal  -MF Primary Wound Type: Pressure inj  -MF       Wound 07/18/23 1900 Left posterior ankle    Wound - Properties Group Placement Date: 07/18/23  -AR Placement Time: 1900  -AR Present on Hospital Admission: Y  -AR Side: Left  -AR Orientation: posterior  -AR Location: ankle  -AR    Retired Wound - Properties Group Placement Date: 07/18/23  -AR Placement Time: 1900 -AR Present on Hospital Admission: Y  -AR Side: Left  -AR Orientation: posterior  -AR Location: ankle  -AR    Retired Wound -  Properties Group Date first assessed: 07/18/23  -AR Time first assessed: 1900  -AR Present on Hospital Admission: Y  -AR Side: Left  -AR Location: ankle  -AR      Row Name 07/23/23 2000 07/23/23 1600 07/23/23 1200       Wound 06/17/23 0915 Right anterior hip Incision    Wound - Properties Group Placement Date: 06/17/23  -HB Placement Time: 0915  -HB Side: Right  -HB Orientation: anterior  -HB Location: hip  -HB Primary Wound Type: Incision  -HB    Closure Approximated  -MF -- --    Base -- clean;dry  -NE clean;dry  -NE    Periwound -- intact  -NE intact  -NE    Periwound Temperature -- warm  -NE warm  -NE    Drainage Amount none  -MF none  -NE none  -NE    Dressing Care open to air  -MF -- --    Retired Wound - Properties Group Placement Date: 06/17/23  -HB Placement Time: 0915  -HB Side: Right  -HB Orientation: anterior  -HB Location: hip  -HB Primary Wound Type: Incision  -HB    Retired Wound - Properties Group Date first assessed: 06/17/23  -HB Time first assessed: 0915  -HB Side: Right  -HB Location: hip  -HB Primary Wound Type: Incision  -HB       Wound 07/15/23 2100 Bilateral medial gluteal Pressure Injury    Wound - Properties Group Placement Date: 07/15/23  -MF Placement Time: 2100 -MF Present on Hospital Admission: Y  -MF Side: Bilateral  -MF Orientation: medial  -MF Location: gluteal  -MF Primary Wound Type: Pressure inj  -MF    Pressure Injury Stage 2  -MF -- --    Dressing Appearance open to air  -MF -- --    Base dry  -MF dry  -NE dry  -NE    Periwound warm;dry  -MF warm;dry  -NE warm;dry  -NE    Periwound Temperature warm  -MF cool  -NE cool  -NE    Periwound Skin Turgor soft  -MF soft  -NE soft  -NE    Drainage Amount none  -MF none  -NE none  -NE    Dressing Care open to air  -MF -- --    Retired Wound - Properties Group Placement Date: 07/15/23  -MF Placement Time: 2100  -MF Present on Hospital Admission: Y  -MF Side: Bilateral  -MF Orientation: medial  -MF Location: gluteal  -MF Primary Wound  Type: Pressure inj  -MF    Retired Wound - Properties Group Date first assessed: 07/15/23  -MF Time first assessed: 2100  -MF Present on Hospital Admission: Y  -MF Side: Bilateral  -MF Location: gluteal  -MF Primary Wound Type: Pressure inj  -MF       Wound 07/18/23 1900 Left posterior ankle    Wound - Properties Group Placement Date: 07/18/23  -AR Placement Time: 1900  -AR Present on Hospital Admission: Y  -AR Side: Left  -AR Orientation: posterior  -AR Location: ankle  -AR    Periwound -- swelling;redness;warm  -NE swelling;redness;warm  -NE    Retired Wound - Properties Group Placement Date: 07/18/23  -AR Placement Time: 1900  -AR Present on Hospital Admission: Y  -AR Side: Left  -AR Orientation: posterior  -AR Location: ankle  -AR    Retired Wound - Properties Group Date first assessed: 07/18/23  -AR Time first assessed: 1900  -AR Present on Hospital Admission: Y  -AR Side: Left  -AR Location: ankle  -AR              User Key  (r) = Recorded By, (t) = Taken By, (c) = Cosigned By      Initials Name Provider Type    HB Cleo Britton, RN Registered Nurse    Rachel George RN Registered Nurse    NE Mani Vega, RN Registered Nurse    Laura Nava, RN Registered Nurse    Danyell Samuel, RN Registered Nurse                      Assessment & Plan      Brief Patient Summary:  Isra Davenport is a 68 y.o. male       aluminum sulfate-calcium acetate 1:20, 1,000 mL, Topical, BID  aspirin, 81 mg, Oral, Daily  budesonide-formoterol, 2 puff, Inhalation, BID - RT  calcium 500 mg vitamin D 5 mcg (200 UT), 1 tablet, Oral, Daily  dexamethasone, 4 mg, Oral, Daily With Breakfast   And  insulin regular, 12 Units, Subcutaneous, Daily With Breakfast  dilTIAZem CD, 240 mg, Oral, Q24H  docusate sodium, 100 mg, Oral, BID  escitalopram, 10 mg, Oral, Daily  ferrous sulfate, 324 mg, Oral, Daily With Breakfast  furosemide, 20 mg, Intravenous, Daily  gabapentin, 300 mg, Oral, BID  guaiFENesin, 600 mg, Oral,  Q12H  hydrocortisone-bacitracin-zinc oxide-nystatin, 1 application , Topical, TID  insulin glargine, 14 Units, Subcutaneous, Q12H  insulin lispro, 14 Units, Subcutaneous, TID With Meals  insulin lispro, 2-9 Units, Subcutaneous, 4x Daily AC & at Bedtime  metoprolol tartrate, 75 mg, Oral, BID  midodrine, 10 mg, Oral, TID AC  nicotine, 1 patch, Transdermal, Q24H  nystatin, 5 mL, Oral, 4x Daily  pantoprazole, 40 mg, Oral, Q AM  senna-docusate sodium, 2 tablet, Oral, BID  sodium chloride, 10 mL, Intravenous, Q12H       Pharmacy Consult - Steroid Insulin Protocol,          I have utilized all available, immediate resources to obtain, update, or review the patient's current medications including all prescriptions, over-the-counter products, herbals, cannabis/cannabidiol products, and vitamin.mineral/dietary (nutritional) supplements.    Active Hospital Problems:  Active Hospital Problems    Diagnosis     **Atrial fibrillation with RVR     Moderate malnutrition     Atrial fibrillation      Plan:     Acute hypoxic respiratory failure  Left-sided pneumonia  COPD  -Imaging reports noted  -Oxygen requirement improving  -Continue bronchodilators and steroids  -Finished antibiotics  -Pulmonology following    PAF  -Rate controlled, monitor on telemetry  -Echo with preserved EF noted  -Recent noninvasive ischemic eval negative for ischemia per cardiology  -Continue p.o. Cardizem, metoprolol, midodrine  -Patient not candidate for anticoagulation due to brain mets  -Cardiology following    Stage IV lung cancer with mets to brain and bone  -Currently on chemotherapy and radiation  -Continue Decadron as per oncology  -Oncology following and managing    DM 2  -Continue basal insulin with sliding scale  -Monitor blood glucose  -Endocrinology following and managing    DANN  -Continue iron supplement  -Monitor hemoglobin, transfuse as needed    Depression  -Patient feeling more depressed during this admission  -Denies any SI  -Lexapro  started during this admission  -Continue outpatient follow-up    Leukocytosis  -Likely reactive secondary to steroids  -Patient previously received antibiotics  -Monitor off antibiotics for now    DVT prophylaxis  -SCDs for now      CODE STATUS:    Medical Intervention Limits: NO intubation (DNI)  Code Status (Patient has no pulse and is not breathing): No CPR (Do Not Attempt to Resuscitate)  Medical Interventions (Patient has pulse or is breathing): Limited Support      Disposition: Pending improvement    Electronically signed by Merle Pereyra DO, 07/24/23, 11:47 EDT.  [facility] [place]      Part of this note may be an electronic transcription/translation of spoken language to printed text using the Dragon Dictation System.

## 2023-07-24 NOTE — PROGRESS NOTES
Daily Progress Note    Patient Care Team:  Mayco Carreon MD as PCP - General (Internal Medicine)  Mina Dupree MD as Consulting Physician (Hematology and Oncology)    Chief Complaint: Follow-up type 2 diabetes    HPI: Patient seen, blood sugar log reviewed, blood sugars are running on the low side.  He is eating fair.  No complaints at this time.    ROS:   Constitutional:  Denies fatigue, tiredness.    Respiratory: denies cough, shortness of breath.   Cardiovascular:  denies chest pain, edema   GI:  Denies abdominal pain, nausea, vomiting.         Vitals:    07/24/23 1653   BP: 101/64   Pulse: 80   Resp: 19   Temp: 97.8 °F (36.6 °C)   SpO2: 93%     Body mass index is 23.09 kg/m².    Physical Exam:  GEN: NAD, conversant  PSYCH: Awake and current      Results Review:     I reviewed the patient's new clinical results.      Lab Results   Component Value Date    HGBA1C 7.30 (H) 07/15/2023     No results found for: GLUF, MICROALBUR  Results from last 7 days   Lab Units 07/24/23  1653 07/24/23  1108 07/24/23  0706 07/23/23  2203 07/23/23  2004 07/23/23  1940   GLUCOSE mg/dL 85 114* 110* 182* 191* 216*  216*             Medication Review: Reviewed.     aluminum sulfate-calcium acetate 1:20, 1,000 mL, Topical, BID  aspirin, 81 mg, Oral, Daily  budesonide-formoterol, 2 puff, Inhalation, BID - RT  calcium 500 mg vitamin D 5 mcg (200 UT), 1 tablet, Oral, Daily  dexamethasone, 4 mg, Oral, Daily With Breakfast   And  insulin regular, 12 Units, Subcutaneous, Daily With Breakfast  dilTIAZem CD, 240 mg, Oral, Q24H  docusate sodium, 100 mg, Oral, BID  escitalopram, 10 mg, Oral, Daily  ferrous sulfate, 324 mg, Oral, Daily With Breakfast  furosemide, 20 mg, Intravenous, Daily  gabapentin, 300 mg, Oral, BID  guaiFENesin, 600 mg, Oral, Q12H  hydrocortisone-bacitracin-zinc oxide-nystatin, 1 application , Topical, TID  insulin glargine, 14 Units, Subcutaneous, Q12H  insulin lispro, 14 Units, Subcutaneous, TID With  Meals  insulin lispro, 2-9 Units, Subcutaneous, 4x Daily AC & at Bedtime  metoprolol tartrate, 75 mg, Oral, BID  midodrine, 10 mg, Oral, TID AC  nicotine, 1 patch, Transdermal, Q24H  nystatin, 5 mL, Oral, 4x Daily  pantoprazole, 40 mg, Oral, Q AM  senna-docusate sodium, 2 tablet, Oral, BID  sodium chloride, 10 mL, Intravenous, Q12H              Assessment and plan:  Diabetes mellitus type 2 with hyperglycemia: Blood sugars on the low side, will reduce lispro to 12 units with each meal and continue rest of the regimen including glargine 14 units every 12 hours as well as regular insulin for the steroid and newly lispro sliding scale.  We will follow blood sugars and make further adjustments as needed.    Mireya Abarca MD. FACE

## 2023-07-24 NOTE — CASE MANAGEMENT/SOCIAL WORK
Continued Stay Note  [facility]     Patient Name: Isra Davenport  MRN: [id number]  Today's Date: 7/24/2023    Admit Date: 7/15/2023    Plan: Accepted to [facility] pending precert. Precert started 7/24, PASRR approved. Radiation treatments at [facility], chemo on hold while in rehab, per oncology. From home alone.   Discharge Plan       Row Name 07/24/23 1436       Plan    Plan Accepted to [facility] pending precert. Precert started 7/24, PASRR approved. Radiation treatments at [facility], chemo on hold while in rehab, per oncology. From home alone.    Plan Comments Updated PT notes available at this time. CM requested for [facility] to initiate precert.                  Yun Kirkland RN     Office Phone: [phone]  Office Cell: [phone]

## 2023-07-24 NOTE — THERAPY TREATMENT NOTE
"Subjective: Pt agreeable to therapeutic plan of care.  Cognition: oriented to Person, Place, Time, and Situation  Pt oriented, but questionable carryover of material. Appears to have mild STM deficits.     Objective:     Bed Mobility: CGA and Min-A   Functional Transfers: CGA, Min-A, and Assist x 2     Balance: supported, with UE support, and standing CGA  Functional Ambulation: CGA    Lower Body Dressing: Mod-A  ADL Position: supported sitting  ADL Comments: recliner    Grooming: CGA  ADL Position: supported sitting  ADL Comments: recliner    Therapeutic Exercise - 10 Reps B UE AROM supported sitting / chair    Vitals: Desaturates    Pain: C/o pain in renetta area  Location: renetta area  Interventions for pain: Repositioned and Increased Activity  Education: Provided education on the importance of mobility in the acute care setting, ADL training, and Post-Op Precautions      Assessment: Isra Davenport presents with ADL impairments affecting function including balance, cognition, coordination, endurance / activity tolerance, pain, and strength. Patient demos mild short-term memory deficits this date, but otherwise alert and oriented to situation. Patient completes bed mobility with Min A for LB due to edema. Requests LE's be wrapped for edema, but also states MD has removed them. Sit<>stand with CGA x2 with RW. Transfers to chair with CGA x2. Fatigues quickly, and SpO2 hovers 88-90% throughout session on HF O2. Demonstrated functioning below baseline abilities indicate the need for continued skilled intervention while inpatient. Tolerating session today without incident. Will continue to follow and progress as tolerated.     Plan/Recommendations:   Moderate Intensity Therapy recommended post-acute care. This is recommended as therapy feels the patient would require 3-4 days per week and wouldn't tolerate \"3 hour daily\" rehab intensity. SNF would be the preferred choice. If the patient does not agree to SNF, arrange HH or " OP depending on home bound status. If patient is medically complex, consider LTACH.. Pt requires no DME at discharge.     Pt desires Skilled Rehab placement at discharge. Pt cooperative; agreeable to therapeutic recommendations and plan of care.     Post-Tx Position: Supine with HOB Elevated, Staff Present, and Alarms activated  PPE: gloves

## 2023-07-24 NOTE — PROGRESS NOTES
[facility] – [place], [place] [facility]   PROGRESS NOTE      Referring Provider: Merle Pereyra DO    Reason for follow-up: f/u afib/flutter     Patient Care Team:  Mayco Carreon MD as PCP - General (Internal Medicine)  Mina Dupree MD as Consulting Physician (Hematology and Oncology)      SUBJECTIVE    Patient doing ok. Scheduled for radiation this afternoon. Hemodynamically stable.     Review of Systems   Cardiovascular:  Negative for chest pain, irregular heartbeat and palpitations.   Respiratory:  Negative for cough and shortness of breath.    Neurological:  Negative for dizziness and light-headedness.   All other systems reviewed and are negative.    Allergies:  Ibuprofen and Penicillins    Personal History:    Past Medical History:   Diagnosis Date    Arthritis     Benign prostatic hyperplasia without urinary obstruction 07/06/2023    Chronic back pain 07/06/2023    Closed fracture of right hip 06/16/2023    Added automatically from request for surgery [id number]    Hernia of anterior abdominal wall 09/22/2020    Lung cancer     squamous cell carcimoma,left    Lung cancer metastatic to brain 06/29/2023    Mass of left lung 06/16/2023    Added automatically from request for surgery [id number]    Neuralgia 07/06/2023    Neuropathy 07/06/2023    Osteoarthritis 07/06/2023    Polyp of colon 07/06/2023    Primary malignant neoplasm of left lung metastatic to other site 06/16/2023    Added automatically from request for surgery [id number]    Prostatitis 07/06/2023    Spinal stenosis of lumbar region 07/06/2023       Past Surgical History:   Procedure Laterality Date    BACK SURGERY  2006    BRONCHOSCOPY N/A 6/19/2023    Procedure: BRONCHOSCOPY WITH BIOPSY X1 AREA, FINE NEEDLE ASPIRATION, BRUSHING, AND BRONCHIAL WASHING;  Surgeon: Reggie Abarca MD;  Location: [facility] ENDOSCOPY;  Service: Pulmonary;  Laterality: N/A;  POST: MUCOUS PLUGS  AND LUNG MASS    HIP INTERTROCHANTERIC NAILING Right 6/17/2023     Procedure: HIP INTERTROCHANTERIC NAILING;  Surgeon: Levi Blackmon II, MD;  Location: [facility] MAIN OR;  Service: Orthopedics;  Laterality: Right;    LEG SURGERY Bilateral 08/25/1985    PORTACATH PLACEMENT Right 6/29/2023    Procedure: INSERTION OF PORTACATH;  Surgeon: Jese Goss MD;  Location: [facility] MAIN OR;  Service: General;  Laterality: Right;       History reviewed. No pertinent family history.    Social History     Tobacco Use    Smoking status: Every Day     Packs/day: 1.00     Years: 15.00     Pack years: 15.00     Types: Cigarettes     Passive exposure: Current    Smokeless tobacco: Never   Vaping Use    Vaping Use: Never used   Substance Use Topics    Alcohol use: Yes     Alcohol/week: 2.0 standard drinks     Types: 2 Cans of beer per week    Drug use: Never        Medications Prior to Admission   Medication Sig Dispense Refill Last Dose    acetaminophen (TYLENOL) 325 MG tablet Take 2 tablets by mouth Every 6 (Six) Hours As Needed for Mild Pain. Indications: Pain       albuterol sulfate  (90 Base) MCG/ACT inhaler Inhale 2 puffs Every 4 (Four) Hours As Needed for Wheezing. 6.7 g 5     aspirin 81 MG EC tablet Take 1 tablet by mouth Daily. 30 tablet 2     denosumab (XGEVA) 120 MG/1.7ML solution injection Inject 1.7 mL under the skin into the appropriate area as directed See Admin Instructions. Every 4 weeks       dexamethasone (DECADRON) 4 MG tablet Take 1 tablet by mouth Daily With Breakfast.       ferrous sulfate 324 (65 Fe) MG tablet delayed-release EC tablet Take 1 tablet by mouth Daily With Breakfast. 30 tablet 2     furosemide (LASIX) 20 MG tablet Take 1 tablet by mouth Daily.       HYDROcodone-acetaminophen (NORCO) 5-325 MG per tablet Take 1 tablet by mouth Every 4 (Four) Hours As Needed for Moderate Pain.       metoprolol tartrate (LOPRESSOR) 25 MG tablet Take 1 tablet by mouth Every 8 (Eight) Hours. 90 tablet 1     nicotine (NICODERM CQ) 21 MG/24HR patch Place 1 patch on  "the skin as directed by provider Daily.       omeprazole (priLOSEC) 20 MG capsule Take 1 capsule by mouth Daily.       ondansetron (ZOFRAN) 4 MG tablet Take 1 tablet by mouth Every 6 (Six) Hours As Needed for Nausea or Vomiting.       vitamin D (ERGOCALCIFEROL) 1.25 MG (02341 UT) capsule capsule Take 1 capsule by mouth 1 (One) Time Per Week.       vitamin D3 125 MCG (5000 UT) capsule capsule Take 1 capsule by mouth Daily.            OBJECTIVE    VITAL SIGNS  Vitals:    07/24/23 0711 07/24/23 0821 07/24/23 0911 07/24/23 1328   BP:  101/63 110/68 96/59   BP Location:       Patient Position:       Pulse: 73 77 90 78   Resp: 18  20 20   Temp:   97.8 °F (36.6 °C) 98 °F (36.7 °C)   TempSrc:   Oral Oral   SpO2: 90% 91% 90% 90%   Weight:       Height:         Flowsheet Rows      Flowsheet Row First Filed Value   Admission Height 177.8 cm (70\") Documented at 07/15/2023 1652   Admission Weight 72.6 kg (160 lb) Documented at 07/15/2023 1652             TELEMETRY: atrial flutter    Physical Exam:  Vitals reviewed.   Constitutional:       General: Awake.      Appearance: Well-developed and not in distress.      Interventions: Nasal cannula in place.      Comments: 4L HFNC   Eyes:      Pupils: Pupils are equal, round, and reactive to light.   Pulmonary:      Effort: Pulmonary effort is normal.      Breath sounds: Normal breath sounds.   Cardiovascular:      Normal rate. Regularly irregular rhythm. Normal S1. Normal S2.    Pulses:     Intact distal pulses.   Edema:     Peripheral edema present.  Abdominal:      General: Bowel sounds are normal.      Palpations: Abdomen is soft.   Musculoskeletal: Normal range of motion.      Cervical back: Normal range of motion. Skin:     General: Skin is warm and dry.   Neurological:      Mental Status: Alert and oriented to person, place and time.   Psychiatric:         Behavior: Behavior is cooperative.        LAB RESULTS (LAST 7 DAYS)  I have reviewed new clinical results.    CMP   Results " from last 7 days   Lab Units 07/20/23  0106 07/19/23  1025 07/18/23  1906 07/18/23  1437   SODIUM mmol/L 138 134* 131* 130*   POTASSIUM mmol/L 4.3 4.1 4.5 4.6   CHLORIDE mmol/L 94* 89* 87* 87*   CO2 mmol/L 38.0* 37.0* 37.0* 35.0*   BUN mg/dL 16 15 21 19   CREATININE mg/dL 0.31* 0.39* 0.49* 0.40*   GLUCOSE mg/dL 123* 381* 335* 372*     CBC  Results from last 7 days   Lab Units 07/24/23  0007 07/23/23  1940 07/22/23  2252 07/21/23  2309 07/20/23  2358 07/20/23  0106 07/19/23  1025 07/17/23  2254   WBC 10*3/mm3 15.50*  --  14.20* 12.30* 12.70* 13.40* 12.90* 11.70*   RBC 10*6/mm3 3.12*  --  3.35* 3.35* 3.19* 3.41* 3.33* 3.38*   HEMOGLOBIN g/dL 8.3*  --  8.7* 8.8* 8.5* 9.0* 9.0* 8.8*   HEMOGLOBIN, POC g/dL  --  11.3*  --   --   --   --   --   --    HEMATOCRIT % 27.0*  --  28.9* 28.4* 27.0* 29.1* 28.3* 28.7*   HEMATOCRIT POC %  --  33*  --   --   --   --   --   --    MCV fL 86.5  --  86.1 85.0 84.6 85.3 85.1 84.9   PLATELETS 10*3/mm3 375  --  406 441 444 484* 486* 395     ProBNP      TROPONIN      CoAg      Creatinine Clearance  Estimated Creatinine Clearance: 238.8 mL/min (A) (by C-G formula based on SCr of 0.31 mg/dL (L)).    Radiology  XR Chest 1 View    Result Date: 7/23/2023  Impression: 1. Worsening consolidation in the left perihilar region. This is secondary to a known lung mass consistent with lung cancer. There is probably worsening postobstructive pneumonitis/atelectasis and there may be a small left pleural effusion. 2. Cardiomegaly with pulmonary vascular congestion/low-grade pulmonary edema. Electronically Signed: Zeyad Contreras  7/23/2023 8:02 PM EDT  Workstation ID: [id number]     EKG    I personally viewed and interpreted the patient's EKG/Telemetry data:  ECG 12 Lead Rhythm Change   Final Result   HEART RATE= 111  bpm   RR Interval= 540  ms   CT Interval=   ms   P Horizontal Axis=   deg   P Front Axis=   deg   QRSD Interval= 83  ms   QT Interval= 317  ms   QRS Axis= 52  deg   T Wave Axis= 45  deg   - ABNORMAL  ECG -   Atrial fibrillation   Ventricular premature complex   When compared with ECG of 18-Jul-2023 11:30:56,   Significant change in rhythm: previously sinus   Electronically Signed By: Cortez Reilly ([facility]) 20-Jul-2023 19:07:05   Date and Time of Study: 2023-07-19 12:25:42      ECG 12 Lead Rhythm Change   Final Result   HEART RATE= 95  bpm   RR Interval= 632  ms   LA Interval= 145  ms   P Horizontal Axis= -8  deg   P Front Axis= 40  deg   QRSD Interval= 82  ms   QT Interval= 330  ms   QRS Axis= 75  deg   T Wave Axis= 81  deg   - OTHERWISE NORMAL ECG -   Sinus rhythm   Atrial premature complex   When compared with ECG of 17-Jul-2023 20:59:42,   Significant change in rhythm   Significant repolarization change   Electronically Signed By: Cortez Reilly ([facility]) 20-Jul-2023 19:07:11   Date and Time of Study: 2023-07-18 11:30:56      ECG 12 Lead Tachycardia   Final Result   HEART RATE= 159  bpm   RR Interval= 376  ms   LA Interval= 119  ms   P Horizontal Axis= -51  deg   P Front Axis= 125  deg   QRSD Interval= 79  ms   QT Interval= 323  ms   QRS Axis= 58  deg   T Wave Axis= 246  deg   - ABNORMAL ECG -   Supraventricular tachycardia   Repolarization abnormality, prob rate related   When compared with ECG of 16-Jul-2023 10:42:41,   Significant change in rhythm: previously sinus   Significant repolarization change   Electronically Signed By: Cortez Reilly ([facility]) 20-Jul-2023 19:07:35   Date and Time of Study: 2023-07-17 20:57:58      ECG 12 Lead Rhythm Change   Final Result   HEART RATE= 85  bpm   RR Interval= 708  ms   LA Interval= 143  ms   P Horizontal Axis= -12  deg   P Front Axis= 44  deg   QRSD Interval= 91  ms   QT Interval= 341  ms   QRS Axis= 63  deg   T Wave Axis= 91  deg   - ABNORMAL ECG -   Sinus rhythm   Ventricular premature complex   Nonspecific T abnormalities, lateral leads   When compared with ECG of 15-Jul-2023 19:55:15,   Significant change in rhythm: previously atrial fibrillation   Electronically Signed By:  Chirag Soto ([facility]) 17-Jul-2023 20:46:11   Date and Time of Study: 2023-07-16 10:42:41      ECG 12 Lead Rhythm Change   Final Result   HEART RATE= 151  bpm   RR Interval= 398  ms   AL Interval=   ms   P Horizontal Axis=   deg   P Front Axis=   deg   QRSD Interval= 75  ms   QT Interval= 233  ms   QRS Axis= 71  deg   T Wave Axis= 238  deg   - ABNORMAL ECG -   Atrial fibrillation   Consider anteroseptal infarct   Repolarization abnormality, prob rate related   When compared with ECG of 16-Jun-2023 17:44:35,   Significant change in rhythm: previously sinus   Electronically Signed By: Salvatore Dougherty ([facility]) 17-Jul-2023 07:59:13   Date and Time of Study: 2023-07-15 19:55:15      SCANNED - TELEMETRY     Final Result      SCANNED - TELEMETRY     Final Result      SCANNED - TELEMETRY     Final Result      SCANNED - TELEMETRY     Final Result      SCANNED - TELEMETRY     Final Result      SCANNED - TELEMETRY     Final Result      SCANNED - TELEMETRY     Final Result      SCANNED - TELEMETRY     Final Result      SCANNED - TELEMETRY     Final Result      SCANNED - TELEMETRY     Final Result      SCANNED - TELEMETRY     Final Result      SCANNED - TELEMETRY     Final Result      SCANNED - TELEMETRY     Final Result      SCANNED - TELEMETRY     Final Result      SCANNED - TELEMETRY     Final Result      SCANNED - TELEMETRY     Final Result      SCANNED - TELEMETRY     Final Result      SCANNED - TELEMETRY     Final Result      SCANNED - TELEMETRY     Final Result      SCANNED - TELEMETRY     Final Result      SCANNED - TELEMETRY     Final Result      SCANNED - TELEMETRY     Final Result      SCANNED - TELEMETRY     Final Result      SCANNED - TELEMETRY     Final Result      SCANNED - TELEMETRY     Final Result      SCANNED - TELEMETRY     Final Result      SCANNED - TELEMETRY     Final Result      SCANNED - TELEMETRY     Final Result      SCANNED - TELEMETRY     Final Result      SCANNED - TELEMETRY     Final Result       SCANNED - TELEMETRY     Final Result      SCANNED - TELEMETRY     Final Result      SCANNED - TELEMETRY     Final Result      SCANNED - TELEMETRY     Final Result      SCANNED - TELEMETRY     Final Result      SCANNED - TELEMETRY     Final Result      SCANNED - TELEMETRY     Final Result      SCANNED - TELEMETRY     Final Result      SCANNED - TELEMETRY     Final Result      SCANNED - TELEMETRY     Final Result      SCANNED - TELEMETRY     Final Result      SCANNED - TELEMETRY     Final Result      SCANNED - TELEMETRY     Final Result      SCANNED - TELEMETRY     Final Result      SCANNED - TELEMETRY     Final Result      SCANNED - TELEMETRY     Final Result      SCANNED - TELEMETRY     Final Result      SCANNED - TELEMETRY     Final Result      SCANNED - TELEMETRY     Final Result      SCANNED - TELEMETRY     Final Result      SCANNED - TELEMETRY     Final Result      SCANNED - TELEMETRY     Final Result      SCANNED - TELEMETRY     Final Result      SCANNED - TELEMETRY     Final Result      SCANNED - TELEMETRY     Final Result      SCANNED - TELEMETRY     Final Result          ECHOCARDIOGRAM:  Results for orders placed during the hospital encounter of 06/16/23    Adult Transthoracic Echo Complete W/ Cont if Necessary Per Protocol    Interpretation Summary    Left ventricular systolic function is normal. Left ventricular ejection fraction appears to be 61 - 65%.    Left ventricular diastolic function is consistent with (grade I) impaired relaxation.    There is mild bileaflet mitral valve prolapse present.    Estimated right ventricular systolic pressure from tricuspid regurgitation is mildly elevated (35-45 mmHg). Calculated right ventricular systolic pressure from tricuspid regurgitation is 44 mmHg.      STRESS MYOVIEW:  Results for orders placed during the hospital encounter of 06/16/23    Stress Test With Myocardial Perfusion One Day    Interpretation Summary    Left ventricular ejection fraction is  hyperdynamic (Calculated EF > 70%).    Myocardial perfusion imaging indicates a small-to-moderate-sized infarct located in the inferior wall with no significant ischemia noted.    Impressions are consistent with a low risk study.    Diaphragmatic attenuation artifact is present.    This is Cardiolite imaging stress test with fixed inferior defect.  MI versus attenuation artifact.  No ischemia noted.  Left ventricular size and function was normal.  Clinical correlation recommended.    Findings consistent with a normal ECG stress test.      CARDIAC CATHETERIZATION:  No results found for this or any previous visit.      OTHER:       ASSESSMENT/PLAN      Atrial fibrillation with RVR    Atrial fibrillation    Moderate malnutrition      PLAN  Isra Davenport is a 68-year-old male patient who has metastatic lung cancer to the brain and to the hip.  Patient was in sinus rhythm upon admission, however he developed atrial fibrillation with difficult to control heart rate.  Currently he is on p.o. amiodarone 400 every 12, diltiazem 60 mg every 6 hours and metoprolol 75 mg twice a day.  Heart rate currently at least is in the 80s, still in A-fib.  I think that in his case, we should address the issue in a more conservative manner given his metastatic cancer.  He already received radiation therapy and is getting ready to receive chemotherapy, but my understanding is that these are for palliative measures.  To that extent, I do not necessarily recommend cardioversion/ablation.  Furthermore due to brain mets, it is recommended not to give any anticoagulation.     Since we are focusing on the rate control, I will discontinue amiodarone especially that he has lung cancer, switch Cardizem to long-acting, continue metoprolol.  If need be we can also use digoxin.       Today  -hemodynamically stable  -currently atrial flutter, rate 70-80s  -scheduled for radiation this afternoon  -continue cardizem and metoprolol  -Continue current Rx  and supportive care  -Will continue to monitor rhythm and focus on rate control      I discussed the patients findings and my recommendations with patient and nurse.      NOREEN Mariscal  07/24/23  15:35 EDT  Electronically signed by NOREEN Mariscal, 07/24/23, 7:19 PM EDT.

## 2023-07-24 NOTE — PROGRESS NOTES
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 male admitted to [facility] through the ED on 7/15/2023 with complaints of uncontrolled pain and BLE edema.  In the ED, he developed tachycardia in the 150s and was found to be in A-fib with RVR.  He was started on supplemental oxygen for O2 sat drop to 88%.  He received Cardizem bolus followed by drip and Lopressor bolus.  In the ED, CBC revealed WBC 11.4, hemoglobin 10.0, MCV 84.8, and platelets 404,000.  Creatinine and LFTs were not elevated.  CXR revealed left basilar pneumonia.  He reported left lower extremity pain with x-ray of the left tip/fib and left foot negative for acute findings.    PMH was significant for recently diagnosed stage IV squamous cell carcinoma of the left upper lobe lung.  He was post radiation therapy to the brain () and currently receiving radiation therapy to the right hip and femur (started 2023 with 10 fractions planned).  He was last seen by our service as an outpatient on 2023 where he was prescribed Lasix and hydrocodone/APAP 5/325 mg as needed for his BLE edema and pain.  BLE venous Doppler performed as an outpatient on 2023 was negative for DVT/SVT and showed bilateral fluid retention.  At time of consultation 2023, hemoglobin had dropped to 9.9.     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  Our service had initially evaluated him during 2023 - 2023 hospitalization during which lung cancer diagnosis was made and initial work-up completed.  He had received radiation  therapy to the brain and is currently receiving radiation to the hip/femur.  He was also diagnosed with iron deficiency anemia during the hospital stay where he was treated with IV iron and discharged on oral ferrous sulfate with GERD prophylaxis.  Complete anemia work-up with the exception of bone marrow was performed during the hospital stay with findings negative for additional etiologies.  Incidentally his copper level was elevated without multivitamin supplementation.  CBC on 7/14/2023 revealed WBC 13, hemoglobin 10.76, and platelets 329,000.  He was continued on daily oral iron.  Molecular testing on his tumor pathology was pending with plans to start chemotherapy after radiation was completed.  He was also to start Xgeva for bone metastases support and was prescribed calcium with vitamin D supplementation.     PCP: Mayco Carreon MD    INTERVAL HISTORY:  7/17/2023 - white blood count 12.30, hemoglobin 9.5, iron 22 (), iron saturation 10 (), TIBC 224 (298-536). Calculated iron deficit is 662 mg. Started Ferrlecit 250 mg IV x 3 days.  Molecular testing on tumor specimen revealed low-level PD-L1 positivity.  7/18/2023-hemoglobin 8.8.  Patient developed SVT overnight and was treated with Cardizem.  Weight loss 33 pounds in 5 weeks.  Changed Pepcid to Protonix.  Palliative care and radiation oncology both consulted.  Physical therapy recommending skilled nursing facility at discharge.  7/19/2023-patient decided DNR/DNI but wants to continue aggressive treatment and try chemotherapy/immunotherapy.  7/20/2023-WBC 13.4, hemoglobin 9. Hospice was consulted by primary team for inpatient hospice admission.  7/21/2023-WBC 12.7, hemoglobin 8.5.  Dexamethasone decreased to 4 mg po daily.   7/22/2023-cardiac electrophysiology was consulted and they recommended, due to his metastatic cancer and comorbidities, to not proceed with ablation or cardioversion and to manage the patient with conservative  therapy.  Increased gabapentin to 300 mg twice daily.  Bilateral FREDY normal.  Mild digital ischemia on the left.  7/23/2023-WBC 14.2, hemoglobin 8.7.  Lexapro started for depression.  7/24/2023-WBC 15.5, hemoglobin 8.3.  Bilateral FREDY The right FREDY was normal with normal digital pressures. The left FREDY was normal with mild digital ischemia.    History of present illness reviewed since last visit and changes noted on 07/24/23.    Subjective   Nicotine patch is helping. He has a dry mouth and a cough.     ROS:  Review of Systems   Constitutional:  Negative for activity change, appetite change, chills, fatigue, fever and unexpected weight change.   HENT:  Negative for mouth sores, sore throat and tinnitus.         Dry mouth.    Eyes:  Negative for photophobia, pain, redness and visual disturbance.   Respiratory:  Positive for cough. Negative for shortness of breath.    Cardiovascular:  Negative for chest pain, palpitations and leg swelling.   Gastrointestinal:  Negative for abdominal pain, constipation, diarrhea, nausea and vomiting.   Genitourinary:  Negative for dysuria, enuresis and hematuria.   Musculoskeletal:  Negative for arthralgias, back pain, myalgias and neck pain.   Skin:  Negative for rash and wound.   Allergic/Immunologic: Negative for environmental allergies.   Neurological:  Negative for dizziness, weakness, light-headedness, numbness and headaches.   Hematological:  Negative for adenopathy. Does not bruise/bleed easily.   Psychiatric/Behavioral:  Negative for dysphoric mood. The patient is not nervous/anxious.       MEDICATIONS:    Scheduled Meds:  aluminum sulfate-calcium acetate 1:20, 1,000 mL, Topical, BID  aspirin, 81 mg, Oral, Daily  budesonide-formoterol, 2 puff, Inhalation, BID - RT  calcium 500 mg vitamin D 5 mcg (200 UT), 1 tablet, Oral, Daily  dexamethasone, 4 mg, Oral, Daily With Breakfast   And  insulin regular, 12 Units, Subcutaneous, Daily With Breakfast  dilTIAZem CD, 240 mg, Oral,  "Q24H  docusate sodium, 100 mg, Oral, BID  escitalopram, 10 mg, Oral, Daily  ferrous sulfate, 324 mg, Oral, Daily With Breakfast  furosemide, 20 mg, Intravenous, Daily  gabapentin, 300 mg, Oral, BID  guaiFENesin, 600 mg, Oral, Q12H  hydrocortisone-bacitracin-zinc oxide-nystatin, 1 application , Topical, TID  insulin glargine, 14 Units, Subcutaneous, Q12H  insulin lispro, 14 Units, Subcutaneous, TID With Meals  insulin lispro, 2-9 Units, Subcutaneous, 4x Daily AC & at Bedtime  metoprolol tartrate, 75 mg, Oral, BID  midodrine, 10 mg, Oral, TID AC  nicotine, 1 patch, Transdermal, Q24H  nystatin, 5 mL, Oral, 4x Daily  pantoprazole, 40 mg, Oral, Q AM  senna-docusate sodium, 2 tablet, Oral, BID  sodium chloride, 10 mL, Intravenous, Q12H    Continuous Infusions:  Pharmacy Consult - Steroid Insulin Protocol,     PRN Meds:    senna-docusate sodium **AND** polyethylene glycol **AND** bisacodyl **AND** bisacodyl    Calcium Replacement - Follow Nurse / BPA Driven Protocol    dextrose    dextrose    glucagon (human recombinant)    HYDROcodone-acetaminophen    hydrOXYzine    ipratropium-albuterol    Magnesium Standard Dose Replacement - Follow Nurse / BPA Driven Protocol    Morphine    ondansetron    Pharmacy Consult - Steroid Insulin Protocol    Phosphorus Replacement - Follow Nurse / BPA Driven Protocol    Potassium Replacement - Follow Nurse / BPA Driven Protocol    [COMPLETED] Insert Peripheral IV **AND** sodium chloride    sodium chloride    sodium chloride     ALLERGIES:    Allergies   Allergen Reactions    Ibuprofen Itching    Penicillins Hives     Objective    VITALS:   /68   Pulse 90   Temp 97.8 °F (36.6 °C) (Oral)   Resp 20   Ht 177.8 cm (70\")   Wt 73 kg (160 lb 15 oz)   SpO2 90%   BMI 23.09 kg/m²     PHYSICAL EXAM:  Physical Exam  Vitals reviewed.   Constitutional:       General: He is not in acute distress.     Appearance: He is well-developed. He is not diaphoretic.   HENT:      Head: Normocephalic and " atraumatic.      Comments: Oxygen via nasal cannula.      Mouth/Throat:      Pharynx: No oropharyngeal exudate.      Comments: Hyperemic mucous membranes.   Eyes:      Conjunctiva/sclera: Conjunctivae normal.   Neck:      Thyroid: No thyromegaly.      Vascular: No JVD.   Cardiovascular:      Rate and Rhythm: Normal rate and regular rhythm.      Heart sounds: Normal heart sounds. No murmur heard.     Comments: Cardiac monitor leads.   Pulmonary:      Effort: Pulmonary effort is normal. No respiratory distress.      Breath sounds: Normal breath sounds.   Chest:      Comments: Left chest wall tattoo.   Right chest wall [id number] - not accessed.   Abdominal:      General: Bowel sounds are normal.      Palpations: Abdomen is soft.      Tenderness: There is no abdominal tenderness. There is no guarding.   Musculoskeletal:         General: Normal range of motion.      Cervical back: Normal range of motion and neck supple.      Right lower le+ Edema present.      Left lower le+ Edema present.      Comments: LUE peripheral IV.   Left hand oxygen saturation monitor.   Skin:     General: Skin is warm and dry.      Coloration: Skin is not jaundiced.      Findings: No erythema or rash.   Neurological:      Mental Status: He is alert and oriented to person, place, and time.      Sensory: No sensory deficit.   Psychiatric:         Behavior: Behavior normal.       RECENT LABS:  Lab Results (last 24 hours)       Procedure Component Value Units Date/Time    POC Glucose Once [[id number]]  (Abnormal) Collected: 23 0706    Specimen: Blood Updated: 23 0707     Glucose 110 mg/dL      Comment: Serial Number: [id number]:  [id number]       Manual Differential [[id number]]  (Abnormal) Collected: 23 0007    Specimen: Blood Updated: 23 0242     Neutrophil % 78.0 %      Lymphocyte % 4.0 %      Monocyte % 2.0 %      Bands %  13.0 %      Metamyelocyte % 2.0 %      Myelocyte % 1.0 %      Neutrophils Absolute  14.11 10*3/mm3      Lymphocytes Absolute 0.62 10*3/mm3      Monocytes Absolute 0.31 10*3/mm3      Anisocytosis Slight/1+     Microcytes Slight/1+     Poikilocytes Slight/1+     WBC Morphology Normal     Platelet Estimate Adequate     Clumped Platelets Present     Large Platelets Slight/1+     Giant Platelets Slight/1+    CBC & Differential [[id number]]  (Abnormal) Collected: 07/24/23 0007    Specimen: Blood Updated: 07/24/23 0242    Narrative:      The following orders were created for panel order CBC & Differential.  Procedure                               Abnormality         Status                     ---------                               -----------         ------                     CBC Auto Differential[[id number]]        Abnormal            Edited Result - FINAL      Scan Slide[[id number]]                                       Edited Result - FINAL        Please view results for these tests on the individual orders.    Scan Slide [[id number]] Collected: 07/24/23 0007    Specimen: Blood Updated: 07/24/23 0242     Scan Slide --     Comment: See Manual Differential Results       CBC Auto Differential [[id number]]  (Abnormal) Collected: 07/24/23 0007    Specimen: Blood Updated: 07/24/23 0242     WBC 15.50 10*3/mm3      RBC 3.12 10*6/mm3      Hemoglobin 8.3 g/dL      Hematocrit 27.0 %      MCV 86.5 fL      MCH 26.4 pg      MCHC 30.5 g/dL      RDW 20.3 %      RDW-SD 60.4 fl      MPV 9.2 fL      Platelets 375 10*3/mm3      Comment: Platelet estimate performed due to platelet clumping.   Corrected result. Previous result was 393 10*3/mm3 on 7/24/2023 at 0231 EDT.       Narrative:      The previously reported component NRBC is no longer being reported. Previous result was 0.5 /100 WBC (Reference Range: 0.0-0.2 /100 WBC) on 7/24/2023 at 0126 EDT.    Extra Tubes [[id number]] Collected: 07/24/23 0007    Specimen: Blood, Venous Line Updated: 07/24/23 0115    Narrative:      The following orders were created for panel order  Extra Tubes.  Procedure                               Abnormality         Status                     ---------                               -----------         ------                     Green Top (Gel)[[id number]]                                  Final result                 Please view results for these tests on the individual orders.    Green Top (Gel) [[id number]] Collected: 07/24/23 0007    Specimen: Blood Updated: 07/24/23 0115     Extra Tube Hold for add-ons.     Comment: Auto resulted.       Extra Tubes [[id number]] Collected: 07/23/23 2231    Specimen: Blood, Venous Line Updated: 07/23/23 2345    Narrative:      The following orders were created for panel order Extra Tubes.  Procedure                               Abnormality         Status                     ---------                               -----------         ------                     Green Top (Gel)[[id number]]                                  Final result                 Please view results for these tests on the individual orders.    Green Top (Gel) [[id number]] Collected: 07/23/23 2231    Specimen: Blood Updated: 07/23/23 2345     Extra Tube Hold for add-ons.     Comment: Auto resulted.       POC Glucose Once [[id number]]  (Abnormal) Collected: 07/23/23 2203    Specimen: Blood Updated: 07/23/23 2322     Glucose 182 mg/dL      Comment: Serial Number: [id number]:  [id number]       STAT Lactic Acid, Reflex [[id number]]  (Normal) Collected: 07/23/23 2231    Specimen: Blood Updated: 07/23/23 2310     Lactate 2.0 mmol/L     Blood Culture - Blood, Hand, Left [[id number]] Collected: 07/23/23 2237    Specimen: Blood from Hand, Left Updated: 07/23/23 2244    Blood Culture - Blood, Arm, Right [[id number]] Collected: 07/23/23 2231    Specimen: Blood from Arm, Right Updated: 07/23/23 2244    Blood Gas, Arterial - [[id number]]  (Abnormal) Collected: 07/23/23 2053    Specimen: Arterial Blood Updated: 07/23/23 2058     Site Right Radial     Alvin's Test  Positive     pH, Arterial 7.443 pH units      pCO2, Arterial 67.7 mm Hg      pO2, Arterial 115.9 mm Hg      HCO3, Arterial 46.3 mmol/L      Base Excess, Arterial 19.1 mmol/L      Comment: Serial Number: [id number]:  [id number]        O2 Saturation, Arterial 98.5 %      CO2 Content 48.3 mmol/L      Barometric Pressure for Blood Gas --     Comment: N/A        Modality BiPap     FIO2 100 %      Ventilator Mode ;BiLevel     Hemodilution No     Respiratory Rate 20    Blood Gas, Arterial - [[id number]]  (Abnormal) Collected: 07/23/23 1940    Specimen: Arterial Blood Updated: 07/23/23 2010     Site Right Radial     Alvin's Test Positive     pH, Arterial 7.160 pH units      pCO2, Arterial 121.2 mm Hg      pO2, Arterial 65.6 mm Hg      HCO3, Arterial 43.2 mmol/L      Base Excess, Arterial 10.7 mmol/L      Comment: Serial Number: [id number]:  [id number]        O2 Saturation, Arterial 83.1 %      Barometric Pressure for Blood Gas --     Comment: N/A        Modality NRB     FIO2 100 %      Hemodilution No    POC Lactate [[id number]]  (Abnormal) Collected: 07/23/23 1940    Specimen: Blood Updated: 07/23/23 2010     Lactate 5.9 mmol/L      Comment: Serial Number: [id number]:  [id number]       POC Glucose Once [[id number]]  (Abnormal) Collected: 07/23/23 2004    Specimen: Blood Updated: 07/23/23 2005     Glucose 191 mg/dL      Comment: Serial Number: [id number]:  [id number]       POC Glucose Once [[id number]]  (Abnormal) Collected: 07/23/23 1940    Specimen: Blood Updated: 07/23/23 1947     Glucose 216 mg/dL      Comment: Serial Number: [id number]:  [id number]       POCT Electrolytes +HGB +HCT [[id number]]  (Abnormal) Collected: 07/23/23 1940    Specimen: Blood Updated: 07/23/23 1947     Sodium 132 mmol/L      POC Potassium 4.9 mmol/L      Ionized Calcium 1.28 mmol/L      Comment: Serial Number: [id number]:  [id number]        Glucose 216 mg/dL      Hematocrit 33 %      Hemoglobin 11.3 g/dL     POC Glucose Once [[id number]]  (Abnormal)  Collected: 07/23/23 1929    Specimen: Blood Updated: 07/23/23 1931     Glucose 151 mg/dL      Comment: Serial Number: [id number]:  [id number]       POC Glucose Once [[id number]]  (Abnormal) Collected: 07/23/23 1628    Specimen: Blood Updated: 07/23/23 1629     Glucose 471 mg/dL      Comment: Serial Number: [id number]:  [id number]       POC Glucose Once [[id number]]  (Abnormal) Collected: 07/23/23 1053    Specimen: Blood Updated: 07/23/23 1054     Glucose 333 mg/dL      Comment: Serial Number: [id number]:  [id number]             PENDING RESULTS:  N/A    IMAGING REVIEWED:   XR Chest 1 View    Result Date: 7/23/2023  Impression: 1. Worsening consolidation in the left perihilar region. This is secondary to a known lung mass consistent with lung cancer. There is probably worsening postobstructive pneumonitis/atelectasis and there may be a small left pleural effusion. 2. Cardiomegaly with pulmonary vascular congestion/low-grade pulmonary edema. Electronically Signed: Zeyad Contreras  7/23/2023 8:02 PM EDT  Workstation ID: [id number]     I have reviewed the patient's labs, imaging, reports, and other clinician documentation.    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  Was originally unable to continue radiation during hospitalization due to intermittent A-fib.  Planned to restart on Monday(7/24).  Molecular testing on tumor pathology revealed low-level PD-L1 positivity.  Orders have been written for outpatient palliative chemotherapy and immunotherapy once radiation therapy has completed.  Plan to start outpatient Xgeva for bone metastases support. He has decided to pursue treatment as planned, but has chosen to be a DNR/DNI.  Dexamethasone tapered again to 4 mg po daily (7/21/2023). Continued calcium plus vitamin D.  DANN with malabsorption and poor tolerance of oral iron/Elevated  copper level-he has not been on multivitamin containing copper. He had reported bright red blood per rectum as outpatient with 2 colonoscopies in the past 6 years that were unremarkable per his report. Received IV iron last admit and again this admit.  On Protonix, aspirin and dexamethasone. Hemoglobin continues to slowly fall.  Iron studies showed continued DANN with a slight improvement from last admission. Tolerating oral ferrous sulfate so far.     Left lung pneumonia-likely post-obstructive. On dexamethasone.  Per pulmonary.    BLE edema and pain-recent outpatient BLE Doppler was negative for DVT/SVT. On Lasix and Gabapentin.  Increased dose of gabapentin and ABIs normal.  A-fib with RVR-new onset noted in ED.  Cardiology consulted.  On ASA.  Cardiac EP recommending conservative management.  Chronic nausea, Uncontrolled DM2, Low TSH, and severe wt. Loss-on Protonix. Receiving Glucerna twice daily and Colace.  Endocrine managing.  Tapering Dexamethasone.   Smoking-cessation counseling continued.  On a nicotine patch.   Recent pathological right hip fracture-physical therapy working with patient.  On calcium plus vitamin D. Plannning rehab placement.   Oral thrush-on nystatin. Improved.   Agitation/depression-due to prolonged hospitalization and clinical status.  Started antidepressants.  T2DM with hyperglycemia-Endocrinology is following.      PLAN  Follow CBC.   Continue nystatin 100,000 units/mL 5 mL p.o. 4 times daily.  Continue dexamethasone 4 mg to daily.   Continue ASA 81 mg by mouth daily.  Would not recommend full dose anticoagulation for A-fib secondary to brain metastasis and known rectal bleeding unless benefits exceed risks.  Continue PRN Norco and Morphine.  Increase gabapentin for pain control.  Continue Protonix.      Continue ferrous sulfate 325mg po daily.   Continue nicotine patch.   Continue calcium plus vitamin D daily.  Continue Lexapro 10 mg p.o daily.   Continue Glucerna twice daily and  Colace.    Radiation therapy to resume when heart rate controlled-hopefully  today.   Plan for combination of chemotherapy and immunotherapy as an outpatient post radiation.  Start Xgeva for bone metastases support as outpatient.  Recommend outpatient GI evaluation.  Brain MRI as outpatient to f/u on metastasis, post XRT.     I have personally performed a face-to-face diagnostic evaluation on this patient. I have performed a complete history and physical examination, reviewed laboratory studies, and radiographic examinations.  I have completed the majority and substantive portion of the medical decision making.  I have formulated the assessment on this patient and the plan of action as noted above. I have discussed the case with Josseline Girard NP, have edited/reviewed the note, and agree with the care plan.  The patient is complaining of a dry cough.  On examination, left chest wall tattoo and right chest wall Khvgor-u-Rjke are present.  WBCs 15.5 and hemoglobin 8.3.  He continues on oral iron supplementation.  Radiation therapy to be resumed today.      I discussed the patient's findings and my recommendations with patient.    Part of this note may be an electronic transcription/translation of spoken language to printed text using the Dragon Dictation System.     Electronically signed by Mina Dupree MD, 07/24/23, 5:35 PM EDT.

## 2023-07-24 NOTE — PLAN OF CARE
Goal Outcome Evaluation:         Isra Davenport presents with functional mobility impairments which indicate the need for skilled intervention. Pt able to ambulate brief distance from bed to bedside chair with RW/CGA x2 prior to expressing needing to sit down. Pt performed seated ther ex sitting eob with SBA to maintain sitting balance. Pt continues to be limited by decreased activity tolerance and functional strength. Tolerating session today without incident. Will continue to follow and progress as tolerated.

## 2023-07-24 NOTE — PROGRESS NOTES
Rapid response for patient having hypoxia.  Placed on BiPAP.  ABG ordered showing hypercapnic hypoxic respiratory failure.  Chest x-ray ordered showing pulmonary edema, IV Lasix has been ordered.  Lactic acid is elevated we will continue to monitor and blood cultures have been ordered.

## 2023-07-24 NOTE — THERAPY TREATMENT NOTE
"Subjective: Pt agreeable to therapeutic plan of care. Pt expressed his bottom is hurting and feels like he is getting weaker.    Objective:     Bed mobility - SBA and CGA  Transfers - CGA, Assist x 2, and with rolling walker  Ambulation - 4 feet CGA, Assist x 2, and with rolling walker    Therapeutic Exercise - 10 Reps B LE AROM unsupported sitting / EOB    Vitals: Pt's O2 sats read >87% during tx session on 4LO2    Pain: Pt did not rate but expressed pain in bottom and BLE  Intervention for pain: Repositioned, Increased Activity, and Therapeutic Presence    Education: Provided education on the importance of mobility in the acute care setting, Verbal/Tactile Cues, Transfer Training, Gait Training, and Energy conservation strategies    Assessment: Isra Davenport presents with functional mobility impairments which indicate the need for skilled intervention. Pt able to ambulate brief distance from bed to bedside chair with RW/CGA x2 prior to expressing needing to sit down. Pt performed seated ther ex sitting eob with SBA to maintain sitting balance. Pt continues to be limited by decreased activity tolerance and functional strength. Tolerating session today without incident. Will continue to follow and progress as tolerated.     Plan/Recommendations:   Moderate Intensity Therapy recommended post-acute care. This is recommended as therapy feels the patient would require 3-4 days per week and wouldn't tolerate \"3 hour daily\" rehab intensity. SNF would be the preferred choice. If the patient does not agree to SNF, arrange HH or OP depending on home bound status. If patient is medically complex, consider LTACH.. Pt requires no DME at discharge.     Pt desires Skilled Rehab placement at discharge. Pt cooperative; agreeable to therapeutic recommendations and plan of care.         Basic Mobility 6-click:  Rollin = Total, A lot = 2, A little = 3; 4 = None  Supine>Sit:   1 = Total, A lot = 2, A little = 3; 4 = None "   Sit>Stand with arms:  1 = Total, A lot = 2, A little = 3; 4 = None  Bed>Chair:   1 = Total, A lot = 2, A little = 3; 4 = None  Ambulate in room:  1 = Total, A lot = 2, A little = 3; 4 = None  3-5 Steps with railin = Total, A lot = 2, A little = 3; 4 = None  Score: 17    Modified Jyothi: N/A = No pre-op stroke/TIA    Post-Tx Position: Up in Chair, Staff Present, Alarms activated, and Call light and personal items within reach  PPE: gloves

## 2023-07-25 ENCOUNTER — HOSPITAL ENCOUNTER (OUTPATIENT)
Dept: RADIATION ONCOLOGY | Facility: HOSPITAL | Age: 68
Discharge: HOME | End: 2023-07-25
Payer: MEDICARE

## 2023-07-25 LAB
ANION GAP SERPL CALCULATED.3IONS-SCNC: 6 MMOL/L (ref 5–15)
ANISOCYTOSIS BLD QL: ABNORMAL
BUN SERPL-MCNC: 25 MG/DL (ref 8–23)
BUN/CREAT SERPL: 92.6 (ref 7–25)
CALCIUM SPEC-SCNC: 9.1 MG/DL (ref 8.6–10.5)
CHLORIDE SERPL-SCNC: 91 MMOL/L (ref 98–107)
CO2 SERPL-SCNC: 39 MMOL/L (ref 22–29)
CREAT SERPL-MCNC: 0.27 MG/DL (ref 0.76–1.27)
DEPRECATED RDW RBC AUTO: 65.6 FL (ref 37–54)
EGFRCR SERPLBLD CKD-EPI 2021: 134.7 ML/MIN/1.73
ERYTHROCYTE [DISTWIDTH] IN BLOOD BY AUTOMATED COUNT: 21.1 % (ref 12.3–15.4)
GEN 5 2HR TROPONIN T REFLEX: 24 NG/L
GLUCOSE BLDC GLUCOMTR-MCNC: 104 MG/DL (ref 70–105)
GLUCOSE BLDC GLUCOMTR-MCNC: 111 MG/DL (ref 70–105)
GLUCOSE BLDC GLUCOMTR-MCNC: 178 MG/DL (ref 70–105)
GLUCOSE BLDC GLUCOMTR-MCNC: 236 MG/DL (ref 70–105)
GLUCOSE SERPL-MCNC: 136 MG/DL (ref 65–99)
HCT VFR BLD AUTO: 26.1 % (ref 37.5–51)
HGB BLD-MCNC: 7.9 G/DL (ref 13–17.7)
LARGE PLATELETS: ABNORMAL
LYMPHOCYTES # BLD MANUAL: 0.99 10*3/MM3 (ref 0.7–3.1)
LYMPHOCYTES NFR BLD MANUAL: 4 % (ref 5–12)
MCH RBC QN AUTO: 25.7 PG (ref 26.6–33)
MCHC RBC AUTO-ENTMCNC: 30.4 G/DL (ref 31.5–35.7)
MCV RBC AUTO: 84.4 FL (ref 79–97)
METAMYELOCYTES NFR BLD MANUAL: 3 % (ref 0–0)
MONOCYTES # BLD: 0.57 10*3/MM3 (ref 0.1–0.9)
MYELOCYTES NFR BLD MANUAL: 3 % (ref 0–0)
NEUTROPHILS # BLD AUTO: 11.79 10*3/MM3 (ref 1.7–7)
NEUTROPHILS NFR BLD MANUAL: 81 % (ref 42.7–76)
NEUTS BAND NFR BLD MANUAL: 2 % (ref 0–5)
PLATELET # BLD AUTO: 352 10*3/MM3 (ref 140–450)
PMV BLD AUTO: 8.5 FL (ref 6–12)
POTASSIUM SERPL-SCNC: 4.4 MMOL/L (ref 3.5–5.2)
QT INTERVAL: 375 MS
RAD ONC ARIA COURSE ID: NORMAL
RAD ONC ARIA COURSE INTENT: NORMAL
RAD ONC ARIA COURSE LAST TREATMENT DATE: NORMAL
RAD ONC ARIA COURSE START DATE: NORMAL
RAD ONC ARIA COURSE TREATMENT ELAPSED DAYS: 13
RAD ONC ARIA FIRST TREATMENT DATE: NORMAL
RAD ONC ARIA PLAN FRACTIONS TREATED TO DATE: 5
RAD ONC ARIA PLAN FRACTIONS TREATED TO DATE: 5
RAD ONC ARIA PLAN ID: NORMAL
RAD ONC ARIA PLAN ID: NORMAL
RAD ONC ARIA PLAN NAME: NORMAL
RAD ONC ARIA PLAN NAME: NORMAL
RAD ONC ARIA PLAN PRESCRIBED DOSE PER FRACTION: 1.5 GY
RAD ONC ARIA PLAN PRESCRIBED DOSE PER FRACTION: 1.5 GY
RAD ONC ARIA PLAN PRIMARY REFERENCE POINT: NORMAL
RAD ONC ARIA PLAN PRIMARY REFERENCE POINT: NORMAL
RAD ONC ARIA PLAN TOTAL FRACTIONS PRESCRIBED: 10
RAD ONC ARIA PLAN TOTAL FRACTIONS PRESCRIBED: 10
RAD ONC ARIA PLAN TOTAL PRESCRIBED DOSE: 1500 CGY
RAD ONC ARIA PLAN TOTAL PRESCRIBED DOSE: 1500 CGY
RAD ONC ARIA REFERENCE POINT DOSAGE GIVEN TO DATE: 15 GY
RAD ONC ARIA REFERENCE POINT ID: NORMAL
RAD ONC ARIA REFERENCE POINT SESSION DOSAGE GIVEN: 3 GY
RBC # BLD AUTO: 3.09 10*6/MM3 (ref 4.14–5.8)
SCAN SLIDE: NORMAL
SODIUM SERPL-SCNC: 136 MMOL/L (ref 136–145)
TROPONIN T DELTA: -13 NG/L
TROPONIN T SERPL HS-MCNC: 37 NG/L
VARIANT LYMPHS NFR BLD MANUAL: 7 % (ref 19.6–45.3)
WBC MORPH BLD: NORMAL
WBC NRBC COR # BLD: 14.2 10*3/MM3 (ref 3.4–10.8)
WHOLE BLOOD HOLD SPECIMEN: NORMAL

## 2023-07-25 PROCEDURE — 85007 BL SMEAR W/DIFF WBC COUNT: CPT | Performed by: NURSE PRACTITIONER

## 2023-07-25 PROCEDURE — 94660 CPAP INITIATION&MGMT: CPT

## 2023-07-25 PROCEDURE — 94799 UNLISTED PULMONARY SVC/PX: CPT

## 2023-07-25 PROCEDURE — 99232 SBSQ HOSP IP/OBS MODERATE 35: CPT | Performed by: INTERNAL MEDICINE

## 2023-07-25 PROCEDURE — 25010000002 ENOXAPARIN PER 10 MG: Performed by: INTERNAL MEDICINE

## 2023-07-25 PROCEDURE — 25010000002 MORPHINE PER 10 MG: Performed by: INTERNAL MEDICINE

## 2023-07-25 PROCEDURE — 63710000001 INSULIN LISPRO (HUMAN) PER 5 UNITS: Performed by: INTERNAL MEDICINE

## 2023-07-25 PROCEDURE — 99232 SBSQ HOSP IP/OBS MODERATE 35: CPT | Performed by: NURSE PRACTITIONER

## 2023-07-25 PROCEDURE — 25010000002 FUROSEMIDE PER 20 MG: Performed by: NURSE PRACTITIONER

## 2023-07-25 PROCEDURE — 63710000001 DEXAMETHASONE PER 0.25 MG: Performed by: INTERNAL MEDICINE

## 2023-07-25 PROCEDURE — 94664 DEMO&/EVAL PT USE INHALER: CPT

## 2023-07-25 PROCEDURE — 85025 COMPLETE CBC W/AUTO DIFF WBC: CPT | Performed by: NURSE PRACTITIONER

## 2023-07-25 PROCEDURE — 77412 RADIATION TX DELIVERY LVL 3: CPT | Performed by: RADIOLOGY

## 2023-07-25 PROCEDURE — 63710000001 INSULIN REGULAR HUMAN PER 5 UNITS: Performed by: INTERNAL MEDICINE

## 2023-07-25 PROCEDURE — 63710000001 INSULIN GLARGINE PER 5 UNITS: Performed by: INTERNAL MEDICINE

## 2023-07-25 PROCEDURE — 84484 ASSAY OF TROPONIN QUANT: CPT | Performed by: NURSE PRACTITIONER

## 2023-07-25 PROCEDURE — 93005 ELECTROCARDIOGRAM TRACING: CPT | Performed by: INTERNAL MEDICINE

## 2023-07-25 PROCEDURE — 80048 BASIC METABOLIC PNL TOTAL CA: CPT | Performed by: INTERNAL MEDICINE

## 2023-07-25 PROCEDURE — 93010 ELECTROCARDIOGRAM REPORT: CPT | Performed by: INTERNAL MEDICINE

## 2023-07-25 PROCEDURE — 82948 REAGENT STRIP/BLOOD GLUCOSE: CPT

## 2023-07-25 PROCEDURE — 99231 SBSQ HOSP IP/OBS SF/LOW 25: CPT | Performed by: INTERNAL MEDICINE

## 2023-07-25 RX ORDER — ENOXAPARIN SODIUM 100 MG/ML
40 INJECTION SUBCUTANEOUS DAILY
Status: DISCONTINUED | OUTPATIENT
Start: 2023-07-25 | End: 2023-07-27 | Stop reason: HOSPADM

## 2023-07-25 RX ADMIN — INSULIN LISPRO 12 UNITS: 100 INJECTION, SOLUTION INTRAVENOUS; SUBCUTANEOUS at 09:48

## 2023-07-25 RX ADMIN — GABAPENTIN 300 MG: 300 CAPSULE ORAL at 20:29

## 2023-07-25 RX ADMIN — INSULIN LISPRO 12 UNITS: 100 INJECTION, SOLUTION INTRAVENOUS; SUBCUTANEOUS at 16:29

## 2023-07-25 RX ADMIN — FUROSEMIDE 20 MG: 10 INJECTION, SOLUTION INTRAMUSCULAR; INTRAVENOUS at 09:49

## 2023-07-25 RX ADMIN — INSULIN GLARGINE 14 UNITS: 100 INJECTION, SOLUTION SUBCUTANEOUS at 20:29

## 2023-07-25 RX ADMIN — GABAPENTIN 300 MG: 300 CAPSULE ORAL at 09:49

## 2023-07-25 RX ADMIN — GUAIFENESIN 600 MG: 600 TABLET, EXTENDED RELEASE ORAL at 09:50

## 2023-07-25 RX ADMIN — PANTOPRAZOLE SODIUM 40 MG: 40 TABLET, DELAYED RELEASE ORAL at 09:50

## 2023-07-25 RX ADMIN — HYDROCODONE BITARTRATE AND ACETAMINOPHEN 1 TABLET: 5; 325 TABLET ORAL at 16:28

## 2023-07-25 RX ADMIN — MORPHINE SULFATE 2 MG: 2 INJECTION, SOLUTION INTRAMUSCULAR; INTRAVENOUS at 12:04

## 2023-07-25 RX ADMIN — MIDODRINE HYDROCHLORIDE 10 MG: 5 TABLET ORAL at 09:49

## 2023-07-25 RX ADMIN — DILTIAZEM HYDROCHLORIDE 240 MG: 240 CAPSULE, EXTENDED RELEASE ORAL at 09:49

## 2023-07-25 RX ADMIN — INSULIN GLARGINE 14 UNITS: 100 INJECTION, SOLUTION SUBCUTANEOUS at 09:47

## 2023-07-25 RX ADMIN — DOCUSATE SODIUM 100 MG: 100 CAPSULE, LIQUID FILLED ORAL at 20:28

## 2023-07-25 RX ADMIN — METOPROLOL TARTRATE 75 MG: 25 TABLET, FILM COATED ORAL at 09:49

## 2023-07-25 RX ADMIN — FERROUS SULFATE TAB EC 324 MG (65 MG FE EQUIVALENT) 324 MG: 324 (65 FE) TABLET DELAYED RESPONSE at 09:50

## 2023-07-25 RX ADMIN — NYSTATIN 500000 UNITS: 100000 SUSPENSION ORAL at 09:49

## 2023-07-25 RX ADMIN — Medication 1 TABLET: at 09:48

## 2023-07-25 RX ADMIN — DOCUSATE SODIUM 100 MG: 100 CAPSULE, LIQUID FILLED ORAL at 09:49

## 2023-07-25 RX ADMIN — HYDROCODONE BITARTRATE AND ACETAMINOPHEN 1 TABLET: 5; 325 TABLET ORAL at 09:57

## 2023-07-25 RX ADMIN — BUDESONIDE AND FORMOTEROL FUMARATE DIHYDRATE 2 PUFF: 160; 4.5 AEROSOL RESPIRATORY (INHALATION) at 06:45

## 2023-07-25 RX ADMIN — Medication 10 ML: at 20:30

## 2023-07-25 RX ADMIN — SENNOSIDES AND DOCUSATE SODIUM 2 TABLET: 8.6; 5 TABLET ORAL at 09:48

## 2023-07-25 RX ADMIN — Medication 10 ML: at 09:52

## 2023-07-25 RX ADMIN — HYDROXYZINE HYDROCHLORIDE 25 MG: 25 TABLET, FILM COATED ORAL at 12:04

## 2023-07-25 RX ADMIN — MIDODRINE HYDROCHLORIDE 10 MG: 5 TABLET ORAL at 16:28

## 2023-07-25 RX ADMIN — BUDESONIDE AND FORMOTEROL FUMARATE DIHYDRATE 2 PUFF: 160; 4.5 AEROSOL RESPIRATORY (INHALATION) at 18:59

## 2023-07-25 RX ADMIN — NICOTINE 1 PATCH: 14 PATCH, EXTENDED RELEASE TRANSDERMAL at 09:48

## 2023-07-25 RX ADMIN — ASPIRIN 81 MG CHEWABLE TABLET 81 MG: 81 TABLET CHEWABLE at 09:49

## 2023-07-25 RX ADMIN — MIDODRINE HYDROCHLORIDE 10 MG: 5 TABLET ORAL at 12:04

## 2023-07-25 RX ADMIN — NYSTATIN 500000 UNITS: 100000 SUSPENSION ORAL at 16:28

## 2023-07-25 RX ADMIN — METOPROLOL TARTRATE 75 MG: 25 TABLET, FILM COATED ORAL at 20:30

## 2023-07-25 RX ADMIN — ESCITALOPRAM OXALATE 10 MG: 10 TABLET ORAL at 09:52

## 2023-07-25 RX ADMIN — ENOXAPARIN SODIUM 40 MG: 100 INJECTION SUBCUTANEOUS at 16:28

## 2023-07-25 RX ADMIN — DEXAMETHASONE 4 MG: 4 TABLET ORAL at 09:49

## 2023-07-25 RX ADMIN — NITROGLYCERIN 0.5 INCH: 20 OINTMENT TOPICAL at 12:08

## 2023-07-25 RX ADMIN — INSULIN HUMAN 12 UNITS: 100 INJECTION, SOLUTION PARENTERAL at 09:47

## 2023-07-25 RX ADMIN — SENNOSIDES AND DOCUSATE SODIUM 2 TABLET: 8.6; 5 TABLET ORAL at 20:30

## 2023-07-25 RX ADMIN — NYSTATIN 500000 UNITS: 100000 SUSPENSION ORAL at 20:28

## 2023-07-25 RX ADMIN — INSULIN LISPRO 4 UNITS: 100 INJECTION, SOLUTION INTRAVENOUS; SUBCUTANEOUS at 20:41

## 2023-07-25 RX ADMIN — GUAIFENESIN 600 MG: 600 TABLET, EXTENDED RELEASE ORAL at 20:41

## 2023-07-25 NOTE — PROGRESS NOTES
[facility] Medicine Services   Daily Progress Note      Patient Name: Isra Davenport  : 1955  MRN: [id number]  Primary Care Physician:  Mayco Carreon MD  Date of admission: 7/15/2023      Subjective      Chief Complaint: Shortness of breath    Patient seen and examined this morning.  Doing better this morning, had radiation therapy yesterday.  Denies any chest pain or shortness of breath.  Still feels depressed but no suicidal ideations.  No other complaints.  Awaiting rehab placement now.    Pertinent positives as noted in HPI/subjective.  All other systems were reviewed and are negative.      Objective      Vitals:   Temp:  [97.8 °F (36.6 °C)-98.9 °F (37.2 °C)] 98.9 °F (37.2 °C)  Heart Rate:  [76-86] 76  Resp:  [14-22] 22  BP: ()/(59-69) 94/61  Flow (L/min):  [3-4] 3    Physical Exam:    General: Awake, alert, elderly male, lying in bed, NAD  Eyes: PERRL, EOMI, conjunctivae are clear  Cardiovascular: Regular rate and rhythm, no murmurs  Respiratory: Clear to auscultation bilaterally, no wheezing or rales, unlabored breathing  Abdomen: Soft, nontender, positive bowel sounds, no guarding  Neurologic: A&O, CN grossly intact, moves all extremities spontaneously  Musculoskeletal: Generalized weak, no other gross deformities  Skin: Warm, dry         Result Review    Result Review:  I have personally reviewed the results from the time of this admission to 2023 09:57 EDT and agree with these findings:  [x]  Laboratory  []  Microbiology  []  Radiology  []  EKG/Telemetry   []  Cardiology/Vascular   []  Pathology  []  Old records  []  Other:    Wounds (last 24 hours)       LDA Wound       Row Name 23 0400 23 0000 23       Wound 07/15/23 2100 Bilateral medial gluteal Pressure Injury    Wound - Properties Group Placement Date: 07/15/23  -MF Placement Time: 2100  -MF Present on Hospital Admission: Y  -MF Side: Bilateral  -MF Orientation: medial  -MF Location: gluteal   -MF Primary Wound Type: Pressure inj  -MF    Pressure Injury Stage 2  -ML 2  -ML 2  -ML    Dressing Appearance open to air  -ML open to air  -ML open to air  -ML    Closure Open to air  -ML Open to air  -ML Open to air  -ML    Base dry  -ML dry  -ML dry  -ML    Periwound warm;dry  -ML warm;dry  -ML warm;dry  -ML    Periwound Temperature warm  -ML warm  -ML warm  -ML    Periwound Skin Turgor soft  -ML soft  -ML soft  -ML    Drainage Amount none  -ML none  -ML none  -ML    Care, Wound barrier applied  -ML barrier applied  -ML barrier applied;other (see comments)  domeboro soak applied, magic cream  -ML    Dressing Care open to air  -ML open to air  -ML open to air  -ML    Periwound Care dry periwound area maintained  -ML dry periwound area maintained  -ML dry periwound area maintained  -ML    Retired Wound - Properties Group Placement Date: 07/15/23  -MF Placement Time: 2100  -MF Present on Hospital Admission: Y  -MF Side: Bilateral  -MF Orientation: medial  -MF Location: gluteal  -MF Primary Wound Type: Pressure inj  -MF    Retired Wound - Properties Group Date first assessed: 07/15/23  -MF Time first assessed: 2100  -MF Present on Hospital Admission: Y  -MF Side: Bilateral  -MF Location: gluteal  -MF Primary Wound Type: Pressure inj  -MF       Wound 07/18/23 1900 Left posterior ankle    Wound - Properties Group Placement Date: 07/18/23  -AR Placement Time: 1900  -AR Present on Hospital Admission: Y  -AR Side: Left  -AR Orientation: posterior  -AR Location: ankle  -AR    Dressing Appearance dry;intact  -ML dry;intact  -ML intact;dry  -ML    Closure VIMAL  -ML VIMAL  -ML VIMAL  -ML    Base dressing in place, unable to visualize  -ML dressing in place, unable to visualize  -ML dressing in place, unable to visualize  -ML    Periwound Care dry periwound area maintained  -ML dry periwound area maintained  -ML dry periwound area maintained  -ML    Retired Wound - Properties Group Placement Date: 07/18/23  -AR Placement Time:  1900  -AR Present on Hospital Admission: Y  -AR Side: Left  -AR Orientation: posterior  -AR Location: ankle  -AR    Retired Wound - Properties Group Date first assessed: 07/18/23  -AR Time first assessed: 1900  -AR Present on Hospital Admission: Y  -AR Side: Left  -AR Location: ankle  -AR      Row Name 07/24/23 1600 07/24/23 1200          [REMOVED] Wound 06/17/23 0915 Right anterior hip Incision    Wound - Properties Group Placement Date: 06/17/23  -HB Placement Time: 0915  -HB Side: Right  -HB Orientation: anterior  -HB Location: hip  -HB Primary Wound Type: Incision  -HB Removal Date: 07/24/23  -EMRE Removal Time: 1255  -EMRE Wound Outcome: Healed  -EMRE    Retired Wound - Properties Group Placement Date: 06/17/23  -HB Placement Time: 0915  -HB Side: Right  -HB Orientation: anterior  -HB Location: hip  -HB Primary Wound Type: Incision  -HB Removal Date: 07/24/23  -EMRE Removal Time: 1255  -EMRE Wound Outcome: Healed  -EMRE    Retired Wound - Properties Group Date first assessed: 06/17/23  -HB Time first assessed: 0915  -HB Side: Right  -HB Location: hip  -HB Primary Wound Type: Incision  -HB Resolution Date: 07/24/23  -EMRE Resolution Time: 1255  -EMRE Wound Outcome: Healed  -EMRE       Wound 07/15/23 2100 Bilateral medial gluteal Pressure Injury    Wound - Properties Group Placement Date: 07/15/23  -MF Placement Time: 2100  -MF Present on Hospital Admission: Y  -MF Side: Bilateral  -MF Orientation: medial  -MF Location: gluteal  -MF Primary Wound Type: Pressure inj  -MF    Pressure Injury Stage 2  -EMRE 2  -EMRE     Dressing Appearance open to air  -EMRE open to air  -EMRE     Closure Open to air  -EMRE Open to air  -EMRE     Base dry  -EMRE dry  -EMRE     Periwound warm;dry  -EMRE warm;dry  -EMRE     Periwound Temperature warm  -EMRE warm  -EMRE     Periwound Skin Turgor soft  -EMRE soft  -EMRE     Drainage Amount none  -EMRE none  -EMRE     Retired Wound - Properties Group Placement Date: 07/15/23  -MF Placement Time: 2100  -MF Present on Hospital Admission: Y   -MF Side: Bilateral  -MF Orientation: medial  -MF Location: gluteal  -MF Primary Wound Type: Pressure inj  -MF    Retired Wound - Properties Group Date first assessed: 07/15/23  -MF Time first assessed: 2100  -MF Present on Hospital Admission: Y  -MF Side: Bilateral  -MF Location: gluteal  -MF Primary Wound Type: Pressure inj  -MF       Wound 07/18/23 1900 Left posterior ankle    Wound - Properties Group Placement Date: 07/18/23  -AR Placement Time: 1900 -AR Present on Hospital Admission: Y  -AR Side: Left  -AR Orientation: posterior  -AR Location: ankle  -AR    Closure VIMAL  -EMRE VIMAL  -EMRE     Base dressing in place, unable to visualize  -EMRE dressing in place, unable to visualize  -EMRE     Retired Wound - Properties Group Placement Date: 07/18/23  -AR Placement Time: 1900  -AR Present on Hospital Admission: Y  -AR Side: Left  -AR Orientation: posterior  -AR Location: ankle  -AR    Retired Wound - Properties Group Date first assessed: 07/18/23  -AR Time first assessed: 1900  -AR Present on Hospital Admission: Y  -AR Side: Left  -AR Location: ankle  -AR              User Key  (r) = Recorded By, (t) = Taken By, (c) = Cosigned By      Initials Name Provider Type    HB Cleo Britton RN Registered Nurse    Rachel George RN Registered Nurse    Kandace South, RN Registered Nurse    Laura Nava, RN Registered Nurse    Danyell Samuel, RN Registered Nurse                      Assessment & Plan      Brief Patient Summary:  Isra Davenport is a 68 y.o. male       aluminum sulfate-calcium acetate 1:20, 1,000 mL, Topical, BID  aspirin, 81 mg, Oral, Daily  budesonide-formoterol, 2 puff, Inhalation, BID - RT  calcium 500 mg vitamin D 5 mcg (200 UT), 1 tablet, Oral, Daily  dexamethasone, 4 mg, Oral, Daily With Breakfast   And  insulin regular, 12 Units, Subcutaneous, Daily With Breakfast  dilTIAZem CD, 240 mg, Oral, Q24H  docusate sodium, 100 mg, Oral, BID  escitalopram, 10 mg, Oral, Daily  ferrous sulfate,  324 mg, Oral, Daily With Breakfast  furosemide, 20 mg, Intravenous, Daily  gabapentin, 300 mg, Oral, BID  guaiFENesin, 600 mg, Oral, Q12H  hydrocortisone-bacitracin-zinc oxide-nystatin, 1 application , Topical, TID  insulin glargine, 14 Units, Subcutaneous, Q12H  insulin lispro, 12 Units, Subcutaneous, TID With Meals  insulin lispro, 2-9 Units, Subcutaneous, 4x Daily AC & at Bedtime  metoprolol tartrate, 75 mg, Oral, BID  midodrine, 10 mg, Oral, TID AC  nicotine, 1 patch, Transdermal, Q24H  nystatin, 5 mL, Swish & Swallow, TID  pantoprazole, 40 mg, Oral, Q AM  senna-docusate sodium, 2 tablet, Oral, BID  sodium chloride, 10 mL, Intravenous, Q12H       Pharmacy Consult - Steroid Insulin Protocol,          I have utilized all available, immediate resources to obtain, update, or review the patient's current medications including all prescriptions, over-the-counter products, herbals, cannabis/cannabidiol products, and vitamin.mineral/dietary (nutritional) supplements.    Active Hospital Problems:  Active Hospital Problems    Diagnosis     **Atrial fibrillation with RVR     Moderate malnutrition     Atrial fibrillation      Plan:     Acute hypoxic respiratory failure  Left-sided pneumonia  COPD  -Imaging reports noted  -Oxygen requirement improving  -Continue bronchodilators and steroids  -Finished antibiotics  -Pulmonology following    PAF  -Rate controlled, monitor on telemetry  -Echo with preserved EF noted  -Recent noninvasive ischemic eval negative for ischemia per cardiology  -Continue p.o. Cardizem, metoprolol, midodrine  -Patient not candidate for anticoagulation due to brain mets  -Cardiology following    Stage IV lung cancer with mets to brain and bone  -Currently on chemotherapy and radiation  -Continue Decadron as per oncology  -Oncology following and managing    DM 2  -Continue basal insulin with sliding scale  -Monitor blood glucose  -Endocrinology following and managing    DANN  -Continue iron  supplement  -Monitor hemoglobin, transfuse as needed    Depression  -Patient feeling more depressed during this admission  -Denies any SI  -Lexapro started during this admission  -Continue outpatient follow-up    Leukocytosis  -Likely reactive secondary to steroids  -Patient previously received antibiotics  -Monitor off antibiotics for now    DVT prophylaxis  -Lovenox      CODE STATUS:    Medical Intervention Limits: NO intubation (DNI)  Code Status (Patient has no pulse and is not breathing): No CPR (Do Not Attempt to Resuscitate)  Medical Interventions (Patient has pulse or is breathing): Limited Support      Disposition: Rehab placement.  Medically stable for discharge.    Electronically signed by Merle Pererya DO, 07/25/23, 09:57 EDT.  [facility] Hospitalist Team      Part of this note may be an electronic transcription/translation of spoken language to printed text using the Dragon Dictation System.

## 2023-07-25 NOTE — PROGRESS NOTES
Nutrition Services    Patient Name: Isra Davenport  YOB: 1955  MRN: [id number]  Admission date: 7/15/2023    PPE Documentation        PPE Worn By Provider Did not enter room for this encounter   PPE Worn By Patient  N/A     PROGRESS NOTE      Encounter Information: Checking in on PO intake. Pt awaiting rehab placement.        PO Diet: Diet: Diabetic Diets; Consistent Carbohydrate; Texture: Regular Texture (IDDSI 7); Fluid Consistency: Thin (IDDSI 0)   PO Supplements: Boost Glucose Control BID (Provides 380 kcals, 32 g protein if consumed)      PO Intake:  %       Current nutrition support: -   Nutrition support review: -       Labs (reviewed below): Reviewed, management per attending.  Hyperglycemia - on consistent CHO diet        GI Function:  Last documented BM on 7/24       Nutrition Intervention Updates: Continue current diet and encourage good PO intake.       Results from last 7 days   Lab Units 07/25/23  0134 07/20/23  0106 07/19/23  1025   SODIUM mmol/L 136 138 134*   POTASSIUM mmol/L 4.4 4.3 4.1   CHLORIDE mmol/L 91* 94* 89*   CO2 mmol/L 39.0* 38.0* 37.0*   BUN mg/dL 25* 16 15   CREATININE mg/dL 0.27* 0.31* 0.39*   CALCIUM mg/dL 9.1 9.3 9.1   GLUCOSE mg/dL 136* 123* 381*       Results from last 7 days   Lab Units 07/25/23  0134 07/20/23  2358 07/20/23  1543 07/20/23  0106 07/19/23  1025 07/18/23  1906 07/18/23  1906   MAGNESIUM mg/dL  --   --   --  1.7 1.9  --  2.2   PHOSPHORUS mg/dL  --   --  2.3* 1.6* 2.7  --  2.0*   HEMOGLOBIN g/dL 7.9*   < >  --  9.0* 9.0*   < >  --    HEMOGLOBIN, POC   --    < >  --   --   --   --   --    HEMATOCRIT % 26.1*   < >  --  29.1* 28.3*   < >  --    HEMATOCRIT POC   --    < >  --   --   --   --   --     < > = values in this interval not displayed.       COVID19   Date Value Ref Range Status   06/19/2023 Not Detected Not Detected - Ref. Range Final     Lab Results   Component Value Date    HGBA1C 7.30 (H) 07/15/2023       RD to follow up per  protocol.    Electronically signed by:  Marcie Montyoa RD  07/25/23 14:28 EDT

## 2023-07-25 NOTE — PROGRESS NOTES
[facility]   PROGRESS NOTE      Referring Provider: Merle Pereyra DO    Reason for follow-up: f/u afib/flutter     Patient Care Team:  Mayco Carreon MD as PCP - General (Internal Medicine)  Mina Dupree MD as Consulting Physician (Hematology and Oncology)      SUBJECTIVE    Patient with hip pain today. Scheduled for radiation this afternoon. Hemodynamically stable. Remains in atrial flutter.       Review of Systems   Cardiovascular:  Negative for chest pain, irregular heartbeat and palpitations.   Respiratory:  Negative for cough and shortness of breath.    Neurological:  Negative for dizziness and light-headedness.   All other systems reviewed and are negative.    Allergies:  Ibuprofen and Penicillins    Personal History:    Past Medical History:   Diagnosis Date    Arthritis     Benign prostatic hyperplasia without urinary obstruction 07/06/2023    Chronic back pain 07/06/2023    Closed fracture of right hip 06/16/2023    Added automatically from request for surgery [id number]    Hernia of anterior abdominal wall 09/22/2020    Lung cancer     squamous cell carcimoma,left    Lung cancer metastatic to brain 06/29/2023    Mass of left lung 06/16/2023    Added automatically from request for surgery [id number]    Neuralgia 07/06/2023    Neuropathy 07/06/2023    Osteoarthritis 07/06/2023    Polyp of colon 07/06/2023    Primary malignant neoplasm of left lung metastatic to other site 06/16/2023    Added automatically from request for surgery [id number]    Prostatitis 07/06/2023    Spinal stenosis of lumbar region 07/06/2023       Past Surgical History:   Procedure Laterality Date    BACK SURGERY  2006    BRONCHOSCOPY N/A 6/19/2023    Procedure: BRONCHOSCOPY WITH BIOPSY X1 AREA, FINE NEEDLE ASPIRATION, BRUSHING, AND BRONCHIAL WASHING;  Surgeon: Reggie Abarca MD;  Location: [facility] ENDOSCOPY;  Service: Pulmonary;  Laterality: N/A;  POST: MUCOUS PLUGS  AND LUNG MASS    HIP  INTERTROCHANTERIC NAILING Right 6/17/2023    Procedure: HIP INTERTROCHANTERIC NAILING;  Surgeon: Levi Blackmon II, MD;  Location: [facility] MAIN OR;  Service: Orthopedics;  Laterality: Right;    LEG SURGERY Bilateral 08/25/1985    PORTACATH PLACEMENT Right 6/29/2023    Procedure: INSERTION OF PORTACATH;  Surgeon: Jese Goss MD;  Location: [facility] MAIN OR;  Service: General;  Laterality: Right;       History reviewed. No pertinent family history.    Social History     Tobacco Use    Smoking status: Every Day     Packs/day: 1.00     Years: 15.00     Pack years: 15.00     Types: Cigarettes     Passive exposure: Current    Smokeless tobacco: Never   Vaping Use    Vaping Use: Never used   Substance Use Topics    Alcohol use: Yes     Alcohol/week: 2.0 standard drinks     Types: 2 Cans of beer per week    Drug use: Never        Medications Prior to Admission   Medication Sig Dispense Refill Last Dose    acetaminophen (TYLENOL) 325 MG tablet Take 2 tablets by mouth Every 6 (Six) Hours As Needed for Mild Pain. Indications: Pain       albuterol sulfate  (90 Base) MCG/ACT inhaler Inhale 2 puffs Every 4 (Four) Hours As Needed for Wheezing. 6.7 g 5     aspirin 81 MG EC tablet Take 1 tablet by mouth Daily. 30 tablet 2     denosumab (XGEVA) 120 MG/1.7ML solution injection Inject 1.7 mL under the skin into the appropriate area as directed See Admin Instructions. Every 4 weeks       dexamethasone (DECADRON) 4 MG tablet Take 1 tablet by mouth Daily With Breakfast.       ferrous sulfate 324 (65 Fe) MG tablet delayed-release EC tablet Take 1 tablet by mouth Daily With Breakfast. 30 tablet 2     furosemide (LASIX) 20 MG tablet Take 1 tablet by mouth Daily.       HYDROcodone-acetaminophen (NORCO) 5-325 MG per tablet Take 1 tablet by mouth Every 4 (Four) Hours As Needed for Moderate Pain.       metoprolol tartrate (LOPRESSOR) 25 MG tablet Take 1 tablet by mouth Every 8 (Eight) Hours. 90 tablet 1     nicotine  "(NICODERM CQ) 21 MG/24HR patch Place 1 patch on the skin as directed by provider Daily.       omeprazole (priLOSEC) 20 MG capsule Take 1 capsule by mouth Daily.       ondansetron (ZOFRAN) 4 MG tablet Take 1 tablet by mouth Every 6 (Six) Hours As Needed for Nausea or Vomiting.       vitamin D (ERGOCALCIFEROL) 1.25 MG (60081 UT) capsule capsule Take 1 capsule by mouth 1 (One) Time Per Week.       vitamin D3 125 MCG (5000 UT) capsule capsule Take 1 capsule by mouth Daily.            OBJECTIVE    VITAL SIGNS  Vitals:    07/25/23 0922 07/25/23 1144 07/25/23 1201 07/25/23 1321   BP: 94/61 113/62 105/58 105/62   BP Location: Left arm   Right arm   Patient Position: Lying   Lying   Pulse: 76 108 107 77   Resp: 22   21   Temp: 98.9 °F (37.2 °C)   99.3 °F (37.4 °C)   TempSrc: Oral   Oral   SpO2: 92% 94% 95% 95%   Weight:       Height:         Flowsheet Rows      Flowsheet Row First Filed Value   Admission Height 177.8 cm (70\") Documented at 07/15/2023 1652   Admission Weight 72.6 kg (160 lb) Documented at 07/15/2023 1652             TELEMETRY: atrial flutter    Physical Exam:  Vitals reviewed.   Constitutional:       General: Awake.      Appearance: Well-developed and not in distress.      Interventions: Nasal cannula in place.      Comments: 4L HFNC   Eyes:      Pupils: Pupils are equal, round, and reactive to light.   Pulmonary:      Effort: Pulmonary effort is normal.      Breath sounds: Normal breath sounds.   Cardiovascular:      Normal rate. Regularly irregular rhythm. Normal S1. Normal S2.    Pulses:     Intact distal pulses.   Edema:     Peripheral edema present.  Abdominal:      General: Bowel sounds are normal.      Palpations: Abdomen is soft.   Musculoskeletal: Normal range of motion.      Cervical back: Normal range of motion. Skin:     General: Skin is warm and dry.   Neurological:      Mental Status: Alert and oriented to person, place and time.   Psychiatric:         Behavior: Behavior is cooperative.    "     LAB RESULTS (LAST 7 DAYS)  I have reviewed new clinical results.    CMP   Results from last 7 days   Lab Units 07/25/23  0134 07/20/23  0106 07/19/23  1025 07/18/23  1906 07/18/23  1437   SODIUM mmol/L 136 138 134* 131* 130*   POTASSIUM mmol/L 4.4 4.3 4.1 4.5 4.6   CHLORIDE mmol/L 91* 94* 89* 87* 87*   CO2 mmol/L 39.0* 38.0* 37.0* 37.0* 35.0*   BUN mg/dL 25* 16 15 21 19   CREATININE mg/dL 0.27* 0.31* 0.39* 0.49* 0.40*   GLUCOSE mg/dL 136* 123* 381* 335* 372*       CBC  Results from last 7 days   Lab Units 07/25/23  0134 07/24/23  0007 07/23/23  1940 07/22/23  2252 07/21/23  2309 07/20/23  2358 07/20/23  0106 07/19/23  1025   WBC 10*3/mm3 14.20* 15.50*  --  14.20* 12.30* 12.70* 13.40* 12.90*   RBC 10*6/mm3 3.09* 3.12*  --  3.35* 3.35* 3.19* 3.41* 3.33*   HEMOGLOBIN g/dL 7.9* 8.3*  --  8.7* 8.8* 8.5* 9.0* 9.0*   HEMOGLOBIN, POC g/dL  --   --  11.3*  --   --   --   --   --    HEMATOCRIT % 26.1* 27.0*  --  28.9* 28.4* 27.0* 29.1* 28.3*   HEMATOCRIT POC %  --   --  33*  --   --   --   --   --    MCV fL 84.4 86.5  --  86.1 85.0 84.6 85.3 85.1   PLATELETS 10*3/mm3 352 375  --  406 441 444 484* 486*       ProBNP      TROPONIN      CoAg      Creatinine Clearance  Estimated Creatinine Clearance: 273.8 mL/min (A) (by C-G formula based on SCr of 0.27 mg/dL (L)).    Radiology  XR Chest 1 View    Result Date: 7/23/2023  Impression: 1. Worsening consolidation in the left perihilar region. This is secondary to a known lung mass consistent with lung cancer. There is probably worsening postobstructive pneumonitis/atelectasis and there may be a small left pleural effusion. 2. Cardiomegaly with pulmonary vascular congestion/low-grade pulmonary edema. Electronically Signed: Zeyad Contreras  7/23/2023 8:02 PM EDT  Workstation ID: [id number]     EKG    I personally viewed and interpreted the patient's EKG/Telemetry data:  ECG 12 Lead Chest Pain   Preliminary Result   HEART RATE= 92  bpm   RR Interval= 549  ms   LA Interval=   ms   P  Horizontal Axis=   deg   P Front Axis=   deg   QRSD Interval= 88  ms   QT Interval= 375  ms   QRS Axis= 46  deg   T Wave Axis= 269  deg   - ABNORMAL ECG -   Atrial flutter   Ventricular premature complex   ST elevation, consider inferior injury   Prolonged QT interval   When compared with ECG of 19-Jul-2023 12:25:42,   Significant change in rhythm: previously atrial fibrillation   Significant repolarization change   Electronically Signed By:    Date and Time of Study: 2023-07-25 11:33:47      ECG 12 Lead Rhythm Change   Final Result   HEART RATE= 111  bpm   RR Interval= 540  ms   AL Interval=   ms   P Horizontal Axis=   deg   P Front Axis=   deg   QRSD Interval= 83  ms   QT Interval= 317  ms   QRS Axis= 52  deg   T Wave Axis= 45  deg   - ABNORMAL ECG -   Atrial fibrillation   Ventricular premature complex   When compared with ECG of 18-Jul-2023 11:30:56,   Significant change in rhythm: previously sinus   Electronically Signed By: Cortez Reilly ([facility]) 20-Jul-2023 19:07:05   Date and Time of Study: 2023-07-19 12:25:42      ECG 12 Lead Rhythm Change   Final Result   HEART RATE= 95  bpm   RR Interval= 632  ms   AL Interval= 145  ms   P Horizontal Axis= -8  deg   P Front Axis= 40  deg   QRSD Interval= 82  ms   QT Interval= 330  ms   QRS Axis= 75  deg   T Wave Axis= 81  deg   - OTHERWISE NORMAL ECG -   Sinus rhythm   Atrial premature complex   When compared with ECG of 17-Jul-2023 20:59:42,   Significant change in rhythm   Significant repolarization change   Electronically Signed By: Cortez Reilly ([facility]) 20-Jul-2023 19:07:11   Date and Time of Study: 2023-07-18 11:30:56      ECG 12 Lead Tachycardia   Final Result   HEART RATE= 159  bpm   RR Interval= 376  ms   AL Interval= 119  ms   P Horizontal Axis= -51  deg   P Front Axis= 125  deg   QRSD Interval= 79  ms   QT Interval= 323  ms   QRS Axis= 58  deg   T Wave Axis= 246  deg   - ABNORMAL ECG -   Supraventricular tachycardia   Repolarization abnormality, prob rate related    When compared with ECG of 16-Jul-2023 10:42:41,   Significant change in rhythm: previously sinus   Significant repolarization change   Electronically Signed By: Cortez Reilly ([facility]) 20-Jul-2023 19:07:35   Date and Time of Study: 2023-07-17 20:57:58      ECG 12 Lead Rhythm Change   Final Result   HEART RATE= 85  bpm   RR Interval= 708  ms   OK Interval= 143  ms   P Horizontal Axis= -12  deg   P Front Axis= 44  deg   QRSD Interval= 91  ms   QT Interval= 341  ms   QRS Axis= 63  deg   T Wave Axis= 91  deg   - ABNORMAL ECG -   Sinus rhythm   Ventricular premature complex   Nonspecific T abnormalities, lateral leads   When compared with ECG of 15-Jul-2023 19:55:15,   Significant change in rhythm: previously atrial fibrillation   Electronically Signed By: Chirag Soto ([facility]) 17-Jul-2023 20:46:11   Date and Time of Study: 2023-07-16 10:42:41      ECG 12 Lead Rhythm Change   Final Result   HEART RATE= 151  bpm   RR Interval= 398  ms   OK Interval=   ms   P Horizontal Axis=   deg   P Front Axis=   deg   QRSD Interval= 75  ms   QT Interval= 233  ms   QRS Axis= 71  deg   T Wave Axis= 238  deg   - ABNORMAL ECG -   Atrial fibrillation   Consider anteroseptal infarct   Repolarization abnormality, prob rate related   When compared with ECG of 16-Jun-2023 17:44:35,   Significant change in rhythm: previously sinus   Electronically Signed By: Salvatore Dougherty ([facility]) 17-Jul-2023 07:59:13   Date and Time of Study: 2023-07-15 19:55:15      SCANNED - TELEMETRY     Final Result      SCANNED - TELEMETRY     Final Result      SCANNED - TELEMETRY     Final Result      SCANNED - TELEMETRY     Final Result      SCANNED - TELEMETRY     Final Result      SCANNED - TELEMETRY     Final Result      SCANNED - TELEMETRY     Final Result      SCANNED - TELEMETRY     Final Result      SCANNED - TELEMETRY     Final Result      SCANNED - TELEMETRY     Final Result      SCANNED - TELEMETRY     Final Result      SCANNED - TELEMETRY     Final Result       SCANNED - TELEMETRY     Final Result      SCANNED - TELEMETRY     Final Result      SCANNED - TELEMETRY     Final Result      SCANNED - TELEMETRY     Final Result      SCANNED - TELEMETRY     Final Result      SCANNED - TELEMETRY     Final Result      SCANNED - TELEMETRY     Final Result      SCANNED - TELEMETRY     Final Result      SCANNED - TELEMETRY     Final Result      SCANNED - TELEMETRY     Final Result      SCANNED - TELEMETRY     Final Result      SCANNED - TELEMETRY     Final Result      SCANNED - TELEMETRY     Final Result      SCANNED - TELEMETRY     Final Result      SCANNED - TELEMETRY     Final Result      SCANNED - TELEMETRY     Final Result      SCANNED - TELEMETRY     Final Result      SCANNED - TELEMETRY     Final Result      SCANNED - TELEMETRY     Final Result      SCANNED - TELEMETRY     Final Result      SCANNED - TELEMETRY     Final Result      SCANNED - TELEMETRY     Final Result      SCANNED - TELEMETRY     Final Result      SCANNED - TELEMETRY     Final Result      SCANNED - TELEMETRY     Final Result      SCANNED - TELEMETRY     Final Result      SCANNED - TELEMETRY     Final Result      SCANNED - TELEMETRY     Final Result      SCANNED - TELEMETRY     Final Result      SCANNED - TELEMETRY     Final Result      SCANNED - TELEMETRY     Final Result      SCANNED - TELEMETRY     Final Result      SCANNED - TELEMETRY     Final Result      SCANNED - TELEMETRY     Final Result      SCANNED - TELEMETRY     Final Result      SCANNED - TELEMETRY     Final Result      SCANNED - TELEMETRY     Final Result      SCANNED - TELEMETRY     Final Result      SCANNED - TELEMETRY     Final Result      SCANNED - TELEMETRY     Final Result      SCANNED - TELEMETRY     Final Result      SCANNED - TELEMETRY     Final Result      SCANNED - TELEMETRY     Final Result      SCANNED - TELEMETRY     Final Result      SCANNED - TELEMETRY     Final Result      SCANNED - TELEMETRY     Final Result      SCANNED -  TELEMETRY     Final Result      SCANNED - TELEMETRY     Final Result      SCANNED - TELEMETRY     Final Result          ECHOCARDIOGRAM:  Results for orders placed during the hospital encounter of 06/16/23    Adult Transthoracic Echo Complete W/ Cont if Necessary Per Protocol    Interpretation Summary    Left ventricular systolic function is normal. Left ventricular ejection fraction appears to be 61 - 65%.    Left ventricular diastolic function is consistent with (grade I) impaired relaxation.    There is mild bileaflet mitral valve prolapse present.    Estimated right ventricular systolic pressure from tricuspid regurgitation is mildly elevated (35-45 mmHg). Calculated right ventricular systolic pressure from tricuspid regurgitation is 44 mmHg.      STRESS MYOVIEW:  Results for orders placed during the hospital encounter of 06/16/23    Stress Test With Myocardial Perfusion One Day    Interpretation Summary    Left ventricular ejection fraction is hyperdynamic (Calculated EF > 70%).    Myocardial perfusion imaging indicates a small-to-moderate-sized infarct located in the inferior wall with no significant ischemia noted.    Impressions are consistent with a low risk study.    Diaphragmatic attenuation artifact is present.    This is Cardiolite imaging stress test with fixed inferior defect.  MI versus attenuation artifact.  No ischemia noted.  Left ventricular size and function was normal.  Clinical correlation recommended.    Findings consistent with a normal ECG stress test.      CARDIAC CATHETERIZATION:  No results found for this or any previous visit.      OTHER:       ASSESSMENT/PLAN      Atrial fibrillation with RVR    Atrial fibrillation    Moderate malnutrition      PLAN  Isra Davenport is a 68-year-old male patient who has metastatic lung cancer to the brain and to the hip.  Patient was in sinus rhythm upon admission, however he developed atrial fibrillation with difficult to control heart rate.  Currently  he is on p.o. amiodarone 400 every 12, diltiazem 60 mg every 6 hours and metoprolol 75 mg twice a day.  Heart rate currently at least is in the 80s, still in A-fib.  I think that in his case, we should address the issue in a more conservative manner given his metastatic cancer.  He already received radiation therapy and is getting ready to receive chemotherapy, but my understanding is that these are for palliative measures.  To that extent, I do not necessarily recommend cardioversion/ablation.  Furthermore due to brain mets, it is recommended not to give any anticoagulation.     Since we are focusing on the rate control, I will discontinue amiodarone especially that he has lung cancer, switch Cardizem to long-acting, continue metoprolol.  If need be we can also use digoxin.       Today  -hemodynamically stable  -currently atrial flutter, rate 70-80s  -scheduled for radiation this afternoon (2/8 treatments)  -continue cardizem and metoprolol  -Continue current Rx and supportive care  -Will continue to monitor rhythm and focus on rate control      I discussed the patients findings and my recommendations with patient and nurse.      NOREEN Mariscal  07/25/23  14:16 EDT  Electronically signed by NOREEN Mariscal, 07/25/23, 2:17 PM EDT.

## 2023-07-25 NOTE — PROGRESS NOTES
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 male admitted to [facility] through the ED on 7/15/2023 with complaints of uncontrolled pain and BLE edema.  In the ED, he developed tachycardia in the 150s and was found to be in A-fib with RVR.  He was started on supplemental oxygen for O2 sat drop to 88%.  He received Cardizem bolus followed by drip and Lopressor bolus.  In the ED, CBC revealed WBC 11.4, hemoglobin 10.0, MCV 84.8, and platelets 404,000.  Creatinine and LFTs were not elevated.  CXR revealed left basilar pneumonia.  He reported left lower extremity pain with x-ray of the left tip/fib and left foot negative for acute findings.    PMH was significant for recently diagnosed stage IV squamous cell carcinoma of the left upper lobe lung.  He was post radiation therapy to the brain () and currently receiving radiation therapy to the right hip and femur (started 2023 with 10 fractions planned).  He was last seen by our service as an outpatient on 2023 where he was prescribed Lasix and hydrocodone/APAP 5/325 mg as needed for his BLE edema and pain.  BLE venous Doppler performed as an outpatient on 2023 was negative for DVT/SVT and showed bilateral fluid retention.  At time of consultation 2023, hemoglobin had dropped to 9.9.     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  Our service had initially evaluated him during 2023 - 2023 hospitalization during which lung cancer diagnosis was made and initial work-up completed.  He had received radiation  therapy to the brain and is currently receiving radiation to the hip/femur.  He was also diagnosed with iron deficiency anemia during the hospital stay where he was treated with IV iron and discharged on oral ferrous sulfate with GERD prophylaxis.  Complete anemia work-up with the exception of bone marrow was performed during the hospital stay with findings negative for additional etiologies.  Incidentally his copper level was elevated without multivitamin supplementation.  CBC on 7/14/2023 revealed WBC 13, hemoglobin 10.76, and platelets 329,000.  He was continued on daily oral iron.  Molecular testing on his tumor pathology was pending with plans to start chemotherapy after radiation was completed.  He was also to start Xgeva for bone metastases support and was prescribed calcium with vitamin D supplementation.     PCP: Mayco Carreon MD    INTERVAL HISTORY:  7/17/2023 - white blood count 12.30, hemoglobin 9.5, iron 22 (), iron saturation 10 (), TIBC 224 (298-536). Calculated iron deficit is 662 mg. Started Ferrlecit 250 mg IV x 3 days.  Molecular testing on tumor specimen revealed low-level PD-L1 positivity.  7/18/2023-hemoglobin 8.8.  Patient developed SVT overnight and was treated with Cardizem.  Weight loss 33 pounds in 5 weeks.  Changed Pepcid to Protonix.  Palliative care and radiation oncology both consulted.  Physical therapy recommending skilled nursing facility at discharge.  7/19/2023-patient decided DNR/DNI but wants to continue aggressive treatment and try chemotherapy/immunotherapy.  7/20/2023-WBC 13.4, hemoglobin 9. Hospice was consulted by primary team for inpatient hospice admission.  7/21/2023-WBC 12.7, hemoglobin 8.5.  Dexamethasone decreased to 4 mg po daily.   7/22/2023-cardiac electrophysiology was consulted and they recommended, due to his metastatic cancer and comorbidities, to not proceed with ablation or cardioversion and to manage the patient with conservative  therapy.  Increased gabapentin to 300 mg twice daily.    7/23/2023-WBC 14.2, hemoglobin 8.7.  Lexapro started for depression.  7/24/2023-WBC 15.5, hemoglobin 8.3.  Bilateral FREDY-The right FREDY was normal with normal digital pressures. The left FREDY was normal with mild digital ischemia.  Resumed radiation to his right hip/femur and received fraction 4/10.  7/25/2023-hemoglobin 7.9.    History of present illness reviewed since last visit and changes noted on 07/25/23.    Subjective   he had shortness of air last night and reports constipation.    ROS:  Review of Systems   Constitutional:  Negative for activity change, appetite change, chills, fatigue, fever and unexpected weight change.   HENT:  Negative for mouth sores, sore throat and tinnitus.         Dry mouth.    Eyes:  Negative for photophobia, pain, redness and visual disturbance.   Respiratory:  Positive for shortness of breath. Negative for apnea and cough.    Cardiovascular:  Negative for chest pain, palpitations and leg swelling.   Gastrointestinal:  Positive for constipation. Negative for abdominal pain, diarrhea, nausea and vomiting.   Genitourinary:  Negative for dysuria, enuresis and hematuria.   Musculoskeletal:  Negative for arthralgias, back pain, myalgias and neck pain.   Skin:  Negative for rash and wound.   Allergic/Immunologic: Negative for environmental allergies.   Neurological:  Negative for dizziness, weakness, light-headedness, numbness and headaches.   Hematological:  Negative for adenopathy. Does not bruise/bleed easily.   Psychiatric/Behavioral:  Negative for dysphoric mood. The patient is not nervous/anxious.       MEDICATIONS:    Scheduled Meds:  aluminum sulfate-calcium acetate 1:20, 1,000 mL, Topical, BID  aspirin, 81 mg, Oral, Daily  budesonide-formoterol, 2 puff, Inhalation, BID - RT  calcium 500 mg vitamin D 5 mcg (200 UT), 1 tablet, Oral, Daily  dexamethasone, 4 mg, Oral, Daily With Breakfast   And  insulin regular, 12 Units,  "Subcutaneous, Daily With Breakfast  dilTIAZem CD, 240 mg, Oral, Q24H  docusate sodium, 100 mg, Oral, BID  escitalopram, 10 mg, Oral, Daily  ferrous sulfate, 324 mg, Oral, Daily With Breakfast  furosemide, 20 mg, Intravenous, Daily  gabapentin, 300 mg, Oral, BID  guaiFENesin, 600 mg, Oral, Q12H  hydrocortisone-bacitracin-zinc oxide-nystatin, 1 application , Topical, TID  insulin glargine, 14 Units, Subcutaneous, Q12H  insulin lispro, 12 Units, Subcutaneous, TID With Meals  insulin lispro, 2-9 Units, Subcutaneous, 4x Daily AC & at Bedtime  metoprolol tartrate, 75 mg, Oral, BID  midodrine, 10 mg, Oral, TID AC  nicotine, 1 patch, Transdermal, Q24H  nystatin, 5 mL, Oral, 4x Daily  pantoprazole, 40 mg, Oral, Q AM  senna-docusate sodium, 2 tablet, Oral, BID  sodium chloride, 10 mL, Intravenous, Q12H    Continuous Infusions:  Pharmacy Consult - Steroid Insulin Protocol,     PRN Meds:    senna-docusate sodium **AND** polyethylene glycol **AND** bisacodyl **AND** bisacodyl    Calcium Replacement - Follow Nurse / BPA Driven Protocol    dextrose    dextrose    glucagon (human recombinant)    HYDROcodone-acetaminophen    hydrOXYzine    ipratropium-albuterol    Magnesium Standard Dose Replacement - Follow Nurse / BPA Driven Protocol    Morphine    ondansetron    Pharmacy Consult - Steroid Insulin Protocol    Phosphorus Replacement - Follow Nurse / BPA Driven Protocol    Potassium Replacement - Follow Nurse / BPA Driven Protocol    [COMPLETED] Insert Peripheral IV **AND** sodium chloride    sodium chloride    sodium chloride     ALLERGIES:    Allergies   Allergen Reactions    Ibuprofen Itching    Penicillins Hives     Objective    VITALS:   BP 96/65 (BP Location: Left arm, Patient Position: Lying)   Pulse 82   Temp 98 °F (36.7 °C) (Oral)   Resp 14   Ht 177.8 cm (70\")   Wt 72.9 kg (160 lb 11.5 oz)   SpO2 91%   BMI 23.06 kg/m²     PHYSICAL EXAM:  Physical Exam  Vitals reviewed.   Constitutional:       General: He is not in " acute distress.     Appearance: He is well-developed. He is not diaphoretic.   HENT:      Head: Normocephalic and atraumatic.      Comments: Oxygen via nasal cannula.      Mouth/Throat:      Mouth: Mucous membranes are moist.      Pharynx: Oropharynx is clear. No oropharyngeal exudate.      Comments: Edentulous.  Eyes:      Conjunctiva/sclera: Conjunctivae normal.   Neck:      Thyroid: No thyromegaly.      Vascular: No JVD.   Cardiovascular:      Rate and Rhythm: Normal rate and regular rhythm.      Heart sounds: Normal heart sounds. No murmur heard.     Comments: Cardiac monitor leads.   Pulmonary:      Effort: Pulmonary effort is normal. No respiratory distress.      Comments: Bilateral breath sounds decreased.  Chest:      Comments: Left chest wall tattoo.   Right chest wall Pyyhnt-F-Qgel - not accessed.   Abdominal:      General: Bowel sounds are normal.      Palpations: Abdomen is soft.      Tenderness: There is no abdominal tenderness. There is no guarding.   Musculoskeletal:         General: Normal range of motion.      Cervical back: Normal range of motion and neck supple. No rigidity.      Right lower le+ Edema present.      Left lower le+ Edema present.      Comments: Left hand oxygen saturation monitor.   Skin:     General: Skin is warm and dry.      Coloration: Skin is not jaundiced.      Findings: No erythema or rash.      Comments: Right upper extremity tattoos.  Left upper extremity IV.   Neurological:      Mental Status: He is alert and oriented to person, place, and time.      Sensory: No sensory deficit.   Psychiatric:         Behavior: Behavior normal.       RECENT LABS:  Lab Results (last 24 hours)       Procedure Component Value Units Date/Time    POC Glucose Once [[id number]]  (Normal) Collected: 23    Specimen: Blood Updated: 23     Glucose 104 mg/dL      Comment: Serial Number: [id number]:  [id number]       Manual Differential [[id number]]  (Abnormal)  Collected: 07/25/23 0134    Specimen: Blood Updated: 07/25/23 0323     Neutrophil % 81.0 %      Lymphocyte % 7.0 %      Monocyte % 4.0 %      Bands %  2.0 %      Metamyelocyte % 3.0 %      Myelocyte % 3.0 %      Neutrophils Absolute 11.79 10*3/mm3      Lymphocytes Absolute 0.99 10*3/mm3      Monocytes Absolute 0.57 10*3/mm3      Anisocytosis Slight/1+     WBC Morphology Normal     Large Platelets Slight/1+    CBC & Differential [[id number]]  (Abnormal) Collected: 07/25/23 0134    Specimen: Blood Updated: 07/25/23 0322    Narrative:      The following orders were created for panel order CBC & Differential.  Procedure                               Abnormality         Status                     ---------                               -----------         ------                     CBC Auto Differential[[id number]]        Abnormal            Final result               Scan Slide[[id number]]                                       Final result                 Please view results for these tests on the individual orders.    Scan Slide [[id number]] Collected: 07/25/23 0134    Specimen: Blood Updated: 07/25/23 0322     Scan Slide --     Comment: See Manual Differential Results       CBC Auto Differential [[id number]]  (Abnormal) Collected: 07/25/23 0134    Specimen: Blood Updated: 07/25/23 0322     WBC 14.20 10*3/mm3      RBC 3.09 10*6/mm3      Hemoglobin 7.9 g/dL      Hematocrit 26.1 %      MCV 84.4 fL      MCH 25.7 pg      MCHC 30.4 g/dL      RDW 21.1 %      RDW-SD 65.6 fl      MPV 8.5 fL      Platelets 352 10*3/mm3     Narrative:      The previously reported component NRBC is no longer being reported. Previous result was 0.2 /100 WBC (Reference Range: 0.0-0.2 /100 WBC) on 7/25/2023 at 0209 EDT.    Basic Metabolic Panel [[id number]]  (Abnormal) Collected: 07/25/23 0134    Specimen: Blood Updated: 07/25/23 0222     Glucose 136 mg/dL      BUN 25 mg/dL      Creatinine 0.27 mg/dL      Sodium 136 mmol/L      Potassium 4.4 mmol/L       Chloride 91 mmol/L      CO2 39.0 mmol/L      Calcium 9.1 mg/dL      BUN/Creatinine Ratio 92.6     Anion Gap 6.0 mmol/L      eGFR 134.7 mL/min/1.73     Narrative:      GFR Normal >60  Chronic Kidney Disease <60  Kidney Failure <15      Blood Culture - Blood, Arm, Right [[id number]]  (Normal) Collected: 07/23/23 2231    Specimen: Blood from Arm, Right Updated: 07/24/23 2245     Blood Culture No growth at 24 hours    Narrative:      Less than seven (7) mL's of blood was collected.  Insufficient quantity may yield false negative results.    Blood Culture - Blood, Hand, Left [[id number]]  (Normal) Collected: 07/23/23 2237    Specimen: Blood from Hand, Left Updated: 07/24/23 2245     Blood Culture No growth at 24 hours    Narrative:      Less than seven (7) mL's of blood was collected.  Insufficient quantity may yield false negative results.    POC Glucose Once [[id number]]  (Abnormal) Collected: 07/24/23 2242    Specimen: Blood Updated: 07/24/23 2244     Glucose 120 mg/dL      Comment: Serial Number: [id number]:  [id number]       POC Glucose Once [[id number]]  (Abnormal) Collected: 07/24/23 1910    Specimen: Blood Updated: 07/24/23 1911     Glucose 117 mg/dL      Comment: Serial Number: [id number]:  [id number]       POC Glucose Once [[id number]]  (Normal) Collected: 07/24/23 1653    Specimen: Blood Updated: 07/24/23 1654     Glucose 85 mg/dL      Comment: Serial Number: [id number]:  [id number]       MRSA Screen, PCR (Inpatient) - Swab, Nares [[id number]]  (Abnormal) Collected: 07/24/23 1147    Specimen: Swab from Nares Updated: 07/24/23 1307     MRSA PCR MRSA Detected    Narrative:      The negative predictive value of this diagnostic test is high and should only be used to consider de-escalating anti-MRSA therapy. A positive result may indicate colonization with MRSA and must be correlated clinically.    Urinalysis With Culture If Indicated - Urine, Clean Catch [[id number]]  (Normal) Collected: 07/24/23 1146     Specimen: Urine, Clean Catch Updated: 07/24/23 1200     Color, UA Yellow     Appearance, UA Clear     pH, UA 7.0     Specific Gravity, UA 1.015     Glucose, UA Negative     Ketones, UA Negative     Bilirubin, UA Negative     Blood, UA Negative     Protein, UA Negative     Leuk Esterase, UA Negative     Nitrite, UA Negative     Urobilinogen, UA 1.0 E.U./dL    Narrative:      In absence of clinical symptoms, the presence of pyuria, bacteria, and/or nitrites on the urinalysis result does not correlate with infection.  Urine microscopic not indicated.    POC Glucose Once [[id number]]  (Abnormal) Collected: 07/24/23 1108    Specimen: Blood Updated: 07/24/23 1109     Glucose 114 mg/dL      Comment: Serial Number: [id number]:  [id number]             PENDING RESULTS:  N/A    IMAGING REVIEWED:   XR Chest 1 View    Result Date: 7/23/2023  Impression: 1. Worsening consolidation in the left perihilar region. This is secondary to a known lung mass consistent with lung cancer. There is probably worsening postobstructive pneumonitis/atelectasis and there may be a small left pleural effusion. 2. Cardiomegaly with pulmonary vascular congestion/low-grade pulmonary edema. Electronically Signed: Zeyad Contreras  7/23/2023 8:02 PM EDT  Workstation ID: [id number]     I have reviewed the patient's labs, imaging, reports, and other clinician documentation.    Assessment & Plan   ASSESSMENT  Stage IV squamous cell carcinoma left upper lobe lung-s/p radiation to the brain and resumed radiation to the right hip/femur (fx 4/10 given on 7/24). Molecular testing on tumor pathology revealed low-level PD-L1 positivity.  Orders have been written for outpatient palliative chemotherapy/immunotherapy and Xgeva once radiation therapy has completed.  He has decided to pursue treatment as planned, but has chosen to be a DNR/DNI.  Dexamethasone tapered again to 4 mg po daily (7/21/2023). Continued calcium plus vitamin D.  DANN with malabsorption and poor  tolerance of oral iron/Elevated copper level-he has not been on multivitamin containing copper. He had reported bright red blood per rectum as outpatient with 2 colonoscopies in the past 6 years that were unremarkable per his report. Received IV iron last admit and again this admit.  On Protonix, aspirin and dexamethasone. Hemoglobin continues to slowly fall.  Iron studies showed continued DANN with a slight improvement from last admission. Tolerating oral ferrous sulfate so far.  Will reevaluate iron profile soon and treat as indicated.   Left lung pneumonia-likely post-obstructive. On dexamethasone.  Per pulmonary.    BLE edema and pain-recent outpatient BLE Doppler was negative for DVT/SVT. On Lasix and Gabapentin.  Increased dose of gabapentin and ABIs normal.  A-fib with RVR-new onset noted in ED.  Cardiology and EP cardiology consulted.  On ASA.    Chronic nausea, Uncontrolled DM2, Low TSH, and severe wt. Loss-on Protonix. Receiving Glucerna twice daily and Colace.  Endocrine managing.  Tapering Dexamethasone.   Smoking-cessation counseling continued.  On a nicotine patch.   Recent pathological right hip fracture-physical therapy working with patient.  On calcium plus vitamin D. Plannning rehab placement.   Oral thrush-on nystatin. Improved.   Agitation/depression-due to prolonged hospitalization and clinical status.  On Lexapro.      PLAN  Follow CBC.   Continue nystatin 100,000 units/mL 5 mL p.o. 4 times daily.  Continue dexamethasone 4 mg to daily.   Continue ASA 81 mg by mouth daily.  Would not recommend full dose anticoagulation for A-fib secondary to brain metastasis and known rectal bleeding unless benefits exceed risks.  Continue PRN Norco and Morphine.  Continue gabapentin.  Continue Protonix.      Continue ferrous sulfate 325mg po daily.   Continue nicotine patch.   Continue calcium plus vitamin D daily.  Continue Lexapro 10 mg p.o daily.   Continue Glucerna twice daily and Colace.    Continue  Radiation therapy.   Plan for combination of chemotherapy and immunotherapy as an outpatient post radiation.  Start Xgeva for bone metastases support as outpatient.  Recommend outpatient GI evaluation.  Brain MRI as outpatient to f/u on metastasis, post XRT.     Seen and evaluated by Dr. Dupree.  Electronically signed by NOREEN Phillips, 07/25/23, 8:57 AM EDT.      I have personally performed a face-to-face diagnostic evaluation on this patient. I have performed a complete history and physical examination, reviewed laboratory studies, and radiographic examinations.  I have completed the majority and substantive portion of the medical decision making.  I have formulated the assessment on this patient and the plan of action as noted above. I have discussed the case with Aminah Houser NP, have edited/reviewed the note, and agree with the care plan.  The patient claims to have had an episode of shortness of air last night.  On examination, his oral mucosa has cleared.  WBC is 14.2 and hemoglobin 7.9.  Hip radiation has restarted.  Will wait on systemic therapy to start post radiation.      I discussed the patient's findings and my recommendations with patient.    Part of this note may be an electronic transcription/translation of spoken language to printed text using the Dragon Dictation System.    Electronically signed by Mina Dupree MD, 07/25/23, 6:01 PM EDT.

## 2023-07-25 NOTE — PROGRESS NOTES
Cardiology [place]        LOS:  LOS: 8 days   Patient Name: Isra Davenport  Age/Sex: 68 y.o. male  : 1955  MRN: [id number]    Day of Service: 23   Length of Stay: 8  Encounter Provider: NOREEN Gomes  Place of Service: [facility] CARDIOLOGY  Patient Care Team:  Mayco Carreon MD as PCP - General (Internal Medicine)  Mina Dupree MD as Consulting Physician (Hematology and Oncology)    Subjective:     Chief Complaint: f/u Afib    Subjective: stable from CV standpoint B/p stable.     Current Medications:   Scheduled Meds:aluminum sulfate-calcium acetate 1:20, 1,000 mL, Topical, BID  aspirin, 81 mg, Oral, Daily  budesonide-formoterol, 2 puff, Inhalation, BID - RT  calcium 500 mg vitamin D 5 mcg (200 UT), 1 tablet, Oral, Daily  dexamethasone, 4 mg, Oral, Daily With Breakfast   And  insulin regular, 12 Units, Subcutaneous, Daily With Breakfast  dilTIAZem CD, 240 mg, Oral, Q24H  docusate sodium, 100 mg, Oral, BID  enoxaparin, 40 mg, Subcutaneous, Daily  escitalopram, 10 mg, Oral, Daily  ferrous sulfate, 324 mg, Oral, Daily With Breakfast  furosemide, 20 mg, Intravenous, Daily  gabapentin, 300 mg, Oral, BID  guaiFENesin, 600 mg, Oral, Q12H  hydrocortisone-bacitracin-zinc oxide-nystatin, 1 application , Topical, TID  insulin glargine, 14 Units, Subcutaneous, Q12H  insulin lispro, 12 Units, Subcutaneous, TID With Meals  insulin lispro, 2-9 Units, Subcutaneous, 4x Daily AC & at Bedtime  metoprolol tartrate, 75 mg, Oral, BID  midodrine, 10 mg, Oral, TID AC  nicotine, 1 patch, Transdermal, Q24H  nystatin, 5 mL, Swish & Swallow, TID  pantoprazole, 40 mg, Oral, Q AM  senna-docusate sodium, 2 tablet, Oral, BID  sodium chloride, 10 mL, Intravenous, Q12H      Continuous Infusions:Pharmacy Consult - Steroid Insulin Protocol,         Allergies:  Allergies   Allergen Reactions    Ibuprofen Itching    Penicillins Hives       Review of Systems   Constitutional: Negative for  chills, diaphoresis and malaise/fatigue.   Cardiovascular:  Negative for chest pain, dyspnea on exertion, irregular heartbeat, leg swelling, near-syncope, orthopnea, palpitations, paroxysmal nocturnal dyspnea and syncope.   Respiratory:  Negative for cough, shortness of breath, sleep disturbances due to breathing and sputum production.    Musculoskeletal:  Positive for back pain and joint pain.   Gastrointestinal:  Negative for change in bowel habit.   Genitourinary:  Negative for urgency.   Neurological:  Negative for dizziness and headaches.   Psychiatric/Behavioral:  Negative for altered mental status.        Objective:     Temp:  [97.8 °F (36.6 °C)-98.9 °F (37.2 °C)] 98.9 °F (37.2 °C)  Heart Rate:  [76-86] 76  Resp:  [14-22] 22  BP: ()/(59-69) 94/61     Intake/Output Summary (Last 24 hours) at 7/25/2023 1035  Last data filed at 7/25/2023 0300  Gross per 24 hour   Intake 960 ml   Output 750 ml   Net 210 ml       Body mass index is 23.06 kg/m².      07/22/23  0518 07/24/23  0507 07/25/23  0507   Weight: 71.6 kg (157 lb 13.6 oz) 73 kg (160 lb 15 oz) 72.9 kg (160 lb 11.5 oz)         General Appearance:    Alert, cooperative, in no acute distress                                Head: Atraumatic, normocephalic, PERRLA               Neck:   supple, trachea midline, no thyromegaly, no carotid bruit, no JVD   Lungs:     Clear to auscultation, respirations regular, even and               unlabored    Heart:    regular rhythm and normal rate, normal S1 and S2   Abdomen:     Normal bowel sounds, no masses, no organomegaly, soft  nontender, nondistended, no guarding, no rebound  tenderness   Extremities:   Moves all extremities well, no edema, no cyanosis, no  redness   Pulses:   Pulses palpable and equal bilaterally   Skin:   No bleeding, bruising or rash   Neurologic:   Awake, alert, oriented x3         Lab Review:   Results from last 7 days   Lab Units 07/25/23  0134 07/20/23  0106   SODIUM mmol/L 136 138   POTASSIUM  mmol/L 4.4 4.3   CHLORIDE mmol/L 91* 94*   CO2 mmol/L 39.0* 38.0*   BUN mg/dL 25* 16   CREATININE mg/dL 0.27* 0.31*   GLUCOSE mg/dL 136* 123*   CALCIUM mg/dL 9.1 9.3           Results from last 7 days   Lab Units 07/25/23  0134 07/24/23  0007   WBC 10*3/mm3 14.20* 15.50*   HEMOGLOBIN g/dL 7.9* 8.3*   HEMATOCRIT % 26.1* 27.0*   PLATELETS 10*3/mm3 352 375           Results from last 7 days   Lab Units 07/20/23  0106 07/19/23  1025   MAGNESIUM mg/dL 1.7 1.9             Invalid input(s): LDLCALC              Recent Radiology:  Imaging Results (Most Recent)       Procedure Component Value Units Date/Time    XR Chest 1 View [[id number]] Collected: 07/23/23 1959     Updated: 07/23/23 2004    Narrative:      XR CHEST 1 VW    Date of Exam: 7/23/2023 7:52 PM EDT    Indication: Hypoxia, left lung cancer.    Comparison: 7/15/2023.    Findings:  The heart is enlarged. There is worsening consolidation in the left perihilar region. This is secondary to a known left lung mass secondary to left lung cancer. The consolidation probably represents worsening postobstructive pneumonitis and atelectasis.   There may be a small left pleural effusion. Within the right lung, the pulmonary vascular markings appear increased. This suggests that the patient has pulmonary vascular congestion/low-grade pulmonary edema. The right lung and pleural space are   otherwise clear. There is a right-sided port in place. There is partially imaged fusion hardware in the lower thoracic spine.      Impression:      Impression:    1. Worsening consolidation in the left perihilar region. This is secondary to a known lung mass consistent with lung cancer. There is probably worsening postobstructive pneumonitis/atelectasis and there may be a small left pleural effusion.  2. Cardiomegaly with pulmonary vascular congestion/low-grade pulmonary edema.      Electronically Signed: Zeyad Contreras    7/23/2023 8:02 PM EDT    Workstation ID: [id number]    XR Chest 1 View  [[id number]] Collected: 07/15/23 1808     Updated: 07/15/23 1938    Narrative:      XR CHEST 1 VW    Date of Exam: 7/15/2023 5:58 PM EDT    Indication: edema    Comparison: 6/29/2023    Findings:  Right internal jugular Port-A-Cath remains in place and unchanged in position. Heart size is stable. There is chronic elevation of left ami diaphragm. There is were airspace disease compatible with pneumonia. Right lung is clear. No pleural effusion. No   evidence pneumothorax.      Impression:      Impression:    1. Worsening left basilar airspace disease compatible with pneumonia.  2. Chronic elevation of the left hemidiaphragm.      Electronically Signed: Jeet Panda    7/15/2023 6:09 PM EDT    Workstation ID: [id number]    XR Foot 3+ View Bilateral [[id number]] Collected: 07/15/23 1832     Updated: 07/15/23 1836    Narrative:      XR FOOT 3+ VW BILATERAL    Date of Exam: 7/15/2023 6:04 PM EDT    Indication: pain    Comparison: None available.    Findings:  Right foot: There is no acute fracture or dislocation. No bony erosion or abnormal periosteal reaction. Soft tissues are unremarkable.    Left foot: There is no acute fracture or dislocation. No bony erosion or abnormal periosteal reaction. There is mild diffuse soft tissue swelling.        Impression:      Impression:    1. No acute bony abnormality of the feet.      Electronically Signed: Jeet Panda    7/15/2023 6:34 PM EDT    Workstation ID: [id number]    XR Tibia Fibula 2 View Bilateral [[id number]] Collected: 07/15/23 1829     Updated: 07/15/23 1833    Narrative:      XR TIBIA FIBULA 2 VW BILATERAL    Date of Exam: 7/15/2023 6:04 PM EDT    Indication: pain    Comparison: None available.    Findings:  Left tibia and fibula: There is no acute fracture or dislocation. Soft tissues are unremarkable.    Right tibia and fibula: There is smooth deformity of the midshaft of both the tibia and fibula related to old healed fractures. There is single surgical screw in place  through the mid tibia. There is no evidence of acute fracture or dislocation. No   abnormal periosteal reaction identified. Soft tissues are unremarkable.      Impression:      Impression:    1. No acute bony abnormality of the left tibia or fibula.  2. Old healed fractures of the right tibia and fibula. No acute bony abnormality.    Electronically Signed: Jeet Panda    7/15/2023 6:31 PM EDT    Workstation ID: [id number]            ECHOCARDIOGRAM:    Results for orders placed during the hospital encounter of 06/16/23    Adult Transthoracic Echo Complete W/ Cont if Necessary Per Protocol    Interpretation Summary    Left ventricular systolic function is normal. Left ventricular ejection fraction appears to be 61 - 65%.    Left ventricular diastolic function is consistent with (grade I) impaired relaxation.    There is mild bileaflet mitral valve prolapse present.    Estimated right ventricular systolic pressure from tricuspid regurgitation is mildly elevated (35-45 mmHg). Calculated right ventricular systolic pressure from tricuspid regurgitation is 44 mmHg.        I reviewed the patient's new clinical results.    EKG:      Assessment:       Atrial fibrillation with RVR    Atrial fibrillation    Moderate malnutrition    1. Hip fracture, pathologic, s/p surgery  2. 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 Tachycardia, pAfib/flutter  4. Acute hypoxic respiratory failure    Plan:   Rates improved, controlled  Recent non invasive ischemic eval negative for ischemia  Echo with preserved LV function  Per oncology not recommended long term a/c due to brain mets and known rectal bleeding    Patient is seen and examined and findings are verified.  All data is reviewed by me personally.  Assessment and plan formulated by APC was done after discussion with attending.  I spent more than 50% of time in taking care of the patient.    Patient  complain of chest pain.  No respiratory distress    Hemodynamics are stable heart rate is in 70s to 80s blood pressure is stable    Normal S1 and S2.  Heart rate is irregular no leg edema noted.    Patient is complaining of chest pain but EKG is unremarkable for ischemia.  Patient is in atrial flutter.  I will recommend troponins.  I would start Nitropaste half inch every 8.  Continue observation    Electronically signed by Qamar Johnson MD, 07/25/23, 2:38 PM EDT.        NOREEN Gomes  07/25/23  10:35 EDT

## 2023-07-25 NOTE — SIGNIFICANT NOTE
Attempt x 2 this date.  1st attempt- pt not feeling well, requested PT to attempt later.  2nd attempt- pt having chest pain, EKG ordered

## 2023-07-25 NOTE — PLAN OF CARE
Goal Outcome Evaluation:         Pt complained of joint paint this am. Prn norco given.     Patient later complained of chest pain. Spoke with cardiology. Dr. Johnson reviewed EKG at bedside. Troponins and nitropaste ordered.  Mophine given for chest pain and patient stated it relieved his pain.     Pt went to cancer care center for radiation.     Pulmonary following and stated pt will need outpatient sleep study.     Patient had no complaints of chest pain since this afternoon. Troponin pending.           Problem: Skin Injury Risk Increased  Goal: Skin Health and Integrity  Outcome: Ongoing, Progressing  Intervention: Optimize Skin Protection  Recent Flowsheet Documentation  Taken 7/25/2023 1204 by Rachel Vásquez, RN  Pressure Reduction Techniques:   frequent weight shift encouraged   pressure points protected   weight shift assistance provided  Pressure Reduction Devices: positioning supports utilized  Skin Protection:   incontinence pads utilized   skin-to-skin areas padded  Taken 7/25/2023 0800 by Rachel Vásquez, RN  Pressure Reduction Techniques:   frequent weight shift encouraged   positioned off wounds   weight shift assistance provided   pressure points protected  Pressure Reduction Devices: positioning supports utilized  Skin Protection: incontinence pads utilized

## 2023-07-25 NOTE — PROGRESS NOTES
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  DM II  S/p intramedullary nailing of the hip  S/p [id number]  S/p chemotherapy/plan for palliative radiation     PLAN:  Oxygen supplement and titration maintain saturation 90-95%: Currently requiring 3 L per nasal cannula  Encourage OOB daily   Antibiotics completed  Bronchodilators  Inhaled corticosteroids  Systemic steroids  Smoking cessation counseling, nicotine patch  Incentive spirometer  Diuresis and monitor [id number]  Oncology, Cardiology, Endocrinology following             LOS: 8 days     Subjective     Patient reports chronic shortness of breath and generalized weakness    Objective     Vital signs for last 24 hours:  Vitals:    07/25/23 0649 07/25/23 0922 07/25/23 1144 07/25/23 1201   BP:  94/61 113/62 105/58   BP Location:  Left arm     Patient Position:  Lying     Pulse: 82 76 108 107   Resp: 14 22     Temp:  98.9 °F (37.2 °C)     TempSrc:  Oral     SpO2: 91% 92% 94% 95%   Weight:       Height:           Intake/Output last 3 shifts:  I/O last 3 completed shifts:  In: 2080 [P.O.:2080]  Out: 2950 [Urine:2950]  Intake/Output this shift:  I/O this shift:  In: 720 [P.O.:720]  Out: 350 [Urine:350]      Radiology  Imaging Results (Last 24 Hours)       ** No results found for the last 24 hours. **            Labs:  Results from last 7 days   Lab Units 07/25/23  0134   WBC 10*3/mm3 14.20*   HEMOGLOBIN g/dL 7.9*   HEMATOCRIT % 26.1*   PLATELETS 10*3/mm3 352       Results from last 7 days   Lab Units 07/25/23  0134   SODIUM mmol/L 136   POTASSIUM mmol/L 4.4   CHLORIDE mmol/L 91*   CO2 mmol/L 39.0*   BUN mg/dL 25*   CREATININE mg/dL 0.27*   CALCIUM mg/dL 9.1   GLUCOSE mg/dL 136*       Results from last 7 days   Lab Units 07/23/23 2053   PH, ARTERIAL pH units 7.443   PO2 ART mm Hg 115.9*   PCO2, ARTERIAL mm Hg 67.7*   HCO3 ART mmol/L 46.3*                        Results from last 7 days   Lab Units 07/20/23  0106   MAGNESIUM mg/dL 1.7                       Meds:   SCHEDULE  aluminum sulfate-calcium acetate 1:20, 1,000 mL, Topical, BID  aspirin, 81 mg, Oral, Daily  budesonide-formoterol, 2 puff, Inhalation, BID - RT  calcium 500 mg vitamin D 5 mcg (200 UT), 1 tablet, Oral, Daily  dexamethasone, 4 mg, Oral, Daily With Breakfast   And  insulin regular, 12 Units, Subcutaneous, Daily With Breakfast  dilTIAZem CD, 240 mg, Oral, Q24H  docusate sodium, 100 mg, Oral, BID  enoxaparin, 40 mg, Subcutaneous, Daily  escitalopram, 10 mg, Oral, Daily  ferrous sulfate, 324 mg, Oral, Daily With Breakfast  furosemide, 20 mg, Intravenous, Daily  gabapentin, 300 mg, Oral, BID  guaiFENesin, 600 mg, Oral, Q12H  hydrocortisone-bacitracin-zinc oxide-nystatin, 1 application , Topical, TID  insulin glargine, 14 Units, Subcutaneous, Q12H  insulin lispro, 12 Units, Subcutaneous, TID With Meals  insulin lispro, 2-9 Units, Subcutaneous, 4x Daily AC & at Bedtime  metoprolol tartrate, 75 mg, Oral, BID  midodrine, 10 mg, Oral, TID AC  nicotine, 1 patch, Transdermal, Q24H  nitroglycerin, 0.5 inch, Topical, Q8H  nystatin, 5 mL, Swish & Swallow, TID  pantoprazole, 40 mg, Oral, Q AM  senna-docusate sodium, 2 tablet, Oral, BID  sodium chloride, 10 mL, Intravenous, Q12H      Infusions  Pharmacy Consult - Steroid Insulin Protocol,       PRNs    senna-docusate sodium **AND** polyethylene glycol **AND** bisacodyl **AND** bisacodyl    Calcium Replacement - Follow Nurse / BPA Driven Protocol    dextrose    dextrose    glucagon (human recombinant)    HYDROcodone-acetaminophen    hydrOXYzine    ipratropium-albuterol    Magnesium Standard Dose Replacement - Follow Nurse / BPA Driven Protocol    Morphine    ondansetron    Pharmacy Consult - Steroid Insulin Protocol    Phosphorus Replacement - Follow Nurse / BPA Driven Protocol    Potassium Replacement - Follow Nurse / BPA Driven Protocol     [COMPLETED] Insert Peripheral IV **AND** sodium chloride    sodium chloride    sodium chloride    Physical Exam:  General Appearance:  Alert   HEENT:  Normocephalic, without obvious abnormality, Conjunctiva/corneas clear,.   Nares normal, no drainage     Neck:  Supple, symmetrical, trachea midline.   Lungs /Chest wall: Mild bilateral basal rhonchi, respirations unlabored, symmetrical wall movement.     Heart:  Regular rate and rhythm, S1 S2 normal  Abdomen: Soft, non-tender, no masses, no organomegaly.    Extremities: No edema, no clubbing or cyanosis     ROS  Constitutional: Negative for chills, fever and positive for malaise/fatigue.   HENT: Negative.    Eyes: Negative.    Cardiovascular: Negative.    Respiratory: Positive for chronic shortness of breath.    Skin: Negative.    Musculoskeletal: Negative.    Gastrointestinal: Negative.    Genitourinary: Negative.    Neurological: Generalized weakness      I reviewed the recent clinical results    Part of this note may be an electronic transcription/translation of spoken language to printed text using the Dragon Dictation System.

## 2023-07-25 NOTE — PROGRESS NOTES
Daily Progress Note    Patient Care Team:  Mayco Carreon MD as PCP - General (Internal Medicine)  Mina Dupree MD as Consulting Physician (Hematology and Oncology)    Chief Complaint: Follow-up type 2 diabetes    HPI: Patient seen, blood sugar log reviewed.  Blood sugars are doing well.  No complaints at this time.    ROS:   Constitutional:  Denies fatigue, tiredness.    Respiratory: denies cough, shortness of breath.   Cardiovascular:  denies chest pain, edema   GI:  Denies abdominal pain, nausea, vomiting.         Vitals:    07/25/23 0922   BP: 94/61   Pulse: 76   Resp: 22   Temp: 98.9 °F (37.2 °C)   SpO2: 92%     Body mass index is 23.06 kg/m².    Physical Exam:  GEN: NAD, conversant  PSYCH: Awake and coherent      Results Review:     I reviewed the patient's new clinical results.    Glucose   Date Value Ref Range Status   07/25/2023 136 (H) 65 - 99 mg/dL Final     Sodium   Date Value Ref Range Status   07/25/2023 136 136 - 145 mmol/L Final     Potassium   Date Value Ref Range Status   07/25/2023 4.4 3.5 - 5.2 mmol/L Final     CO2   Date Value Ref Range Status   07/25/2023 39.0 (H) 22.0 - 29.0 mmol/L Final     Chloride   Date Value Ref Range Status   07/25/2023 91 (L) 98 - 107 mmol/L Final     Anion Gap   Date Value Ref Range Status   07/25/2023 6.0 5.0 - 15.0 mmol/L Final     Creatinine   Date Value Ref Range Status   07/25/2023 0.27 (L) 0.76 - 1.27 mg/dL Final     BUN   Date Value Ref Range Status   07/25/2023 25 (H) 8 - 23 mg/dL Final     BUN/Creatinine Ratio   Date Value Ref Range Status   07/25/2023 92.6 (H) 7.0 - 25.0 Final     Calcium   Date Value Ref Range Status   07/25/2023 9.1 8.6 - 10.5 mg/dL Final     Lab Results   Component Value Date    HGBA1C 7.30 (H) 07/15/2023     No results found for: GLUF, MICROALBUR  Results from last 7 days   Lab Units 07/25/23  0722 07/24/23  2242 07/24/23  1910 07/24/23  1653 07/24/23  1108 07/24/23  0706   GLUCOSE mg/dL 104 120* 117* 85 114* 110*              Medication Review: Reviewed.     aluminum sulfate-calcium acetate 1:20, 1,000 mL, Topical, BID  aspirin, 81 mg, Oral, Daily  budesonide-formoterol, 2 puff, Inhalation, BID - RT  calcium 500 mg vitamin D 5 mcg (200 UT), 1 tablet, Oral, Daily  dexamethasone, 4 mg, Oral, Daily With Breakfast   And  insulin regular, 12 Units, Subcutaneous, Daily With Breakfast  dilTIAZem CD, 240 mg, Oral, Q24H  docusate sodium, 100 mg, Oral, BID  enoxaparin, 40 mg, Subcutaneous, Daily  escitalopram, 10 mg, Oral, Daily  ferrous sulfate, 324 mg, Oral, Daily With Breakfast  furosemide, 20 mg, Intravenous, Daily  gabapentin, 300 mg, Oral, BID  guaiFENesin, 600 mg, Oral, Q12H  hydrocortisone-bacitracin-zinc oxide-nystatin, 1 application , Topical, TID  insulin glargine, 14 Units, Subcutaneous, Q12H  insulin lispro, 12 Units, Subcutaneous, TID With Meals  insulin lispro, 2-9 Units, Subcutaneous, 4x Daily AC & at Bedtime  metoprolol tartrate, 75 mg, Oral, BID  midodrine, 10 mg, Oral, TID AC  nicotine, 1 patch, Transdermal, Q24H  nystatin, 5 mL, Swish & Swallow, TID  pantoprazole, 40 mg, Oral, Q AM  senna-docusate sodium, 2 tablet, Oral, BID  sodium chloride, 10 mL, Intravenous, Q12H              Assessment and plan:  Diabetes mellitus type 2 with hyperglycemia: Blood sugars fair, will continue glargine 14 units subcu every 12 hours and lispro 12 with each meal along with lispro sliding scale.  We will follow blood sugars and make adjustments as needed.    Mireya Abarca MD. FACE

## 2023-07-26 LAB
ANION GAP SERPL CALCULATED.3IONS-SCNC: 6 MMOL/L (ref 5–15)
ANISOCYTOSIS BLD QL: ABNORMAL
BUN SERPL-MCNC: 22 MG/DL (ref 8–23)
BUN/CREAT SERPL: 78.6 (ref 7–25)
CALCIUM SPEC-SCNC: 8.8 MG/DL (ref 8.6–10.5)
CHLORIDE SERPL-SCNC: 90 MMOL/L (ref 98–107)
CO2 SERPL-SCNC: 38 MMOL/L (ref 22–29)
CREAT SERPL-MCNC: 0.28 MG/DL (ref 0.76–1.27)
DEPRECATED RDW RBC AUTO: 63.4 FL (ref 37–54)
EGFRCR SERPLBLD CKD-EPI 2021: 133.2 ML/MIN/1.73
ERYTHROCYTE [DISTWIDTH] IN BLOOD BY AUTOMATED COUNT: 20.3 % (ref 12.3–15.4)
GLUCOSE BLDC GLUCOMTR-MCNC: 113 MG/DL (ref 70–105)
GLUCOSE BLDC GLUCOMTR-MCNC: 136 MG/DL (ref 70–105)
GLUCOSE BLDC GLUCOMTR-MCNC: 183 MG/DL (ref 70–105)
GLUCOSE BLDC GLUCOMTR-MCNC: 98 MG/DL (ref 70–105)
GLUCOSE SERPL-MCNC: 256 MG/DL (ref 65–99)
HCT VFR BLD AUTO: 24.3 % (ref 37.5–51)
HGB BLD-MCNC: 7.3 G/DL (ref 13–17.7)
LARGE PLATELETS: ABNORMAL
LYMPHOCYTES # BLD MANUAL: 0.51 10*3/MM3 (ref 0.7–3.1)
LYMPHOCYTES NFR BLD MANUAL: 4 % (ref 5–12)
MCH RBC QN AUTO: 25.8 PG (ref 26.6–33)
MCHC RBC AUTO-ENTMCNC: 30.3 G/DL (ref 31.5–35.7)
MCV RBC AUTO: 85 FL (ref 79–97)
MICROCYTES BLD QL: ABNORMAL
MONOCYTES # BLD: 0.51 10*3/MM3 (ref 0.1–0.9)
NEUTROPHILS # BLD AUTO: 11.78 10*3/MM3 (ref 1.7–7)
NEUTROPHILS NFR BLD MANUAL: 87 % (ref 42.7–76)
NEUTS BAND NFR BLD MANUAL: 5 % (ref 0–5)
PLATELET # BLD AUTO: 317 10*3/MM3 (ref 140–450)
PMV BLD AUTO: 8.9 FL (ref 6–12)
POTASSIUM SERPL-SCNC: 4.2 MMOL/L (ref 3.5–5.2)
RBC # BLD AUTO: 2.85 10*6/MM3 (ref 4.14–5.8)
SCAN SLIDE: NORMAL
SMALL PLATELETS BLD QL SMEAR: ADEQUATE
SODIUM SERPL-SCNC: 134 MMOL/L (ref 136–145)
VARIANT LYMPHS NFR BLD MANUAL: 4 % (ref 19.6–45.3)
WBC MORPH BLD: NORMAL
WBC NRBC COR # BLD: 12.8 10*3/MM3 (ref 3.4–10.8)

## 2023-07-26 PROCEDURE — 99232 SBSQ HOSP IP/OBS MODERATE 35: CPT | Performed by: INTERNAL MEDICINE

## 2023-07-26 PROCEDURE — 99231 SBSQ HOSP IP/OBS SF/LOW 25: CPT | Performed by: INTERNAL MEDICINE

## 2023-07-26 PROCEDURE — 97530 THERAPEUTIC ACTIVITIES: CPT

## 2023-07-26 PROCEDURE — 25010000002 ENOXAPARIN PER 10 MG: Performed by: INTERNAL MEDICINE

## 2023-07-26 PROCEDURE — 94799 UNLISTED PULMONARY SVC/PX: CPT

## 2023-07-26 PROCEDURE — 63710000001 INSULIN REGULAR HUMAN PER 5 UNITS: Performed by: INTERNAL MEDICINE

## 2023-07-26 PROCEDURE — 97116 GAIT TRAINING THERAPY: CPT

## 2023-07-26 PROCEDURE — 82948 REAGENT STRIP/BLOOD GLUCOSE: CPT

## 2023-07-26 PROCEDURE — 63710000001 INSULIN LISPRO (HUMAN) PER 5 UNITS: Performed by: INTERNAL MEDICINE

## 2023-07-26 PROCEDURE — 25010000002 FUROSEMIDE PER 20 MG: Performed by: NURSE PRACTITIONER

## 2023-07-26 PROCEDURE — 85007 BL SMEAR W/DIFF WBC COUNT: CPT | Performed by: NURSE PRACTITIONER

## 2023-07-26 PROCEDURE — 80048 BASIC METABOLIC PNL TOTAL CA: CPT | Performed by: INTERNAL MEDICINE

## 2023-07-26 PROCEDURE — 63710000001 INSULIN GLARGINE PER 5 UNITS: Performed by: INTERNAL MEDICINE

## 2023-07-26 PROCEDURE — 77336 RADIATION PHYSICS CONSULT: CPT | Performed by: RADIOLOGY

## 2023-07-26 PROCEDURE — 77427 RADIATION TX MANAGEMENT X5: CPT | Performed by: RADIOLOGY

## 2023-07-26 PROCEDURE — 94761 N-INVAS EAR/PLS OXIMETRY MLT: CPT

## 2023-07-26 PROCEDURE — 77412 RADIATION TX DELIVERY LVL 3: CPT | Performed by: STUDENT IN AN ORGANIZED HEALTH CARE EDUCATION/TRAINING PROGRAM

## 2023-07-26 PROCEDURE — 77417 THER RADIOLOGY PORT IMAGE(S): CPT | Performed by: STUDENT IN AN ORGANIZED HEALTH CARE EDUCATION/TRAINING PROGRAM

## 2023-07-26 PROCEDURE — 63710000001 DEXAMETHASONE PER 0.25 MG: Performed by: INTERNAL MEDICINE

## 2023-07-26 PROCEDURE — 85025 COMPLETE CBC W/AUTO DIFF WBC: CPT | Performed by: NURSE PRACTITIONER

## 2023-07-26 PROCEDURE — 94664 DEMO&/EVAL PT USE INHALER: CPT

## 2023-07-26 RX ADMIN — GABAPENTIN 300 MG: 300 CAPSULE ORAL at 20:40

## 2023-07-26 RX ADMIN — ASPIRIN 81 MG CHEWABLE TABLET 81 MG: 81 TABLET CHEWABLE at 09:54

## 2023-07-26 RX ADMIN — INSULIN HUMAN 12 UNITS: 100 INJECTION, SOLUTION PARENTERAL at 09:57

## 2023-07-26 RX ADMIN — INSULIN GLARGINE 14 UNITS: 100 INJECTION, SOLUTION SUBCUTANEOUS at 09:56

## 2023-07-26 RX ADMIN — SENNOSIDES AND DOCUSATE SODIUM 2 TABLET: 8.6; 5 TABLET ORAL at 09:54

## 2023-07-26 RX ADMIN — GABAPENTIN 300 MG: 300 CAPSULE ORAL at 09:54

## 2023-07-26 RX ADMIN — NITROGLYCERIN 0.5 INCH: 20 OINTMENT TOPICAL at 12:38

## 2023-07-26 RX ADMIN — INSULIN LISPRO 12 UNITS: 100 INJECTION, SOLUTION INTRAVENOUS; SUBCUTANEOUS at 17:24

## 2023-07-26 RX ADMIN — BUDESONIDE AND FORMOTEROL FUMARATE DIHYDRATE 2 PUFF: 160; 4.5 AEROSOL RESPIRATORY (INHALATION) at 07:14

## 2023-07-26 RX ADMIN — METOPROLOL TARTRATE 75 MG: 25 TABLET, FILM COATED ORAL at 20:41

## 2023-07-26 RX ADMIN — ENOXAPARIN SODIUM 40 MG: 100 INJECTION SUBCUTANEOUS at 17:25

## 2023-07-26 RX ADMIN — INSULIN LISPRO 12 UNITS: 100 INJECTION, SOLUTION INTRAVENOUS; SUBCUTANEOUS at 09:56

## 2023-07-26 RX ADMIN — HYDROCODONE BITARTRATE AND ACETAMINOPHEN 1 TABLET: 5; 325 TABLET ORAL at 12:37

## 2023-07-26 RX ADMIN — FERROUS SULFATE TAB EC 324 MG (65 MG FE EQUIVALENT) 324 MG: 324 (65 FE) TABLET DELAYED RESPONSE at 09:54

## 2023-07-26 RX ADMIN — NYSTATIN 500000 UNITS: 100000 SUSPENSION ORAL at 17:25

## 2023-07-26 RX ADMIN — NYSTATIN 500000 UNITS: 100000 SUSPENSION ORAL at 20:41

## 2023-07-26 RX ADMIN — SENNOSIDES AND DOCUSATE SODIUM 2 TABLET: 8.6; 5 TABLET ORAL at 20:40

## 2023-07-26 RX ADMIN — INSULIN LISPRO 2 UNITS: 100 INJECTION, SOLUTION INTRAVENOUS; SUBCUTANEOUS at 20:38

## 2023-07-26 RX ADMIN — Medication 10 ML: at 20:41

## 2023-07-26 RX ADMIN — Medication 10 ML: at 09:59

## 2023-07-26 RX ADMIN — DEXAMETHASONE 4 MG: 4 TABLET ORAL at 09:53

## 2023-07-26 RX ADMIN — NYSTATIN 500000 UNITS: 100000 SUSPENSION ORAL at 09:55

## 2023-07-26 RX ADMIN — Medication 1 TABLET: at 09:54

## 2023-07-26 RX ADMIN — PANTOPRAZOLE SODIUM 40 MG: 40 TABLET, DELAYED RELEASE ORAL at 09:54

## 2023-07-26 RX ADMIN — DILTIAZEM HYDROCHLORIDE 240 MG: 240 CAPSULE, EXTENDED RELEASE ORAL at 09:55

## 2023-07-26 RX ADMIN — FUROSEMIDE 20 MG: 10 INJECTION, SOLUTION INTRAMUSCULAR; INTRAVENOUS at 09:55

## 2023-07-26 RX ADMIN — GUAIFENESIN 600 MG: 600 TABLET, EXTENDED RELEASE ORAL at 20:41

## 2023-07-26 RX ADMIN — MIDODRINE HYDROCHLORIDE 10 MG: 5 TABLET ORAL at 12:37

## 2023-07-26 RX ADMIN — INSULIN GLARGINE 14 UNITS: 100 INJECTION, SOLUTION SUBCUTANEOUS at 20:39

## 2023-07-26 RX ADMIN — GUAIFENESIN 600 MG: 600 TABLET, EXTENDED RELEASE ORAL at 09:54

## 2023-07-26 RX ADMIN — METOPROLOL TARTRATE 75 MG: 25 TABLET, FILM COATED ORAL at 09:54

## 2023-07-26 RX ADMIN — BUDESONIDE AND FORMOTEROL FUMARATE DIHYDRATE 2 PUFF: 160; 4.5 AEROSOL RESPIRATORY (INHALATION) at 19:12

## 2023-07-26 RX ADMIN — NICOTINE 1 PATCH: 14 PATCH, EXTENDED RELEASE TRANSDERMAL at 09:55

## 2023-07-26 RX ADMIN — MIDODRINE HYDROCHLORIDE 10 MG: 5 TABLET ORAL at 09:54

## 2023-07-26 RX ADMIN — NITROGLYCERIN 0.5 INCH: 20 OINTMENT TOPICAL at 20:37

## 2023-07-26 RX ADMIN — ESCITALOPRAM OXALATE 10 MG: 10 TABLET ORAL at 09:54

## 2023-07-26 RX ADMIN — INSULIN LISPRO 12 UNITS: 100 INJECTION, SOLUTION INTRAVENOUS; SUBCUTANEOUS at 12:37

## 2023-07-26 RX ADMIN — DOCUSATE SODIUM 100 MG: 100 CAPSULE, LIQUID FILLED ORAL at 09:54

## 2023-07-26 RX ADMIN — MIDODRINE HYDROCHLORIDE 10 MG: 5 TABLET ORAL at 17:24

## 2023-07-26 RX ADMIN — DOCUSATE SODIUM 100 MG: 100 CAPSULE, LIQUID FILLED ORAL at 20:40

## 2023-07-26 NOTE — PROGRESS NOTES
Daily Progress Note    Patient Care Team:  Mayco Carreon MD as PCP - General (Internal Medicine)  Mina Dupree MD as Consulting Physician (Hematology and Oncology)    Chief Complaint: Follow-up type 2 diabetes    HPI: Patient seen, blood sugar log reviewed.  Blood sugars mostly doing well.  He is eating well.  No complaints at this time.    ROS:   Constitutional:  Denies fatigue, tiredness.    Respiratory: denies cough, shortness of breath.   Cardiovascular:  denies chest pain, edema   GI:  Denies abdominal pain, nausea, vomiting.         Vitals:    07/26/23 1654   BP: 97/57   Pulse: 68   Resp: 19   Temp: 98 °F (36.7 °C)   SpO2: 92%     Body mass index is 23.44 kg/m².    Physical Exam:  GEN: NAD, conversant  PSYCH: Awake and coherent      Results Review:     I reviewed the patient's new clinical results.    Glucose   Date Value Ref Range Status   07/26/2023 256 (H) 65 - 99 mg/dL Final     Sodium   Date Value Ref Range Status   07/26/2023 134 (L) 136 - 145 mmol/L Final     Potassium   Date Value Ref Range Status   07/26/2023 4.2 3.5 - 5.2 mmol/L Final     CO2   Date Value Ref Range Status   07/26/2023 38.0 (H) 22.0 - 29.0 mmol/L Final     Chloride   Date Value Ref Range Status   07/26/2023 90 (L) 98 - 107 mmol/L Final     Anion Gap   Date Value Ref Range Status   07/26/2023 6.0 5.0 - 15.0 mmol/L Final     Creatinine   Date Value Ref Range Status   07/26/2023 0.28 (L) 0.76 - 1.27 mg/dL Final     BUN   Date Value Ref Range Status   07/26/2023 22 8 - 23 mg/dL Final     BUN/Creatinine Ratio   Date Value Ref Range Status   07/26/2023 78.6 (H) 7.0 - 25.0 Final     Calcium   Date Value Ref Range Status   07/26/2023 8.8 8.6 - 10.5 mg/dL Final     Lab Results   Component Value Date    HGBA1C 7.30 (H) 07/15/2023     No results found for: GLUF, MICROALBUR  Results from last 7 days   Lab Units 07/26/23  1611 07/26/23  1231 07/26/23  0915 07/25/23  2028 07/25/23  1624 07/25/23  1151   GLUCOSE mg/dL 136* 113*  98 236* 111* 178*             Medication Review: Reviewed.     aspirin, 81 mg, Oral, Daily  budesonide-formoterol, 2 puff, Inhalation, BID - RT  calcium 500 mg vitamin D 5 mcg (200 UT), 1 tablet, Oral, Daily  dexamethasone, 4 mg, Oral, Daily With Breakfast   And  insulin regular, 12 Units, Subcutaneous, Daily With Breakfast  dilTIAZem CD, 240 mg, Oral, Q24H  docusate sodium, 100 mg, Oral, BID  enoxaparin, 40 mg, Subcutaneous, Daily  escitalopram, 10 mg, Oral, Daily  ferrous sulfate, 324 mg, Oral, Daily With Breakfast  furosemide, 20 mg, Intravenous, Daily  gabapentin, 300 mg, Oral, BID  guaiFENesin, 600 mg, Oral, Q12H  hydrocortisone-bacitracin-zinc oxide-nystatin, 1 application , Topical, BID  insulin glargine, 14 Units, Subcutaneous, Q12H  insulin lispro, 12 Units, Subcutaneous, TID With Meals  insulin lispro, 2-9 Units, Subcutaneous, 4x Daily AC & at Bedtime  metoprolol tartrate, 75 mg, Oral, BID  midodrine, 10 mg, Oral, TID AC  nicotine, 1 patch, Transdermal, Q24H  nitroglycerin, 0.5 inch, Topical, Q8H  nystatin, 5 mL, Swish & Swallow, TID  pantoprazole, 40 mg, Oral, Q AM  senna-docusate sodium, 2 tablet, Oral, BID  sodium chloride, 10 mL, Intravenous, Q12H              Assessment and plan:  Diabetes mellitus type 2 with hyperglycemia: Blood sugars fair, continue current management we will follow blood sugars and make adjustments as needed.    Mireya Abarca MD. FACE

## 2023-07-26 NOTE — PROGRESS NOTES
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  DM II  S/p intramedullary nailing of the hip  S/p Oqxgaw-[id number]-Dgnu  S/p chemotherapy/plan for palliative radiation     PLAN:  Oxygen supplement and titration maintain saturation 90-95%: Currently requiring 3 L per nasal cannula  Encourage OOB daily   Antibiotics completed  Bronchodilators  Inhaled corticosteroids  Systemic steroids  Smoking cessation counseling, nicotine patch  Incentive spirometer  Diuresis and monitor [id number]'s  Oncology, Cardiology, Endocrinology following  Patient is getting radiation therapy with plans for chemo and immunotherapy as an outpatient             LOS: 9 days     Subjective     Patient reports chronic shortness of breath and generalized weakness    Objective     Vital signs for last 24 hours:  Vitals:    07/26/23 0514 07/26/23 0700 07/26/23 0716 07/26/23 0900   BP: 100/67   115/68   BP Location: Left arm   Left arm   Patient Position: Lying   Lying   Pulse: 65 74 72 95   Resp: 20 23 23 23   Temp: 97.3 °F (36.3 °C)   97.7 °F (36.5 °C)   TempSrc: Oral   Axillary   SpO2: 96% 94% 94% 94%   Weight: 74.1 kg (163 lb 5.8 oz)      Height:           Intake/Output last 3 shifts:  I/O last 3 completed shifts:  In: 1680 [P.O.:1680]  Out: 1600 [Urine:1600]  Intake/Output this shift:  I/O this shift:  In: 360 [P.O.:360]  Out: 300 [Urine:300]      Radiology  Imaging Results (Last 24 Hours)       ** No results found for the last 24 hours. **            Labs:  Results from last 7 days   Lab Units 07/26/23  0038   WBC 10*3/mm3 12.80*   HEMOGLOBIN g/dL 7.3*   HEMATOCRIT % 24.3*   PLATELETS 10*3/mm3 317       Results from last 7 days   Lab Units 07/26/23  0038   SODIUM mmol/L 134*   POTASSIUM mmol/L 4.2   CHLORIDE mmol/L 90*   CO2 mmol/L 38.0*   BUN mg/dL 22   CREATININE mg/dL 0.28*   CALCIUM mg/dL 8.8   GLUCOSE mg/dL 256*       Results  from last 7 days   Lab Units 07/23/23 2053   PH, ARTERIAL pH units 7.443   PO2 ART mm Hg 115.9*   PCO2, ARTERIAL mm Hg 67.7*   HCO3 ART mmol/L 46.3*             Results from last 7 days   Lab Units 07/25/23 2042 07/25/23  1802   HSTROP T ng/L 24* 37*           Results from last 7 days   Lab Units 07/20/23  0106   MAGNESIUM mg/dL 1.7                       Meds:   SCHEDULE  aluminum sulfate-calcium acetate 1:20, 1,000 mL, Topical, BID  aspirin, 81 mg, Oral, Daily  budesonide-formoterol, 2 puff, Inhalation, BID - RT  calcium 500 mg vitamin D 5 mcg (200 UT), 1 tablet, Oral, Daily  dexamethasone, 4 mg, Oral, Daily With Breakfast   And  insulin regular, 12 Units, Subcutaneous, Daily With Breakfast  dilTIAZem CD, 240 mg, Oral, Q24H  docusate sodium, 100 mg, Oral, BID  enoxaparin, 40 mg, Subcutaneous, Daily  escitalopram, 10 mg, Oral, Daily  ferrous sulfate, 324 mg, Oral, Daily With Breakfast  furosemide, 20 mg, Intravenous, Daily  gabapentin, 300 mg, Oral, BID  guaiFENesin, 600 mg, Oral, Q12H  hydrocortisone-bacitracin-zinc oxide-nystatin, 1 application , Topical, TID  insulin glargine, 14 Units, Subcutaneous, Q12H  insulin lispro, 12 Units, Subcutaneous, TID With Meals  insulin lispro, 2-9 Units, Subcutaneous, 4x Daily AC & at Bedtime  metoprolol tartrate, 75 mg, Oral, BID  midodrine, 10 mg, Oral, TID AC  nicotine, 1 patch, Transdermal, Q24H  nitroglycerin, 0.5 inch, Topical, Q8H  nystatin, 5 mL, Swish & Swallow, TID  pantoprazole, 40 mg, Oral, Q AM  senna-docusate sodium, 2 tablet, Oral, BID  sodium chloride, 10 mL, Intravenous, Q12H      Infusions  Pharmacy Consult - Steroid Insulin Protocol,       PRNs    senna-docusate sodium **AND** polyethylene glycol **AND** bisacodyl **AND** bisacodyl    Calcium Replacement - Follow Nurse / BPA Driven Protocol    dextrose    dextrose    glucagon (human recombinant)    HYDROcodone-acetaminophen    hydrOXYzine    ipratropium-albuterol    Magnesium Standard Dose Replacement -  Follow Nurse / BPA Driven Protocol    Morphine    ondansetron    Pharmacy Consult - Steroid Insulin Protocol    Phosphorus Replacement - Follow Nurse / BPA Driven Protocol    Potassium Replacement - Follow Nurse / BPA Driven Protocol    [COMPLETED] Insert Peripheral IV **AND** sodium chloride    sodium chloride    sodium chloride    Physical Exam:  General Appearance:  Alert   HEENT:  Normocephalic, without obvious abnormality, Conjunctiva/corneas clear,.   Nares normal, no drainage     Neck:  Supple, symmetrical, trachea midline.   Lungs /Chest wall: Mild bilateral basal rhonchi, respirations unlabored, symmetrical wall movement.     Heart:  Regular rate and rhythm, S1 S2 normal  Abdomen: Soft, non-tender, no masses, no organomegaly.    Extremities: No edema, no clubbing or cyanosis     ROS  Constitutional: Negative for chills, fever and positive for malaise/fatigue.   HENT: Negative.    Eyes: Negative.    Cardiovascular: Negative.    Respiratory: Positive for chronic shortness of breath.    Skin: Negative.    Musculoskeletal: Negative.    Gastrointestinal: Negative.    Genitourinary: Negative.    Neurological: Generalized weakness      I reviewed the recent clinical results    Part of this note may be an electronic transcription/translation of spoken language to printed text using the Dragon Dictation System.

## 2023-07-26 NOTE — PLAN OF CARE
Goal Outcome Evaluation:      Pt converted to sinus rhythm. Pt completed radiation today at the cancer care center and has 4 treatments left. Plan to start chemo as outpatient after radiation is complete. Pt to discharge to rehab pending precert.      Problem: Skin Injury Risk Increased  Goal: Skin Health and Integrity  Outcome: Ongoing, Progressing  Intervention: Optimize Skin Protection  Recent Flowsheet Documentation  Taken 7/26/2023 1600 by Rachel Vásquez RN  Pressure Reduction Techniques:   frequent weight shift encouraged   positioned off wounds   sit time limited to 2 hours  Pressure Reduction Devices:   specialty bed utilized   chair cushion utilized   positioning supports utilized   pressure-redistributing mattress utilized  Taken 7/26/2023 1200 by Rachel Vásquez RN  Pressure Reduction Techniques:   frequent weight shift encouraged   pressure points protected   weight shift assistance provided  Pressure Reduction Devices: positioning supports utilized  Skin Protection:   incontinence pads utilized   skin-to-skin areas padded  Taken 7/26/2023 0800 by Rachel Vásquez RN  Pressure Reduction Techniques:   frequent weight shift encouraged   pressure points protected   weight shift assistance provided  Pressure Reduction Devices: positioning supports utilized  Skin Protection: incontinence pads utilized

## 2023-07-26 NOTE — PLAN OF CARE
Goal Outcome Evaluation:      Isra Davenport presents with functional mobility impairments which indicate the need for skilled intervention. Tolerating session today without incident. Pt very fatigued from just arriving back from radiation and agreeable to bed mobility, transfers, and short distance ambulation this date. Pt required SBA for supine to sit transfer, required CGA for sit to/from stand transfers, and CGA for gait training bout 5 ft w/ RW. Recommending SNF and will continue to follow and progress as tolerated. PPE: alma He, PT, DPT

## 2023-07-26 NOTE — DISCHARGE PLACEMENT REQUEST
"Catalina Davenport (68 y.o. Male)       Date of Birth   1955    Social Security Number       Address   [address] [place] [address]    Home Phone   [phone]    MRN   [id number]       Uatsdin   None    Marital Status                               Admission Date   7/15/23    Admission Type   Emergency    Admitting Provider   Jese Ontiveros MD    Attending Provider   Merle Pereyra DO    Department, Room/Bed   [facility], 2112/1       Discharge Date       Discharge Disposition       Discharge Destination                                 Attending Provider: Merle Pereyra DO    Allergies: Ibuprofen, Penicillins    Isolation: None   Infection: MRSA No Isolation this Admit (07/24/23)   Code Status: No CPR    Ht: 177.8 cm (70\")   Wt: 74.1 kg (163 lb 5.8 oz)    Admission Cmt: None   Principal Problem: Atrial fibrillation with RVR [I48.91]                   Active Insurance as of 7/15/2023       Primary Coverage       Payor Plan Insurance Group Employer/Plan Group    ANTHEM MEDICARE REPLACEMENT ANTHEM MEDICARE ADVANTAGE [id number]       Payor Plan Address Payor Plan Phone Number Payor Plan Fax Number Effective Dates    [address] [phone]  6/1/2021 - None Entered    [facility] [phone]         Subscriber Name Subscriber Birth Date Member ID       CATALINA DAVENPORT 1955 [id number]               Secondary Coverage       Payor Plan Insurance Group Employer/Plan Group    [place]        Payor Plan Address Payor Plan Phone Number Payor Plan Fax Number Effective Dates    [address]   4/30/2023 - None Entered    [place] [address]         Subscriber Name Subscriber Birth Date Member ID       CATALINA DAVENPORT 1955 [id number]                     Emergency Contacts        (Rel.) Home Phone Work Phone Mobile Phone    faiza davenport (Brother) [phone] -- --    martin davenport (Relative) [phone] --    " roddy davenport [phone] -- [phone]                 History & Physical        Yi Amin APRN at 07/15/23 2006       Attestation signed by Raoul Castillo MD at 23 0856    I have reviewed this documentation and agree.                      [facility] Medicine Services  History & Physical    Patient Name: Isra Davenport  : 1955  MRN: [id number]  Primary Care Physician:  Mayco Carreon MD  Date of admission: 7/15/2023  Date and Time of Service: 7/15/23 at 2015    Subjective      Chief Complaint: Pain    History of Present Illness: Isra Davenport is a 68 y.o. 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  He is status post intramedullary nailing of the hip on 2023 and a Orlzdc-c-Updm placed on 2023 he is currently undergoing chemotherapy and plan for palliative radiation who presented to [facility] on 7/15/2023 complaining of pain and lower extremity edema    Review of records show patient had presented to chemotherapy with complaint of lower extremity edema and what he describes as neuropathic pain.  He had ultrasound that was negative for DVT and was prescribed Lasix.  He was also prescribed Norco 5/325 for pain.  Patient reports when he went to  prescriptions from the pharmacy the pharmacy was closed on Monday and he was unable to fill prescription.  He presents to the ED today with uncontrolled pain.  It was noted from previous admission he was discharged on low pressure for diagnosis of sinus tachycardia    ED work-up included chest x-ray that showed a right internal jugular Port-A-Cath in place.  Heart size stable.  Airspace disease in the left lung compatible with pneumonia.  Chronic elevation of the left hemidiaphragm.  No pleural effusion.  Right lung was clear.  Bilateral foot x-ray was negative for  fracture.  There is healed fracture of the right tibia and fibula.  Troponin 26, potassium 4.6, sodium 138, glucose 355, creatinine 0.3, BUN is 21.  Albumin 2.6.  White count 11.4, hemoglobin 10.  On admission heart rate 84 but while he was being treated in the ED heart rate jumped into the 150s and was found to be in acute A-fib with RVR.  O2 sats dropped to 88% was placed on 2 L nasal cannula.  He was given a Cardizem bolus and Lopressor bolus and resumed on a Cardizem drip.  Patient denies any chest pain or feeling of chest palpitations.  Denies any fever chills or increased shortness of breath.  Reports positive for productive cough.  Positive for swelling of bilateral lower extremities below the calf and extreme tenderness to touch bilateral feet.          Review of Systems   Constitutional: Positive for malaise/fatigue.   Cardiovascular:  Positive for leg swelling.   Respiratory:  Positive for cough and sputum production.    Skin: Negative.    Musculoskeletal:  Positive for back pain.   Gastrointestinal: Negative.    Genitourinary: Negative.    Neurological:  Positive for paresthesias.   Psychiatric/Behavioral: Negative.        Personal History     Past Medical History:   Diagnosis Date    Arthritis     Benign prostatic hyperplasia without urinary obstruction 07/06/2023    Chronic back pain 07/06/2023    Closed fracture of right hip 06/16/2023    Added automatically from request for surgery [id number]    Hernia of anterior abdominal wall 09/22/2020    Lung cancer     squamous cell carcimoma,left    Lung cancer metastatic to brain 06/29/2023    Mass of left lung 06/16/2023    Added automatically from request for surgery [id number]    Neuralgia 07/06/2023    Neuropathy 07/06/2023    Osteoarthritis 07/06/2023    Polyp of colon 07/06/2023    Primary malignant neoplasm of left lung metastatic to other site 06/16/2023    Added automatically from request for surgery [id number]    Prostatitis 07/06/2023    Spinal stenosis of  lumbar region 07/06/2023       Past Surgical History:   Procedure Laterality Date    BACK SURGERY  2006    BRONCHOSCOPY N/A 6/19/2023    Procedure: BRONCHOSCOPY WITH BIOPSY X1 AREA, FINE NEEDLE ASPIRATION, BRUSHING, AND BRONCHIAL WASHING;  Surgeon: Reggie Abarca MD;  Location: [facility] ENDOSCOPY;  Service: Pulmonary;  Laterality: N/A;  POST: MUCOUS PLUGS  AND LUNG MASS    HIP INTERTROCHANTERIC NAILING Right 6/17/2023    Procedure: HIP INTERTROCHANTERIC NAILING;  Surgeon: Levi Blackmon II, MD;  Location: [facility] MAIN OR;  Service: Orthopedics;  Laterality: Right;    LEG SURGERY Bilateral 08/25/1985    PORTACATH PLACEMENT Right 6/29/2023    Procedure: INSERTION OF PORTACATH;  Surgeon: Jese Goss MD;  Location: [facility] MAIN OR;  Service: General;  Laterality: Right;       Family History: family history is not on file. Otherwise pertinent FHx was reviewed and not pertinent to current issue.    Social History:  reports that he has been smoking cigarettes. He has a 15.00 pack-year smoking history. He has never used smokeless tobacco. He reports current alcohol use of about 2.0 standard drinks per week. He reports that he does not use drugs.    Home Medications:  Prior to Admission Medications       Prescriptions Last Dose Informant Patient Reported? Taking?    acetaminophen (TYLENOL) 325 MG tablet   Yes No    Take 2 tablets by mouth Every 6 (Six) Hours As Needed for Mild Pain. Indications: Pain    albuterol sulfate  (90 Base) MCG/ACT inhaler   No No    Inhale 2 puffs Every 4 (Four) Hours As Needed for Wheezing.    aspirin 81 MG EC tablet   No No    Take 1 tablet by mouth Daily.    ferrous sulfate 324 (65 Fe) MG tablet delayed-release EC tablet   No No    Take 1 tablet by mouth Daily With Breakfast.    metoprolol tartrate (LOPRESSOR) 25 MG tablet   No No    Take 1 tablet by mouth Every 8 (Eight) Hours.              Allergies:  Allergies   Allergen Reactions    Ibuprofen Itching    Penicillins  Hives       Objective      Vitals:   Temp:  [98.2 °F (36.8 °C)] 98.2 °F (36.8 °C)  Heart Rate:  [0-156] 108  Resp:  [19-20] 19  BP: (105-129)/(54-82) 114/74  Flow (L/min):  [2-6] 2    Physical Exam  Constitutional:       Appearance: He is ill-appearing.   Eyes:      Pupils: Pupils are equal, round, and reactive to light.   Cardiovascular:      Rate and Rhythm: Tachycardia present. Rhythm irregular.   Pulmonary:      Breath sounds: Rhonchi present.   Abdominal:      General: Abdomen is flat.      Palpations: Abdomen is soft.   Musculoskeletal:      Right lower leg: Edema present.      Left lower leg: Edema present.   Skin:     General: Skin is warm and dry.   Neurological:      Mental Status: He is alert and oriented to person, place, and time.   Psychiatric:         Mood and Affect: Mood normal.        Result Review    Result Review:  I have personally reviewed the results from the time of this admission to 7/15/2023 21:19 EDT and agree with these findings:  [x]  Laboratory  [x]  Microbiology  [x]  Radiology  []  EKG/Telemetry   []  Cardiology/Vascular   []  Pathology  [x]  Old records  []  Other:  Most notable findings include: see H&P         Assessment & Plan        Active Hospital Problems:  Active Hospital Problems    Diagnosis     **Atrial fibrillation with RVR      Plan:   New onset A-fib with RVR  - Started on Cardiezem drip in ED and given lopressor  - will consult cardiology  - Had considered pharmacologic AC but with known brain mets concerned with risk for bleed so will consult oncology for recommendation on AC  - continue VTE with SCD until seen by cardiology and oncology    Pain and Edema BLE  - appears Neuropathic  - pt also with known bone cancer and arthritis   - consider neuropathy 2/2 chemotherapy  - will continue Lasix for edema  -Was ruled out for DVT   - will add gabapentin 200mg QHS for sign of neuropathic pain  - check BNP  - will resume pt Norco that was previously ordered and add prn  Dilaudid   -echo 6/22/23: Left ventricular systolic function is normal. Left ventricular ejection fraction appears to be 61 - 65%.   Left ventricular diastolic function is consistent with (grade I) impaired relaxation.    Hyperglycemia  - likely steroid induced   -A1C 7.3  - add glucomander  - add steroid protocol   -endocrinology consult    Hypoxia  - consider 2/2 known lung cancer   - cxr with pneumonia and elevated WBC and known tobacco use  - will cover possible COPD vs pneumonia vs carcinoma with empiric Azithromycin  - wean O2 as tolerated   - duo nebs prn    Anemia  - continue ferrous sulfate  - hgb stable    Stage IV squamous cell lung cancer with mets to brain and bone  - currently treated with chemotherapy   - continue decadron as previously ordered by oncology  - continue nexium   - consult oncology for continued management    DVT prophylaxis:  Mechanical DVT prophylaxis orders are present.    CODE STATUS:    Medical Intervention Limits: NO intubation (DNI)  Code Status (Patient has no pulse and is not breathing): No CPR (Do Not Attempt to Resuscitate)  Medical Interventions (Patient has pulse or is breathing): Limited Support    Admission Status:  I believe this patient meets observation status.    I discussed the patient's findings and my recommendations with patient.    This patient has been examined wearing appropriate Personal Protective Equipment a    Signature: Electronically signed by NOREEN Agosto, 07/15/23, 21:19 EDT.  [facility] Hospitalist Team     Electronically signed by Raoul Castillo MD at 07/16/23 0821

## 2023-07-26 NOTE — THERAPY TREATMENT NOTE
"Subjective: Pt agreeable to therapeutic plan of care. Just arrived back in room from radiology and \"completely wiped.\"     Objective:     Bed mobility - SBA (supine to sit transfer)  Transfers - CGA and with rolling walker (sit to/from stand transfer)  Ambulation - 5 feet CGA and with rolling walker    Therapeutic Exercise - Declined 2*/2 fatigue; reports he has been compliant with seated BLE exercises throughout the day.    Vitals: WNL    Pain: 4 VAS   Location: BLE/feet  Intervention for pain: Repositioned    Education: Verbal/Tactile Cues, Transfer Training, and Gait Training    Assessment: Isra Davenport presents with functional mobility impairments which indicate the need for skilled intervention. Tolerating session today without incident. Pt very fatigued from just arriving back from radiation and agreeable to bed mobility, transfers, and short distance ambulation this date. Pt required SBA for supine to sit transfer, required CGA for sit to/from stand transfers, and CGA for gait training bout 5 ft w/ RW. Recommending SNF and will continue to follow and progress as tolerated. PPE: gloves    Plan/Recommendations:   Moderate Intensity Therapy recommended post-acute care. This is recommended as therapy feels the patient would require 3-4 days per week and wouldn't tolerate \"3 hour daily\" rehab intensity. SNF would be the preferred choice. If the patient does not agree to SNF, arrange HH or OP depending on home bound status. If patient is medically complex, consider LTACH.. Pt requires no DME at discharge.     Pt desires Skilled Rehab placement at discharge. Pt cooperative; agreeable to therapeutic recommendations and plan of care.         Basic Mobility 6-click:  Rollin = Total, A lot = 2, A little = 3; 4 = None  Supine>Sit:   1 = Total, A lot = 2, A little = 3; 4 = None   Sit>Stand with arms:  1 = Total, A lot = 2, A little = 3; 4 = None  Bed>Chair:   1 = Total, A lot = 2, A little = 3; 4 = " None  Ambulate in room:  1 = Total, A lot = 2, A little = 3; 4 = None  3-5 Steps with railin = Total, A lot = 2, A little = 3; 4 = None  Score: 20    Post-Tx Position: Up in Chair, Alarms activated, and Call light and personal items within reach  PPE: gloves

## 2023-07-26 NOTE — CASE MANAGEMENT/SOCIAL WORK
Continued Stay Note  XOCHILT Fajardo     Patient Name: Isra Davenport  MRN: [id number]  Today's Date: 7/26/2023    Admit Date: 7/15/2023    Plan: Accepted to [facility] pending precert. [facility], will require new precert. Radiation treatments at [facility], chemo on hold while in rehab, per oncology. From home alone.   Discharge Plan       Row Name 07/26/23 1500       Plan    Plan Comments Notified [facility] of facility denials including [place], [facility] [place], and [facility]. [facility] portal updated and determination pending on exception for out of network facility. As of 1540, precert remains pending.                 Yun Kirkland RN     Office Phone: [phone]  Office Cell: [phone]

## 2023-07-26 NOTE — CASE MANAGEMENT/SOCIAL WORK
Continued Stay Note   Scotty     Patient Name: Isra Davenport  MRN: [id number]  Today's Date: 7/26/2023    Admit Date: 7/15/2023    Plan: Accepted to [facility] pending precert. Precert started 7/24, [facility] approved. [facility] referral pending acceptance, will require new precert. Radiation treatments at [facility], chemo on hold while in rehab, per oncology. From home alone.   Discharge Plan       Row Name 07/26/23 1206       Plan    Plan Accepted to [facility] pending precert. Precert started 7/24, [facility] approved. [facility] referral pending acceptance, will require new precert. Radiation treatments at [facility], chemo on hold while in rehab, per oncology. From home alone.    Patient/Family in Agreement with Plan yes    Provided Post Acute Provider List? Yes    Post Acute Provider List Nursing Home    Provided Post Acute Provider Quality & Resource List? Yes    Post Acute Provider Quality and Resource List Nursing Home    Delivered To Patient    Method of Delivery In person    Plan Comments Per [facility], insurance requesting 3 denials due to [facility] being out of network. CM met with patient at bedside to discuss dc planning. ECF list provided. Patient agreeable to new referrals to [facility] and [facility]. CM added in basket. Discussed with  liaison Makayla PEÑA that they cannot accept due to [place] limit. Response pending from Hannha from [facility]. Discussed with RN that per [facility], patient has 5 more radiation treatments including today, 7/26. Avoidable days in place.             Yun Kirkland RN     Office Phone: [phone]  Office Cell: [phone]

## 2023-07-26 NOTE — CONSULTS
Nutrition Services    Patient Name: Isra Davenport  YOB: 1955  MRN: [id number]  Admission date: 7/15/2023    Comment:    Continue current diet and ONS.    PPE Documentation        PPE Worn By Provider Did not enter room this encounter    PPE Worn By Patient  N/A     CLINICAL NUTRITION ASSESSMENT      Reason for Assessment Follow up   7/18: wound, nursing admission screen consult, MST: 4      H&P      Past Medical History:   Diagnosis Date    Arthritis     Benign prostatic hyperplasia without urinary obstruction 07/06/2023    Chronic back pain 07/06/2023    Closed fracture of right hip 06/16/2023    Added automatically from request for surgery [id number]    Hernia of anterior abdominal wall 09/22/2020    Lung cancer     squamous cell carcimoma,left    Lung cancer metastatic to brain 06/29/2023    Mass of left lung 06/16/2023    Added automatically from request for surgery [id number]    Neuralgia 07/06/2023    Neuropathy 07/06/2023    Osteoarthritis 07/06/2023    Polyp of colon 07/06/2023    Primary malignant neoplasm of left lung metastatic to other site 06/16/2023    Added automatically from request for surgery [id number]    Prostatitis 07/06/2023    Spinal stenosis of lumbar region 07/06/2023       Past Surgical History:   Procedure Laterality Date    BACK SURGERY  2006    BRONCHOSCOPY N/A 6/19/2023    Procedure: BRONCHOSCOPY WITH BIOPSY X1 AREA, FINE NEEDLE ASPIRATION, BRUSHING, AND BRONCHIAL WASHING;  Surgeon: Reggie Abarca MD;  Location: [facility] ENDOSCOPY;  Service: Pulmonary;  Laterality: N/A;  POST: MUCOUS PLUGS  AND LUNG MASS    HIP INTERTROCHANTERIC NAILING Right 6/17/2023    Procedure: HIP INTERTROCHANTERIC NAILING;  Surgeon: Levi Blackmon II, MD;  Location: [facility] OR;  Service: Orthopedics;  Laterality: Right;    LEG SURGERY Bilateral 08/25/1985    PORTACATH PLACEMENT Right 6/29/2023    Procedure: INSERTION OF PORTACATH;  Surgeon: Jese Goss MD;  Location: [facility]  "OR;  Service: General;  Laterality: Right;        Current Problems   New onset A-fib RVR  -cardiology following     Pain and edema BLE    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 Pt was with other providers x 2 attempts to visit. Plan for discharge soon.    7/18: Pt admitted to [facility] with atrial fibrillation with RVR. Pt with recent admission in June for dx of new metastatic squamous cell carcinoma of the L lung stage IV with mets to the bone and brain. RD visited pt at bedside. Pt reported that he has been eating 2 meals daily with some snacking. Pt said he has been drinking the ONS this admission as this is one of the few things he can drink to get his meds down. Pt agreeable to ONS at time of RD visit. Pt denied food allergies and difficulty chewing/swallowing. NFPE completed, consistent with nutrition diagnosis of malnutrition using AND/ASPEN criteria. See MSA below.        Anthropometrics        Current Height, Weight Height: 177.8 cm (70\")  Weight: 74.1 kg (163 lb 5.8 oz) (07/26/23 0514)       Ideal Body Weight (IBW) 166#   Usual Body Weight (UBW) Unknown        Trending Weight Hx     This admission: 7/26: 163# - scale; 7% wt gain since last wt review, less than 2L fluid wt loss since admission per I/O documentation  7/18: 151# - scale              PTA: 10% wt loss x 1 month    Wt Readings from Last 30 Encounters:   07/26/23 0514 74.1 kg (163 lb 5.8 oz)   07/25/23 0507 72.9 kg (160 lb 11.5 oz)   07/24/23 0507 73 kg (160 lb 15 oz)   07/22/23 0518 71.6 kg (157 lb 13.6 oz)   07/21/23 0504 70.8 kg (156 lb 1.4 oz)   07/17/23 0601 68.6 kg (151 lb 3.8 oz)   07/16/23 0500 64.5 kg (142 lb 3.2 oz)   07/15/23 1706 65.4 kg (144 lb 3.2 oz)   07/15/23 1652 72.6 kg (160 lb)   07/14/23 1503 72.6 kg (160 lb)   07/06/23 1230 72.6 kg (160 lb)   07/01/23 1339 64.9 kg (143 lb) "   06/29/23 0041 71.5 kg (157 lb 10.1 oz)   06/25/23 0500 72.2 kg (159 lb 2.8 oz)   06/24/23 0500 71.7 kg (158 lb 1.1 oz)   06/23/23 0502 77.5 kg (170 lb 13.7 oz)   06/22/23 1322 76.7 kg (169 lb)   06/22/23 0509 77 kg (169 lb 12.1 oz)   06/21/23 0536 76.1 kg (167 lb 12.3 oz)   06/16/23 1141 74.4 kg (164 lb)   04/30/23 1009 74.4 kg (164 lb 0.4 oz)   02/26/16 1256 83.5 kg (184 lb)   01/07/16 0931 83.6 kg (184 lb 6.4 oz)      BMI kg/m2 Body mass index is 23.44 kg/m².       Labs        Pertinent Labs Reviewed, management per attending    Results from last 7 days   Lab Units 07/26/23  0038 07/25/23  0134 07/20/23  0106   SODIUM mmol/L 134* 136 138   POTASSIUM mmol/L 4.2 4.4 4.3   CHLORIDE mmol/L 90* 91* 94*   CO2 mmol/L 38.0* 39.0* 38.0*   BUN mg/dL 22 25* 16   CREATININE mg/dL 0.28* 0.27* 0.31*   CALCIUM mg/dL 8.8 9.1 9.3   GLUCOSE mg/dL 256* 136* 123*       Results from last 7 days   Lab Units 07/26/23  0038 07/20/23  2358 07/20/23  1543 07/20/23  0106 07/19/23  1025   MAGNESIUM mg/dL  --   --   --  1.7 1.9   PHOSPHORUS mg/dL  --   --  2.3* 1.6* 2.7   HEMOGLOBIN g/dL 7.3*   < >  --  9.0* 9.0*   HEMOGLOBIN, POC   --    < >  --   --   --    HEMATOCRIT % 24.3*   < >  --  29.1* 28.3*   HEMATOCRIT POC   --    < >  --   --   --     < > = values in this interval not displayed.       COVID19   Date Value Ref Range Status   06/19/2023 Not Detected Not Detected - Ref. Range Final     Lab Results   Component Value Date    HGBA1C 7.30 (H) 07/15/2023        Medications    Scheduled Medications aluminum sulfate-calcium acetate 1:20, 1,000 mL, Topical, BID  aspirin, 81 mg, Oral, Daily  budesonide-formoterol, 2 puff, Inhalation, BID - RT  calcium 500 mg vitamin D 5 mcg (200 UT), 1 tablet, Oral, Daily  dexamethasone, 4 mg, Oral, Daily With Breakfast   And  insulin regular, 12 Units, Subcutaneous, Daily With Breakfast  dilTIAZem CD, 240 mg, Oral, Q24H  docusate sodium, 100 mg, Oral, BID  enoxaparin, 40 mg, Subcutaneous,  Daily  escitalopram, 10 mg, Oral, Daily  ferrous sulfate, 324 mg, Oral, Daily With Breakfast  furosemide, 20 mg, Intravenous, Daily  gabapentin, 300 mg, Oral, BID  guaiFENesin, 600 mg, Oral, Q12H  hydrocortisone-bacitracin-zinc oxide-nystatin, 1 application , Topical, TID  insulin glargine, 14 Units, Subcutaneous, Q12H  insulin lispro, 12 Units, Subcutaneous, TID With Meals  insulin lispro, 2-9 Units, Subcutaneous, 4x Daily AC & at Bedtime  metoprolol tartrate, 75 mg, Oral, BID  midodrine, 10 mg, Oral, TID AC  nicotine, 1 patch, Transdermal, Q24H  nitroglycerin, 0.5 inch, Topical, Q8H  nystatin, 5 mL, Swish & Swallow, TID  pantoprazole, 40 mg, Oral, Q AM  senna-docusate sodium, 2 tablet, Oral, BID  sodium chloride, 10 mL, Intravenous, Q12H        Infusions Pharmacy Consult - Steroid Insulin Protocol,         PRN Medications   senna-docusate sodium **AND** polyethylene glycol **AND** bisacodyl **AND** bisacodyl    Calcium Replacement - Follow Nurse / BPA Driven Protocol    dextrose    dextrose    glucagon (human recombinant)    HYDROcodone-acetaminophen    hydrOXYzine    ipratropium-albuterol    Magnesium Standard Dose Replacement - Follow Nurse / BPA Driven Protocol    Morphine    ondansetron    Pharmacy Consult - Steroid Insulin Protocol    Phosphorus Replacement - Follow Nurse / BPA Driven Protocol    Potassium Replacement - Follow Nurse / BPA Driven Protocol    [COMPLETED] Insert Peripheral IV **AND** sodium chloride    sodium chloride    sodium chloride     Physical Findings        Trending Physical   Appearance, NFPE 7/26: VIMAL    7/18: NFPE completed, consistent with nutrition diagnosis of malnutrition using AND/ASPEN criteria. See MSA below.      --  Edema  2+ legs, feet  1+ ankles     Bowel Function Last documented BM on 7/24     Tubes None      Chewing/Swallowing No reported difficulty      Skin Multiple stage II pressure injuries to sacral area with MASD per [facility] assessment 7/21     --  Current Nutrition  Orders & Evaluation of Intake       Oral Nutrition     Food Allergies NKFA    Current PO Diet Diet: Diabetic Diets; Consistent Carbohydrate; Texture: Regular Texture (IDDSI 7); Fluid Consistency: Thin (IDDSI 0)   Supplement Boost Glucose Control BID (Provides 380 kcals, 32 g protein if consumed)      PO Evaluation     Trending % PO Intake 7/26: 75%  7/18: %   --  Nutritional Risk Screening        NRS-2002 Score          Nutrition Diagnosis         Nutrition Dx Problem 1 Moderate chronic disease related malnutrition related to decreased appetite in the setting of a catabolic illness as evidenced by PO intake meeting less than 75% of estimated energy requirement for greater than or equal to 1 month, moderate fluid accumulation, >5% wt loss in 1 month, and moderate muscle wasting per NFPE.       Nutrition Dx Problem 2        Intervention Goal         Intervention Goal(s) PO intake > 75%, ONS acceptance, wt maintenance     Nutrition Intervention        RD Action Continue current diet and ONS     Nutrition Prescription          Diet Prescription Consistent CHO   Supplement Prescription Boost Glucose Control BID (Provides 380 kcals, 32 g protein if consumed)      --  Monitor/Evaluation        Monitor Per protocol, PO intake, Supplement intake, Pertinent labs, Weight     Malnutrition Severity Assessment      Patient meets criteria for : Moderate (non-severe) Malnutrition           Electronically signed by:  Marcie Montoya RD  07/26/23 08:20 EDT

## 2023-07-26 NOTE — PROGRESS NOTES
[facility] Medicine Services   Daily Progress Note      Patient Name: Isra Davenport  : 1955  MRN: [id number]  Primary Care Physician:  Mayco Carreon MD  Date of admission: 7/15/2023      Subjective      Chief Complaint: Shortness of breath    Patient seen and examined this morning.  Doing okay this morning.  Had some chest pain last night but that has resolved and he is feeling fine now.  Going for radiation again.  Medically stable for discharge to rehab.  Awaiting pre-CERT.    Pertinent positives as noted in HPI/subjective.  All other systems were reviewed and are negative.      Objective      Vitals:   Temp:  [97.1 °F (36.2 °C)-99.3 °F (37.4 °C)] 97.7 °F (36.5 °C)  Heart Rate:  [] 95  Resp:  [10-23] 23  BP: ()/(58-71) 115/68  Flow (L/min):  [3-6] 5    Physical Exam:    General: Awake, alert, elderly male, lying in bed, NAD  Cardiovascular: Regular rate and rhythm, no murmurs  Respiratory: Clear to auscultation bilaterally, no wheezing or rales, unlabored breathing  Abdomen: Soft, nontender, positive bowel sounds, no guarding  Musculoskeletal: Generalized weak, no other gross deformities  Skin: Warm, dry         Result Review    Result Review:  I have personally reviewed the results from the time of this admission to 2023 10:19 EDT and agree with these findings:  [x]  Laboratory  []  Microbiology  []  Radiology  []  EKG/Telemetry   []  Cardiology/Vascular   []  Pathology  []  Old records  []  Other:    Wounds (last 24 hours)       LDA Wound       Row Name 23 0400 23 0000 23 2000       Wound 07/15/23 2100 Bilateral medial gluteal Pressure Injury    Wound - Properties Group Placement Date: 07/15/23  -MF Placement Time:   -MF Present on Hospital Admission: Y  -MF Side: Bilateral  -MF Orientation: medial  -MF Location: gluteal  -MF Primary Wound Type: Pressure inj  -MF    Closure Open to air  -LB Open to air  -LB Open to air  -LB    Base dry  -LB dry   -LB dry  -LB    Periwound warm;dry  -LB warm;dry  -LB warm;dry  -LB    Periwound Temperature warm  -LB warm  -LB warm  -LB    Periwound Skin Turgor soft  -LB soft  -LB soft  -LB    Drainage Amount none  -LB none  -LB none  -LB    Retired Wound - Properties Group Placement Date: 07/15/23  -MF Placement Time: 2100  -MF Present on Hospital Admission: Y  -MF Side: Bilateral  -MF Orientation: medial  -MF Location: gluteal  -MF Primary Wound Type: Pressure inj  -MF    Retired Wound - Properties Group Date first assessed: 07/15/23  -MF Time first assessed: 2100  -MF Present on Hospital Admission: Y  -MF Side: Bilateral  -MF Location: gluteal  -MF Primary Wound Type: Pressure inj  -MF       Wound 07/18/23 1900 Left posterior ankle    Wound - Properties Group Placement Date: 07/18/23  -AR Placement Time: 1900  -AR Present on Hospital Admission: Y  -AR Side: Left  -AR Orientation: posterior  -AR Location: ankle  -AR    Closure VIMAL  -LB VIMAL  -LB VIMAL  -LB    Base dressing in place, unable to visualize  -LB dressing in place, unable to visualize  -LB dressing in place, unable to visualize  -LB    Retired Wound - Properties Group Placement Date: 07/18/23  -AR Placement Time: 1900  -AR Present on Hospital Admission: Y  -AR Side: Left  -AR Orientation: posterior  -AR Location: ankle  -AR    Retired Wound - Properties Group Date first assessed: 07/18/23  -AR Time first assessed: 1900  -AR Present on Hospital Admission: Y  -AR Side: Left  -AR Location: ankle  -AR      Row Name 07/25/23 1600 07/25/23 1204          Wound 07/15/23 2100 Bilateral medial gluteal Pressure Injury    Wound - Properties Group Placement Date: 07/15/23  -MF Placement Time: 2100  -MF Present on Hospital Admission: Y  -MF Side: Bilateral  -MF Orientation: medial  -MF Location: gluteal  -MF Primary Wound Type: Pressure inj  -MF    Pressure Injury Stage -- 2  -EMRE     Dressing Appearance -- open to air  -EMRE     Closure Open to air  -EMRE Open to air  -EMRE     Base dry   -EMRE dry  -EMRE     Periwound warm;dry  -EMRE warm;dry  -EMRE     Periwound Temperature warm  -EMRE warm  -EMRE     Periwound Skin Turgor soft  -EMRE soft  -EMRE     Drainage Amount none  -EMRE none  -EMRE     Retired Wound - Properties Group Placement Date: 07/15/23  -MF Placement Time: 2100  -MF Present on Hospital Admission: Y  -MF Side: Bilateral  -MF Orientation: medial  -MF Location: gluteal  -MF Primary Wound Type: Pressure inj  -MF    Retired Wound - Properties Group Date first assessed: 07/15/23  -MF Time first assessed: 2100  -MF Present on Hospital Admission: Y  -MF Side: Bilateral  -MF Location: gluteal  -MF Primary Wound Type: Pressure inj  -MF       Wound 07/18/23 1900 Left posterior ankle    Wound - Properties Group Placement Date: 07/18/23  -AR Placement Time: 1900 -AR Present on Hospital Admission: Y  -AR Side: Left  -AR Orientation: posterior  -AR Location: ankle  -AR    Closure VIMAL  -EMRE VIMAL  -EMRE     Base dressing in place, unable to visualize  -EMRE dressing in place, unable to visualize  -EMRE     Retired Wound - Properties Group Placement Date: 07/18/23  -AR Placement Time: 1900 -AR Present on Hospital Admission: Y  -AR Side: Left  -AR Orientation: posterior  -AR Location: ankle  -AR    Retired Wound - Properties Group Date first assessed: 07/18/23  -AR Time first assessed: 1900  -AR Present on Hospital Admission: Y  -AR Side: Left  -AR Location: ankle  -AR              User Key  (r) = Recorded By, (t) = Taken By, (c) = Cosigned By      Initials Name Provider Type    Rachel George RN Registered Nurse    Lorrie Randall, RN Registered Nurse    Laura Nava, RN Registered Nurse    Danyell Samuel, RN Registered Nurse                      Assessment & Plan      Brief Patient Summary:  Isra Davenport is a 68 y.o. male       aluminum sulfate-calcium acetate 1:20, 1,000 mL, Topical, BID  aspirin, 81 mg, Oral, Daily  budesonide-formoterol, 2 puff, Inhalation, BID - RT  calcium 500 mg vitamin D 5 mcg (200  UT), 1 tablet, Oral, Daily  dexamethasone, 4 mg, Oral, Daily With Breakfast   And  insulin regular, 12 Units, Subcutaneous, Daily With Breakfast  dilTIAZem CD, 240 mg, Oral, Q24H  docusate sodium, 100 mg, Oral, BID  enoxaparin, 40 mg, Subcutaneous, Daily  escitalopram, 10 mg, Oral, Daily  ferrous sulfate, 324 mg, Oral, Daily With Breakfast  furosemide, 20 mg, Intravenous, Daily  gabapentin, 300 mg, Oral, BID  guaiFENesin, 600 mg, Oral, Q12H  hydrocortisone-bacitracin-zinc oxide-nystatin, 1 application , Topical, TID  insulin glargine, 14 Units, Subcutaneous, Q12H  insulin lispro, 12 Units, Subcutaneous, TID With Meals  insulin lispro, 2-9 Units, Subcutaneous, 4x Daily AC & at Bedtime  metoprolol tartrate, 75 mg, Oral, BID  midodrine, 10 mg, Oral, TID AC  nicotine, 1 patch, Transdermal, Q24H  nitroglycerin, 0.5 inch, Topical, Q8H  nystatin, 5 mL, Swish & Swallow, TID  pantoprazole, 40 mg, Oral, Q AM  senna-docusate sodium, 2 tablet, Oral, BID  sodium chloride, 10 mL, Intravenous, Q12H       Pharmacy Consult - Steroid Insulin Protocol,          I have utilized all available, immediate resources to obtain, update, or review the patient's current medications including all prescriptions, over-the-counter products, herbals, cannabis/cannabidiol products, and vitamin.mineral/dietary (nutritional) supplements.    Active Hospital Problems:  Active Hospital Problems    Diagnosis     **Atrial fibrillation with RVR     Moderate malnutrition     Atrial fibrillation      Plan:     Acute hypoxic respiratory failure  Left-sided pneumonia  COPD  -Imaging reports noted  -Oxygen requirement improving  -Continue bronchodilators and steroids  -Finished antibiotics  -Pulmonology following    PAF  -Rate controlled, monitor on telemetry  -Echo with preserved EF noted  -Recent noninvasive ischemic eval negative for ischemia per cardiology  -Continue p.o. Cardizem, metoprolol, midodrine  -Patient not candidate for anticoagulation due to  brain mets  -Cardiology following, okay to discharge from their standpoint    Stage IV lung cancer with mets to brain and bone  -Currently on chemotherapy and radiation  -Continue Decadron as per oncology  -Oncology following and managing    DM 2  -Continue basal insulin with sliding scale  -Monitor blood glucose  -Endocrinology following and managing    DANN  -Continue iron supplement  -Monitor hemoglobin, transfuse as needed    Depression  -Patient feeling more depressed during this admission  -Denies any SI  -Lexapro started during this admission  -Continue outpatient follow-up    Leukocytosis  -Likely reactive secondary to steroids  -Patient previously received antibiotics  -Monitor off antibiotics for now    DVT prophylaxis  -Lovenox      CODE STATUS:    Medical Intervention Limits: NO intubation (DNI)  Code Status (Patient has no pulse and is not breathing): No CPR (Do Not Attempt to Resuscitate)  Medical Interventions (Patient has pulse or is breathing): Limited Support      Disposition: Rehab placement.  Medically stable for discharge.    Electronically signed by Merle Pereyra DO, 07/26/23, 10:19 EDT.  [facility] [place] Hospitalist Team      Part of this note may be an electronic transcription/translation of spoken language to printed text using the Dragon Dictation System.

## 2023-07-26 NOTE — PROGRESS NOTES
[facility]        LOS:  LOS: 9 days   Patient Name: Isra Davenport  Age/Sex: 68 y.o. male  : 1955  MRN: [id number]    Day of Service: 23   Length of Stay: 9  Encounter Provider: Qamar Johnson MD  Place of Service: [facility] CARDIOLOGY  Patient Care Team:  Mayco Carreon MD as PCP - General (Internal Medicine)  Mina Dupree MD as Consulting Physician (Hematology and Oncology)    Subjective:     Chief Complaint: f/u Afib    Subjective: stable from CV standpoint B/p stable.     Current Medications:   Scheduled Meds:aspirin, 81 mg, Oral, Daily  budesonide-formoterol, 2 puff, Inhalation, BID - RT  calcium 500 mg vitamin D 5 mcg (200 UT), 1 tablet, Oral, Daily  dexamethasone, 4 mg, Oral, Daily With Breakfast   And  insulin regular, 12 Units, Subcutaneous, Daily With Breakfast  dilTIAZem CD, 240 mg, Oral, Q24H  docusate sodium, 100 mg, Oral, BID  enoxaparin, 40 mg, Subcutaneous, Daily  escitalopram, 10 mg, Oral, Daily  ferrous sulfate, 324 mg, Oral, Daily With Breakfast  furosemide, 20 mg, Intravenous, Daily  gabapentin, 300 mg, Oral, BID  guaiFENesin, 600 mg, Oral, Q12H  hydrocortisone-bacitracin-zinc oxide-nystatin, 1 application , Topical, BID  insulin glargine, 14 Units, Subcutaneous, Q12H  insulin lispro, 12 Units, Subcutaneous, TID With Meals  insulin lispro, 2-9 Units, Subcutaneous, 4x Daily AC & at Bedtime  metoprolol tartrate, 75 mg, Oral, BID  midodrine, 10 mg, Oral, TID AC  nicotine, 1 patch, Transdermal, Q24H  nitroglycerin, 0.5 inch, Topical, Q8H  nystatin, 5 mL, Swish & Swallow, TID  pantoprazole, 40 mg, Oral, Q AM  senna-docusate sodium, 2 tablet, Oral, BID  sodium chloride, 10 mL, Intravenous, Q12H      Continuous Infusions:Pharmacy Consult - Steroid Insulin Protocol,         Allergies:  Allergies   Allergen Reactions    Ibuprofen Itching    Penicillins Hives       Review of Systems   Constitutional: Negative for chills, diaphoresis and  malaise/fatigue.   Cardiovascular:  Negative for chest pain, dyspnea on exertion, irregular heartbeat, leg swelling, near-syncope, orthopnea, palpitations, paroxysmal nocturnal dyspnea and syncope.   Respiratory:  Negative for cough, shortness of breath, sleep disturbances due to breathing and sputum production.    Musculoskeletal:  Positive for back pain and joint pain.   Gastrointestinal:  Negative for change in bowel habit.   Genitourinary:  Negative for urgency.   Neurological:  Negative for dizziness and headaches.   Psychiatric/Behavioral:  Negative for altered mental status.        Objective:     Temp:  [97.1 °F (36.2 °C)-98 °F (36.7 °C)] 98 °F (36.7 °C)  Heart Rate:  [63-95] 68  Resp:  [10-23] 19  BP: ()/(57-71) 97/57     Intake/Output Summary (Last 24 hours) at 7/26/2023 1834  Last data filed at 7/26/2023 1700  Gross per 24 hour   Intake 1800 ml   Output 1700 ml   Net 100 ml       Body mass index is 23.44 kg/m².      07/24/23  0507 07/25/23  0507 07/26/23  0514   Weight: 73 kg (160 lb 15 oz) 72.9 kg (160 lb 11.5 oz) 74.1 kg (163 lb 5.8 oz)         General Appearance:    Alert, cooperative, in no acute distress                                Head: Atraumatic, normocephalic, PERRLA               Neck:   supple, trachea midline, no thyromegaly, no carotid bruit, no JVD   Lungs:     Clear to auscultation, respirations regular, even and               unlabored    Heart:    regular rhythm and normal rate, normal S1 and S2   Abdomen:     Normal bowel sounds, no masses, no organomegaly, soft  nontender, nondistended, no guarding, no rebound  tenderness   Extremities:   Moves all extremities well, no edema, no cyanosis, no  redness   Pulses:   Pulses palpable and equal bilaterally   Skin:   No bleeding, bruising or rash   Neurologic:   Awake, alert, oriented x3         Lab Review:   Results from last 7 days   Lab Units 07/26/23  0038 07/25/23  0134   SODIUM mmol/L 134* 136   POTASSIUM mmol/L 4.2 4.4   CHLORIDE  mmol/L 90* 91*   CO2 mmol/L 38.0* 39.0*   BUN mg/dL 22 25*   CREATININE mg/dL 0.28* 0.27*   GLUCOSE mg/dL 256* 136*   CALCIUM mg/dL 8.8 9.1       Results from last 7 days   Lab Units 07/25/23  2042 07/25/23  1802   HSTROP T ng/L 24* 37*     Results from last 7 days   Lab Units 07/26/23  0038 07/25/23  0134   WBC 10*3/mm3 12.80* 14.20*   HEMOGLOBIN g/dL 7.3* 7.9*   HEMATOCRIT % 24.3* 26.1*   PLATELETS 10*3/mm3 317 352           Results from last 7 days   Lab Units 07/20/23  0106   MAGNESIUM mg/dL 1.7             Invalid input(s): LDLCALC              Recent Radiology:  Imaging Results (Most Recent)       Procedure Component Value Units Date/Time    XR Chest 1 View [[id number]] Collected: 07/23/23 1959     Updated: 07/23/23 2004    Narrative:      XR CHEST 1 VW    Date of Exam: 7/23/2023 7:52 PM EDT    Indication: Hypoxia, left lung cancer.    Comparison: 7/15/2023.    Findings:  The heart is enlarged. There is worsening consolidation in the left perihilar region. This is secondary to a known left lung mass secondary to left lung cancer. The consolidation probably represents worsening postobstructive pneumonitis and atelectasis.   There may be a small left pleural effusion. Within the right lung, the pulmonary vascular markings appear increased. This suggests that the patient has pulmonary vascular congestion/low-grade pulmonary edema. The right lung and pleural space are   otherwise clear. There is a right-sided port in place. There is partially imaged fusion hardware in the lower thoracic spine.      Impression:      Impression:    1. Worsening consolidation in the left perihilar region. This is secondary to a known lung mass consistent with lung cancer. There is probably worsening postobstructive pneumonitis/atelectasis and there may be a small left pleural effusion.  2. Cardiomegaly with pulmonary vascular congestion/low-grade pulmonary edema.      Electronically Signed: Zeyad Contreras    7/23/2023 8:02 PM EDT     Workstation ID: [id number]    XR Chest 1 View [[id number]] Collected: 07/15/23 1808     Updated: 07/15/23 1938    Narrative:      XR CHEST 1 VW    Date of Exam: 7/15/2023 5:58 PM EDT    Indication: edema    Comparison: 6/29/2023    Findings:  Right internal jugular Port-A-Cath remains in place and unchanged in position. Heart size is stable. There is chronic elevation of left ami diaphragm. There is were airspace disease compatible with pneumonia. Right lung is clear. No pleural effusion. No   evidence pneumothorax.      Impression:      Impression:    1. Worsening left basilar airspace disease compatible with pneumonia.  2. Chronic elevation of the left hemidiaphragm.      Electronically Signed: Jeet Panda    7/15/2023 6:09 PM EDT    Workstation ID: [id number]    XR Foot 3+ View Bilateral [[id number]] Collected: 07/15/23 1832     Updated: 07/15/23 1836    Narrative:      XR FOOT 3+ VW BILATERAL    Date of Exam: 7/15/2023 6:04 PM EDT    Indication: pain    Comparison: None available.    Findings:  Right foot: There is no acute fracture or dislocation. No bony erosion or abnormal periosteal reaction. Soft tissues are unremarkable.    Left foot: There is no acute fracture or dislocation. No bony erosion or abnormal periosteal reaction. There is mild diffuse soft tissue swelling.        Impression:      Impression:    1. No acute bony abnormality of the feet.      Electronically Signed: Jeet Panda    7/15/2023 6:34 PM EDT    Workstation ID: [id number]    XR Tibia Fibula 2 View Bilateral [[id number]] Collected: 07/15/23 1829     Updated: 07/15/23 1833    Narrative:      XR TIBIA FIBULA 2 VW BILATERAL    Date of Exam: 7/15/2023 6:04 PM EDT    Indication: pain    Comparison: None available.    Findings:  Left tibia and fibula: There is no acute fracture or dislocation. Soft tissues are unremarkable.    Right tibia and fibula: There is smooth deformity of the midshaft of both the tibia and fibula related to old healed  fractures. There is single surgical screw in place through the mid tibia. There is no evidence of acute fracture or dislocation. No   abnormal periosteal reaction identified. Soft tissues are unremarkable.      Impression:      Impression:    1. No acute bony abnormality of the left tibia or fibula.  2. Old healed fractures of the right tibia and fibula. No acute bony abnormality.    Electronically Signed: Jeet Panda    7/15/2023 6:31 PM EDT    Workstation ID: [id number]            ECHOCARDIOGRAM:    Results for orders placed during the hospital encounter of 06/16/23    Adult Transthoracic Echo Complete W/ Cont if Necessary Per Protocol    Interpretation Summary    Left ventricular systolic function is normal. Left ventricular ejection fraction appears to be 61 - 65%.    Left ventricular diastolic function is consistent with (grade I) impaired relaxation.    There is mild bileaflet mitral valve prolapse present.    Estimated right ventricular systolic pressure from tricuspid regurgitation is mildly elevated (35-45 mmHg). Calculated right ventricular systolic pressure from tricuspid regurgitation is 44 mmHg.        I reviewed the patient's new clinical results.    EKG:      Assessment:       Atrial fibrillation with RVR    Atrial fibrillation    Moderate malnutrition    1. Hip fracture, pathologic, s/p surgery  2. 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 Tachycardia, pAfib/flutter  4. Acute hypoxic respiratory failure    Plan:   Rates improved, controlled  Recent non invasive ischemic eval negative for ischemia  Echo with preserved LV function  Per oncology not recommended long term a/c due to brain mets and known rectal bleeding        Patient is seen and examined and findings are verified.  All data is reviewed by me personally.  Assessment and plan formulated by APC was done after discussion with attending.  I spent more than 50%  of time in taking care of the patient.    Patient underwent radiation yesterday and day before.  May be anticipated today.  Patient denies any chest pain today.    Normal S1 and S2.  No pericardial rub or murmur abdominal exam is benign    Patient is converted into sinus rhythm I am pleased with that.  Heart rate is controlled blood pressure is improved.    At this stage I will continue current treatment.  I will switch nitroglycerin paste to Imdur tomorrow      Qamar Johnson MD  07/26/23  18:34 EDT

## 2023-07-26 NOTE — PROGRESS NOTES
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 male admitted to [facility] through the ED on 7/15/2023 with complaints of uncontrolled pain and BLE edema.  In the ED, he developed tachycardia in the 150s and was found to be in A-fib with RVR.  He was started on supplemental oxygen for O2 sat drop to 88%.  He received Cardizem bolus followed by drip and Lopressor bolus.  In the ED, CBC revealed WBC 11.4, hemoglobin 10.0, MCV 84.8, and platelets 404,000.  Creatinine and LFTs were not elevated.  CXR revealed left basilar pneumonia.  He reported left lower extremity pain with x-ray of the left tip/fib and left foot negative for acute findings.    PMH was significant for recently diagnosed stage IV squamous cell carcinoma of the left upper lobe lung.  He was post radiation therapy to the brain () and currently receiving radiation therapy to the right hip and femur (started 2023 with 10 fractions planned).  He was last seen by our service as an outpatient on 2023 where he was prescribed Lasix and hydrocodone/APAP 5/325 mg as needed for his BLE edema and pain.  BLE venous Doppler performed as an outpatient on 2023 was negative for DVT/SVT and showed bilateral fluid retention.  At time of consultation 2023, hemoglobin had dropped to 9.9.     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  Our service had initially evaluated him during 2023 - 2023 hospitalization during which lung cancer diagnosis was made and initial work-up completed.  He had received radiation  therapy to the brain and is currently receiving radiation to the hip/femur.  He was also diagnosed with iron deficiency anemia during the hospital stay where he was treated with IV iron and discharged on oral ferrous sulfate with GERD prophylaxis.  Complete anemia work-up with the exception of bone marrow was performed during the hospital stay with findings negative for additional etiologies.  Incidentally his copper level was elevated without multivitamin supplementation.  CBC on 7/14/2023 revealed WBC 13, hemoglobin 10.76, and platelets 329,000.  He was continued on daily oral iron.  Molecular testing on his tumor pathology was pending with plans to start chemotherapy after radiation was completed.  He was also to start Xgeva for bone metastases support and was prescribed calcium with vitamin D supplementation.     PCP: Mayco Carreon MD    INTERVAL HISTORY:  7/17/2023 - white blood count 12.30, hemoglobin 9.5, iron 22 (), iron saturation 10 (), TIBC 224 (298-536). Calculated iron deficit is 662 mg. Started Ferrlecit 250 mg IV x 3 days.  Molecular testing on tumor specimen revealed low-level PD-L1 positivity.  7/18/2023-hemoglobin 8.8.  Patient developed SVT overnight and was treated with Cardizem.  Weight loss 33 pounds in 5 weeks.  Changed Pepcid to Protonix.  Palliative care and radiation oncology both consulted.  Physical therapy recommending skilled nursing facility at discharge.  7/19/2023-patient decided DNR/DNI but wants to continue aggressive treatment and try chemotherapy/immunotherapy.  7/20/2023-WBC 13.4, hemoglobin 9. Hospice was consulted by primary team for inpatient hospice admission.  7/21/2023-WBC 12.7, hemoglobin 8.5.  Dexamethasone decreased to 4 mg po daily.   7/22/2023-cardiac electrophysiology was consulted and they recommended, due to his metastatic cancer and comorbidities, to not proceed with ablation or cardioversion and to manage the patient with conservative  therapy.  Increased gabapentin to 300 mg twice daily.    7/23/2023-WBC 14.2, hemoglobin 8.7.  Lexapro started for depression.  7/24/2023-WBC 15.5, hemoglobin 8.3.  Bilateral FREDY-The right FREDY was normal with normal digital pressures. The left FREDY was normal with mild digital ischemia.  Resumed radiation to his right hip/femur and received fraction 4/10.  7/25/2023-hemoglobin 7.9.  7/26/2023-WBC 12.8 and hemoglobin 7.5.    History of present illness reviewed since last visit and changes noted on 07/26/23.    Subjective   patient has no complaints this morning.    ROS:  Review of Systems   Constitutional:  Negative for activity change, appetite change, chills, fatigue, fever and unexpected weight change.   HENT:  Negative for mouth sores, sore throat and tinnitus.    Eyes:  Negative for photophobia, pain, redness and visual disturbance.   Respiratory:  Negative for apnea, cough and shortness of breath.    Cardiovascular:  Negative for chest pain, palpitations and leg swelling.   Gastrointestinal:  Negative for abdominal pain, constipation, diarrhea, nausea and vomiting.   Genitourinary:  Negative for dysuria, enuresis and hematuria.   Musculoskeletal:  Negative for arthralgias, back pain, myalgias and neck pain.   Skin:  Negative for rash and wound.   Allergic/Immunologic: Negative for environmental allergies.   Neurological:  Negative for dizziness, speech difficulty, weakness, light-headedness, numbness and headaches.   Hematological:  Negative for adenopathy. Does not bruise/bleed easily.   Psychiatric/Behavioral:  Negative for dysphoric mood. The patient is not nervous/anxious.       MEDICATIONS:    Scheduled Meds:  aluminum sulfate-calcium acetate 1:20, 1,000 mL, Topical, BID  aspirin, 81 mg, Oral, Daily  budesonide-formoterol, 2 puff, Inhalation, BID - RT  calcium 500 mg vitamin D 5 mcg (200 UT), 1 tablet, Oral, Daily  dexamethasone, 4 mg, Oral, Daily With Breakfast   And  insulin regular, 12 Units,  "Subcutaneous, Daily With Breakfast  dilTIAZem CD, 240 mg, Oral, Q24H  docusate sodium, 100 mg, Oral, BID  enoxaparin, 40 mg, Subcutaneous, Daily  escitalopram, 10 mg, Oral, Daily  ferrous sulfate, 324 mg, Oral, Daily With Breakfast  furosemide, 20 mg, Intravenous, Daily  gabapentin, 300 mg, Oral, BID  guaiFENesin, 600 mg, Oral, Q12H  hydrocortisone-bacitracin-zinc oxide-nystatin, 1 application , Topical, TID  insulin glargine, 14 Units, Subcutaneous, Q12H  insulin lispro, 12 Units, Subcutaneous, TID With Meals  insulin lispro, 2-9 Units, Subcutaneous, 4x Daily AC & at Bedtime  metoprolol tartrate, 75 mg, Oral, BID  midodrine, 10 mg, Oral, TID AC  nicotine, 1 patch, Transdermal, Q24H  nitroglycerin, 0.5 inch, Topical, Q8H  nystatin, 5 mL, Swish & Swallow, TID  pantoprazole, 40 mg, Oral, Q AM  senna-docusate sodium, 2 tablet, Oral, BID  sodium chloride, 10 mL, Intravenous, Q12H    Continuous Infusions:  Pharmacy Consult - Steroid Insulin Protocol,     PRN Meds:    senna-docusate sodium **AND** polyethylene glycol **AND** bisacodyl **AND** bisacodyl    Calcium Replacement - Follow Nurse / BPA Driven Protocol    dextrose    dextrose    glucagon (human recombinant)    HYDROcodone-acetaminophen    hydrOXYzine    ipratropium-albuterol    Magnesium Standard Dose Replacement - Follow Nurse / BPA Driven Protocol    Morphine    ondansetron    Pharmacy Consult - Steroid Insulin Protocol    Phosphorus Replacement - Follow Nurse / BPA Driven Protocol    Potassium Replacement - Follow Nurse / BPA Driven Protocol    [COMPLETED] Insert Peripheral IV **AND** sodium chloride    sodium chloride    sodium chloride     ALLERGIES:    Allergies   Allergen Reactions    Ibuprofen Itching    Penicillins Hives     Objective    VITALS:   /67 (BP Location: Left arm, Patient Position: Lying)   Pulse 72   Temp 97.3 °F (36.3 °C) (Oral)   Resp 23   Ht 177.8 cm (70\")   Wt 74.1 kg (163 lb 5.8 oz)   SpO2 94%   BMI 23.44 kg/m²     PHYSICAL " EXAM:  Physical Exam  Vitals reviewed.   Constitutional:       General: He is not in acute distress.     Appearance: He is well-developed. He is not diaphoretic.   HENT:      Head: Normocephalic and atraumatic.      Nose:      Comments: Wearing BiPAP.     Mouth/Throat:      Mouth: Mucous membranes are moist.      Pharynx: Oropharynx is clear. No oropharyngeal exudate.      Comments: Edentulous.  Eyes:      Conjunctiva/sclera: Conjunctivae normal.   Neck:      Thyroid: No thyromegaly.      Vascular: No JVD.   Cardiovascular:      Rate and Rhythm: Normal rate and regular rhythm.      Heart sounds: Normal heart sounds. No murmur heard.     Comments: Cardiac monitor leads.   Pulmonary:      Effort: Pulmonary effort is normal. No respiratory distress.      Breath sounds: Normal breath sounds. No stridor.   Chest:      Comments: Left chest wall tattoo.   Right chest wall Rbnjun-M-Kdjj - not accessed.   Abdominal:      General: Bowel sounds are normal.      Palpations: Abdomen is soft.      Tenderness: There is no abdominal tenderness. There is no guarding.   Musculoskeletal:         General: Normal range of motion.      Cervical back: Normal range of motion and neck supple. No rigidity.      Right lower le+ Edema present.      Left lower le+ Edema present.      Comments: Left hand oxygen saturation monitor.   Skin:     General: Skin is warm and dry.      Coloration: Skin is not jaundiced.      Findings: No erythema or rash.      Comments: Right upper extremity tattoos.  Left upper extremity IV.   Neurological:      Mental Status: He is alert and oriented to person, place, and time.      Sensory: No sensory deficit.   Psychiatric:         Mood and Affect: Mood normal.         Behavior: Behavior normal.         Thought Content: Thought content normal.         Judgment: Judgment normal.       RECENT LABS:  Lab Results (last 24 hours)       Procedure Component Value Units Date/Time    Manual Differential [[id number]]   (Abnormal) Collected: 07/26/23 0038    Specimen: Blood Updated: 07/26/23 0523     Neutrophil % 87.0 %      Lymphocyte % 4.0 %      Monocyte % 4.0 %      Bands %  5.0 %      Neutrophils Absolute 11.78 10*3/mm3      Lymphocytes Absolute 0.51 10*3/mm3      Monocytes Absolute 0.51 10*3/mm3      Anisocytosis Slight/1+     Microcytes Slight/1+     WBC Morphology Normal     Platelet Estimate Adequate     Large Platelets Slight/1+    CBC & Differential [[id number]]  (Abnormal) Collected: 07/26/23 0038    Specimen: Blood Updated: 07/26/23 0523    Narrative:      The following orders were created for panel order CBC & Differential.  Procedure                               Abnormality         Status                     ---------                               -----------         ------                     CBC Auto Differential[[id number]]        Abnormal            Final result               Scan Slide[[id number]]                                       Final result                 Please view results for these tests on the individual orders.    Scan Slide [[id number]] Collected: 07/26/23 0038    Specimen: Blood Updated: 07/26/23 0523     Scan Slide --     Comment: See Manual Differential Results       CBC Auto Differential [[id number]]  (Abnormal) Collected: 07/26/23 0038    Specimen: Blood Updated: 07/26/23 0523     WBC 12.80 10*3/mm3      RBC 2.85 10*6/mm3      Hemoglobin 7.3 g/dL      Hematocrit 24.3 %      MCV 85.0 fL      MCH 25.8 pg      MCHC 30.3 g/dL      RDW 20.3 %      RDW-SD 63.4 fl      MPV 8.9 fL      Platelets 317 10*3/mm3     Narrative:      The previously reported component NRBC is no longer being reported. Previous result was 0.3 /100 WBC (Reference Range: 0.0-0.2 /100 WBC) on 7/26/2023 at 0146 EDT.    Basic Metabolic Panel [[id number]]  (Abnormal) Collected: 07/26/23 0038    Specimen: Blood Updated: 07/26/23 0246     Glucose 256 mg/dL      BUN 22 mg/dL      Creatinine 0.28 mg/dL      Sodium 134 mmol/L      Potassium  4.2 mmol/L      Chloride 90 mmol/L      CO2 38.0 mmol/L      Calcium 8.8 mg/dL      BUN/Creatinine Ratio 78.6     Anion Gap 6.0 mmol/L      eGFR 133.2 mL/min/1.73     Narrative:      GFR Normal >60  Chronic Kidney Disease <60  Kidney Failure <15      Blood Culture - Blood, Arm, Right [[id number]]  (Normal) Collected: 07/23/23 2231    Specimen: Blood from Arm, Right Updated: 07/25/23 2245     Blood Culture No growth at 2 days    Narrative:      Less than seven (7) mL's of blood was collected.  Insufficient quantity may yield false negative results.    Blood Culture - Blood, Hand, Left [[id number]]  (Normal) Collected: 07/23/23 2237    Specimen: Blood from Hand, Left Updated: 07/25/23 2245     Blood Culture No growth at 2 days    Narrative:      Less than seven (7) mL's of blood was collected.  Insufficient quantity may yield false negative results.    Extra Tubes [[id number]] Collected: 07/25/23 2104    Specimen: Blood, Venous Line Updated: 07/25/23 2215    Narrative:      The following orders were created for panel order Extra Tubes.  Procedure                               Abnormality         Status                     ---------                               -----------         ------                     Lavender Top[[id number]]                                     Final result                 Please view results for these tests on the individual orders.    Lavender Top [[id number]] Collected: 07/25/23 2104    Specimen: Blood Updated: 07/25/23 2215     Extra Tube hold for add-on     Comment: Auto resulted       High Sensitivity Troponin T 2Hr [[id number]]  (Abnormal) Collected: 07/25/23 2042    Specimen: Blood Updated: 07/25/23 2129     HS Troponin T 24 ng/L      Troponin T Delta -13 ng/L     Narrative:      High Sensitive Troponin T Reference Range:  <10.0 ng/L- Negative Female for AMI  <15.0 ng/L- Negative Male for AMI  >=10 - Abnormal Female indicating possible myocardial injury.  >=15 - Abnormal Male indicating  possible myocardial injury.   Clinicians would have to utilize clinical acumen, EKG, Troponin, and serial changes to determine if it is an Acute Myocardial Infarction or myocardial injury due to an underlying chronic condition.         POC Glucose Once [[id number]]  (Abnormal) Collected: 07/25/23 2028    Specimen: Blood Updated: 07/25/23 2030     Glucose 236 mg/dL      Comment: Serial Number: [id number]:  [id number]       High Sensitivity Troponin T [[id number]]  (Abnormal) Collected: 07/25/23 1802    Specimen: Blood Updated: 07/25/23 1858     HS Troponin T 37 ng/L     Narrative:      High Sensitive Troponin T Reference Range:  <10.0 ng/L- Negative Female for AMI  <15.0 ng/L- Negative Male for AMI  >=10 - Abnormal Female indicating possible myocardial injury.  >=15 - Abnormal Male indicating possible myocardial injury.   Clinicians would have to utilize clinical acumen, EKG, Troponin, and serial changes to determine if it is an Acute Myocardial Infarction or myocardial injury due to an underlying chronic condition.         POC Glucose Once [[id number]]  (Abnormal) Collected: 07/25/23 1624    Specimen: Blood Updated: 07/25/23 1625     Glucose 111 mg/dL      Comment: Serial Number: [id number]:  [id number]       POC Glucose Once [[id number]]  (Abnormal) Collected: 07/25/23 1151    Specimen: Blood Updated: 07/25/23 1154     Glucose 178 mg/dL      Comment: Serial Number: [id number]:  [id number]             PENDING RESULTS:  N/A    IMAGING REVIEWED:   No radiology results for the last day    I have reviewed the patient's labs, imaging, reports, and other clinician documentation.    Assessment & Plan   ASSESSMENT  Stage IV squamous cell carcinoma left upper lobe lung-s/p radiation to the brain and resumed radiation to the right hip/femur (fx 4/10 given on 7/24). Molecular testing on tumor pathology revealed low-level PD-L1 positivity.  Orders have been written for outpatient palliative  chemotherapy/immunotherapy and Xgeva, once radiation therapy has completed.  He has decided to pursue treatment as planned, but has chosen to be a DNR/DNI.  Dexamethasone tapered again to 4 mg po daily (7/21/2023). Continued calcium plus vitamin D.  DANN with malabsorption and poor tolerance of oral iron/Elevated copper level-he has not been on multivitamin containing copper. He had reported bright red blood per rectum as outpatient with 2 colonoscopies in the past 6 years that were unremarkable per his report. Received IV iron last admit and again this admit (completed 7/19).  On Protonix, aspirin and dexamethasone. Hemoglobin continues to slowly fall. Iron studies showed continued DANN with a slight improvement from last admission. Tolerating oral ferrous sulfate so far.  Anemia could be worse secondary to hip radiation.  Will reevaluate iron profile soon and treat as indicated.   Left lung pneumonia-likely post-obstructive. On dexamethasone.  Per pulmonary.    BLE edema and pain-recent outpatient BLE Doppler was negative for DVT/SVT. On Lasix and Gabapentin.  Increased dose of gabapentin and ABIs normal. Added prophy Lovenox. Pain controlled.  A-fib with RVR-new onset noted in ED.  Cardiology and EP cardiology consulted.  On ASA and prophy Lovenox.    Chronic nausea, Uncontrolled DM2, Low TSH, and severe wt. Loss-on Protonix. Receiving Glucerna twice daily and Colace.  Endocrine managing.  Tapering Dexamethasone. Weight has stabilized.  Smoking-cessation counseling continued.  On a nicotine patch.   Recent pathological right hip fracture-physical therapy working with patient.  On calcium plus vitamin D. Plannning rehab placement.   Oral thrush-Improved and now on maintenance nystatin.   Agitation/depression-due to prolonged hospitalization and clinical status.  On Lexapro. Improved.     PLAN  Follow CBC.   Continue nystatin 100,000 units/mL 5 mL p.o. 3 times daily.  Continue dexamethasone 4 mg to daily.   Continue  ASA 81 mg by mouth daily and prophy Lovenox.  Would not recommend full dose anticoagulation for A-fib secondary to brain metastasis and known rectal bleeding unless benefits exceed risks.  Continue PRN Norco and Morphine.  Continue gabapentin.  Continue Protonix.      Continue ferrous sulfate 325mg po daily.   Continue nicotine patch.   Continue calcium plus vitamin D daily.  Continue Lexapro 10 mg p.o daily.   Continue Glucerna twice daily and Colace.    Continue Radiation therapy.   Plan for combination of chemotherapy and immunotherapy as an outpatient, post radiation.  Start Xgeva for bone metastases support as outpatient.  Recommend outpatient GI evaluation.  Brain MRI as outpatient to f/u on metastasis, post XRT.     Seen and evaluated by Dr. Dupree.  Electronically signed by NOREEN Phillips, 07/26/23, 9:23 AM EDT.      I have personally performed a face-to-face diagnostic evaluation on this patient. I have performed a complete history and physical examination, reviewed laboratory studies, and radiographic examinations.  I have completed the majority and substantive portion of the medical decision making.  I have formulated the assessment on this patient and the plan of action as noted above. I have discussed the case with Aminah Houser NP, have edited/reviewed the note, and agree with the care plan.  He claims to be feeling all right.  He is wearing a BiPAP this morning and has 1+ lower extremity edema.  Hemoglobin is down to 7.3.  He continues on radiation to the hip.  He may need a blood transfusion soon.      I discussed the patient's findings and my recommendations with patient.    Part of this note may be an electronic transcription/translation of spoken language to printed text using the Dragon Dictation System.    Electronically signed by Mina Dupree MD, 07/26/23, 5:19 PM EDT.

## 2023-07-26 NOTE — NURSING NOTE
WOCN note:    68 yr old male admitted 7/15/23 with complaints of swelling and pain to BLEs. 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 He is receiving radiation treatments at [facility].     WOCN follow up on BLEs and sacral pressure injuries. The legs are currently unwrapped. There are two wounds to the left lateral dorsal foot that are tender to touch and are covered in adherent yellow slough. There is a silicone bordered foam dressing in place. The patient reports the LE edema has improved. There are pinpoint areas of scabbing noted to the right foot and flakes of dry skin.   The lower legs and feet were cleansed with soap and water and hydrating lotion applied. The silicone bordered foam dressing was replaced to the left foot. Both lower legs were wrapped with kerlix and two 4inch ACE wraps from the base of the toes to below the knees. Foam off loading boots were applied.    The denuded areas to the buttocks have resolved but the pressure injuries remain and are healing. Patient continues to complain of pain to the area. He is continent of stool and had a formed stool per primary RN. The Edith's Magic cream was applied. Will discontinue the Domeboro's soaks for now but recommend to alternate the Edith's magic cream with Calazime zinc barrier. Will reorder the Agiliti low air loss bed surface. Continue pressure injury prevention measures and skin care. We will continue to follow.

## 2023-07-27 ENCOUNTER — HOSPITAL ENCOUNTER (OUTPATIENT)
Dept: RADIATION ONCOLOGY | Facility: HOSPITAL | Age: 68
Discharge: HOME | End: 2023-07-27
Payer: MEDICARE

## 2023-07-27 VITALS
WEIGHT: 162.04 LBS | RESPIRATION RATE: 15 BRPM | OXYGEN SATURATION: 91 % | HEART RATE: 82 BPM | SYSTOLIC BLOOD PRESSURE: 108 MMHG | TEMPERATURE: 98 F | DIASTOLIC BLOOD PRESSURE: 59 MMHG | HEIGHT: 70 IN | BODY MASS INDEX: 23.2 KG/M2

## 2023-07-27 LAB
ANION GAP SERPL CALCULATED.3IONS-SCNC: 4 MMOL/L (ref 5–15)
ANISOCYTOSIS BLD QL: ABNORMAL
BUN SERPL-MCNC: 21 MG/DL (ref 8–23)
BUN/CREAT SERPL: 100 (ref 7–25)
CALCIUM SPEC-SCNC: 8.4 MG/DL (ref 8.6–10.5)
CHLORIDE SERPL-SCNC: 91 MMOL/L (ref 98–107)
CO2 SERPL-SCNC: 39 MMOL/L (ref 22–29)
CREAT SERPL-MCNC: 0.21 MG/DL (ref 0.76–1.27)
DEPRECATED RDW RBC AUTO: 62.1 FL (ref 37–54)
EGFRCR SERPLBLD CKD-EPI 2021: 145.3 ML/MIN/1.73
ERYTHROCYTE [DISTWIDTH] IN BLOOD BY AUTOMATED COUNT: 20.5 % (ref 12.3–15.4)
GLUCOSE BLDC GLUCOMTR-MCNC: 110 MG/DL (ref 70–105)
GLUCOSE BLDC GLUCOMTR-MCNC: 137 MG/DL (ref 70–105)
GLUCOSE SERPL-MCNC: 263 MG/DL (ref 65–99)
HCT VFR BLD AUTO: 24.3 % (ref 37.5–51)
HGB BLD-MCNC: 7.4 G/DL (ref 13–17.7)
LARGE PLATELETS: ABNORMAL
LYMPHOCYTES # BLD MANUAL: 0.49 10*3/MM3 (ref 0.7–3.1)
LYMPHOCYTES NFR BLD MANUAL: 4 % (ref 5–12)
MCH RBC QN AUTO: 26.5 PG (ref 26.6–33)
MCHC RBC AUTO-ENTMCNC: 30.6 G/DL (ref 31.5–35.7)
MCV RBC AUTO: 86.6 FL (ref 79–97)
MONOCYTES # BLD: 0.49 10*3/MM3 (ref 0.1–0.9)
MYELOCYTES NFR BLD MANUAL: 2 % (ref 0–0)
NEUTROPHILS # BLD AUTO: 11.07 10*3/MM3 (ref 1.7–7)
NEUTROPHILS NFR BLD MANUAL: 87 % (ref 42.7–76)
NEUTS BAND NFR BLD MANUAL: 3 % (ref 0–5)
PLATELET # BLD AUTO: 302 10*3/MM3 (ref 140–450)
PMV BLD AUTO: 8.7 FL (ref 6–12)
POTASSIUM SERPL-SCNC: 4.2 MMOL/L (ref 3.5–5.2)
RAD ONC ARIA COURSE ID: NORMAL
RAD ONC ARIA COURSE ID: NORMAL
RAD ONC ARIA COURSE INTENT: NORMAL
RAD ONC ARIA COURSE INTENT: NORMAL
RAD ONC ARIA COURSE LAST TREATMENT DATE: NORMAL
RAD ONC ARIA COURSE LAST TREATMENT DATE: NORMAL
RAD ONC ARIA COURSE START DATE: NORMAL
RAD ONC ARIA COURSE START DATE: NORMAL
RAD ONC ARIA COURSE TREATMENT ELAPSED DAYS: 14
RAD ONC ARIA COURSE TREATMENT ELAPSED DAYS: 15
RAD ONC ARIA FIRST TREATMENT DATE: NORMAL
RAD ONC ARIA FIRST TREATMENT DATE: NORMAL
RAD ONC ARIA PLAN FRACTIONS TREATED TO DATE: 6
RAD ONC ARIA PLAN FRACTIONS TREATED TO DATE: 6
RAD ONC ARIA PLAN FRACTIONS TREATED TO DATE: 7
RAD ONC ARIA PLAN FRACTIONS TREATED TO DATE: 7
RAD ONC ARIA PLAN ID: NORMAL
RAD ONC ARIA PLAN NAME: NORMAL
RAD ONC ARIA PLAN PRESCRIBED DOSE PER FRACTION: 1.5 GY
RAD ONC ARIA PLAN PRIMARY REFERENCE POINT: NORMAL
RAD ONC ARIA PLAN TOTAL FRACTIONS PRESCRIBED: 10
RAD ONC ARIA PLAN TOTAL PRESCRIBED DOSE: 1500 CGY
RAD ONC ARIA REFERENCE POINT DOSAGE GIVEN TO DATE: 18 GY
RAD ONC ARIA REFERENCE POINT DOSAGE GIVEN TO DATE: 21 GY
RAD ONC ARIA REFERENCE POINT ID: NORMAL
RAD ONC ARIA REFERENCE POINT ID: NORMAL
RAD ONC ARIA REFERENCE POINT SESSION DOSAGE GIVEN: 3 GY
RAD ONC ARIA REFERENCE POINT SESSION DOSAGE GIVEN: 3 GY
RBC # BLD AUTO: 2.81 10*6/MM3 (ref 4.14–5.8)
SCAN SLIDE: NORMAL
SODIUM SERPL-SCNC: 134 MMOL/L (ref 136–145)
TOXIC GRANULATION: ABNORMAL
VARIANT LYMPHS NFR BLD MANUAL: 4 % (ref 19.6–45.3)
WBC NRBC COR # BLD: 12.3 10*3/MM3 (ref 3.4–10.8)

## 2023-07-27 PROCEDURE — 63710000001 INSULIN LISPRO (HUMAN) PER 5 UNITS: Performed by: INTERNAL MEDICINE

## 2023-07-27 PROCEDURE — 80048 BASIC METABOLIC PNL TOTAL CA: CPT | Performed by: INTERNAL MEDICINE

## 2023-07-27 PROCEDURE — 85007 BL SMEAR W/DIFF WBC COUNT: CPT | Performed by: NURSE PRACTITIONER

## 2023-07-27 PROCEDURE — 94664 DEMO&/EVAL PT USE INHALER: CPT

## 2023-07-27 PROCEDURE — 82948 REAGENT STRIP/BLOOD GLUCOSE: CPT

## 2023-07-27 PROCEDURE — 99231 SBSQ HOSP IP/OBS SF/LOW 25: CPT | Performed by: INTERNAL MEDICINE

## 2023-07-27 PROCEDURE — 77412 RADIATION TX DELIVERY LVL 3: CPT | Performed by: RADIOLOGY

## 2023-07-27 PROCEDURE — 63710000001 DEXAMETHASONE PER 0.25 MG: Performed by: INTERNAL MEDICINE

## 2023-07-27 PROCEDURE — 63710000001 INSULIN REGULAR HUMAN PER 5 UNITS: Performed by: INTERNAL MEDICINE

## 2023-07-27 PROCEDURE — 25010000002 FUROSEMIDE PER 20 MG: Performed by: NURSE PRACTITIONER

## 2023-07-27 PROCEDURE — 94799 UNLISTED PULMONARY SVC/PX: CPT

## 2023-07-27 PROCEDURE — 85025 COMPLETE CBC W/AUTO DIFF WBC: CPT | Performed by: NURSE PRACTITIONER

## 2023-07-27 PROCEDURE — 63710000001 INSULIN GLARGINE PER 5 UNITS: Performed by: INTERNAL MEDICINE

## 2023-07-27 PROCEDURE — 94761 N-INVAS EAR/PLS OXIMETRY MLT: CPT

## 2023-07-27 RX ORDER — ESCITALOPRAM OXALATE 10 MG/1
10 TABLET ORAL DAILY
Qty: 30 TABLET | Refills: 0 | Status: SHIPPED | OUTPATIENT
Start: 2023-07-28

## 2023-07-27 RX ORDER — GABAPENTIN 300 MG/1
300 CAPSULE ORAL 2 TIMES DAILY
Qty: 30 CAPSULE | Refills: 0 | Status: SHIPPED | OUTPATIENT
Start: 2023-07-27

## 2023-07-27 RX ORDER — BUDESONIDE AND FORMOTEROL FUMARATE DIHYDRATE 160; 4.5 UG/1; UG/1
2 AEROSOL RESPIRATORY (INHALATION)
Qty: 6 G | Refills: 0 | Status: SHIPPED | OUTPATIENT
Start: 2023-07-27

## 2023-07-27 RX ORDER — INSULIN LISPRO 100 [IU]/ML
12 INJECTION, SOLUTION INTRAVENOUS; SUBCUTANEOUS
Qty: 10 ML | Refills: 0 | Status: SHIPPED | OUTPATIENT
Start: 2023-07-27

## 2023-07-27 RX ORDER — MIDODRINE HYDROCHLORIDE 10 MG/1
10 TABLET ORAL
Qty: 90 TABLET | Refills: 0 | Status: SHIPPED | OUTPATIENT
Start: 2023-07-27 | End: 2023-08-01

## 2023-07-27 RX ORDER — HYDROCODONE BITARTRATE AND ACETAMINOPHEN 5; 325 MG/1; MG/1
1 TABLET ORAL EVERY 4 HOURS PRN
Qty: 8 TABLET | Refills: 0 | Status: SHIPPED | OUTPATIENT
Start: 2023-07-27

## 2023-07-27 RX ORDER — DILTIAZEM HYDROCHLORIDE 240 MG/1
240 CAPSULE, COATED, EXTENDED RELEASE ORAL
Qty: 30 CAPSULE | Refills: 0 | Status: SHIPPED | OUTPATIENT
Start: 2023-07-28

## 2023-07-27 RX ADMIN — NITROGLYCERIN 0.5 INCH: 20 OINTMENT TOPICAL at 05:43

## 2023-07-27 RX ADMIN — METOPROLOL TARTRATE 75 MG: 25 TABLET, FILM COATED ORAL at 10:30

## 2023-07-27 RX ADMIN — GUAIFENESIN 600 MG: 600 TABLET, EXTENDED RELEASE ORAL at 09:29

## 2023-07-27 RX ADMIN — INSULIN LISPRO 6 UNITS: 100 INJECTION, SOLUTION INTRAVENOUS; SUBCUTANEOUS at 09:40

## 2023-07-27 RX ADMIN — MIDODRINE HYDROCHLORIDE 10 MG: 5 TABLET ORAL at 09:28

## 2023-07-27 RX ADMIN — NYSTATIN 500000 UNITS: 100000 SUSPENSION ORAL at 09:28

## 2023-07-27 RX ADMIN — Medication 10 ML: at 09:26

## 2023-07-27 RX ADMIN — SENNOSIDES AND DOCUSATE SODIUM 2 TABLET: 8.6; 5 TABLET ORAL at 09:30

## 2023-07-27 RX ADMIN — Medication 1 TABLET: at 09:28

## 2023-07-27 RX ADMIN — DEXAMETHASONE 4 MG: 4 TABLET ORAL at 09:30

## 2023-07-27 RX ADMIN — BUDESONIDE AND FORMOTEROL FUMARATE DIHYDRATE 2 PUFF: 160; 4.5 AEROSOL RESPIRATORY (INHALATION) at 06:48

## 2023-07-27 RX ADMIN — FERROUS SULFATE TAB EC 324 MG (65 MG FE EQUIVALENT) 324 MG: 324 (65 FE) TABLET DELAYED RESPONSE at 09:30

## 2023-07-27 RX ADMIN — DOCUSATE SODIUM 100 MG: 100 CAPSULE, LIQUID FILLED ORAL at 09:31

## 2023-07-27 RX ADMIN — MIDODRINE HYDROCHLORIDE 10 MG: 5 TABLET ORAL at 11:28

## 2023-07-27 RX ADMIN — GABAPENTIN 300 MG: 300 CAPSULE ORAL at 09:29

## 2023-07-27 RX ADMIN — NICOTINE 1 PATCH: 14 PATCH, EXTENDED RELEASE TRANSDERMAL at 09:29

## 2023-07-27 RX ADMIN — INSULIN GLARGINE 7 UNITS: 100 INJECTION, SOLUTION SUBCUTANEOUS at 09:39

## 2023-07-27 RX ADMIN — DILTIAZEM HYDROCHLORIDE 240 MG: 240 CAPSULE, EXTENDED RELEASE ORAL at 10:30

## 2023-07-27 RX ADMIN — ASPIRIN 81 MG CHEWABLE TABLET 81 MG: 81 TABLET CHEWABLE at 09:31

## 2023-07-27 RX ADMIN — ESCITALOPRAM OXALATE 10 MG: 10 TABLET ORAL at 09:31

## 2023-07-27 RX ADMIN — INSULIN LISPRO 6 UNITS: 100 INJECTION, SOLUTION INTRAVENOUS; SUBCUTANEOUS at 11:27

## 2023-07-27 RX ADMIN — FUROSEMIDE 20 MG: 10 INJECTION, SOLUTION INTRAMUSCULAR; INTRAVENOUS at 10:30

## 2023-07-27 RX ADMIN — INSULIN HUMAN 6 UNITS: 100 INJECTION, SOLUTION PARENTERAL at 09:40

## 2023-07-27 RX ADMIN — PANTOPRAZOLE SODIUM 40 MG: 40 TABLET, DELAYED RELEASE ORAL at 05:43

## 2023-07-27 NOTE — PLAN OF CARE
Goal Outcome Evaluation:         Currently at radiation treatment. Will be discharging to [facility]

## 2023-07-27 NOTE — CASE MANAGEMENT/SOCIAL WORK
Continued Stay Note  XOCHILT Fajardo     Patient Name: Isra Davenport  MRN: [id number]  Today's Date: 7/27/2023    Admit Date: 7/15/2023    Plan: [facility] accepted. Precert approved (valid 7/27-7/31). PASRR approved. Radiation treatments at [facility], chemo on hold while in rehab, per oncology. From home alone.   Discharge Plan       Row Name 07/27/23 1350       Plan    Plan [facility] accepted. Precert approved (valid 7/27-7/31). PASRR approved. Radiation treatments at [facility], chemo on hold while in rehab, per oncology. From home alone.    Patient/Family in Agreement with Plan yes    Plan Comments CMA notified CM that precert approved. CM contacted [facility] liaison and confirmed patient can come today. Patient with radiation treatment at 12 (noon) with [facility] W/C van to transport to rehab once back from treatment.    Final Discharge Disposition Code 03 - skilled nursing facility (SNF)    Final Note [facility], skilled                      Expected Discharge Date and Time       Expected Discharge Date Expected Discharge Time    Jul 27, 2023           Case Management Discharge Note      Final Note: [facility], skilled    Provided Post Acute Provider List?: Yes  Post Acute Provider List: Nursing Home  Provided Post Acute Provider Quality & Resource List?: Yes  Post Acute Provider Quality and Resource List: Nursing Home  Delivered To: Patient  Method of Delivery: In person    Selected Continued Care - Discharged on 7/27/2023 Admission date: 7/15/2023 - Discharge disposition: Skilled Nursing Facility (DC - External)      Destination Coordination complete.      Service Provider Selected Services Address Phone Fax Patient Preferred    [facility] [address] [facility] [place] [address] [phone]                  Transportation Services  W/C Van: Billie Ramirez    Final Discharge Disposition  Code: 03 - skilled nursing facility (SNF)    Phone communication or documentation only - no physical contact with patient or family.     KRISTI Vazquez[place], [place] [address]    Office: [phone]  Fax: [phone]

## 2023-07-27 NOTE — SIGNIFICANT NOTE
Case Management/Social Work    Patient Name:  Isra Davenport  YOB: 1955  MRN: [id number]  Admit Date:  7/15/2023           07/27/23 1015   Post Acute Pre-Cert Documentation   Verification from Payer Yes   Date Post Acute Pre-Cert Completed 07/27/23   Response Accepted   Post Acute Pre-Cert Initiated Comment [facility] verified SNF precert approval via [employer] portal. Authorization ID [id number]. Valid 7/27-7/31. Approval letter indexed to media. CM made aware.           Electronically signed by:  Elvin Weller CMA  07/27/23 10:17 EDT    Elvin Weller  Case Management Associate  [address] [address]  P: [phone]  F: [phone]

## 2023-07-27 NOTE — PROGRESS NOTES
Daily Progress Note    Patient Care Team:  Mayco Carreon MD as PCP - General (Internal Medicine)  Mina Dupree MD as Consulting Physician (Hematology and Oncology)    Chief Complaint: Follow-up type 2 diabetes    HPI: Blood sugar log reviewed, patient for radiation therapy.  Blood sugars doing well.          Vitals:    07/27/23 1128   BP: 108/59   Pulse: 82   Resp:    Temp:    SpO2:      Body mass index is 23.25 kg/m².        Results Review:     I reviewed the patient's new clinical results.    Glucose   Date Value Ref Range Status   07/27/2023 263 (H) 65 - 99 mg/dL Final     Sodium   Date Value Ref Range Status   07/27/2023 134 (L) 136 - 145 mmol/L Final     Potassium   Date Value Ref Range Status   07/27/2023 4.2 3.5 - 5.2 mmol/L Final     CO2   Date Value Ref Range Status   07/27/2023 39.0 (H) 22.0 - 29.0 mmol/L Final     Chloride   Date Value Ref Range Status   07/27/2023 91 (L) 98 - 107 mmol/L Final     Anion Gap   Date Value Ref Range Status   07/27/2023 4.0 (L) 5.0 - 15.0 mmol/L Final     Creatinine   Date Value Ref Range Status   07/27/2023 0.21 (L) 0.76 - 1.27 mg/dL Final     BUN   Date Value Ref Range Status   07/27/2023 21 8 - 23 mg/dL Final     BUN/Creatinine Ratio   Date Value Ref Range Status   07/27/2023 100.0 (H) 7.0 - 25.0 Final     Calcium   Date Value Ref Range Status   07/27/2023 8.4 (L) 8.6 - 10.5 mg/dL Final     Lab Results   Component Value Date    HGBA1C 7.30 (H) 07/15/2023     No results found for: GLUF, MICROALBUR  Results from last 7 days   Lab Units 07/27/23  1106 07/27/23  0657 07/26/23  2030 07/26/23  1611 07/26/23  1231 07/26/23  0915   GLUCOSE mg/dL 137* 110* 183* 136* 113* 98             Medication Review: Reviewed.     aspirin, 81 mg, Oral, Daily  budesonide-formoterol, 2 puff, Inhalation, BID - RT  calcium 500 mg vitamin D 5 mcg (200 UT), 1 tablet, Oral, Daily  dexamethasone, 4 mg, Oral, Daily With Breakfast   And  insulin regular, 12 Units, Subcutaneous,  Daily With Breakfast  dilTIAZem CD, 240 mg, Oral, Q24H  docusate sodium, 100 mg, Oral, BID  enoxaparin, 40 mg, Subcutaneous, Daily  escitalopram, 10 mg, Oral, Daily  ferrous sulfate, 324 mg, Oral, Daily With Breakfast  furosemide, 20 mg, Intravenous, Daily  gabapentin, 300 mg, Oral, BID  guaiFENesin, 600 mg, Oral, Q12H  hydrocortisone-bacitracin-zinc oxide-nystatin, 1 application , Topical, BID  insulin glargine, 14 Units, Subcutaneous, Q12H  insulin lispro, 12 Units, Subcutaneous, TID With Meals  insulin lispro, 2-9 Units, Subcutaneous, 4x Daily AC & at Bedtime  metoprolol tartrate, 75 mg, Oral, BID  midodrine, 10 mg, Oral, TID AC  nicotine, 1 patch, Transdermal, Q24H  nitroglycerin, 0.5 inch, Topical, Q8H  nystatin, 5 mL, Swish & Swallow, TID  pantoprazole, 40 mg, Oral, Q AM  senna-docusate sodium, 2 tablet, Oral, BID  sodium chloride, 10 mL, Intravenous, Q12H              Assessment and plan:  Diabetes mellitus type 2 with hyperglycemia: Blood sugars fair, continue current management.    Mireya Abarca MD. FACE

## 2023-07-27 NOTE — DISCHARGE SUMMARY
[facility] Medicine Services   DISCHARGE SUMMARY    Patient Name: Isra Davenport  : 1955  MRN: [id number]    Date of Admission: 7/15/2023  Date of Discharge:  23    Primary Care Physician: Mayco Carreon MD      Presenting Problem:   Pedal edema [R60.0]  Atrial fibrillation with rapid ventricular response [I48.91]  Atrial fibrillation with RVR [I48.91]  Primary malignant neoplasm of left lung metastatic to other site [C34.92]  Pain in both lower extremities [M79.604, M79.605]  Atrial fibrillation [I48.91]    Active and Resolved Hospital Problems:  Active Hospital Problems    Diagnosis POA    **Atrial fibrillation with RVR [I48.91] Yes    Moderate malnutrition [E44.0] Yes    Atrial fibrillation [I48.91] Yes      Resolved Hospital Problems   No resolved problems to display.         Hospital Course     Hospital Course:  Isra Davenport is a 68 y.o. 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  Patient had a nailing of the hip on  and an Mfmazu-n-Fage placed on  and is currently undergoing chemotherapy with a plan for palliative radiation.  Per report patient went to his chemotherapy appointment complaining of the lower extremity edema and what he describes as neuropathic pain.  He did have an ultrasound that showed negative for DVT and was prescribed Lasix.  He went to the pharmacy to pick it up and it was closed so he came to the ER with uncontrolled pain.  In the ER chest x-ray showed a right internal jugular Port-A-Cath in place.  Airspace disease in the left lung compatible with pneumonia.  In the ER patient was noted with A-fib with RVR.  O2 sats dropped to 88% was placed on 2 L O2, he was given a Cardizem bolus and Lopressor bolus in the ER and started on a Cardizem drip.  Admitted  for further treatment.  Further details on hospital course as noted below in assessment plan.  Overall improved and remains stable now.  PT/OT recommending SNF.  Okay to discharge per all specialties.  Patient is medically stable to discharge to rehab today with follow-up with PCP, oncology, cardiology, and pulmonology as an outpatient.      A/P:    Acute hypoxic respiratory failure, improved  Left-sided pneumonia  COPD  -Imaging reports noted  -Oxygen requirement improving  -Continue bronchodilators and steroids  -Finished antibiotics  -Pulmonology following  -Okay to discharge to rehab today     PAF  -Rate controlled, monitor on telemetry  -Echo with preserved EF noted  -Recent noninvasive ischemic eval negative for ischemia per cardiology  -Continue p.o. Cardizem, metoprolol, midodrine  -Patient not candidate for anticoagulation due to brain mets  -Cardiology following, okay to discharge from their standpoint     Stage IV lung cancer with mets to brain and bone  -Currently on chemotherapy and radiation  -Continue Decadron as per oncology  -Oncology following and managing     DM 2  -Continue basal insulin with sliding scale  -Monitor blood glucose  -Endocrinology following and managing     DANN  -Continue iron supplement  -Monitor hemoglobin, transfuse as needed     Depression  -Patient feeling more depressed during this admission  -Denies any SI  -Lexapro started during this admission  -Continue outpatient follow-up     Leukocytosis  -Likely reactive secondary to steroids  -Patient previously received antibiotics  -Monitor off antibiotics for now    DISCHARGE Follow Up Recommendations for labs and diagnostics:     Follow-up with PCP, oncology, cardiology, pulmonology    Reasons For Change In Medications and Indications for New Medications:  As noted below    Day of Discharge     Vital Signs:  Temp:  [97.4 °F (36.3 °C)-98.1 °F (36.7 °C)] 98 °F (36.7 °C)  Heart Rate:  [56-83] 74  Resp:  [12-19] 15  BP:  ()/(54-64) 108/61  Flow (L/min):  [4-5] 4    Physical Exam:  General: Awake, alert, elderly male, lying in bed, NAD  Cardiovascular: Regular rate and rhythm, no murmurs  Respiratory: Clear to auscultation bilaterally, no wheezing or rales, unlabored breathing  Abdomen: Soft, nontender, positive bowel sounds, no guarding  Musculoskeletal: Generalized weak, no other gross deformities  Skin: Warm, dry        Pertinent  and/or Most Recent Results     LAB RESULTS:      Lab 07/27/23  0102 07/26/23  0038 07/25/23  0134 07/24/23  0007 07/23/23  2231 07/23/23  1940 07/22/23  2252   WBC 12.30* 12.80* 14.20* 15.50*  --   --  14.20*   HEMOGLOBIN 7.4* 7.3* 7.9* 8.3*  --   --  8.7*   HEMOGLOBIN, POC  --   --   --   --   --  11.3*  --    HEMATOCRIT 24.3* 24.3* 26.1* 27.0*  --   --  28.9*   HEMATOCRIT POC  --   --   --   --   --  33*  --    PLATELETS 302 317 352 375  --   --  406   NEUTROS ABS 11.07* 11.78* 11.79* 14.11*  --   --  11.50*   MCV 86.6 85.0 84.4 86.5  --   --  86.1   LACTATE  --   --   --   --  2.0 5.9*  --          Lab 07/27/23  0102 07/26/23  0038 07/25/23  0134 07/20/23  1543   SODIUM 134* 134* 136  --    POTASSIUM 4.2 4.2 4.4  --    CHLORIDE 91* 90* 91*  --    CO2 39.0* 38.0* 39.0*  --    ANION GAP 4.0* 6.0 6.0  --    BUN 21 22 25*  --    CREATININE 0.21* 0.28* 0.27*  --    EGFR 145.3 133.2 134.7  --    GLUCOSE 263* 256* 136*  --    CALCIUM 8.4* 8.8 9.1  --    PHOSPHORUS  --   --   --  2.3*             Lab 07/25/23 2042 07/25/23 1802   HSTROP T 24* 37*                 Lab 07/23/23 2053 07/23/23  1940   PH, ARTERIAL 7.443 7.160*   PCO2, ARTERIAL 67.7* 121.2*   PO2 .9* 65.6*   O2 SATURATION ART 98.5* 83.1*   FIO2 100 100   HCO3 ART 46.3* 43.2*   BASE EXCESS ART 19.1* 10.7*     Brief Urine Lab Results  (Last result in the past 365 days)        Color   Clarity   Blood   Leuk Est   Nitrite   Protein   CREAT   Urine HCG        07/24/23 1146 Yellow   Clear   Negative   Negative   Negative   Negative                  Microbiology Results (last 10 days)       Procedure Component Value - Date/Time    MRSA Screen, PCR (Inpatient) - Swab, Nares [[id number]]  (Abnormal) Collected: 07/24/23 1147    Lab Status: Final result Specimen: Swab from Nares Updated: 07/24/23 1307     MRSA PCR MRSA Detected    Narrative:      The negative predictive value of this diagnostic test is high and should only be used to consider de-escalating anti-MRSA therapy. A positive result may indicate colonization with MRSA and must be correlated clinically.    Blood Culture - Blood, Hand, Left [[id number]]  (Normal) Collected: 07/23/23 2237    Lab Status: Preliminary result Specimen: Blood from Hand, Left Updated: 07/26/23 2246     Blood Culture No growth at 3 days    Narrative:      Less than seven (7) mL's of blood was collected.  Insufficient quantity may yield false negative results.    Blood Culture - Blood, Arm, Right [[id number]]  (Normal) Collected: 07/23/23 2231    Lab Status: Preliminary result Specimen: Blood from Arm, Right Updated: 07/26/23 2246     Blood Culture No growth at 3 days    Narrative:      Less than seven (7) mL's of blood was collected.  Insufficient quantity may yield false negative results.            XR Chest 1 View    Result Date: 7/23/2023  Impression: Impression: 1. Worsening consolidation in the left perihilar region. This is secondary to a known lung mass consistent with lung cancer. There is probably worsening postobstructive pneumonitis/atelectasis and there may be a small left pleural effusion. 2. Cardiomegaly with pulmonary vascular congestion/low-grade pulmonary edema. Electronically Signed: Zeyad Contreras  7/23/2023 8:02 PM EDT  Workstation ID: [id number]    XR Chest 1 View    Result Date: 7/15/2023  Impression: Impression: 1. Worsening left basilar airspace disease compatible with pneumonia. 2. Chronic elevation of the left hemidiaphragm. Electronically Signed: Jeet Panda  7/15/2023 6:09 PM EDT  Workstation ID:  [id number]    XR Chest 1 View    Result Date: 6/29/2023  Impression: Impression: 1. Right chest wall tania catheter placement with the tip located at the mid SVC level. No visible pneumothorax. 2. Medial left basilar consolidation, suggestive of pneumonia, with probable trace left basilar pleural fluid. Significantly improved left lung aeration since 6/20/2023. Electronically Signed: Alice Dunhamcharo  6/29/2023 9:38 AM EDT  Workstation ID: [id number]    NM PET/CT Skull Base to Mid Thigh    Result Date: 7/10/2023  Impression: 1.There is a large mass in the left upper lobe extending to the hilum consistent with patient's known malignancy. There is metastatic activity to left hilar lymph node, bilateral adrenal glands and right greater trochanter. Intracranial metastatic disease has also been seen on brain MRI exam. Electronically Signed: Maddie Meek  7/10/2023 6:13 PM EDT  Workstation ID: [id number]    XR Foot 3+ View Bilateral    Result Date: 7/15/2023  Impression: Impression: 1. No acute bony abnormality of the feet. Electronically Signed: Jeet Panda  7/15/2023 6:34 PM EDT  Workstation ID: [id number]    XR Tibia Fibula 2 View Bilateral    Result Date: 7/15/2023  Impression: Impression: 1. No acute bony abnormality of the left tibia or fibula. 2. Old healed fractures of the right tibia and fibula. No acute bony abnormality. Electronically Signed: Jeet Panda  7/15/2023 6:31 PM EDT  Workstation ID: [id number]     Results for orders placed during the hospital encounter of 07/15/23    Doppler Ankle Brachial Index Single Level CAR    Interpretation Summary    Right Conclusion: The right FREDY is normal. Normal digital pressures.    Left Conclusion: The left FREDY is normal. Mild digital ischemia.      Results for orders placed during the hospital encounter of 07/15/23    Doppler Ankle Brachial Index Single Level CAR    Interpretation Summary    Right Conclusion: The right FREDY is normal. Normal digital pressures.    Left Conclusion:  The left FREDY is normal. Mild digital ischemia.      Results for orders placed during the hospital encounter of 06/16/23    Adult Transthoracic Echo Complete W/ Cont if Necessary Per Protocol    Interpretation Summary    Left ventricular systolic function is normal. Left ventricular ejection fraction appears to be 61 - 65%.    Left ventricular diastolic function is consistent with (grade I) impaired relaxation.    There is mild bileaflet mitral valve prolapse present.    Estimated right ventricular systolic pressure from tricuspid regurgitation is mildly elevated (35-45 mmHg). Calculated right ventricular systolic pressure from tricuspid regurgitation is 44 mmHg.      Labs Pending at Discharge:  Pending Labs       Order Current Status    Blood Culture - Blood, Arm, Right Preliminary result    Blood Culture - Blood, Hand, Left Preliminary result            Procedures Performed           Consults:   Consults       Date and Time Order Name Status Description    7/21/2023 10:43 AM Inpatient Cardiac Electrophysiology Consult      7/17/2023 12:33 AM Inpatient Radiation Oncology Consult      7/16/2023 12:52 PM Inpatient Pulmonology Consult      7/15/2023 11:38 PM Inpatient Endocrinology Consult      7/15/2023  9:07 PM Hematology & Oncology Inpatient Consult Completed     7/15/2023  8:46 PM Inpatient Cardiology Consult Completed     6/20/2023 11:03 AM Inpatient General Surgery Consult For Implanted Port Placement Completed     6/20/2023  8:56 AM IP Consult to Cardiology Completed     6/18/2023  1:14 PM Inpatient Radiation Oncology Consult Completed     6/18/2023 10:08 AM Inpatient Pulmonology Consult Completed     6/16/2023  6:34 PM Hematology & Oncology Inpatient Consult Completed     6/16/2023  2:41 PM Ortho (on-call MD unless specified) Completed               Discharge Details        Discharge Medications        New Medications        Instructions Start Date   Accu-Chek Guide test strip  Generic drug: glucose blood   1  each, Other, 3 Times Daily Before Meals, Use as instructed Dx code: E11.65      Accu-Chek Softclix Lancets lancets   1 each, Other, 3 Times Daily Before Meals, Use as instructed Dx code: E11.65      budesonide-formoterol 160-4.5 MCG/ACT inhaler  Commonly known as: SYMBICORT   2 puffs, Inhalation, 2 Times Daily - RT      dilTIAZem  MG 24 hr capsule  Commonly known as: CARDIZEM CD   240 mg, Oral, Every 24 Hours Scheduled   Start Date: July 28, 2023     escitalopram 10 MG tablet  Commonly known as: LEXAPRO   10 mg, Oral, Daily   Start Date: July 28, 2023     gabapentin 300 MG capsule  Commonly known as: NEURONTIN   300 mg, Oral, 2 Times Daily      insulin glargine 100 UNIT/ML injection  Commonly known as: LANTUS, SEMGLEE   14 Units, Subcutaneous, Every 12 Hours Scheduled      insulin lispro 100 UNIT/ML injection  Commonly known as: HUMALOG/ADMELOG   2-9 Units, Subcutaneous, 4 Times Daily Before Meals & Nightly      insulin lispro 100 UNIT/ML injection  Commonly known as: HUMALOG/ADMELOG   12 Units, Subcutaneous, 3 Times Daily With Meals      insulin regular 100 UNIT/ML injection  Commonly known as: humuLIN R,novoLIN R   12 Units, Subcutaneous, Daily With Breakfast   Start Date: July 28, 2023     ipratropium-albuterol 0.5-2.5 mg/3 ml nebulizer  Commonly known as: DUO-NEB   3 mL, Nebulization, Every 4 Hours PRN      midodrine 10 MG tablet  Commonly known as: PROAMATINE   10 mg, Oral, 3 Times Daily Before Meals      naloxone 4 MG/0.1ML nasal spray  Commonly known as: NARCAN   Call 911. Don't prime. [place]ley in 1 nostril for overdose. Repeat in 2-3 minutes in other nostril if no or minimal breathing/responsiveness.      pantoprazole 40 MG EC tablet  Commonly known as: PROTONIX   40 mg, Oral, Every Early Morning      Pen Needles 32G X 4 MM misc   1 each, Does not apply, 4 Times Daily Before Meals & Nightly, Dx code: E11.65      polyethylene glycol 17 g packet  Commonly known as: MIRALAX   17 g, Oral, Daily PRN       sennosides-docusate 8.6-50 MG per tablet  Commonly known as: PERICOLACE   2 tablets, Oral, 2 Times Daily             Changes to Medications        Instructions Start Date   Metoprolol Tartrate 75 MG tablet  What changed:   medication strength  how much to take  when to take this   75 mg, Oral, 2 Times Daily             Continue These Medications        Instructions Start Date   acetaminophen 325 MG tablet  Commonly known as: TYLENOL   2 tablets, Oral, Every 6 Hours PRN      aspirin 81 MG EC tablet   81 mg, Oral, Daily      denosumab 120 MG/1.7ML solution injection  Commonly known as: XGEVA   120 mg, Subcutaneous, See Admin Instructions, Every 4 weeks      dexamethasone 4 MG tablet  Commonly known as: DECADRON   4 mg, Oral, Daily With Breakfast      ferrous sulfate 324 (65 Fe) MG tablet delayed-release EC tablet   324 mg, Oral, Daily With Breakfast      furosemide 20 MG tablet  Commonly known as: LASIX   20 mg, Oral, Daily      HYDROcodone-acetaminophen 5-325 MG per tablet  Commonly known as: NORCO   1 tablet, Oral, Every 4 Hours PRN      nicotine 21 MG/24HR patch  Commonly known as: NICODERM CQ   1 patch, Transdermal, Every 24 Hours      ondansetron 4 MG tablet  Commonly known as: ZOFRAN   4 mg, Oral, Every 6 Hours PRN      vitamin D 1.25 MG (55905 UT) capsule capsule  Commonly known as: ERGOCALCIFEROL   50,000 Units, Oral, Weekly             Stop These Medications      albuterol sulfate  (90 Base) MCG/ACT inhaler  Commonly known as: PROVENTIL HFA;VENTOLIN HFA;PROAIR HFA     omeprazole 20 MG capsule  Commonly known as: priLOSEC     vitamin D3 125 MCG (5000 UT) capsule capsule            ASK your doctor about these medications        Instructions Start Date   traMADol 50 MG tablet  Commonly known as: ULTRAM  Ask about: Should I take this medication?   50 mg, Oral, Every 6 Hours PRN               Allergies   Allergen Reactions    Ibuprofen Itching    Penicillins Hives         Discharge Disposition:  Skilled  Nursing Facility (DC - External)    Diet:  Hospital:  Diet Order   Procedures    Diet: Diabetic Diets; Consistent Carbohydrate; Texture: Regular Texture (IDDSI 7); Fluid Consistency: Thin (IDDSI 0)         Discharge Activity:   Activity Instructions       Gradually Increase Activity Until at Pre-Hospitalization Level                CODE STATUS:  Code Status and Medical Interventions:   Ordered at: 07/15/23 2109     Medical Intervention Limits:    NO intubation (DNI)     Code Status (Patient has no pulse and is not breathing):    No CPR (Do Not Attempt to Resuscitate)     Medical Interventions (Patient has pulse or is breathing):    Limited Support         Future Appointments   Date Time Provider Department Center   7/31/2023  3:00 PM Jese Lucas MD [facility] None       Additional Instructions for the Follow-ups that You Need to Schedule       Discharge Follow-up with PCP   As directed       Currently Documented PCP:    Mayco Carreon MD    PCP Phone Number:    [phone]     Follow Up Details: 1 week                Time spent on Discharge including face to face service:  44 minutes    Signature:    Electronically signed by Merle Pereyra DO, 07/27/23, 11:18 AM EDT.      Part of this note may be an electronic transcription/translation of spoken language to printed text using the Dragon Dictation System.

## 2023-07-27 NOTE — PROGRESS NOTES
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 male admitted to [facility] through the ED on 7/15/2023 with complaints of uncontrolled pain and BLE edema.  In the ED, he developed tachycardia in the 150s and was found to be in A-fib with RVR.  He was started on supplemental oxygen for O2 sat drop to 88%.  He received Cardizem bolus followed by drip and Lopressor bolus.  In the ED, CBC revealed WBC 11.4, hemoglobin 10.0, MCV 84.8, and platelets 404,000.  Creatinine and LFTs were not elevated.  CXR revealed left basilar pneumonia.  He reported left lower extremity pain with x-ray of the left tip/fib and left foot negative for acute findings.    PMH was significant for recently diagnosed stage IV squamous cell carcinoma of the left upper lobe lung.  He was post radiation therapy to the brain () and currently receiving radiation therapy to the right hip and femur (started 2023 with 10 fractions planned).  He was last seen by our service as an outpatient on 2023 where he was prescribed Lasix and hydrocodone/APAP 5/325 mg as needed for his BLE edema and pain.  BLE venous Doppler performed as an outpatient on 2023 was negative for DVT/SVT and showed bilateral fluid retention.  At time of consultation 2023, hemoglobin had dropped to 9.9.     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  Our service had initially evaluated him during 2023 - 2023 hospitalization during which lung cancer diagnosis was made and initial work-up completed.  He had received radiation  therapy to the brain and is currently receiving radiation to the hip/femur.  He was also diagnosed with iron deficiency anemia during the hospital stay where he was treated with IV iron and discharged on oral ferrous sulfate with GERD prophylaxis.  Complete anemia work-up with the exception of bone marrow was performed during the hospital stay with findings negative for additional etiologies.  Incidentally his copper level was elevated without multivitamin supplementation.  CBC on 7/14/2023 revealed WBC 13, hemoglobin 10.76, and platelets 329,000.  He was continued on daily oral iron.  Molecular testing on his tumor pathology was pending with plans to start chemotherapy after radiation was completed.  He was also to start Xgeva for bone metastases support and was prescribed calcium with vitamin D supplementation.     PCP: Mayco Carreon MD    INTERVAL HISTORY:  7/17/2023 - white blood count 12.30, hemoglobin 9.5, iron 22 (), iron saturation 10 (), TIBC 224 (298-536). Calculated iron deficit is 662 mg. Started Ferrlecit 250 mg IV x 3 days.  Molecular testing on tumor specimen revealed low-level PD-L1 positivity.  7/18/2023-hemoglobin 8.8.  Patient developed SVT overnight and was treated with Cardizem.  Weight loss 33 pounds in 5 weeks.  Changed Pepcid to Protonix.  Palliative care and radiation oncology both consulted.  Physical therapy recommending skilled nursing facility at discharge.  7/19/2023-patient decided DNR/DNI but wants to continue aggressive treatment and try chemotherapy/immunotherapy.  7/20/2023-WBC 13.4, hemoglobin 9. Hospice was consulted by primary team for inpatient hospice admission.  7/21/2023-WBC 12.7, hemoglobin 8.5.  Dexamethasone decreased to 4 mg po daily.   7/22/2023-cardiac electrophysiology was consulted and they recommended, due to his metastatic cancer and comorbidities, to not proceed with ablation or cardioversion and to manage the patient with conservative  therapy.  Increased gabapentin to 300 mg twice daily.    7/23/2023-WBC 14.2, hemoglobin 8.7.  Lexapro started for depression.  7/24/2023-WBC 15.5, hemoglobin 8.3.  Bilateral FREDY-The right FREDY was normal with normal digital pressures. The left FREDY was normal with mild digital ischemia.  Resumed radiation to his right hip/femur and received fraction 4/10.  7/25/2023-hemoglobin 7.9.  7/26/2023-WBC 12.8 and hemoglobin 7.5.  7/27/23-white count 12.3 and hemoglobin 7.4.    History of present illness reviewed since last visit and changes noted on 07/27/23.    Subjective   patient reports he is doing better.      ROS:  Review of Systems   Constitutional:  Negative for activity change, appetite change, chills, fatigue, fever and unexpected weight change.   HENT:  Negative for mouth sores, sore throat and tinnitus.    Eyes:  Negative for photophobia, pain, redness and visual disturbance.   Respiratory:  Negative for apnea, cough and shortness of breath.    Cardiovascular:  Negative for chest pain, palpitations and leg swelling.   Gastrointestinal:  Negative for abdominal pain, constipation, diarrhea, nausea and vomiting.   Genitourinary:  Negative for dysuria, enuresis and hematuria.   Musculoskeletal:  Negative for arthralgias, back pain, myalgias and neck pain.   Skin:  Negative for rash and wound.   Allergic/Immunologic: Negative for environmental allergies.   Neurological:  Negative for dizziness, speech difficulty, weakness, light-headedness, numbness and headaches.   Hematological:  Negative for adenopathy. Does not bruise/bleed easily.   Psychiatric/Behavioral:  Negative for agitation and dysphoric mood. The patient is not nervous/anxious.       MEDICATIONS:    Scheduled Meds:  aspirin, 81 mg, Oral, Daily  budesonide-formoterol, 2 puff, Inhalation, BID - RT  calcium 500 mg vitamin D 5 mcg (200 UT), 1 tablet, Oral, Daily  dexamethasone, 4 mg, Oral, Daily With Breakfast   And  insulin regular, 12 Units, Subcutaneous,  "Daily With Breakfast  dilTIAZem CD, 240 mg, Oral, Q24H  docusate sodium, 100 mg, Oral, BID  enoxaparin, 40 mg, Subcutaneous, Daily  escitalopram, 10 mg, Oral, Daily  ferrous sulfate, 324 mg, Oral, Daily With Breakfast  furosemide, 20 mg, Intravenous, Daily  gabapentin, 300 mg, Oral, BID  guaiFENesin, 600 mg, Oral, Q12H  hydrocortisone-bacitracin-zinc oxide-nystatin, 1 application , Topical, BID  insulin glargine, 14 Units, Subcutaneous, Q12H  insulin lispro, 12 Units, Subcutaneous, TID With Meals  insulin lispro, 2-9 Units, Subcutaneous, 4x Daily AC & at Bedtime  metoprolol tartrate, 75 mg, Oral, BID  midodrine, 10 mg, Oral, TID AC  nicotine, 1 patch, Transdermal, Q24H  nitroglycerin, 0.5 inch, Topical, Q8H  nystatin, 5 mL, Swish & Swallow, TID  pantoprazole, 40 mg, Oral, Q AM  senna-docusate sodium, 2 tablet, Oral, BID  sodium chloride, 10 mL, Intravenous, Q12H    Continuous Infusions:  Pharmacy Consult - Steroid Insulin Protocol,     PRN Meds:    senna-docusate sodium **AND** polyethylene glycol **AND** bisacodyl **AND** bisacodyl    Calcium Replacement - Follow Nurse / BPA Driven Protocol    dextrose    dextrose    glucagon (human recombinant)    HYDROcodone-acetaminophen    hydrOXYzine    ipratropium-albuterol    Magnesium Standard Dose Replacement - Follow Nurse / BPA Driven Protocol    Morphine    ondansetron    Pharmacy Consult - Steroid Insulin Protocol    Phosphorus Replacement - Follow Nurse / BPA Driven Protocol    Potassium Replacement - Follow Nurse / BPA Driven Protocol    [COMPLETED] Insert Peripheral IV **AND** sodium chloride    sodium chloride    sodium chloride     ALLERGIES:    Allergies   Allergen Reactions    Ibuprofen Itching    Penicillins Hives     Objective    VITALS:   /64 (BP Location: Left arm, Patient Position: Lying)   Pulse 56   Temp 97.8 °F (36.6 °C) (Oral)   Resp 14   Ht 177.8 cm (70\")   Wt 73.5 kg (162 lb 0.6 oz)   SpO2 96%   BMI 23.25 kg/m²     PHYSICAL " EXAM:  Physical Exam  Vitals reviewed.   Constitutional:       General: He is not in acute distress.     Appearance: He is well-developed. He is not diaphoretic.   HENT:      Head: Normocephalic and atraumatic.      Nose:      Comments: Oxygen per nasal cannula.     Mouth/Throat:      Mouth: Mucous membranes are moist.      Pharynx: Oropharynx is clear. No oropharyngeal exudate.      Comments: Edentulous.  Eyes:      Conjunctiva/sclera: Conjunctivae normal.   Neck:      Thyroid: No thyromegaly.      Vascular: No JVD.   Cardiovascular:      Rate and Rhythm: Regular rhythm. Bradycardia present.      Heart sounds: Normal heart sounds. No murmur heard.     Comments: Cardiac monitor leads.   Pulmonary:      Effort: Pulmonary effort is normal. No respiratory distress.      Breath sounds: Normal breath sounds. No stridor.   Chest:      Comments: Left chest wall tattoo.   Right chest wall Wiqvol-F-Dwyj - not accessed.   Abdominal:      General: Bowel sounds are normal.      Palpations: Abdomen is soft.      Tenderness: There is no abdominal tenderness. There is no guarding.   Musculoskeletal:         General: Normal range of motion.      Cervical back: Normal range of motion and neck supple. No rigidity.      Right lower leg: No edema.      Left lower leg: No edema.      Comments: Left hand oxygen saturation monitor.  Ace wraps on bilateral lower extremities.   Skin:     General: Skin is warm and dry.      Coloration: Skin is not jaundiced.      Findings: No erythema or rash.      Comments: Right upper extremity tattoos.  Right upper extremity IV.   Neurological:      Mental Status: He is alert and oriented to person, place, and time.      Sensory: No sensory deficit.   Psychiatric:         Mood and Affect: Mood normal.         Behavior: Behavior normal.         Thought Content: Thought content normal.         Judgment: Judgment normal.       RECENT LABS:  Lab Results (last 24 hours)       Procedure Component Value Units  Date/Time    POC Glucose Once [[id number]]  (Abnormal) Collected: 07/27/23 0657    Specimen: Blood Updated: 07/27/23 0659     Glucose 110 mg/dL      Comment: Serial Number: [id number]:  [id number]       Manual Differential [[id number]]  (Abnormal) Collected: 07/27/23 0102    Specimen: Blood Updated: 07/27/23 0234     Neutrophil % 87.0 %      Lymphocyte % 4.0 %      Monocyte % 4.0 %      Bands %  3.0 %      Myelocyte % 2.0 %      Neutrophils Absolute 11.07 10*3/mm3      Lymphocytes Absolute 0.49 10*3/mm3      Monocytes Absolute 0.49 10*3/mm3      Anisocytosis Slight/1+     Toxic Granulation Slight/1+     Large Platelets Slight/1+    CBC & Differential [[id number]]  (Abnormal) Collected: 07/27/23 0102    Specimen: Blood Updated: 07/27/23 0234    Narrative:      The following orders were created for panel order CBC & Differential.  Procedure                               Abnormality         Status                     ---------                               -----------         ------                     CBC Auto Differential[[id number]]        Abnormal            Final result               Scan Slide[[id number]]                                       Final result                 Please view results for these tests on the individual orders.    Scan Slide [[id number]] Collected: 07/27/23 0102    Specimen: Blood Updated: 07/27/23 0234     Scan Slide --     Comment: See Manual Differential Results       CBC Auto Differential [[id number]]  (Abnormal) Collected: 07/27/23 0102    Specimen: Blood Updated: 07/27/23 0234     WBC 12.30 10*3/mm3      RBC 2.81 10*6/mm3      Hemoglobin 7.4 g/dL      Hematocrit 24.3 %      MCV 86.6 fL      MCH 26.5 pg      MCHC 30.6 g/dL      RDW 20.5 %      RDW-SD 62.1 fl      MPV 8.7 fL      Platelets 302 10*3/mm3     Narrative:      The previously reported component NRBC is no longer being reported. Previous result was 0.1 /100 WBC (Reference Range: 0.0-0.2 /100 WBC) on 7/27/2023 at 014 EDT.     Basic Metabolic Panel [[id number]]  (Abnormal) Collected: 07/27/23 0102    Specimen: Blood Updated: 07/27/23 0208     Glucose 263 mg/dL      BUN 21 mg/dL      Creatinine 0.21 mg/dL      Sodium 134 mmol/L      Potassium 4.2 mmol/L      Chloride 91 mmol/L      CO2 39.0 mmol/L      Calcium 8.4 mg/dL      BUN/Creatinine Ratio 100.0     Anion Gap 4.0 mmol/L      eGFR 145.3 mL/min/1.73     Narrative:      GFR Normal >60  Chronic Kidney Disease <60  Kidney Failure <15      Blood Culture - Blood, Arm, Right [[id number]]  (Normal) Collected: 07/23/23 2231    Specimen: Blood from Arm, Right Updated: 07/26/23 2246     Blood Culture No growth at 3 days    Narrative:      Less than seven (7) mL's of blood was collected.  Insufficient quantity may yield false negative results.    Blood Culture - Blood, Hand, Left [[id number]]  (Normal) Collected: 07/23/23 2237    Specimen: Blood from Hand, Left Updated: 07/26/23 2246     Blood Culture No growth at 3 days    Narrative:      Less than seven (7) mL's of blood was collected.  Insufficient quantity may yield false negative results.    POC Glucose Once [[id number]]  (Abnormal) Collected: 07/26/23 2030    Specimen: Blood Updated: 07/26/23 2032     Glucose 183 mg/dL      Comment: Serial Number: [id number]:  [id number]       POC Glucose Once [[id number]]  (Abnormal) Collected: 07/26/23 1611    Specimen: Blood Updated: 07/26/23 1612     Glucose 136 mg/dL      Comment: Serial Number: [id number]:  [id number]       POC Glucose Once [[id number]]  (Abnormal) Collected: 07/26/23 1231    Specimen: Blood Updated: 07/26/23 1233     Glucose 113 mg/dL      Comment: Serial Number: [id number]:  [id number]             PENDING RESULTS:  N/A    IMAGING REVIEWED:   No radiology results for the last day    I have reviewed the patient's labs, imaging, reports, and other clinician documentation.    Assessment & Plan   ASSESSMENT  Stage IV squamous cell carcinoma left upper lobe lung-s/p radiation  to the brain and resumed radiation to the right hip/femur (fx 4/10 given on 7/24). Molecular testing on tumor pathology revealed low-level PD-L1 positivity.  Waiting for discharge and completion of radiation to start outpatient palliative chemotherapy/immunotherapy and Xgeva. He has decided to pursue treatment as planned, but has chosen to be a DNR/DNI.  Dexamethasone tapered again to 4 mg po daily (7/21/2023). Continued calcium plus vitamin D.  DANN with malabsorption and poor tolerance of oral iron/Elevated copper level-he has not been on multivitamin containing copper. He had reported bright red blood per rectum as outpatient with 2 colonoscopies in the past 6 years that were unremarkable per his report. Received IV iron last admit and again this admit (completed 7/19).  Complete anemia work-up done last admission was negative for vitamin B12, folate, paraproteinemia or hemolysis.  On Protonix, aspirin and dexamethasone. Hemoglobin stable low.  Iron studies showed continued DANN with a slight improvement from last admission. Tolerating oral ferrous sulfate so far.  Anemia could be worse secondary to hip radiation.  Will reevaluate iron profile soon and treat as indicated.   Left lung pneumonia-likely post-obstructive. On dexamethasone.  Per pulmonary.    BLE edema and pain-recent outpatient BLE Doppler was negative for DVT/SVT. On Lasix, prophy Lovenox and Gabapentin. ABIs normal. Pain controlled.  A-fib with RVR-new onset noted in ED.  Cardiology and EP cardiology consulted.  On ASA and prophy Lovenox.    Chronic nausea, Uncontrolled DM2, Low TSH, and severe wt. Loss-on Protonix, Glucerna twice daily and Colace.  Endocrine managing.  Tapering Dexamethasone. Weight has stabilized.  Smoking-cessation counseling continued.  On a nicotine patch.   Recent pathological right hip fracture-physical therapy working with patient.  On calcium plus vitamin D. Plannning rehab placement at discharge.   Oral thrush-Improved and  now on maintenance nystatin.   Agitation/depression-due to prolonged hospitalization and clinical status.  On Lexapro. Improved.     PLAN  Follow CBC.   Continue nystatin 100,000 units/mL 5 mL p.o. 3 times daily.  Continue dexamethasone 4 mg to daily.   Continue ASA 81 mg by mouth daily and prophy Lovenox.  Would not recommend full dose anticoagulation for A-fib secondary to brain metastasis and known rectal bleeding unless benefits exceed risks.  Continue PRN Norco and Morphine.  Continue gabapentin.  Continue Protonix.      Continue ferrous sulfate 325mg po daily.   Continue nicotine patch.   Continue calcium plus vitamin D daily.  Continue Lexapro 10 mg p.o daily.   Continue Glucerna twice daily and Colace.    Continue Radiation therapy.   Plan for combination of chemotherapy and immunotherapy as an outpatient, post radiation.  Start Xgeva for bone metastases support as outpatient.  Recommend outpatient GI evaluation.  Brain MRI as outpatient to f/u on metastasis, post XRT.     Seen and evaluated by Dr. Dupree.  Electronically signed by NOREEN Phillips, 07/27/23, 9:31 AM EDT.    I have personally performed a face-to-face diagnostic evaluation on this patient. I have performed a complete history and physical examination, reviewed laboratory studies, and radiographic examinations.  I have completed the majority and substantive portion of the medical decision making.  I have formulated the assessment on this patient and the plan of action as noted above. I have discussed the case with Aminah Houser NP, have edited/reviewed the note, and agree with the care plan.  He claims to be feeling better.  On examination, lower extremities wrapped in bandage.  Hemoglobin is 7.4.  He continues on radiation to the hip.  We will plan on systemic treatments post radiation completion.      I discussed the patient's findings and my recommendations with patient.    Part of this note may be an electronic  transcription/translation of spoken language to printed text using the Dragon Dictation System.    Electronically signed by Mina Dupree MD, 07/27/23, 6:45 PM EDT.

## 2023-07-27 NOTE — NURSING NOTE
Received verbal order from Rima to cut insulin lispro, insulin regular, and lantus dose in half d/t lower blood glucose.

## 2023-07-28 LAB
BACTERIA SPEC AEROBE CULT: NORMAL
BACTERIA SPEC AEROBE CULT: NORMAL

## 2023-08-01 PROBLEM — R06.03 ACUTE RESPIRATORY DISTRESS: Status: ACTIVE | Noted: 2023-01-01

## 2023-08-01 NOTE — PROGRESS NOTES
"Radiation Oncology Nurse Note    Asked to assess patient prior to treatment at 1:00PM. Patient very lethargic, oriented to self only, responsive to loud voices but unable to follow through with simple commands. . VSS: 107/69, 80 and regular (palpation), 95%. Assessed by Dr. Lucas and all in agreement to send patient to ED for concerns of neurologic event with patient's recently diagnosed a-fib. Called report and spoke to Taty at [facility] ED.    Talked with patient's nurse, Stew, at [facility] at 1:30PM (after calling report to Taty in ED). Stew stated patient's BS was 49 this morning, patient was given glucagon, and recheck was 90. Patient was sent to XRT treatment with snacks. Stew also stated \"the only medication we've added since he came here is Ativan 0.5 mg TID\" (unsure if it was PRN at that time but Ativan is PRN now). Stew stated patient received a dose yesterday morning but refused afternoon dose as the Ativan \"made him sleepy\". Stew said patient received a dose last night and another dose this morning.     Called and infomed Nabila of Ativan dosing as this may be a contributing factor in patient's MSC.     Katherin Boyce RN, BSN  Radiation Oncology Department  [facility]  [phone]  Office  [phone]  Fax   "

## 2023-08-01 NOTE — ED PROVIDER NOTES
Subjective   History of Present Illness  Chief complaint: AMS      Context: Patient is a 68-year-old male who presents with complaints of altered mental status from the cancer care center.  Nursing staff states and they report they were told he has been gradually getting worse over the last 2 days.  He is notably a DNR DNI.  He is unable to provide any history.  Per his MAR he is notably on Ativan and Norco and a chronic 3 L nasal cannula        PCP: theodore casas    Review of Systems   Unable to perform ROS: Mental status change     Past Medical History:   Diagnosis Date    Arthritis     Benign prostatic hyperplasia without urinary obstruction 07/06/2023    Chronic back pain 07/06/2023    Closed fracture of right hip 06/16/2023    Added automatically from request for surgery [id number]    Hernia of anterior abdominal wall 09/22/2020    Lung cancer     squamous cell carcimoma,left    Lung cancer metastatic to brain 06/29/2023    Mass of left lung 06/16/2023    Added automatically from request for surgery [id number]    Neuralgia 07/06/2023    Neuropathy 07/06/2023    Osteoarthritis 07/06/2023    Polyp of colon 07/06/2023    Primary malignant neoplasm of left lung metastatic to other site 06/16/2023    Added automatically from request for surgery [id number]    Prostatitis 07/06/2023    Spinal stenosis of lumbar region 07/06/2023       Allergies   Allergen Reactions    Ibuprofen Itching    Penicillins Hives       Past Surgical History:   Procedure Laterality Date    BACK SURGERY  2006    BRONCHOSCOPY N/A 6/19/2023    Procedure: BRONCHOSCOPY WITH BIOPSY X1 AREA, FINE NEEDLE ASPIRATION, BRUSHING, AND BRONCHIAL WASHING;  Surgeon: Regige Abarca MD;  Location: [facility] ENDOSCOPY;  Service: Pulmonary;  Laterality: N/A;  POST: MUCOUS PLUGS  AND LUNG MASS    HIP INTERTROCHANTERIC NAILING Right 6/17/2023    Procedure: HIP INTERTROCHANTERIC NAILING;  Surgeon: Levi Blackmon II, MD;  Location: [facility] MAIN OR;  Service:  Orthopedics;  Laterality: Right;    LEG SURGERY Bilateral 08/25/1985    PORTACATH PLACEMENT Right 6/29/2023    Procedure: INSERTION OF PORTACATH;  Surgeon: Jese Goss MD;  Location: [facility] MAIN OR;  Service: General;  Laterality: Right;       No family history on file.    Social History     Socioeconomic History    Marital status:    Tobacco Use    Smoking status: Every Day     Packs/day: 1.00     Years: 15.00     Pack years: 15.00     Types: Cigarettes     Passive exposure: Current    Smokeless tobacco: Never   Vaping Use    Vaping Use: Never used   Substance and Sexual Activity    Alcohol use: Yes     Alcohol/week: 2.0 standard drinks     Types: 2 Cans of beer per week    Drug use: Never    Sexual activity: Defer           Objective   Physical Exam    Vital signs and triage nurse note reviewed.  Constitutional: Arouses to tactile stimuli and quickly falls back asleep  HEENT: Normocephalic, atraumatic; pupils are 3 mm PERRL with intact EOM; oropharynx is pink and moist without exudate or erythema.  Neck: Supple   Cardiovascular: Regular rate and rhythm, normal S1-S2.  Pulmonary: Respiratory effort regular nonlabored, breath sounds clear to auscultation all fields.  Abdomen: Soft, nontender nondistended with normoactive bowel sounds; no rebound or guarding.  Musculoskeletal: Independent range of motion of all extremities; bilateral lower extremity edema  Neuro: Easily falls asleep  Skin:  Fleshtone warm, dry, intact; no erythematous or petechial rash or lesion      Procedures           ED Course  ED Course as of 08/01/23 1553   Tue Aug 01, 2023   1515 Waiting on CMP and troponin to be resulted [JW]   1533 Attempted to update brother on status and voicemail was left [JW]      ED Course User Index  [JW] Shelia Brandon, NOREEN      Labs Reviewed   COMPREHENSIVE METABOLIC PANEL - Abnormal; Notable for the following components:       Result Value    Glucose 141 (*)     Creatinine 0.30 (*)     CO2  35.0 (*)     Calcium 7.7 (*)     Total Protein 5.8 (*)     Albumin 1.9 (*)     ALT (SGPT) 48 (*)     Alkaline Phosphatase 141 (*)     BUN/Creatinine Ratio 76.7 (*)     All other components within normal limits    Narrative:     GFR Normal >60  Chronic Kidney Disease <60  Kidney Failure <15     APTT - Abnormal; Notable for the following components:    PTT 25.1 (*)     All other components within normal limits   PROTIME-INR - Abnormal; Notable for the following components:    Protime 12.0 (*)     INR 1.13 (*)     All other components within normal limits   CBC WITH AUTO DIFFERENTIAL - Abnormal; Notable for the following components:    WBC 12.50 (*)     RBC 3.07 (*)     Hemoglobin 7.9 (*)     Hematocrit 26.2 (*)     MCH 25.7 (*)     MCHC 30.2 (*)     RDW 20.6 (*)     RDW-SD 64.8 (*)     Neutrophil % 90.9 (*)     Lymphocyte % 5.1 (*)     Monocyte % 4.0 (*)     Eosinophil % 0.0 (*)     Neutrophils, Absolute 11.40 (*)     Lymphocytes, Absolute 0.60 (*)     nRBC 0.3 (*)     All other components within normal limits   URINALYSIS W/ CULTURE IF INDICATED - Abnormal; Notable for the following components:    Glucose,  mg/dL (Trace) (*)     Protein, UA Trace (*)     Leuk Esterase, UA Trace (*)     All other components within normal limits    Narrative:     In absence of clinical symptoms, the presence of pyuria, bacteria, and/or nitrites on the urinalysis result does not correlate with infection.   SINGLE HSTROPONIN T - Abnormal; Notable for the following components:    HS Troponin T 31 (*)     All other components within normal limits    Narrative:     High Sensitive Troponin T Reference Range:  <10.0 ng/L- Negative Female for AMI  <15.0 ng/L- Negative Male for AMI  >=10 - Abnormal Female indicating possible myocardial injury.  >=15 - Abnormal Male indicating possible myocardial injury.   Clinicians would have to utilize clinical acumen, EKG, Troponin, and serial changes to determine if it is an Acute Myocardial  Infarction or myocardial injury due to an underlying chronic condition.        URINALYSIS, MICROSCOPIC ONLY - Abnormal; Notable for the following components:    WBC, UA 0-2 (*)     All other components within normal limits   BLOOD GAS, ARTERIAL - Abnormal; Notable for the following components:    pCO2, Arterial 79.1 (*)     pO2, Arterial 75.0 (*)     HCO3, Arterial 47.1 (*)     Base Excess, Arterial 19.2 (*)     O2 Saturation, Arterial 93.5 (*)     CO2 Content 49.6 (*)     All other components within normal limits   POCT GLUCOSE FINGERSTICK - Abnormal; Notable for the following components:    Glucose 183 (*)     All other components within normal limits   BLOOD CULTURE   BLOOD CULTURE   BLOOD GAS, ARTERIAL   CBC AND DIFFERENTIAL    Narrative:     The following orders were created for panel order CBC & Differential.  Procedure                               Abnormality         Status                     ---------                               -----------         ------                     CBC Auto Differential[[id number]]        Abnormal            Final result                 Please view results for these tests on the individual orders.     Medications   sodium chloride 0.9 % flush 10 mL (has no administration in time range)   cefTRIAXone (ROCEPHIN) 1,000 mg in sodium chloride 0.9 % 100 mL IVPB (has no administration in time range)   Naloxone HCl (NARCAN) injection 2 mg (2 mg Intravenous Given 8/1/23 7138)     CT Head Without Contrast    Result Date: 8/1/2023  No acute intracranial hemorrhage 2. Area of low-attenuation within the left parietal white matter corresponding to metastatic lesion noted on prior MRI. Findings are better seen on prior MRI Electronically Signed: Sanju Randall  8/1/2023 2:49 PM EDT  Workstation ID: [id number]    XR Chest 1 View    Result Date: 8/1/2023  Impression: 1. Known perihilar mass is obscured by overlying airspace opacity. Findings may represent atelectasis, edema and/or pneumonia.  Overall findings are aeration on the left is slightly improved Electronically Signed: Sanju Randall  8/1/2023 3:02 PM EDT  Workstation ID: [id number]   Prior to Admission medications    Medication Sig Start Date End Date Taking? Authorizing Provider   Accu-Chek Softclix Lancets lancets 1 each by Other route 3 (Three) Times a Day Before Meals. Use as instructed  Dx code: E11.65 7/21/23   Raoul Castillo MD   acetaminophen (TYLENOL) 325 MG tablet Take 2 tablets by mouth Every 6 (Six) Hours As Needed for Mild Pain. Indications: Pain    ProviderHagn MD   aspirin 81 MG EC tablet Take 1 tablet by mouth Daily. 6/30/23   Soham Harding MD   budesonide-formoterol (SYMBICORT) 160-4.5 MCG/ACT inhaler Inhale 2 puffs 2 (Two) Times a Day. 7/27/23   Merle Pereyra DO   denosumab (XGEVA) 120 MG/1.7ML solution injection Inject 1.7 mL under the skin into the appropriate area as directed See Admin Instructions. Every 4 weeks    ProviderHang MD   dexamethasone (DECADRON) 4 MG tablet Take 1 tablet by mouth Daily With Breakfast.    ProviderHang MD   dilTIAZem CD (CARDIZEM CD) 240 MG 24 hr capsule Take 1 capsule by mouth Daily. 7/28/23   Merle Pereyra DO   escitalopram (LEXAPRO) 10 MG tablet Take 1 tablet by mouth Daily. 7/28/23   Merle Pereyra DO   ferrous sulfate 324 (65 Fe) MG tablet delayed-release EC tablet Take 1 tablet by mouth Daily With Breakfast. 6/27/23   Soham Harding MD   furosemide (LASIX) 20 MG tablet Take 1 tablet by mouth Daily.    ProviderHang MD   gabapentin (NEURONTIN) 300 MG capsule Take 1 capsule by mouth 2 (Two) Times a Day. 7/27/23   Merle Pereyra DO   glucose blood (Accu-Chek Guide) test strip 1 each by Other route 3 (Three) Times a Day Before Meals. Use as instructed  Dx code: E11.65 7/21/23   Raoul Castillo MD   HYDROcodone-acetaminophen (NORCO) 5-325 MG per tablet Take 1 tablet by mouth Every 4 (Four) Hours As Needed for Moderate Pain. 7/27/23    Merle Pereyra DO   insulin glargine (LANTUS, SEMGLEE) 100 UNIT/ML injection Inject 14 Units under the skin into the appropriate area as directed Every 12 (Twelve) Hours. 7/21/23   Raoul Castillo MD   insulin lispro (HUMALOG/ADMELOG) 100 UNIT/ML injection Inject 2-9 Units under the skin into the appropriate area as directed 4 (Four) Times a Day Before Meals & at Bedtime. 7/21/23   Raoul Castillo MD   insulin lispro (HUMALOG/ADMELOG) 100 UNIT/ML injection Inject 12 Units under the skin into the appropriate area as directed 3 (Three) Times a Day With Meals. 7/27/23   Merle Pereyra DO   Insulin Pen Needle (Pen Needles) 32G X 4 MM misc 1 each 4 (Four) Times a Day Before Meals & at Bedtime. Dx code: E11.65 7/21/23   Raoul Castillo MD   insulin regular (humuLIN R,novoLIN R) 100 UNIT/ML injection Inject 12 Units under the skin into the appropriate area as directed Daily With Breakfast. 7/28/23   Merle Pereyra DO   ipratropium-albuterol (DUO-NEB) 0.5-2.5 mg/3 ml nebulizer Take 3 mL by nebulization Every 4 (Four) Hours As Needed for Shortness of Air. 7/21/23   Raoul Castillo MD   Metoprolol Tartrate 75 MG tablet Take 75 mg by mouth 2 (Two) Times a Day. Indications: High Blood Pressure Disorder 7/21/23   Raoul Castillo MD   midodrine (PROAMATINE) 10 MG tablet Take 1 tablet by mouth 3 (Three) Times a Day Before Meals. 7/27/23   Merle Pereyra DO   naloxone (NARCAN) 4 MG/0.1ML nasal spray Call 911. Don't prime. [place] in 1 nostril for overdose. Repeat in 2-3 minutes in other nostril if no or minimal breathing/responsiveness. 7/21/23   Raoul Castillo MD   nicotine (NICODERM CQ) 21 MG/24HR patch Place 1 patch on the skin as directed by provider Daily.    Provider, MD Hang   ondansetron (ZOFRAN) 4 MG tablet Take 1 tablet by mouth Every 6 (Six) Hours As Needed for Nausea or Vomiting.    Provider, MD Hang   pantoprazole (PROTONIX) 40 MG EC tablet Take 1 tablet by mouth Every  "Morning. 7/22/23   Raoul Castillo MD   polyethylene glycol (MIRALAX) 17 g packet Take 17 g by mouth Daily As Needed (Use if senna-docusate is ineffective). 7/21/23   Raoul Castillo MD   sennosides-docusate (PERICOLACE) 8.6-50 MG per tablet Take 2 tablets by mouth 2 (Two) Times a Day. 7/21/23   Raoul Castillo MD   vitamin D (ERGOCALCIFEROL) 1.25 MG (01321 UT) capsule capsule Take 1 capsule by mouth 1 (One) Time Per Week.    Provider, MD Hang                                          Medical Decision Making  Chart review: 6/22/23: mr brain: 1. Metastatic lesions in the right frontal lobe measuring 4 mm in size and in the left parietal lobe measuring 11 mm in size with surrounding vasogenic edema.  2. Mild paranasal sinus disease.      7/10/23: PET scan: IMPRESSION:  1.There is a large mass in the left upper lobe extending to the hilum consistent with patient's known malignancy. There is metastatic activity to left hilar lymph node, bilateral adrenal glands and right greater trochanter. Intracranial metastatic   disease has also been seen on brain MRI exam.          /63   Pulse 85   Temp 99.1 øF (37.3 øC) (Oral)   Resp 16   Ht 170.2 cm (67\")   Wt 68 kg (150 lb)   SpO2 95%   BMI 23.49 kg/mý           Radiology interpretation:  X-rays reviewed independently and interpreted by me: Perihilar mass questionable pneumonia.  CT of the head without ICH  Further interpretation by radiologist as above  Lab interpretation:  Labs all viewed by me and significant for, troponin 31 white blood cell count 12.5 glucose 141         Appropriate PPE worn during exam.  Patient had an IV established obtained to evaluate for electrolyte abnormalities hypoxia hypercapnia ICH.  No change after Narcan.  He was noted to have hypercapnia on his ABG was placed on BiPAP.  Notably he is a DNR/DNI.  There is no family at bedside and attempted to call his listed power of  and voicemail was left.  He was " given a dose of Rocephin for questionable pneumonia.  He will be admitted to the hospitalist for further evaluation and management.    I discussed with Dr. Penaloza           Problems Addressed:  Altered mental status, unspecified altered mental status type: complicated acute illness or injury  History of lung cancer: complicated acute illness or injury  Respiratory failure with hypoxia and hypercapnia, unspecified chronicity: complicated acute illness or injury    Amount and/or Complexity of Data Reviewed  Labs: ordered.  Radiology: ordered.    Risk  Prescription drug management.  Decision regarding hospitalization.        Final diagnoses:   Altered mental status, unspecified altered mental status type   Respiratory failure with hypoxia and hypercapnia, unspecified chronicity   History of lung cancer       ED Disposition  ED Disposition       ED Disposition   Decision to Admit    Condition   --    Comment   --               No follow-up provider specified.       Medication List      No changes were made to your prescriptions during this visit.            Shelia Brandon, APRN  08/01/23 1553

## 2023-08-01 NOTE — H&P
[facility]  History & Physical    Patient Name: Isra Davenport  : 1955  MRN: [id number]  Primary Care Physician:  Mayco Carreon MD  Date of admission: 2023  Date and Time of Service: 23 at 1700    Subjective      Chief Complaint: Respiratory Failure    History of Present Illness: Isra Davenport is a 68 y.o. male ith a CMH of Afib, lung cancer, metastasis to the bone and brain, undergoing chemotherapy, who presented to [facility] on 2023 with  respiratory failure.    Patient was not awake at the time of interview, but per sign out, patient was found to be altered during his chemotherapy therapy infusion earlier today.  He was found to be hypoxic and due to this was brought to the ED.  When arrived at the ED, was given antibiotics and started on BIPAP therapy.    Speaking with family, the patient most did not want drastic interventions and did not want to be kept alive by artificial means.  A long discussion was had with family at bedside explaining different options for care including treatment for his respiratory failure and comfort measures.  Given the degree of illness it was settled that the patient would have wanted to be placed on comfort measures at this time.      Review of Systems   Unable to perform ROS: Mental status change     Personal History     Past Medical History:   Diagnosis Date    Arthritis     Benign prostatic hyperplasia without urinary obstruction 2023    Chronic back pain 2023    Closed fracture of right hip 2023    Added automatically from request for surgery [id number]    Hernia of anterior abdominal wall 2020    Lung cancer     squamous cell carcimoma,left    Lung cancer metastatic to brain 2023    Mass of left lung 2023    Added automatically from request for surgery [id number]    Neuralgia 2023    Neuropathy 2023    Osteoarthritis 2023    Polyp of colon 2023     Primary malignant neoplasm of left lung metastatic to other site 06/16/2023    Added automatically from request for surgery [id number]    Prostatitis 07/06/2023    Spinal stenosis of lumbar region 07/06/2023       Past Surgical History:   Procedure Laterality Date    BACK SURGERY  2006    BRONCHOSCOPY N/A 6/19/2023    Procedure: BRONCHOSCOPY WITH BIOPSY X1 AREA, FINE NEEDLE ASPIRATION, BRUSHING, AND BRONCHIAL WASHING;  Surgeon: Reggie Abarca MD;  Location: [facility] ENDOSCOPY;  Service: Pulmonary;  Laterality: N/A;  POST: MUCOUS PLUGS  AND LUNG MASS    HIP INTERTROCHANTERIC NAILING Right 6/17/2023    Procedure: HIP INTERTROCHANTERIC NAILING;  Surgeon: Levi Blackmon II, MD;  Location: [facility] MAIN OR;  Service: Orthopedics;  Laterality: Right;    LEG SURGERY Bilateral 08/25/1985    PORTACATH PLACEMENT Right 6/29/2023    Procedure: INSERTION OF PORTACATH;  Surgeon: Jese oGss MD;  Location: [facility] MAIN OR;  Service: General;  Laterality: Right;       Family History: family history is not on file. Otherwise pertinent FHx was reviewed and not pertinent to current issue.    Social History:  reports that he has been smoking cigarettes. He has a 15.00 pack-year smoking history. He has been exposed to tobacco smoke. He has never used smokeless tobacco. He reports current alcohol use of about 2.0 standard drinks per week. He reports that he does not use drugs.    Home Medications:  Prior to Admission Medications       Prescriptions Last Dose Informant Patient Reported? Taking?    Accu-Chek Softclix Lancets lancets   No No    1 each by Other route 3 (Three) Times a Day Before Meals. Use as instructed  Dx code: E11.65    acetaminophen (TYLENOL) 325 MG tablet   Yes Yes    Take 2 tablets by mouth Every 6 (Six) Hours As Needed for Mild Pain. Indications: Pain    aspirin 81 MG EC tablet   No Yes    Take 1 tablet by mouth Daily.    budesonide-formoterol (SYMBICORT) 160-4.5 MCG/ACT inhaler   No Yes    Inhale 2  puffs 2 (Two) Times a Day.    denosumab (XGEVA) 120 MG/1.7ML solution injection   Yes Yes    Inject 1.7 mL under the skin into the appropriate area as directed Every 30 (Thirty) Days.    dexamethasone (DECADRON) 4 MG tablet   Yes Yes    Take 1 tablet by mouth Daily With Breakfast.    dextrose (GLUTOSE) 40 % gel   Yes Yes    Take  by mouth Every 1 (One) Hour As Needed for Low Blood Sugar.    dilTIAZem CD (CARDIZEM CD) 240 MG 24 hr capsule   No Yes    Take 1 capsule by mouth Daily.    escitalopram (LEXAPRO) 10 MG tablet   No Yes    Take 1 tablet by mouth Daily.    ferrous sulfate 324 (65 Fe) MG tablet delayed-release EC tablet   No Yes    Take 1 tablet by mouth Daily With Breakfast.    furosemide (LASIX) 20 MG tablet   Yes Yes    Take 1 tablet by mouth Daily.    gabapentin (NEURONTIN) 300 MG capsule   No Yes    Take 1 capsule by mouth 2 (Two) Times a Day.    glucagon (GLUCAGEN) 1 MG injection   Yes Yes    Inject 1 mg under the skin into the appropriate area as directed 1 (One) Time As Needed for Low Blood Sugar.    glucose blood (Accu-Chek Guide) test strip   No No    1 each by Other route 3 (Three) Times a Day Before Meals. Use as instructed  Dx code: E11.65    HYDROcodone-acetaminophen (NORCO) 5-325 MG per tablet   No Yes    Take 1 tablet by mouth Every 4 (Four) Hours As Needed for Moderate Pain.    insulin glargine (LANTUS, SEMGLEE) 100 UNIT/ML injection   No Yes    Inject 14 Units under the skin into the appropriate area as directed Every 12 (Twelve) Hours.    insulin lispro (HUMALOG/ADMELOG) 100 UNIT/ML injection   No No    Inject 12 Units under the skin into the appropriate area as directed 3 (Three) Times a Day With Meals.    Insulin Pen Needle (Pen Needles) 32G X 4 MM misc   No Yes    1 each 4 (Four) Times a Day Before Meals & at Bedtime. Dx code: E11.65    insulin regular (humuLIN R,novoLIN R) 100 UNIT/ML injection   No Yes    Inject 12 Units under the skin into the appropriate area as directed Daily With  Breakfast.    ipratropium-albuterol (DUO-NEB) 0.5-2.5 mg/3 ml nebulizer   No Yes    Take 3 mL by nebulization Every 4 (Four) Hours As Needed for Shortness of Air.    ipratropium-albuterol (DUO-NEB) 0.5-2.5 mg/3 ml nebulizer   Yes Yes    Take 3 mL by nebulization 3 (Three) Times a Day.    LORazepam (ATIVAN) 0.5 MG tablet   Yes Yes    Take 1 tablet by mouth Every 8 (Eight) Hours As Needed for Anxiety.    Metoprolol Tartrate 75 MG tablet   Yes Yes    Take 1 tablet by mouth 2 (Two) Times a Day. Hold for SBP<100 or HR<60    midodrine (PROAMATINE) 10 MG tablet   Yes Yes    Take 1 tablet by mouth 3 (Three) Times a Day Before Meals. Hold for SBP>140    mupirocin (BACTROBAN) 2 % ointment   Yes Yes    Apply 1 application  topically to the appropriate area as directed Every Night.    naloxone (NARCAN) 4 MG/0.1ML nasal spray   No Yes    Call 911. Don't prime. [place] in 1 nostril for overdose. Repeat in 2-3 minutes in other nostril if no or minimal breathing/responsiveness.    nicotine (NICODERM CQ) 21 MG/24HR patch   Yes Yes    Place 1 patch on the skin as directed by provider Daily.    ondansetron (ZOFRAN) 4 MG tablet   Yes Yes    Take 1 tablet by mouth Every 6 (Six) Hours As Needed for Nausea or Vomiting.    pantoprazole (PROTONIX) 40 MG EC tablet   No Yes    Take 1 tablet by mouth Every Morning.    polyethylene glycol (MIRALAX) 17 g packet   No Yes    Take 17 g by mouth Daily As Needed (Use if senna-docusate is ineffective).    sennosides-docusate (PERICOLACE) 8.6-50 MG per tablet   No Yes    Take 2 tablets by mouth 2 (Two) Times a Day.    vitamin D (ERGOCALCIFEROL) 1.25 MG (65505 UT) capsule capsule   Yes Yes    Take 1 capsule by mouth 1 (One) Time Per Week. Monday              Allergies:  Allergies   Allergen Reactions    Ibuprofen Itching    Penicillins Hives       Objective      Vitals:   Temp:  [99.1 øF (37.3 øC)] 99.1 øF (37.3 øC)  Heart Rate:  [] 110  Resp:  [16-24] 24  BP: (107-121)/(63-89) 121/89  Body mass  index is 23.49 kg/mý.  Physical Exam  Constitutional:       Appearance: He is ill-appearing.   HENT:      Head: Normocephalic and atraumatic.      Right Ear: External ear normal.      Left Ear: External ear normal.   Cardiovascular:      Rate and Rhythm: Tachycardia present. Rhythm irregular.   Pulmonary:      Effort: Respiratory distress present.      Breath sounds: Rhonchi present.   Abdominal:      General: There is distension.   Musculoskeletal:      Right lower leg: No edema.      Left lower leg: No edema.   Lymphadenopathy:      Cervical: No cervical adenopathy.   Skin:     General: Skin is warm.      Capillary Refill: Capillary refill takes 2 to 3 seconds.   Neurological:      Comments: obtunded       Diagnostic Data:  Lab Results (last 24 hours)       Procedure Component Value Units Date/Time    Blood Culture - Blood, Arm, Right [[id number]] Collected: 08/01/23 1630    Specimen: Blood from Arm, Right Updated: 08/01/23 1634    Blood Culture - Blood, Arm, Left [[id number]] Collected: 08/01/23 1600    Specimen: Blood from Arm, Left Updated: 08/01/23 1605    Blood Gas, Arterial - [[id number]]  (Abnormal) Collected: 08/01/23 1419    Specimen: Arterial Blood Updated: 08/01/23 1551     Site Right Radial     Alvin's Test Positive     pH, Arterial 7.383 pH units      pCO2, Arterial 79.1 mm Hg      pO2, Arterial 75.0 mm Hg      HCO3, Arterial 47.1 mmol/L      Base Excess, Arterial 19.2 mmol/L      Comment: Serial Number: [id number]:  [id number]        O2 Saturation, Arterial 93.5 %      CO2 Content 49.6 mmol/L      Barometric Pressure for Blood Gas --     Comment: N/A        Modality Cannula     FIO2 33 %      Hemodilution No    Comprehensive Metabolic Panel [[id number]]  (Abnormal) Collected: 08/01/23 1408    Specimen: Blood Updated: 08/01/23 1531     Glucose 141 mg/dL      BUN 23 mg/dL      Creatinine 0.30 mg/dL      Sodium 142 mmol/L      Potassium 3.8 mmol/L      Chloride 101 mmol/L      CO2 35.0 mmol/L      Calcium  7.7 mg/dL      Total Protein 5.8 g/dL      Albumin 1.9 g/dL      ALT (SGPT) 48 U/L      AST (SGOT) 34 U/L      Alkaline Phosphatase 141 U/L      Total Bilirubin <0.2 mg/dL      Globulin 3.9 gm/dL      A/G Ratio 0.5 g/dL      BUN/Creatinine Ratio 76.7     Anion Gap 6.0 mmol/L      eGFR 130.4 mL/min/1.73     Narrative:      GFR Normal >60  Chronic Kidney Disease <60  Kidney Failure <15      Single High Sensitivity Troponin T [[id number]]  (Abnormal) Collected: 08/01/23 1408    Specimen: Blood Updated: 08/01/23 1525     HS Troponin T 31 ng/L     Narrative:      High Sensitive Troponin T Reference Range:  <10.0 ng/L- Negative Female for AMI  <15.0 ng/L- Negative Male for AMI  >=10 - Abnormal Female indicating possible myocardial injury.  >=15 - Abnormal Male indicating possible myocardial injury.   Clinicians would have to utilize clinical acumen, EKG, Troponin, and serial changes to determine if it is an Acute Myocardial Infarction or myocardial injury due to an underlying chronic condition.         Urinalysis With Culture If Indicated - Urine, Clean Catch [[id number]]  (Abnormal) Collected: 08/01/23 1412    Specimen: Urine, Clean Catch Updated: 08/01/23 1453     Color, UA Yellow     Appearance, UA Clear     pH, UA 5.5     Specific Gravity, UA 1.019     Glucose,  mg/dL (Trace)     Ketones, UA Negative     Bilirubin, UA Negative     Blood, UA Negative     Protein, UA Trace     Leuk Esterase, UA Trace     Nitrite, UA Negative     Urobilinogen, UA 1.0 E.U./dL    Narrative:      In absence of clinical symptoms, the presence of pyuria, bacteria, and/or nitrites on the urinalysis result does not correlate with infection.    Urinalysis, Microscopic Only - Urine, Clean Catch [[id number]]  (Abnormal) Collected: 08/01/23 1412    Specimen: Urine, Clean Catch Updated: 08/01/23 1453     RBC, UA None Seen /HPF      WBC, UA 0-2 /HPF      Comment: Urine culture not indicated.        Bacteria, UA None Seen /HPF      Squamous  Epithelial Cells, UA 0-2 /HPF      Hyaline Casts, UA 7-12 /LPF      Mucus, UA Trace /HPF      Methodology Manual Light Microscopy    aPTT [[id number]]  (Abnormal) Collected: 08/01/23 1408    Specimen: Blood Updated: 08/01/23 1426     PTT 25.1 seconds     Protime-INR [[id number]]  (Abnormal) Collected: 08/01/23 1408    Specimen: Blood Updated: 08/01/23 1426     Protime 12.0 Seconds      INR 1.13    CBC & Differential [[id number]]  (Abnormal) Collected: 08/01/23 1408    Specimen: Blood Updated: 08/01/23 1415    Narrative:      The following orders were created for panel order CBC & Differential.  Procedure                               Abnormality         Status                     ---------                               -----------         ------                     CBC Auto Differential[[id number]]        Abnormal            Final result                 Please view results for these tests on the individual orders.    CBC Auto Differential [[id number]]  (Abnormal) Collected: 08/01/23 1408    Specimen: Blood Updated: 08/01/23 1415     WBC 12.50 10*3/mm3      RBC 3.07 10*6/mm3      Hemoglobin 7.9 g/dL      Hematocrit 26.2 %      MCV 85.2 fL      MCH 25.7 pg      MCHC 30.2 g/dL      RDW 20.6 %      RDW-SD 64.8 fl      MPV 7.8 fL      Platelets 310 10*3/mm3      Neutrophil % 90.9 %      Lymphocyte % 5.1 %      Monocyte % 4.0 %      Eosinophil % 0.0 %      Basophil % 0.0 %      Neutrophils, Absolute 11.40 10*3/mm3      Lymphocytes, Absolute 0.60 10*3/mm3      Monocytes, Absolute 0.50 10*3/mm3      Eosinophils, Absolute 0.00 10*3/mm3      Basophils, Absolute 0.00 10*3/mm3      nRBC 0.3 /100 WBC     POC Glucose Once [[id number]]  (Abnormal) Collected: 08/01/23 1351    Specimen: Blood Updated: 08/01/23 1352     Glucose 183 mg/dL      Comment: Serial Number: [id number]:  [id number]                Imaging Results (Last 24 Hours)       Procedure Component Value Units Date/Time    XR Chest 1 View [[id number]] Collected: 08/01/23  1457     Updated: 08/01/23 1504    Narrative:      XR CHEST 1 VW    Date of Exam: 8/1/2023 2:52 PM EDT    Indication: AMS hx lung ca    Comparison: 7/23/2023 and prior    Findings:  Study is limited by overlying support and monitoring apparatus. Right-sided port remains in place. Heart size is grossly stable. Increased perihilar opacities again noted left greater than right. Known perihilar mass on the left is obscured. Dependent   opacities at the left base appears slightly more prominent than the right. This is accentuated by relative elevation of the left hemidiaphragm. There is improved aeration compared to the prior study.    Fossa structures are stable. Fusion of the thoracolumbar level again noted.      Impression:      Impression:    1. Known perihilar mass is obscured by overlying airspace opacity. Findings may represent atelectasis, edema and/or pneumonia. Overall findings are aeration on the left is slightly improved            Electronically Signed: Sanju Randall    8/1/2023 3:02 PM EDT    Workstation ID: [id number]    CT Head Without Contrast [[id number]] Collected: 08/01/23 1440     Updated: 08/01/23 1451    Narrative:      CT HEAD WO CONTRAST    Date of Exam: 8/1/2023 2:38 PM EDT    Indication: AMS hx lung ca.    Comparison: PET/CT 7/10/2023 and prior    Technique: Axial CT images were obtained of the head without contrast administration.  Coronal reconstructions were performed.  Automated exposure control and iterative reconstruction methods were used.        FINDINGS:    Brain/Ventricles: Hypoattenuation within the deep and subcortical white matter left parietal lobe at the vertex noted corresponding to area of enhancing metastatic lesion  MRI from 6/22/2023. No acute hemorrhage is noted. Degree of low-attenuation within the white matter is less prominent on CT than MR comparison.    Orbits: The visualized portion of the orbits demonstrate no acute abnormality.    Sinuses: Partial opacification  ethmoid air cells. Visualized paranasal sinuses are otherwise clear. Mastoid air cells are grossly unremarkable.    Soft Tissues/Skull: No acute abnormality of the visualized skull or soft tissues.      Impression:      No acute intracranial hemorrhage    2. Area of low-attenuation within the left parietal white matter corresponding to metastatic lesion noted on prior MRI. Findings are better seen on prior MRI      Electronically Signed: Sanju Randall    8/1/2023 2:49 PM EDT    Workstation ID: [id number]              Assessment & Plan        This is a 68 y.o. male with:    Active and Resolved Problems  Active Hospital Problems    Diagnosis  POA    **Acute respiratory distress [R06.03]  Yes    Atrial fibrillation [I48.91]  Yes    Metastasis to bone [C79.51]  Yes    Spinal stenosis of lumbar region [M48.061]  Yes    Neuropathy [G62.9]  Yes    Chronic back pain [M54.9, G89.29]  Yes    Lung cancer metastatic to brain [C34.90, C79.31]  Yes    Primary malignant neoplasm of left lung metastatic to other site [C34.92]  Yes      Resolved Hospital Problems   No resolved problems to display.       #Respiratory Failure  At this time, family decided that the patient be placed on comfort measures.  This was to their knowledge what would best represent the patient's wishes.  We had a lengthy discussion about the pros and cons of treatment as well as comfort measures.  However, given the patient's current diagnosis of lung cancer and the extent of metastasis, comfort was preferred.  - Ativan ATC for anxiety  - Morphine ATC for air hunger and pain  - Stop BIPAP and labs  - Scopalamine for secretions  - Will allow for home medications with low side effects that improve overall comfort.    DVT prophylaxis:  Mechanical DVT prophylaxis orders are present.    The patient desires to be as follows:    CODE STATUS:    Level Of Support Discussed With: Next of Kin (If No Surrogate)  Code Status (Patient has no pulse and is not breathing): No  CPR (Do Not Attempt to Resuscitate)  Medical Interventions (Patient has pulse or is breathing): Comfort Measures  Release to patient: Routine Release      As mentioned above, spoke with next of kin in regards to making the patient comfort measures on 8/1/23.    Admission Status:  I believe this patient meets observation status.    Expected Length of Stay: 0-1 days    PDMP reviewed and consistent with patient reported medications.    I discussed the patient's findings and my recommendations with family.      Signature:       Chuy Greco M.D.    This document has been electronically signed by Chuy Greco MD on August 1, 2023 19:36 EDT   [place] [facility]ist Team

## 2023-08-02 NOTE — DISCHARGE SUMMARY
"[facility]    DEATH SUMMARY    PATIENT NAME: Isra Davenport   PHYSICIAN: Chuy Greco MD  : [id number]  MRN: [id number]    ADMITTED: 2023     DATE & TIME OF DEATH: 23 @ 0435      ADMITTING DIAGNOSIS:  Acute respiratory distress [R06.03]  History of lung cancer [Z85.118]  Altered mental status, unspecified altered mental status type [R41.82]  Respiratory failure with hypoxia and hypercapnia, unspecified chronicity [J96.91, J96.92]      DISCHARGE, AND RESOLVED DIAGNOSES:  Active Hospital Problems    Diagnosis  POA    **Acute respiratory distress [R06.03]  Yes    Atrial fibrillation [I48.91]  Yes    Metastasis to bone [C79.51]  Yes    Spinal stenosis of lumbar region [M48.061]  Yes    Neuropathy [G62.9]  Yes    Chronic back pain [M54.9, G89.29]  Yes    Lung cancer metastatic to brain [C34.90, C79.31]  Yes    Primary malignant neoplasm of left lung metastatic to other site [C34.92]  Yes      Resolved Hospital Problems   No resolved problems to display.         SERVICE: Hospitalist    CONSULTS:   Consult Orders (all) (From admission, onward)       Start     Ordered    23 2242  Inpatient Case Management  Consult  Once,   Status:  Canceled        Provider:  (Not yet assigned)    23 2241    23 1550  Hospitalist (on-call MD unless specified)  Once,   Status:  Canceled        Specialty:  Hospitalist  Provider:  Chuy Greco MD    23 1549                    PROCEDURES:   None    HISTORY OF PRESENT ILLNESS:   Per the H&P written by Dr. Greco, dated 23:  \"Isra Davenport is a 68 y.o. male ith a CMH of Afib, lung cancer, metastasis to the bone and brain, undergoing chemotherapy, who presented to [facility] on 2023 with  respiratory failure.     Patient was not awake at the time of interview, but per sign out, patient was found to be altered during his chemotherapy therapy infusion earlier today.  He was found to be hypoxic and due to this was brought to the " "ED.  When arrived at the ED, was given antibiotics and started on BIPAP therapy.     Speaking with family, the patient most did not want drastic interventions and did not want to be kept alive by artificial means.  A long discussion was had with family at bedside explaining different options for care including treatment for his respiratory failure and comfort measures.  Given the degree of illness it was settled that the patient would have wanted to be placed on comfort measures at this time.\"    PHYSICAL EXAM:  Physical Exam  Constitutional:       Comments: Not Responsive   Cardiovascular:      Comments: No pulses  Pulmonary:      Comments: No respirations        DIAGNOSTIC DATA:   Lab Results (last 72 hours)       Procedure Component Value Units Date/Time    Blood Culture - Blood, Arm, Right [[id number]] Collected: 08/01/23 1630    Specimen: Blood from Arm, Right Updated: 08/01/23 1634    Blood Culture - Blood, Arm, Left [[id number]] Collected: 08/01/23 1600    Specimen: Blood from Arm, Left Updated: 08/01/23 1605    Blood Gas, Arterial - [[id number]]  (Abnormal) Collected: 08/01/23 1419    Specimen: Arterial Blood Updated: 08/01/23 1551     Site Right Radial     Alvin's Test Positive     pH, Arterial 7.383 pH units      pCO2, Arterial 79.1 mm Hg      pO2, Arterial 75.0 mm Hg      HCO3, Arterial 47.1 mmol/L      Base Excess, Arterial 19.2 mmol/L      Comment: Serial Number: [id number]:  [id number]        O2 Saturation, Arterial 93.5 %      CO2 Content 49.6 mmol/L      Barometric Pressure for Blood Gas --     Comment: N/A        Modality Cannula     FIO2 33 %      Hemodilution No    Comprehensive Metabolic Panel [[id number]]  (Abnormal) Collected: 08/01/23 1408    Specimen: Blood Updated: 08/01/23 1531     Glucose 141 mg/dL      BUN 23 mg/dL      Creatinine 0.30 mg/dL      Sodium 142 mmol/L      Potassium 3.8 mmol/L      Chloride 101 mmol/L      CO2 35.0 mmol/L      Calcium 7.7 mg/dL      Total Protein 5.8 g/dL      " Albumin 1.9 g/dL      ALT (SGPT) 48 U/L      AST (SGOT) 34 U/L      Alkaline Phosphatase 141 U/L      Total Bilirubin <0.2 mg/dL      Globulin 3.9 gm/dL      A/G Ratio 0.5 g/dL      BUN/Creatinine Ratio 76.7     Anion Gap 6.0 mmol/L      eGFR 130.4 mL/min/1.73     Narrative:      GFR Normal >60  Chronic Kidney Disease <60  Kidney Failure <15      Single High Sensitivity Troponin T [[id number]]  (Abnormal) Collected: 08/01/23 1408    Specimen: Blood Updated: 08/01/23 1525     HS Troponin T 31 ng/L     Narrative:      High Sensitive Troponin T Reference Range:  <10.0 ng/L- Negative Female for AMI  <15.0 ng/L- Negative Male for AMI  >=10 - Abnormal Female indicating possible myocardial injury.  >=15 - Abnormal Male indicating possible myocardial injury.   Clinicians would have to utilize clinical acumen, EKG, Troponin, and serial changes to determine if it is an Acute Myocardial Infarction or myocardial injury due to an underlying chronic condition.         Urinalysis With Culture If Indicated - Urine, Clean Catch [[id number]]  (Abnormal) Collected: 08/01/23 1412    Specimen: Urine, Clean Catch Updated: 08/01/23 1453     Color, UA Yellow     Appearance, UA Clear     pH, UA 5.5     Specific Gravity, UA 1.019     Glucose,  mg/dL (Trace)     Ketones, UA Negative     Bilirubin, UA Negative     Blood, UA Negative     Protein, UA Trace     Leuk Esterase, UA Trace     Nitrite, UA Negative     Urobilinogen, UA 1.0 E.U./dL    Narrative:      In absence of clinical symptoms, the presence of pyuria, bacteria, and/or nitrites on the urinalysis result does not correlate with infection.    Urinalysis, Microscopic Only - Urine, Clean Catch [[id number]]  (Abnormal) Collected: 08/01/23 1412    Specimen: Urine, Clean Catch Updated: 08/01/23 1453     RBC, UA None Seen /HPF      WBC, UA 0-2 /HPF      Comment: Urine culture not indicated.        Bacteria, UA None Seen /HPF      Squamous Epithelial Cells, UA 0-2 /HPF      Hyaline Casts,  UA 7-12 /LPF      Mucus, UA Trace /HPF      Methodology Manual Light Microscopy    aPTT [[id number]]  (Abnormal) Collected: 08/01/23 1408    Specimen: Blood Updated: 08/01/23 1426     PTT 25.1 seconds     Protime-INR [[id number]]  (Abnormal) Collected: 08/01/23 1408    Specimen: Blood Updated: 08/01/23 1426     Protime 12.0 Seconds      INR 1.13    CBC & Differential [[id number]]  (Abnormal) Collected: 08/01/23 1408    Specimen: Blood Updated: 08/01/23 1415    Narrative:      The following orders were created for panel order CBC & Differential.  Procedure                               Abnormality         Status                     ---------                               -----------         ------                     CBC Auto Differential[[id number]]        Abnormal            Final result                 Please view results for these tests on the individual orders.    CBC Auto Differential [[id number]]  (Abnormal) Collected: 08/01/23 1408    Specimen: Blood Updated: 08/01/23 1415     WBC 12.50 10*3/mm3      RBC 3.07 10*6/mm3      Hemoglobin 7.9 g/dL      Hematocrit 26.2 %      MCV 85.2 fL      MCH 25.7 pg      MCHC 30.2 g/dL      RDW 20.6 %      RDW-SD 64.8 fl      MPV 7.8 fL      Platelets 310 10*3/mm3      Neutrophil % 90.9 %      Lymphocyte % 5.1 %      Monocyte % 4.0 %      Eosinophil % 0.0 %      Basophil % 0.0 %      Neutrophils, Absolute 11.40 10*3/mm3      Lymphocytes, Absolute 0.60 10*3/mm3      Monocytes, Absolute 0.50 10*3/mm3      Eosinophils, Absolute 0.00 10*3/mm3      Basophils, Absolute 0.00 10*3/mm3      nRBC 0.3 /100 WBC     POC Glucose Once [[id number]]  (Abnormal) Collected: 08/01/23 1351    Specimen: Blood Updated: 08/01/23 1352     Glucose 183 mg/dL      Comment: Serial Number: [id number]:  [id number]                CT Head Without Contrast    Result Date: 8/1/2023  No acute intracranial hemorrhage 2. Area of low-attenuation within the left parietal white matter corresponding to metastatic  lesion noted on prior MRI. Findings are better seen on prior MRI Electronically Signed: Sanju Tonia  2023 2:49 PM EDT  Workstation ID: [id number]    XR Chest 1 View    Result Date: 2023  Impression: 1. Known perihilar mass is obscured by overlying airspace opacity. Findings may represent atelectasis, edema and/or pneumonia. Overall findings are aeration on the left is slightly improved Electronically Signed: Sanju Garciakin  2023 3:02 PM EDT  Workstation ID: [id number]      HOSPITAL COURSE:  As mentioned in the HPI, a discussion was had with family.  Morphine was provided ATC for pain and air hunger, Ativan for anxiety, and scopolamine for secretions.  Labs and most home medications were discontinued.  Bipap was discontinued.  Care was made to assure the patient did not appear to be in distress.  Next morning, staff was notified that the patient was no longer breathing and at 0435 was pronounced dead by nursing.    DISCHARGE CONDITION:     DISPOSITION: DISCHARGED AS     DISCHARGE MEDICATIONS: N/A DISCHARGED AS     INSTRUCTIONS: N/A DISCHARGED AS     FOLLOW UP: N/A DISCHARGED AS     PENDING TEST RESULTS AT DISCHARGE  Pending Labs       Order Current Status    Blood Culture - Blood, Arm, Left In process    Blood Culture - Blood, Arm, Right In process            Time: >30 minutes were spent in discharge planning, medication reconciliation and coordination of care for this patient.      Chuy Greco M.D.    This document has been electronically signed by Chuy Greco MD on 2023 08:29 EDT

## 2023-08-02 NOTE — NURSING NOTE
Patient passed at 0410 with family at bedside. MD, House Supervisor, ,  and Security notified. Family requested [facility] in [place] ([phone]) be notified. BRYAN notified and stated patient potential donor but needed  release.  called and spoke with David, patient's body released. BRYAN called and notified of  release and stated they were contacting the family. BRYAN asked that body not be released to  Home and was informed that  home had not been contacted at this time and requested  home not to be contacted until they spoke with family.

## 2023-08-02 NOTE — CASE MANAGEMENT/SOCIAL WORK
Case Management Discharge Note      Final Note:                 Final Discharge Disposition Code: 41 -  in medical facility

## 2023-08-02 NOTE — PLAN OF CARE
Goal Outcome Evaluation:      Pt arrived to floor from ED under comfort measures. Family at bedside.

## 2023-08-02 NOTE — NURSING NOTE
House Supervisor made aware of  release of patient and BRYAN's status on patient being a possible donor. Patient will be sent to the [facility].

## 2023-08-03 ENCOUNTER — HOSPITAL ENCOUNTER (OUTPATIENT)
Dept: RADIATION ONCOLOGY | Facility: HOSPITAL | Age: 68
Discharge: HOME | End: 2023-08-03
Payer: MEDICARE

## 2023-08-03 LAB
RAD ONC ARIA COURSE END DATE: NORMAL
RAD ONC ARIA COURSE ID: NORMAL
RAD ONC ARIA COURSE INTENT: NORMAL
RAD ONC ARIA COURSE LAST TREATMENT DATE: NORMAL
RAD ONC ARIA COURSE START DATE: NORMAL
RAD ONC ARIA COURSE TREATMENT ELAPSED DAYS: 19
RAD ONC ARIA FIRST TREATMENT DATE: NORMAL
RAD ONC ARIA PLAN FRACTIONS TREATED TO DATE: 8
RAD ONC ARIA PLAN FRACTIONS TREATED TO DATE: 8
RAD ONC ARIA PLAN ID: NORMAL
RAD ONC ARIA PLAN ID: NORMAL
RAD ONC ARIA PLAN NAME: NORMAL
RAD ONC ARIA PLAN NAME: NORMAL
RAD ONC ARIA PLAN PRESCRIBED DOSE PER FRACTION: 1.5 GY
RAD ONC ARIA PLAN PRESCRIBED DOSE PER FRACTION: 1.5 GY
RAD ONC ARIA PLAN PRIMARY REFERENCE POINT: NORMAL
RAD ONC ARIA PLAN PRIMARY REFERENCE POINT: NORMAL
RAD ONC ARIA PLAN TOTAL FRACTIONS PRESCRIBED: 10
RAD ONC ARIA PLAN TOTAL FRACTIONS PRESCRIBED: 10
RAD ONC ARIA PLAN TOTAL PRESCRIBED DOSE: 1500 CGY
RAD ONC ARIA PLAN TOTAL PRESCRIBED DOSE: 1500 CGY
RAD ONC ARIA REFERENCE POINT DOSAGE GIVEN TO DATE: 24 GY
RAD ONC ARIA REFERENCE POINT ID: NORMAL

## 2023-08-06 LAB
BACTERIA SPEC AEROBE CULT: NORMAL
BACTERIA SPEC AEROBE CULT: NORMAL